# Patient Record
Sex: FEMALE | Race: WHITE | Employment: OTHER | URBAN - METROPOLITAN AREA
[De-identification: names, ages, dates, MRNs, and addresses within clinical notes are randomized per-mention and may not be internally consistent; named-entity substitution may affect disease eponyms.]

---

## 2017-01-11 RX ORDER — HYDROMORPHONE HYDROCHLORIDE 2 MG/1
2 TABLET ORAL
Qty: 100 TABLET | Refills: 0 | Status: SHIPPED | OUTPATIENT
Start: 2017-01-11 | End: 2017-02-20 | Stop reason: SDUPTHER

## 2017-01-11 RX ORDER — AMOXICILLIN AND CLAVULANATE POTASSIUM 875; 125 MG/1; MG/1
1 TABLET, FILM COATED ORAL 2 TIMES DAILY
Qty: 20 TABLET | Refills: 0 | Status: SHIPPED | OUTPATIENT
Start: 2017-01-11 | End: 2017-01-21

## 2017-01-23 RX ORDER — DICLOFENAC SODIUM AND MISOPROSTOL 75; 200 MG/1; UG/1
TABLET, DELAYED RELEASE ORAL
Qty: 60 TABLET | Refills: 0 | Status: SHIPPED | OUTPATIENT
Start: 2017-01-23 | End: 2017-01-26 | Stop reason: SDUPTHER

## 2017-01-26 RX ORDER — DICLOFENAC SODIUM AND MISOPROSTOL 75; 200 MG/1; UG/1
TABLET, DELAYED RELEASE ORAL
Qty: 60 TABLET | Refills: 3 | Status: SHIPPED | OUTPATIENT
Start: 2017-01-26 | End: 2017-04-07 | Stop reason: SDUPTHER

## 2017-02-17 RX ORDER — ATENOLOL 100 MG/1
TABLET ORAL
Qty: 90 TABLET | Refills: 0 | Status: SHIPPED | OUTPATIENT
Start: 2017-02-17 | End: 2017-05-25 | Stop reason: ALTCHOICE

## 2017-02-21 RX ORDER — HYDROMORPHONE HYDROCHLORIDE 2 MG/1
2 TABLET ORAL
Qty: 100 TABLET | Refills: 0 | Status: SHIPPED | OUTPATIENT
Start: 2017-02-21 | End: 2017-06-08 | Stop reason: SDUPTHER

## 2017-02-21 RX ORDER — CEPHALEXIN 500 MG/1
500 CAPSULE ORAL 3 TIMES DAILY
Qty: 30 CAPSULE | Refills: 0 | Status: SHIPPED | OUTPATIENT
Start: 2017-02-21 | End: 2017-03-30 | Stop reason: SDUPTHER

## 2017-02-21 RX ORDER — OMEPRAZOLE 40 MG/1
CAPSULE, DELAYED RELEASE ORAL
Qty: 90 CAPSULE | Refills: 0 | Status: SHIPPED | OUTPATIENT
Start: 2017-02-21 | End: 2017-04-07 | Stop reason: SDUPTHER

## 2017-02-27 RX ORDER — SITAGLIPTIN 100 MG/1
TABLET, FILM COATED ORAL
Qty: 90 TABLET | Refills: 0 | Status: SHIPPED | OUTPATIENT
Start: 2017-02-27 | End: 2017-04-07 | Stop reason: SDUPTHER

## 2017-03-06 RX ORDER — GABAPENTIN 300 MG/1
CAPSULE ORAL
Qty: 60 CAPSULE | Refills: 0 | Status: SHIPPED | OUTPATIENT
Start: 2017-03-06 | End: 2017-04-07 | Stop reason: SDUPTHER

## 2017-03-10 RX ORDER — GLYBURIDE 5 MG/1
TABLET ORAL
Qty: 60 TABLET | Refills: 3 | Status: SHIPPED | OUTPATIENT
Start: 2017-03-10 | End: 2017-04-07 | Stop reason: SDUPTHER

## 2017-03-30 ENCOUNTER — TELEPHONE (OUTPATIENT)
Dept: FAMILY MEDICINE CLINIC | Age: 62
End: 2017-03-30

## 2017-03-30 RX ORDER — CEPHALEXIN 500 MG/1
500 CAPSULE ORAL 3 TIMES DAILY
Qty: 30 CAPSULE | Refills: 0 | Status: SHIPPED | OUTPATIENT
Start: 2017-03-30 | End: 2017-04-09

## 2017-03-30 RX ORDER — FLUCONAZOLE 100 MG/1
100 TABLET ORAL DAILY
Qty: 2 TABLET | Refills: 0 | Status: ON HOLD | OUTPATIENT
Start: 2017-03-30 | End: 2017-07-03 | Stop reason: ALTCHOICE

## 2017-04-10 RX ORDER — DICLOFENAC SODIUM AND MISOPROSTOL 75; 200 MG/1; UG/1
TABLET, DELAYED RELEASE ORAL
Qty: 180 TABLET | Refills: 0 | Status: SHIPPED | OUTPATIENT
Start: 2017-04-10 | End: 2017-04-24 | Stop reason: SDUPTHER

## 2017-04-10 RX ORDER — ATENOLOL 100 MG/1
TABLET ORAL
Qty: 90 TABLET | Refills: 0 | Status: SHIPPED | OUTPATIENT
Start: 2017-04-10 | End: 2017-06-29 | Stop reason: SDUPTHER

## 2017-04-10 RX ORDER — OMEPRAZOLE 40 MG/1
CAPSULE, DELAYED RELEASE ORAL
Qty: 90 CAPSULE | Refills: 0 | Status: SHIPPED | OUTPATIENT
Start: 2017-04-10 | End: 2017-06-29 | Stop reason: SDUPTHER

## 2017-04-10 RX ORDER — AMLODIPINE BESYLATE 5 MG/1
TABLET ORAL
Qty: 90 TABLET | Refills: 0 | Status: SHIPPED | OUTPATIENT
Start: 2017-04-10 | End: 2017-05-25 | Stop reason: ALTCHOICE

## 2017-04-10 RX ORDER — GLYBURIDE 5 MG/1
TABLET ORAL
Qty: 180 TABLET | Refills: 0 | Status: SHIPPED | OUTPATIENT
Start: 2017-04-10 | End: 2017-04-24 | Stop reason: SDUPTHER

## 2017-04-10 RX ORDER — GABAPENTIN 300 MG/1
CAPSULE ORAL
Qty: 180 CAPSULE | Refills: 0 | Status: SHIPPED | OUTPATIENT
Start: 2017-04-10 | End: 2017-05-25 | Stop reason: ALTCHOICE

## 2017-04-11 RX ORDER — AMLODIPINE BESYLATE 5 MG/1
TABLET ORAL
Qty: 30 TABLET | Refills: 3 | Status: ON HOLD | OUTPATIENT
Start: 2017-04-11 | End: 2017-07-02 | Stop reason: CLARIF

## 2017-04-11 RX ORDER — GABAPENTIN 300 MG/1
CAPSULE ORAL
Qty: 60 CAPSULE | Refills: 3 | Status: SHIPPED | OUTPATIENT
Start: 2017-04-11 | End: 2017-10-16 | Stop reason: SDUPTHER

## 2017-04-24 RX ORDER — DICLOFENAC SODIUM AND MISOPROSTOL 75; 200 MG/1; UG/1
TABLET, DELAYED RELEASE ORAL
Qty: 180 TABLET | Refills: 0 | Status: ON HOLD | OUTPATIENT
Start: 2017-04-24 | End: 2017-07-03 | Stop reason: ALTCHOICE

## 2017-04-24 RX ORDER — GLYBURIDE 5 MG/1
TABLET ORAL
Qty: 180 TABLET | Refills: 0 | Status: SHIPPED | OUTPATIENT
Start: 2017-04-24 | End: 2017-08-03 | Stop reason: SDUPTHER

## 2017-05-11 RX ORDER — CEPHALEXIN 500 MG/1
500 CAPSULE ORAL 3 TIMES DAILY
Qty: 30 CAPSULE | Refills: 0 | Status: SHIPPED | OUTPATIENT
Start: 2017-05-11 | End: 2017-05-25 | Stop reason: ALTCHOICE

## 2017-05-25 ENCOUNTER — OFFICE VISIT (OUTPATIENT)
Dept: FAMILY MEDICINE CLINIC | Age: 62
End: 2017-05-25
Payer: COMMERCIAL

## 2017-05-25 VITALS
RESPIRATION RATE: 16 BRPM | WEIGHT: 293 LBS | HEART RATE: 72 BPM | SYSTOLIC BLOOD PRESSURE: 140 MMHG | BODY MASS INDEX: 58.32 KG/M2 | DIASTOLIC BLOOD PRESSURE: 80 MMHG | OXYGEN SATURATION: 98 %

## 2017-05-25 DIAGNOSIS — L03.116 CELLULITIS OF LEFT LOWER EXTREMITY: Primary | ICD-10-CM

## 2017-05-25 DIAGNOSIS — I10 ESSENTIAL HYPERTENSION: ICD-10-CM

## 2017-05-25 DIAGNOSIS — R60.0 LOCALIZED EDEMA: ICD-10-CM

## 2017-05-25 PROCEDURE — 99213 OFFICE O/P EST LOW 20 MIN: CPT | Performed by: FAMILY MEDICINE

## 2017-05-25 RX ORDER — LISINOPRIL AND HYDROCHLOROTHIAZIDE 25; 20 MG/1; MG/1
1 TABLET ORAL DAILY
Qty: 30 TABLET | Refills: 3 | Status: ON HOLD | OUTPATIENT
Start: 2017-05-25 | End: 2017-07-02

## 2017-05-25 RX ORDER — DOXYCYCLINE HYCLATE 100 MG
100 TABLET ORAL 2 TIMES DAILY
Qty: 30 TABLET | Refills: 0 | Status: SHIPPED | OUTPATIENT
Start: 2017-05-25 | End: 2017-06-06 | Stop reason: SDUPTHER

## 2017-06-05 ENCOUNTER — TELEPHONE (OUTPATIENT)
Dept: FAMILY MEDICINE CLINIC | Age: 62
End: 2017-06-05

## 2017-06-06 RX ORDER — DOXYCYCLINE HYCLATE 100 MG
100 TABLET ORAL 2 TIMES DAILY
Qty: 30 TABLET | Refills: 0 | Status: SHIPPED | OUTPATIENT
Start: 2017-06-06 | End: 2017-06-23 | Stop reason: SDUPTHER

## 2017-06-08 RX ORDER — METHOCARBAMOL 750 MG/1
TABLET, FILM COATED ORAL
Qty: 60 TABLET | Refills: 3 | Status: SHIPPED | OUTPATIENT
Start: 2017-06-08 | End: 2017-12-06 | Stop reason: SDUPTHER

## 2017-06-08 RX ORDER — HYDROMORPHONE HYDROCHLORIDE 2 MG/1
2 TABLET ORAL
Qty: 100 TABLET | Refills: 0 | Status: SHIPPED | OUTPATIENT
Start: 2017-06-08 | End: 2017-08-15 | Stop reason: SDUPTHER

## 2017-06-19 RX ORDER — SITAGLIPTIN 100 MG/1
TABLET, FILM COATED ORAL
Qty: 90 TABLET | Refills: 0 | Status: SHIPPED | OUTPATIENT
Start: 2017-06-19 | End: 2017-08-03 | Stop reason: SDUPTHER

## 2017-06-23 RX ORDER — DOXYCYCLINE HYCLATE 100 MG
100 TABLET ORAL 2 TIMES DAILY
Qty: 30 TABLET | Refills: 0 | Status: SHIPPED | OUTPATIENT
Start: 2017-06-23 | End: 2017-07-08

## 2017-06-26 ENCOUNTER — TELEPHONE (OUTPATIENT)
Dept: FAMILY MEDICINE CLINIC | Age: 62
End: 2017-06-26

## 2017-06-26 RX ORDER — HYDROCHLOROTHIAZIDE 25 MG/1
25 TABLET ORAL DAILY
Qty: 30 TABLET | Refills: 3 | Status: SHIPPED | OUTPATIENT
Start: 2017-06-26 | End: 2017-08-11 | Stop reason: SDUPTHER

## 2017-06-29 RX ORDER — GABAPENTIN 300 MG/1
CAPSULE ORAL
Qty: 180 CAPSULE | Refills: 0 | Status: SHIPPED | OUTPATIENT
Start: 2017-06-29 | End: 2017-10-16 | Stop reason: SDUPTHER

## 2017-06-29 RX ORDER — AMLODIPINE BESYLATE 5 MG/1
TABLET ORAL
Qty: 90 TABLET | Refills: 0 | Status: ON HOLD | OUTPATIENT
Start: 2017-06-29 | End: 2017-07-02 | Stop reason: CLARIF

## 2017-06-29 RX ORDER — OMEPRAZOLE 40 MG/1
CAPSULE, DELAYED RELEASE ORAL
Qty: 90 CAPSULE | Refills: 0 | Status: SHIPPED | OUTPATIENT
Start: 2017-06-29 | End: 2017-08-03 | Stop reason: SDUPTHER

## 2017-06-29 RX ORDER — ATENOLOL 100 MG/1
TABLET ORAL
Qty: 90 TABLET | Refills: 0 | Status: ON HOLD | OUTPATIENT
Start: 2017-06-29 | End: 2017-07-05

## 2017-07-02 ENCOUNTER — APPOINTMENT (OUTPATIENT)
Dept: GENERAL RADIOLOGY | Age: 62
DRG: 872 | End: 2017-07-02
Payer: COMMERCIAL

## 2017-07-02 ENCOUNTER — APPOINTMENT (OUTPATIENT)
Dept: CT IMAGING | Age: 62
DRG: 872 | End: 2017-07-02
Payer: COMMERCIAL

## 2017-07-02 ENCOUNTER — HOSPITAL ENCOUNTER (INPATIENT)
Age: 62
LOS: 3 days | Discharge: HOME | DRG: 872 | End: 2017-07-05
Attending: EMERGENCY MEDICINE | Admitting: INTERNAL MEDICINE
Payer: COMMERCIAL

## 2017-07-02 DIAGNOSIS — N30.01 ACUTE CYSTITIS WITH HEMATURIA: ICD-10-CM

## 2017-07-02 DIAGNOSIS — D72.828 GRANULOCYTOSIS: ICD-10-CM

## 2017-07-02 DIAGNOSIS — N28.9 RENAL INSUFFICIENCY: ICD-10-CM

## 2017-07-02 DIAGNOSIS — R10.84 GENERALIZED ABDOMINAL PAIN: ICD-10-CM

## 2017-07-02 DIAGNOSIS — R11.11 VOMITING WITHOUT NAUSEA, INTRACTABILITY OF VOMITING NOT SPECIFIED, UNSPECIFIED VOMITING TYPE: Primary | ICD-10-CM

## 2017-07-02 DIAGNOSIS — R65.10 SIRS (SYSTEMIC INFLAMMATORY RESPONSE SYNDROME) (HCC): ICD-10-CM

## 2017-07-02 DIAGNOSIS — N20.0 NEPHROLITHIASIS: ICD-10-CM

## 2017-07-02 PROBLEM — N30.00 ACUTE CYSTITIS: Status: ACTIVE | Noted: 2017-07-02

## 2017-07-02 PROBLEM — R11.2 NAUSEA AND VOMITING: Status: ACTIVE | Noted: 2017-07-02

## 2017-07-02 LAB
% CKMB: 1.7 % (ref 0–3)
-: ABNORMAL
ABSOLUTE EOS #: 0 K/UL (ref 0–0.4)
ABSOLUTE EOS #: 0.21 K/UL (ref 0–0.4)
ABSOLUTE LYMPH #: 0.62 K/UL (ref 1–4.8)
ABSOLUTE LYMPH #: 0.74 K/UL (ref 1–4.8)
ABSOLUTE MONO #: 0.83 K/UL (ref 0.2–0.8)
ABSOLUTE MONO #: 0.98 K/UL (ref 0.2–0.8)
ALBUMIN SERPL-MCNC: 4.1 G/DL (ref 3.5–5.2)
ALBUMIN/GLOBULIN RATIO: NORMAL (ref 1–2.5)
ALP BLD-CCNC: 75 U/L (ref 35–104)
ALT SERPL-CCNC: 21 U/L (ref 5–33)
AMORPHOUS: ABNORMAL
AMYLASE: 36 U/L (ref 28–100)
ANION GAP SERPL CALCULATED.3IONS-SCNC: 12 MMOL/L (ref 9–17)
ANION GAP SERPL CALCULATED.3IONS-SCNC: 16 MMOL/L (ref 9–17)
AST SERPL-CCNC: 20 U/L
BACTERIA: ABNORMAL
BASOPHILS # BLD: 0 %
BASOPHILS # BLD: 2 %
BASOPHILS ABSOLUTE: 0 K/UL (ref 0–0.2)
BASOPHILS ABSOLUTE: 0.49 K/UL (ref 0–0.2)
BILIRUB SERPL-MCNC: 0.47 MG/DL (ref 0.3–1.2)
BILIRUBIN DIRECT: 0.13 MG/DL
BILIRUBIN URINE: NEGATIVE
BILIRUBIN, INDIRECT: 0.34 MG/DL (ref 0–1)
BUN BLDV-MCNC: 26 MG/DL (ref 8–23)
BUN BLDV-MCNC: 28 MG/DL (ref 8–23)
BUN/CREAT BLD: 19 (ref 9–20)
BUN/CREAT BLD: 19 (ref 9–20)
CALCIUM SERPL-MCNC: 8.5 MG/DL (ref 8.6–10.4)
CALCIUM SERPL-MCNC: 9.5 MG/DL (ref 8.6–10.4)
CASTS UA: ABNORMAL /LPF
CHLORIDE BLD-SCNC: 102 MMOL/L (ref 98–107)
CHLORIDE BLD-SCNC: 104 MMOL/L (ref 98–107)
CK MB: <1 NG/ML
CKMB INTERPRETATION: NORMAL
CO2: 21 MMOL/L (ref 20–31)
CO2: 23 MMOL/L (ref 20–31)
COLOR: YELLOW
COMMENT UA: ABNORMAL
CREAT SERPL-MCNC: 1.39 MG/DL (ref 0.5–0.9)
CREAT SERPL-MCNC: 1.46 MG/DL (ref 0.5–0.9)
CRYSTALS, UA: ABNORMAL /HPF
DIFFERENTIAL TYPE: ABNORMAL
DIFFERENTIAL TYPE: ABNORMAL
EOSINOPHILS RELATIVE PERCENT: 0 %
EOSINOPHILS RELATIVE PERCENT: 1 %
EPITHELIAL CELLS UA: ABNORMAL /HPF
GFR AFRICAN AMERICAN: 44 ML/MIN
GFR AFRICAN AMERICAN: 46 ML/MIN
GFR NON-AFRICAN AMERICAN: 36 ML/MIN
GFR NON-AFRICAN AMERICAN: 38 ML/MIN
GFR SERPL CREATININE-BSD FRML MDRD: ABNORMAL ML/MIN/{1.73_M2}
GLOBULIN: NORMAL G/DL (ref 1.5–3.8)
GLUCOSE BLD-MCNC: 146 MG/DL (ref 65–105)
GLUCOSE BLD-MCNC: 156 MG/DL (ref 65–105)
GLUCOSE BLD-MCNC: 163 MG/DL (ref 65–105)
GLUCOSE BLD-MCNC: 192 MG/DL (ref 65–105)
GLUCOSE BLD-MCNC: 197 MG/DL (ref 70–99)
GLUCOSE BLD-MCNC: 220 MG/DL (ref 65–105)
GLUCOSE BLD-MCNC: 234 MG/DL (ref 70–99)
GLUCOSE URINE: NEGATIVE
HCT VFR BLD CALC: 35.2 % (ref 36–46)
HCT VFR BLD CALC: 41.7 % (ref 36–46)
HEMOGLOBIN: 12.2 G/DL (ref 12–16)
HEMOGLOBIN: 13.7 G/DL (ref 12–16)
KETONES, URINE: NEGATIVE
LACTIC ACID, WHOLE BLOOD: NORMAL MMOL/L (ref 0.7–2.1)
LACTIC ACID: 1.3 MMOL/L (ref 0.5–2.2)
LACTIC ACID: 2 MMOL/L (ref 0.5–2.2)
LEUKOCYTE ESTERASE, URINE: ABNORMAL
LIPASE: 21 U/L (ref 13–60)
LYMPHOCYTES # BLD: 3 %
LYMPHOCYTES # BLD: 3 %
MCH RBC QN AUTO: 28.8 PG (ref 26–34)
MCH RBC QN AUTO: 29.8 PG (ref 26–34)
MCHC RBC AUTO-ENTMCNC: 32.8 G/DL (ref 31–37)
MCHC RBC AUTO-ENTMCNC: 34.6 G/DL (ref 31–37)
MCV RBC AUTO: 86.1 FL (ref 80–100)
MCV RBC AUTO: 87.8 FL (ref 80–100)
MONOCYTES # BLD: 4 %
MONOCYTES # BLD: 4 %
MORPHOLOGY: ABNORMAL
MUCUS: ABNORMAL
MYOGLOBIN: 87 NG/ML (ref 25–58)
NITRITE, URINE: NEGATIVE
OTHER OBSERVATIONS UA: ABNORMAL
PDW BLD-RTO: 13.5 % (ref 11.5–14.5)
PDW BLD-RTO: 14.5 % (ref 11.5–14.5)
PH UA: 5.5 (ref 5–8)
PLATELET # BLD: 201 K/UL (ref 130–400)
PLATELET # BLD: 212 K/UL (ref 130–400)
PLATELET ESTIMATE: ABNORMAL
PLATELET ESTIMATE: ABNORMAL
PMV BLD AUTO: 8 FL (ref 6–12)
PMV BLD AUTO: ABNORMAL FL (ref 6–12)
POTASSIUM SERPL-SCNC: 4.2 MMOL/L (ref 3.7–5.3)
POTASSIUM SERPL-SCNC: 4.2 MMOL/L (ref 3.7–5.3)
PROTEIN UA: NEGATIVE
RBC # BLD: 4.09 M/UL (ref 4–5.2)
RBC # BLD: 4.75 M/UL (ref 4–5.2)
RBC # BLD: ABNORMAL 10*6/UL
RBC # BLD: ABNORMAL 10*6/UL
RBC UA: ABNORMAL /HPF (ref 0–2)
RENAL EPITHELIAL, UA: ABNORMAL /HPF
SEG NEUTROPHILS: 91 %
SEG NEUTROPHILS: 92 %
SEGMENTED NEUTROPHILS ABSOLUTE COUNT: 19.14 K/UL (ref 1.8–7.7)
SEGMENTED NEUTROPHILS ABSOLUTE COUNT: 22.39 K/UL (ref 1.8–7.7)
SODIUM BLD-SCNC: 139 MMOL/L (ref 135–144)
SODIUM BLD-SCNC: 139 MMOL/L (ref 135–144)
SPECIFIC GRAVITY UA: 1.02 (ref 1–1.03)
TOTAL CK: 59 U/L (ref 26–192)
TOTAL PROTEIN: 7.7 G/DL (ref 6.4–8.3)
TRICHOMONAS: ABNORMAL
TROPONIN INTERP: ABNORMAL
TROPONIN T: <0.03 NG/ML
TURBIDITY: ABNORMAL
URINE HGB: ABNORMAL
UROBILINOGEN, URINE: NORMAL
WBC # BLD: 20.8 K/UL (ref 3.5–11)
WBC # BLD: 24.6 K/UL (ref 3.5–11)
WBC # BLD: ABNORMAL 10*3/UL
WBC # BLD: ABNORMAL 10*3/UL
WBC UA: ABNORMAL /HPF (ref 0–5)
YEAST: ABNORMAL

## 2017-07-02 PROCEDURE — 96375 TX/PRO/DX INJ NEW DRUG ADDON: CPT

## 2017-07-02 PROCEDURE — 87184 SC STD DISK METHOD PER PLATE: CPT

## 2017-07-02 PROCEDURE — 85025 COMPLETE CBC W/AUTO DIFF WBC: CPT

## 2017-07-02 PROCEDURE — 82150 ASSAY OF AMYLASE: CPT

## 2017-07-02 PROCEDURE — 83690 ASSAY OF LIPASE: CPT

## 2017-07-02 PROCEDURE — 6360000002 HC RX W HCPCS: Performed by: EMERGENCY MEDICINE

## 2017-07-02 PROCEDURE — 99285 EMERGENCY DEPT VISIT HI MDM: CPT

## 2017-07-02 PROCEDURE — 87077 CULTURE AEROBIC IDENTIFY: CPT

## 2017-07-02 PROCEDURE — 82553 CREATINE MB FRACTION: CPT

## 2017-07-02 PROCEDURE — 83605 ASSAY OF LACTIC ACID: CPT

## 2017-07-02 PROCEDURE — 81001 URINALYSIS AUTO W/SCOPE: CPT

## 2017-07-02 PROCEDURE — 82947 ASSAY GLUCOSE BLOOD QUANT: CPT

## 2017-07-02 PROCEDURE — 2500000003 HC RX 250 WO HCPCS: Performed by: INTERNAL MEDICINE

## 2017-07-02 PROCEDURE — 87086 URINE CULTURE/COLONY COUNT: CPT

## 2017-07-02 PROCEDURE — 2580000003 HC RX 258: Performed by: INTERNAL MEDICINE

## 2017-07-02 PROCEDURE — 93005 ELECTROCARDIOGRAM TRACING: CPT

## 2017-07-02 PROCEDURE — 84484 ASSAY OF TROPONIN QUANT: CPT

## 2017-07-02 PROCEDURE — 83874 ASSAY OF MYOGLOBIN: CPT

## 2017-07-02 PROCEDURE — 6360000002 HC RX W HCPCS: Performed by: INTERNAL MEDICINE

## 2017-07-02 PROCEDURE — 51701 INSERT BLADDER CATHETER: CPT

## 2017-07-02 PROCEDURE — 74176 CT ABD & PELVIS W/O CONTRAST: CPT

## 2017-07-02 PROCEDURE — 71010 XR CHEST PORTABLE: CPT

## 2017-07-02 PROCEDURE — 96361 HYDRATE IV INFUSION ADD-ON: CPT

## 2017-07-02 PROCEDURE — 6370000000 HC RX 637 (ALT 250 FOR IP): Performed by: INTERNAL MEDICINE

## 2017-07-02 PROCEDURE — 80048 BASIC METABOLIC PNL TOTAL CA: CPT

## 2017-07-02 PROCEDURE — 80076 HEPATIC FUNCTION PANEL: CPT

## 2017-07-02 PROCEDURE — 99223 1ST HOSP IP/OBS HIGH 75: CPT | Performed by: HOSPITALIST

## 2017-07-02 PROCEDURE — C9113 INJ PANTOPRAZOLE SODIUM, VIA: HCPCS | Performed by: EMERGENCY MEDICINE

## 2017-07-02 PROCEDURE — 2580000003 HC RX 258: Performed by: EMERGENCY MEDICINE

## 2017-07-02 PROCEDURE — 82550 ASSAY OF CK (CPK): CPT

## 2017-07-02 PROCEDURE — 36415 COLL VENOUS BLD VENIPUNCTURE: CPT

## 2017-07-02 PROCEDURE — 6370000000 HC RX 637 (ALT 250 FOR IP): Performed by: HOSPITALIST

## 2017-07-02 PROCEDURE — 2060000000 HC ICU INTERMEDIATE R&B

## 2017-07-02 PROCEDURE — 96365 THER/PROPH/DIAG IV INF INIT: CPT

## 2017-07-02 PROCEDURE — 6370000000 HC RX 637 (ALT 250 FOR IP): Performed by: EMERGENCY MEDICINE

## 2017-07-02 PROCEDURE — S0028 INJECTION, FAMOTIDINE, 20 MG: HCPCS | Performed by: INTERNAL MEDICINE

## 2017-07-02 RX ORDER — DEXTROSE MONOHYDRATE 25 G/50ML
12.5 INJECTION, SOLUTION INTRAVENOUS PRN
Status: DISCONTINUED | OUTPATIENT
Start: 2017-07-02 | End: 2017-07-05 | Stop reason: HOSPADM

## 2017-07-02 RX ORDER — GABAPENTIN 300 MG/1
300 CAPSULE ORAL 3 TIMES DAILY
Status: DISCONTINUED | OUTPATIENT
Start: 2017-07-02 | End: 2017-07-05 | Stop reason: HOSPADM

## 2017-07-02 RX ORDER — 0.9 % SODIUM CHLORIDE 0.9 %
1000 INTRAVENOUS SOLUTION INTRAVENOUS ONCE
Status: COMPLETED | OUTPATIENT
Start: 2017-07-02 | End: 2017-07-02

## 2017-07-02 RX ORDER — SODIUM CHLORIDE 0.9 % (FLUSH) 0.9 %
10 SYRINGE (ML) INJECTION EVERY 12 HOURS SCHEDULED
Status: DISCONTINUED | OUTPATIENT
Start: 2017-07-02 | End: 2017-07-05 | Stop reason: HOSPADM

## 2017-07-02 RX ORDER — HYDROMORPHONE HYDROCHLORIDE 2 MG/1
2 TABLET ORAL
Status: DISCONTINUED | OUTPATIENT
Start: 2017-07-02 | End: 2017-07-05 | Stop reason: HOSPADM

## 2017-07-02 RX ORDER — GABAPENTIN 300 MG/1
300 CAPSULE ORAL 2 TIMES DAILY
Status: DISCONTINUED | OUTPATIENT
Start: 2017-07-02 | End: 2017-07-02

## 2017-07-02 RX ORDER — DEXTROSE MONOHYDRATE 50 MG/ML
100 INJECTION, SOLUTION INTRAVENOUS PRN
Status: DISCONTINUED | OUTPATIENT
Start: 2017-07-02 | End: 2017-07-05 | Stop reason: HOSPADM

## 2017-07-02 RX ORDER — HEPARIN SODIUM 5000 [USP'U]/ML
5000 INJECTION, SOLUTION INTRAVENOUS; SUBCUTANEOUS EVERY 8 HOURS SCHEDULED
Status: DISCONTINUED | OUTPATIENT
Start: 2017-07-02 | End: 2017-07-03

## 2017-07-02 RX ORDER — METHOCARBAMOL 750 MG/1
750 TABLET, FILM COATED ORAL 3 TIMES DAILY PRN
Status: DISCONTINUED | OUTPATIENT
Start: 2017-07-02 | End: 2017-07-05 | Stop reason: HOSPADM

## 2017-07-02 RX ORDER — GLYBURIDE 5 MG/1
5 TABLET ORAL
Status: DISCONTINUED | OUTPATIENT
Start: 2017-07-03 | End: 2017-07-05 | Stop reason: HOSPADM

## 2017-07-02 RX ORDER — NICOTINE POLACRILEX 4 MG
15 LOZENGE BUCCAL PRN
Status: DISCONTINUED | OUTPATIENT
Start: 2017-07-02 | End: 2017-07-05 | Stop reason: HOSPADM

## 2017-07-02 RX ORDER — SODIUM CHLORIDE 9 MG/ML
INJECTION, SOLUTION INTRAVENOUS CONTINUOUS
Status: DISCONTINUED | OUTPATIENT
Start: 2017-07-02 | End: 2017-07-03

## 2017-07-02 RX ORDER — HYDROCHLOROTHIAZIDE 25 MG/1
25 TABLET ORAL DAILY
Status: DISCONTINUED | OUTPATIENT
Start: 2017-07-02 | End: 2017-07-02

## 2017-07-02 RX ORDER — SODIUM CHLORIDE 0.9 % (FLUSH) 0.9 %
10 SYRINGE (ML) INJECTION PRN
Status: DISCONTINUED | OUTPATIENT
Start: 2017-07-02 | End: 2017-07-05 | Stop reason: HOSPADM

## 2017-07-02 RX ORDER — 0.9 % SODIUM CHLORIDE 0.9 %
10 VIAL (ML) INJECTION ONCE
Status: COMPLETED | OUTPATIENT
Start: 2017-07-02 | End: 2017-07-02

## 2017-07-02 RX ORDER — ATENOLOL 50 MG/1
100 TABLET ORAL DAILY
Status: DISCONTINUED | OUTPATIENT
Start: 2017-07-02 | End: 2017-07-03

## 2017-07-02 RX ORDER — OXYCODONE HYDROCHLORIDE 15 MG/1
15 TABLET, FILM COATED, EXTENDED RELEASE ORAL 2 TIMES DAILY
Status: DISCONTINUED | OUTPATIENT
Start: 2017-07-02 | End: 2017-07-03

## 2017-07-02 RX ORDER — HYDROMORPHONE HCL 110MG/55ML
2 PATIENT CONTROLLED ANALGESIA SYRINGE INTRAVENOUS ONCE
Status: COMPLETED | OUTPATIENT
Start: 2017-07-02 | End: 2017-07-02

## 2017-07-02 RX ORDER — PANTOPRAZOLE SODIUM 40 MG/1
40 TABLET, DELAYED RELEASE ORAL
Status: DISCONTINUED | OUTPATIENT
Start: 2017-07-03 | End: 2017-07-04

## 2017-07-02 RX ORDER — HYDROMORPHONE HCL 110MG/55ML
2 PATIENT CONTROLLED ANALGESIA SYRINGE INTRAVENOUS
Status: DISCONTINUED | OUTPATIENT
Start: 2017-07-02 | End: 2017-07-05 | Stop reason: HOSPADM

## 2017-07-02 RX ORDER — A/C/E/ZINC/SOD SELENATE/COPPER 5000-60-30
1 TABLET ORAL DAILY
Status: DISCONTINUED | OUTPATIENT
Start: 2017-07-02 | End: 2017-07-05 | Stop reason: HOSPADM

## 2017-07-02 RX ORDER — PANTOPRAZOLE SODIUM 40 MG/10ML
40 INJECTION, POWDER, LYOPHILIZED, FOR SOLUTION INTRAVENOUS ONCE
Status: COMPLETED | OUTPATIENT
Start: 2017-07-02 | End: 2017-07-02

## 2017-07-02 RX ORDER — ACETAMINOPHEN 500 MG
500 TABLET ORAL EVERY 6 HOURS PRN
Status: DISCONTINUED | OUTPATIENT
Start: 2017-07-02 | End: 2017-07-02 | Stop reason: SDUPTHER

## 2017-07-02 RX ORDER — ACETAMINOPHEN 325 MG/1
650 TABLET ORAL EVERY 4 HOURS PRN
Status: DISCONTINUED | OUTPATIENT
Start: 2017-07-02 | End: 2017-07-05 | Stop reason: HOSPADM

## 2017-07-02 RX ORDER — ONDANSETRON 2 MG/ML
4 INJECTION INTRAMUSCULAR; INTRAVENOUS EVERY 6 HOURS PRN
Status: DISCONTINUED | OUTPATIENT
Start: 2017-07-02 | End: 2017-07-05 | Stop reason: HOSPADM

## 2017-07-02 RX ORDER — LISINOPRIL AND HYDROCHLOROTHIAZIDE 25; 20 MG/1; MG/1
1 TABLET ORAL DAILY
Status: DISCONTINUED | OUTPATIENT
Start: 2017-07-02 | End: 2017-07-02

## 2017-07-02 RX ORDER — ONDANSETRON 2 MG/ML
8 INJECTION INTRAMUSCULAR; INTRAVENOUS ONCE
Status: COMPLETED | OUTPATIENT
Start: 2017-07-02 | End: 2017-07-02

## 2017-07-02 RX ORDER — AMLODIPINE BESYLATE 5 MG/1
5 TABLET ORAL DAILY
Status: DISCONTINUED | OUTPATIENT
Start: 2017-07-02 | End: 2017-07-02

## 2017-07-02 RX ADMIN — ATENOLOL 100 MG: 50 TABLET ORAL at 08:40

## 2017-07-02 RX ADMIN — GABAPENTIN 300 MG: 300 CAPSULE ORAL at 13:30

## 2017-07-02 RX ADMIN — INSULIN LISPRO 2 UNITS: 100 INJECTION, SOLUTION INTRAVENOUS; SUBCUTANEOUS at 13:01

## 2017-07-02 RX ADMIN — LINAGLIPTIN 5 MG: 5 TABLET, FILM COATED ORAL at 16:32

## 2017-07-02 RX ADMIN — Medication 10 ML: at 00:57

## 2017-07-02 RX ADMIN — HEPARIN SODIUM 5000 UNITS: 5000 INJECTION, SOLUTION INTRAVENOUS; SUBCUTANEOUS at 06:30

## 2017-07-02 RX ADMIN — HEPARIN SODIUM 5000 UNITS: 5000 INJECTION, SOLUTION INTRAVENOUS; SUBCUTANEOUS at 21:06

## 2017-07-02 RX ADMIN — SODIUM CHLORIDE: 9 INJECTION, SOLUTION INTRAVENOUS at 14:59

## 2017-07-02 RX ADMIN — INSULIN LISPRO 2 UNITS: 100 INJECTION, SOLUTION INTRAVENOUS; SUBCUTANEOUS at 09:30

## 2017-07-02 RX ADMIN — SODIUM CHLORIDE 1000 ML: 9 INJECTION, SOLUTION INTRAVENOUS at 00:58

## 2017-07-02 RX ADMIN — ACETAMINOPHEN 650 MG: 325 TABLET ORAL at 14:49

## 2017-07-02 RX ADMIN — Medication 1 TABLET: at 16:32

## 2017-07-02 RX ADMIN — INSULIN LISPRO 1 UNITS: 100 INJECTION, SOLUTION INTRAVENOUS; SUBCUTANEOUS at 21:06

## 2017-07-02 RX ADMIN — GABAPENTIN 300 MG: 300 CAPSULE ORAL at 08:40

## 2017-07-02 RX ADMIN — FAMOTIDINE 20 MG: 10 INJECTION INTRAVENOUS at 08:40

## 2017-07-02 RX ADMIN — FAMOTIDINE 20 MG: 10 INJECTION INTRAVENOUS at 21:06

## 2017-07-02 RX ADMIN — HYDROCHLOROTHIAZIDE 25 MG: 25 TABLET ORAL at 13:30

## 2017-07-02 RX ADMIN — CEFEPIME HYDROCHLORIDE 2 G: 2 INJECTION, POWDER, FOR SOLUTION INTRAVENOUS at 13:08

## 2017-07-02 RX ADMIN — SODIUM CHLORIDE: 9 INJECTION, SOLUTION INTRAVENOUS at 04:12

## 2017-07-02 RX ADMIN — GABAPENTIN 300 MG: 300 CAPSULE ORAL at 21:06

## 2017-07-02 RX ADMIN — CEFEPIME HYDROCHLORIDE 2 G: 2 INJECTION, POWDER, FOR SOLUTION INTRAVENOUS at 02:07

## 2017-07-02 RX ADMIN — INSULIN LISPRO 2 UNITS: 100 INJECTION, SOLUTION INTRAVENOUS; SUBCUTANEOUS at 17:38

## 2017-07-02 RX ADMIN — IBUPROFEN 600 MG: 100 SUSPENSION ORAL at 01:33

## 2017-07-02 RX ADMIN — HEPARIN SODIUM 5000 UNITS: 5000 INJECTION, SOLUTION INTRAVENOUS; SUBCUTANEOUS at 13:30

## 2017-07-02 RX ADMIN — HYDROMORPHONE HYDROCHLORIDE 2 MG: 2 INJECTION, SOLUTION INTRAMUSCULAR; INTRAVENOUS; SUBCUTANEOUS at 01:33

## 2017-07-02 RX ADMIN — PANTOPRAZOLE SODIUM 40 MG: 40 INJECTION, POWDER, FOR SOLUTION INTRAVENOUS at 00:57

## 2017-07-02 RX ADMIN — ONDANSETRON 8 MG: 2 INJECTION INTRAMUSCULAR; INTRAVENOUS at 00:57

## 2017-07-02 RX ADMIN — ACETAMINOPHEN 650 MG: 325 TABLET ORAL at 03:23

## 2017-07-02 ASSESSMENT — ENCOUNTER SYMPTOMS
CONSTIPATION: 0
BLOOD IN STOOL: 0
VOMITING: 1
DIARRHEA: 0
NAUSEA: 1
RESPIRATORY NEGATIVE: 1
ABDOMINAL PAIN: 1
ANAL BLEEDING: 0

## 2017-07-02 ASSESSMENT — PAIN SCALES - GENERAL
PAINLEVEL_OUTOF10: 10
PAINLEVEL_OUTOF10: 5

## 2017-07-03 LAB
ABSOLUTE EOS #: 0.1 K/UL (ref 0–0.4)
ABSOLUTE LYMPH #: 1 K/UL (ref 1–4.8)
ABSOLUTE MONO #: 0.9 K/UL (ref 0.2–0.8)
ANION GAP SERPL CALCULATED.3IONS-SCNC: 10 MMOL/L (ref 9–17)
BASOPHILS # BLD: 0 %
BASOPHILS ABSOLUTE: 0 K/UL (ref 0–0.2)
BUN BLDV-MCNC: 21 MG/DL (ref 8–23)
BUN/CREAT BLD: 19 (ref 9–20)
CALCIUM SERPL-MCNC: 8.5 MG/DL (ref 8.6–10.4)
CHLORIDE BLD-SCNC: 105 MMOL/L (ref 98–107)
CO2: 24 MMOL/L (ref 20–31)
CREAT SERPL-MCNC: 1.11 MG/DL (ref 0.5–0.9)
DIFFERENTIAL TYPE: ABNORMAL
EKG ATRIAL RATE: 97 BPM
EKG P AXIS: 2 DEGREES
EKG P-R INTERVAL: 174 MS
EKG Q-T INTERVAL: 370 MS
EKG QRS DURATION: 100 MS
EKG QTC CALCULATION (BAZETT): 469 MS
EKG R AXIS: -52 DEGREES
EKG T AXIS: 58 DEGREES
EKG VENTRICULAR RATE: 97 BPM
EOSINOPHILS RELATIVE PERCENT: 1 %
GFR AFRICAN AMERICAN: 60 ML/MIN
GFR NON-AFRICAN AMERICAN: 49 ML/MIN
GFR SERPL CREATININE-BSD FRML MDRD: ABNORMAL ML/MIN/{1.73_M2}
GFR SERPL CREATININE-BSD FRML MDRD: ABNORMAL ML/MIN/{1.73_M2}
GLUCOSE BLD-MCNC: 132 MG/DL (ref 65–105)
GLUCOSE BLD-MCNC: 137 MG/DL (ref 65–105)
GLUCOSE BLD-MCNC: 146 MG/DL (ref 70–99)
GLUCOSE BLD-MCNC: 165 MG/DL (ref 65–105)
GLUCOSE BLD-MCNC: 198 MG/DL (ref 65–105)
HCT VFR BLD CALC: 32.8 % (ref 36–46)
HCT VFR BLD CALC: 35.1 % (ref 36–46)
HEMOGLOBIN: 11 G/DL (ref 12–16)
HEMOGLOBIN: 11.8 G/DL (ref 12–16)
LACTIC ACID, WHOLE BLOOD: NORMAL MMOL/L (ref 0.7–2.1)
LACTIC ACID: 0.7 MMOL/L (ref 0.5–2.2)
LV EF: 65 %
LVEF MODALITY: NORMAL
LYMPHOCYTES # BLD: 10 %
MCH RBC QN AUTO: 29.4 PG (ref 26–34)
MCH RBC QN AUTO: 29.5 PG (ref 26–34)
MCHC RBC AUTO-ENTMCNC: 33.5 G/DL (ref 31–37)
MCHC RBC AUTO-ENTMCNC: 33.5 G/DL (ref 31–37)
MCV RBC AUTO: 87.8 FL (ref 80–100)
MCV RBC AUTO: 87.9 FL (ref 80–100)
MONOCYTES # BLD: 9 %
PARTIAL THROMBOPLASTIN TIME: 40.2 SEC (ref 23–31)
PDW BLD-RTO: 14.6 % (ref 11.5–14.5)
PDW BLD-RTO: 15 % (ref 11.5–14.5)
PLATELET # BLD: 153 K/UL (ref 130–400)
PLATELET # BLD: 154 K/UL (ref 130–400)
PLATELET ESTIMATE: ABNORMAL
PMV BLD AUTO: 7.8 FL (ref 6–12)
PMV BLD AUTO: 8.1 FL (ref 6–12)
POTASSIUM SERPL-SCNC: 3.9 MMOL/L (ref 3.7–5.3)
RBC # BLD: 3.73 M/UL (ref 4–5.2)
RBC # BLD: 4 M/UL (ref 4–5.2)
RBC # BLD: ABNORMAL 10*6/UL
SEG NEUTROPHILS: 80 %
SEGMENTED NEUTROPHILS ABSOLUTE COUNT: 7.9 K/UL (ref 1.8–7.7)
SODIUM BLD-SCNC: 139 MMOL/L (ref 135–144)
TSH SERPL DL<=0.05 MIU/L-ACNC: 1.24 MIU/L (ref 0.3–5)
WBC # BLD: 9 K/UL (ref 3.5–11)
WBC # BLD: 9.8 K/UL (ref 3.5–11)
WBC # BLD: ABNORMAL 10*3/UL

## 2017-07-03 PROCEDURE — 99232 SBSQ HOSP IP/OBS MODERATE 35: CPT | Performed by: HOSPITALIST

## 2017-07-03 PROCEDURE — 6360000002 HC RX W HCPCS: Performed by: INTERNAL MEDICINE

## 2017-07-03 PROCEDURE — 2580000003 HC RX 258: Performed by: INTERNAL MEDICINE

## 2017-07-03 PROCEDURE — 93005 ELECTROCARDIOGRAM TRACING: CPT

## 2017-07-03 PROCEDURE — 6370000000 HC RX 637 (ALT 250 FOR IP): Performed by: INTERNAL MEDICINE

## 2017-07-03 PROCEDURE — 85027 COMPLETE CBC AUTOMATED: CPT

## 2017-07-03 PROCEDURE — 2500000003 HC RX 250 WO HCPCS: Performed by: INTERNAL MEDICINE

## 2017-07-03 PROCEDURE — 6370000000 HC RX 637 (ALT 250 FOR IP): Performed by: HOSPITALIST

## 2017-07-03 PROCEDURE — 83605 ASSAY OF LACTIC ACID: CPT

## 2017-07-03 PROCEDURE — 94762 N-INVAS EAR/PLS OXIMTRY CONT: CPT

## 2017-07-03 PROCEDURE — 85730 THROMBOPLASTIN TIME PARTIAL: CPT

## 2017-07-03 PROCEDURE — 2500000003 HC RX 250 WO HCPCS: Performed by: HOSPITALIST

## 2017-07-03 PROCEDURE — 36415 COLL VENOUS BLD VENIPUNCTURE: CPT

## 2017-07-03 PROCEDURE — 82947 ASSAY GLUCOSE BLOOD QUANT: CPT

## 2017-07-03 PROCEDURE — 80048 BASIC METABOLIC PNL TOTAL CA: CPT

## 2017-07-03 PROCEDURE — 93306 TTE W/DOPPLER COMPLETE: CPT

## 2017-07-03 PROCEDURE — S0028 INJECTION, FAMOTIDINE, 20 MG: HCPCS | Performed by: INTERNAL MEDICINE

## 2017-07-03 PROCEDURE — 84443 ASSAY THYROID STIM HORMONE: CPT

## 2017-07-03 PROCEDURE — 2060000000 HC ICU INTERMEDIATE R&B

## 2017-07-03 PROCEDURE — 85025 COMPLETE CBC W/AUTO DIFF WBC: CPT

## 2017-07-03 RX ORDER — ATENOLOL 50 MG/1
50 TABLET ORAL DAILY
Status: DISCONTINUED | OUTPATIENT
Start: 2017-07-04 | End: 2017-07-05 | Stop reason: HOSPADM

## 2017-07-03 RX ORDER — HEPARIN SODIUM 10000 [USP'U]/100ML
1000 INJECTION, SOLUTION INTRAVENOUS CONTINUOUS
Status: DISCONTINUED | OUTPATIENT
Start: 2017-07-03 | End: 2017-07-03

## 2017-07-03 RX ORDER — HEPARIN SODIUM 1000 [USP'U]/ML
2000 INJECTION, SOLUTION INTRAVENOUS; SUBCUTANEOUS PRN
Status: DISCONTINUED | OUTPATIENT
Start: 2017-07-03 | End: 2017-07-03 | Stop reason: ALTCHOICE

## 2017-07-03 RX ORDER — ZOLPIDEM TARTRATE 5 MG/1
5 TABLET ORAL NIGHTLY PRN
Status: DISCONTINUED | OUTPATIENT
Start: 2017-07-03 | End: 2017-07-05 | Stop reason: HOSPADM

## 2017-07-03 RX ORDER — HEPARIN SODIUM 1000 [USP'U]/ML
4000 INJECTION, SOLUTION INTRAVENOUS; SUBCUTANEOUS PRN
Status: DISCONTINUED | OUTPATIENT
Start: 2017-07-03 | End: 2017-07-03 | Stop reason: ALTCHOICE

## 2017-07-03 RX ORDER — FAMOTIDINE 20 MG/1
20 TABLET, FILM COATED ORAL 2 TIMES DAILY
Status: DISCONTINUED | OUTPATIENT
Start: 2017-07-03 | End: 2017-07-05 | Stop reason: HOSPADM

## 2017-07-03 RX ORDER — HEPARIN SODIUM 1000 [USP'U]/ML
4000 INJECTION, SOLUTION INTRAVENOUS; SUBCUTANEOUS ONCE
Status: COMPLETED | OUTPATIENT
Start: 2017-07-03 | End: 2017-07-03

## 2017-07-03 RX ORDER — AMIODARONE HYDROCHLORIDE 200 MG/1
200 TABLET ORAL DAILY
Status: DISCONTINUED | OUTPATIENT
Start: 2017-07-03 | End: 2017-07-05 | Stop reason: HOSPADM

## 2017-07-03 RX ADMIN — ACETAMINOPHEN 650 MG: 325 TABLET ORAL at 00:35

## 2017-07-03 RX ADMIN — Medication 10 ML: at 20:53

## 2017-07-03 RX ADMIN — HEPARIN SODIUM 4000 UNITS: 1000 INJECTION, SOLUTION INTRAVENOUS; SUBCUTANEOUS at 17:31

## 2017-07-03 RX ADMIN — HEPARIN SODIUM 5000 UNITS: 5000 INJECTION, SOLUTION INTRAVENOUS; SUBCUTANEOUS at 13:18

## 2017-07-03 RX ADMIN — GABAPENTIN 300 MG: 300 CAPSULE ORAL at 13:17

## 2017-07-03 RX ADMIN — MICONAZORB AF ANTIFUNGAL POWDER: 1.42 POWDER TOPICAL at 11:56

## 2017-07-03 RX ADMIN — PANTOPRAZOLE SODIUM 40 MG: 40 TABLET, DELAYED RELEASE ORAL at 06:18

## 2017-07-03 RX ADMIN — ZOLPIDEM TARTRATE 5 MG: 5 TABLET ORAL at 23:05

## 2017-07-03 RX ADMIN — INSULIN LISPRO 2 UNITS: 100 INJECTION, SOLUTION INTRAVENOUS; SUBCUTANEOUS at 07:44

## 2017-07-03 RX ADMIN — GLYBURIDE 5 MG: 5 TABLET ORAL at 07:40

## 2017-07-03 RX ADMIN — CEFEPIME HYDROCHLORIDE 2 G: 2 INJECTION, POWDER, FOR SOLUTION INTRAVENOUS at 01:41

## 2017-07-03 RX ADMIN — Medication 1 TABLET: at 07:39

## 2017-07-03 RX ADMIN — INSULIN LISPRO 1 UNITS: 100 INJECTION, SOLUTION INTRAVENOUS; SUBCUTANEOUS at 20:45

## 2017-07-03 RX ADMIN — SODIUM CHLORIDE: 9 INJECTION, SOLUTION INTRAVENOUS at 01:43

## 2017-07-03 RX ADMIN — LINAGLIPTIN 5 MG: 5 TABLET, FILM COATED ORAL at 07:39

## 2017-07-03 RX ADMIN — FAMOTIDINE 20 MG: 20 TABLET ORAL at 23:30

## 2017-07-03 RX ADMIN — AMIODARONE HYDROCHLORIDE 200 MG: 200 TABLET ORAL at 20:45

## 2017-07-03 RX ADMIN — MICONAZORB AF ANTIFUNGAL POWDER: 1.42 POWDER TOPICAL at 20:52

## 2017-07-03 RX ADMIN — VANCOMYCIN HYDROCHLORIDE 1500 MG: 1 INJECTION, POWDER, LYOPHILIZED, FOR SOLUTION INTRAVENOUS at 10:33

## 2017-07-03 RX ADMIN — VANCOMYCIN HYDROCHLORIDE 1500 MG: 1 INJECTION, POWDER, LYOPHILIZED, FOR SOLUTION INTRAVENOUS at 23:30

## 2017-07-03 RX ADMIN — INSULIN LISPRO 2 UNITS: 100 INJECTION, SOLUTION INTRAVENOUS; SUBCUTANEOUS at 17:30

## 2017-07-03 RX ADMIN — GABAPENTIN 300 MG: 300 CAPSULE ORAL at 20:52

## 2017-07-03 RX ADMIN — APIXABAN 5 MG: 5 TABLET, FILM COATED ORAL at 20:45

## 2017-07-03 RX ADMIN — ACETAMINOPHEN 650 MG: 325 TABLET ORAL at 07:44

## 2017-07-03 RX ADMIN — GABAPENTIN 300 MG: 300 CAPSULE ORAL at 07:39

## 2017-07-03 RX ADMIN — HEPARIN SODIUM AND DEXTROSE 1000 UNITS/HR: 10000; 5 INJECTION INTRAVENOUS at 17:31

## 2017-07-03 RX ADMIN — ATENOLOL 100 MG: 50 TABLET ORAL at 07:40

## 2017-07-03 RX ADMIN — HEPARIN SODIUM 5000 UNITS: 5000 INJECTION, SOLUTION INTRAVENOUS; SUBCUTANEOUS at 06:17

## 2017-07-03 RX ADMIN — FAMOTIDINE 20 MG: 10 INJECTION INTRAVENOUS at 07:40

## 2017-07-03 ASSESSMENT — PAIN SCALES - GENERAL
PAINLEVEL_OUTOF10: 8
PAINLEVEL_OUTOF10: 4
PAINLEVEL_OUTOF10: 0
PAINLEVEL_OUTOF10: 2

## 2017-07-03 ASSESSMENT — PAIN DESCRIPTION - LOCATION: LOCATION: HEAD

## 2017-07-03 ASSESSMENT — PAIN DESCRIPTION - PROGRESSION: CLINICAL_PROGRESSION: GRADUALLY IMPROVING

## 2017-07-03 ASSESSMENT — PAIN DESCRIPTION - PAIN TYPE: TYPE: ACUTE PAIN

## 2017-07-03 ASSESSMENT — PAIN DESCRIPTION - FREQUENCY: FREQUENCY: INTERMITTENT

## 2017-07-03 ASSESSMENT — PAIN DESCRIPTION - ONSET: ONSET: ON-GOING

## 2017-07-03 ASSESSMENT — PAIN DESCRIPTION - DESCRIPTORS: DESCRIPTORS: ACHING

## 2017-07-04 PROBLEM — L03.116 CELLULITIS OF LEFT LOWER EXTREMITY: Status: ACTIVE | Noted: 2017-07-04

## 2017-07-04 PROBLEM — I89.0 LYMPHEDEMA OF BOTH LOWER EXTREMITIES: Chronic | Status: ACTIVE | Noted: 2017-07-04

## 2017-07-04 LAB
ABSOLUTE EOS #: 0.2 K/UL (ref 0–0.4)
ABSOLUTE LYMPH #: 1 K/UL (ref 1–4.8)
ABSOLUTE MONO #: 0.7 K/UL (ref 0.2–0.8)
ANION GAP SERPL CALCULATED.3IONS-SCNC: 10 MMOL/L (ref 9–17)
BASOPHILS # BLD: 0 %
BASOPHILS ABSOLUTE: 0 K/UL (ref 0–0.2)
BUN BLDV-MCNC: 16 MG/DL (ref 8–23)
BUN/CREAT BLD: 16 (ref 9–20)
CALCIUM SERPL-MCNC: 8.5 MG/DL (ref 8.6–10.4)
CHLORIDE BLD-SCNC: 105 MMOL/L (ref 98–107)
CO2: 25 MMOL/L (ref 20–31)
CREAT SERPL-MCNC: 0.97 MG/DL (ref 0.5–0.9)
DIFFERENTIAL TYPE: ABNORMAL
EOSINOPHILS RELATIVE PERCENT: 4 %
GFR AFRICAN AMERICAN: >60 ML/MIN
GFR NON-AFRICAN AMERICAN: 58 ML/MIN
GFR SERPL CREATININE-BSD FRML MDRD: ABNORMAL ML/MIN/{1.73_M2}
GFR SERPL CREATININE-BSD FRML MDRD: ABNORMAL ML/MIN/{1.73_M2}
GLUCOSE BLD-MCNC: 108 MG/DL (ref 65–105)
GLUCOSE BLD-MCNC: 131 MG/DL (ref 65–105)
GLUCOSE BLD-MCNC: 144 MG/DL (ref 70–99)
GLUCOSE BLD-MCNC: 187 MG/DL (ref 65–105)
GLUCOSE BLD-MCNC: 193 MG/DL (ref 65–105)
HCT VFR BLD CALC: 32.1 % (ref 36–46)
HEMOGLOBIN: 10.9 G/DL (ref 12–16)
LACTIC ACID, WHOLE BLOOD: NORMAL MMOL/L (ref 0.7–2.1)
LACTIC ACID: 0.6 MMOL/L (ref 0.5–2.2)
LYMPHOCYTES # BLD: 15 %
MCH RBC QN AUTO: 29.5 PG (ref 26–34)
MCHC RBC AUTO-ENTMCNC: 33.8 G/DL (ref 31–37)
MCV RBC AUTO: 87.3 FL (ref 80–100)
MONOCYTES # BLD: 10 %
PARTIAL THROMBOPLASTIN TIME: 29.2 SEC (ref 23–31)
PDW BLD-RTO: 14.6 % (ref 11.5–14.5)
PLATELET # BLD: 155 K/UL (ref 130–400)
PLATELET ESTIMATE: ABNORMAL
PMV BLD AUTO: 7.7 FL (ref 6–12)
POTASSIUM SERPL-SCNC: 3.6 MMOL/L (ref 3.7–5.3)
RBC # BLD: 3.67 M/UL (ref 4–5.2)
RBC # BLD: ABNORMAL 10*6/UL
SEG NEUTROPHILS: 71 %
SEGMENTED NEUTROPHILS ABSOLUTE COUNT: 4.7 K/UL (ref 1.8–7.7)
SODIUM BLD-SCNC: 140 MMOL/L (ref 135–144)
VANCOMYCIN TROUGH DATE LAST DOSE: NORMAL
VANCOMYCIN TROUGH DOSE AMOUNT: NORMAL
VANCOMYCIN TROUGH TIME LAST DOSE: NORMAL
VANCOMYCIN TROUGH: 13.9 UG/ML (ref 10–20)
WBC # BLD: 6.6 K/UL (ref 3.5–11)
WBC # BLD: ABNORMAL 10*3/UL

## 2017-07-04 PROCEDURE — 85025 COMPLETE CBC W/AUTO DIFF WBC: CPT

## 2017-07-04 PROCEDURE — 2700000000 HC OXYGEN THERAPY PER DAY

## 2017-07-04 PROCEDURE — 99232 SBSQ HOSP IP/OBS MODERATE 35: CPT | Performed by: INTERNAL MEDICINE

## 2017-07-04 PROCEDURE — 36415 COLL VENOUS BLD VENIPUNCTURE: CPT

## 2017-07-04 PROCEDURE — 85730 THROMBOPLASTIN TIME PARTIAL: CPT

## 2017-07-04 PROCEDURE — 6360000002 HC RX W HCPCS: Performed by: INTERNAL MEDICINE

## 2017-07-04 PROCEDURE — 6370000000 HC RX 637 (ALT 250 FOR IP): Performed by: INTERNAL MEDICINE

## 2017-07-04 PROCEDURE — 83605 ASSAY OF LACTIC ACID: CPT

## 2017-07-04 PROCEDURE — 80202 ASSAY OF VANCOMYCIN: CPT

## 2017-07-04 PROCEDURE — 80048 BASIC METABOLIC PNL TOTAL CA: CPT

## 2017-07-04 PROCEDURE — 82947 ASSAY GLUCOSE BLOOD QUANT: CPT

## 2017-07-04 PROCEDURE — 2580000003 HC RX 258: Performed by: INTERNAL MEDICINE

## 2017-07-04 PROCEDURE — 2060000000 HC ICU INTERMEDIATE R&B

## 2017-07-04 PROCEDURE — 6370000000 HC RX 637 (ALT 250 FOR IP): Performed by: HOSPITALIST

## 2017-07-04 RX ORDER — POTASSIUM CHLORIDE 20 MEQ/1
20 TABLET, EXTENDED RELEASE ORAL ONCE
Status: COMPLETED | OUTPATIENT
Start: 2017-07-04 | End: 2017-07-04

## 2017-07-04 RX ADMIN — PANTOPRAZOLE SODIUM 40 MG: 40 TABLET, DELAYED RELEASE ORAL at 10:13

## 2017-07-04 RX ADMIN — MICONAZORB AF ANTIFUNGAL POWDER: 1.42 POWDER TOPICAL at 10:19

## 2017-07-04 RX ADMIN — POTASSIUM CHLORIDE 20 MEQ: 20 TABLET, EXTENDED RELEASE ORAL at 21:26

## 2017-07-04 RX ADMIN — GABAPENTIN 300 MG: 300 CAPSULE ORAL at 13:46

## 2017-07-04 RX ADMIN — MICONAZORB AF ANTIFUNGAL POWDER: 1.42 POWDER TOPICAL at 21:12

## 2017-07-04 RX ADMIN — LINAGLIPTIN 5 MG: 5 TABLET, FILM COATED ORAL at 10:13

## 2017-07-04 RX ADMIN — INSULIN LISPRO 2 UNITS: 100 INJECTION, SOLUTION INTRAVENOUS; SUBCUTANEOUS at 12:25

## 2017-07-04 RX ADMIN — GLYBURIDE 5 MG: 5 TABLET ORAL at 10:13

## 2017-07-04 RX ADMIN — Medication 10 ML: at 10:19

## 2017-07-04 RX ADMIN — GABAPENTIN 300 MG: 300 CAPSULE ORAL at 10:13

## 2017-07-04 RX ADMIN — GABAPENTIN 300 MG: 300 CAPSULE ORAL at 21:00

## 2017-07-04 RX ADMIN — FAMOTIDINE 20 MG: 20 TABLET ORAL at 10:13

## 2017-07-04 RX ADMIN — Medication 10 ML: at 21:13

## 2017-07-04 RX ADMIN — ATENOLOL 50 MG: 50 TABLET ORAL at 10:13

## 2017-07-04 RX ADMIN — APIXABAN 5 MG: 5 TABLET, FILM COATED ORAL at 21:00

## 2017-07-04 RX ADMIN — FAMOTIDINE 20 MG: 20 TABLET ORAL at 21:00

## 2017-07-04 RX ADMIN — ZOLPIDEM TARTRATE 5 MG: 5 TABLET ORAL at 22:02

## 2017-07-04 RX ADMIN — AMIODARONE HYDROCHLORIDE 200 MG: 200 TABLET ORAL at 10:13

## 2017-07-04 RX ADMIN — APIXABAN 5 MG: 5 TABLET, FILM COATED ORAL at 10:13

## 2017-07-04 RX ADMIN — VANCOMYCIN HYDROCHLORIDE 1500 MG: 1 INJECTION, POWDER, LYOPHILIZED, FOR SOLUTION INTRAVENOUS at 10:18

## 2017-07-04 RX ADMIN — Medication 1 TABLET: at 10:13

## 2017-07-04 RX ADMIN — INSULIN LISPRO 1 UNITS: 100 INJECTION, SOLUTION INTRAVENOUS; SUBCUTANEOUS at 21:00

## 2017-07-04 RX ADMIN — ACETAMINOPHEN 650 MG: 325 TABLET ORAL at 03:06

## 2017-07-04 RX ADMIN — VANCOMYCIN HYDROCHLORIDE 1750 MG: 1 INJECTION, POWDER, LYOPHILIZED, FOR SOLUTION INTRAVENOUS at 21:47

## 2017-07-04 ASSESSMENT — PAIN SCALES - GENERAL: PAINLEVEL_OUTOF10: 6

## 2017-07-05 VITALS
BODY MASS INDEX: 39.68 KG/M2 | RESPIRATION RATE: 16 BRPM | WEIGHT: 293 LBS | TEMPERATURE: 97.5 F | HEART RATE: 82 BPM | SYSTOLIC BLOOD PRESSURE: 124 MMHG | HEIGHT: 72 IN | DIASTOLIC BLOOD PRESSURE: 79 MMHG | OXYGEN SATURATION: 94 %

## 2017-07-05 LAB
ABSOLUTE EOS #: 0.5 K/UL (ref 0–0.4)
ABSOLUTE LYMPH #: 0.8 K/UL (ref 1–4.8)
ABSOLUTE MONO #: 0.7 K/UL (ref 0.2–0.8)
ANION GAP SERPL CALCULATED.3IONS-SCNC: 12 MMOL/L (ref 9–17)
BASOPHILS # BLD: 0 %
BASOPHILS ABSOLUTE: 0 K/UL (ref 0–0.2)
BUN BLDV-MCNC: 12 MG/DL (ref 8–23)
BUN/CREAT BLD: 14 (ref 9–20)
CALCIUM SERPL-MCNC: 8.6 MG/DL (ref 8.6–10.4)
CHLORIDE BLD-SCNC: 106 MMOL/L (ref 98–107)
CO2: 25 MMOL/L (ref 20–31)
CREAT SERPL-MCNC: 0.87 MG/DL (ref 0.5–0.9)
DIFFERENTIAL TYPE: ABNORMAL
EKG ATRIAL RATE: 78 BPM
EKG Q-T INTERVAL: 406 MS
EKG QRS DURATION: 96 MS
EKG QTC CALCULATION (BAZETT): 415 MS
EKG R AXIS: 6 DEGREES
EKG T AXIS: 22 DEGREES
EKG VENTRICULAR RATE: 63 BPM
EOSINOPHILS RELATIVE PERCENT: 6 %
GFR AFRICAN AMERICAN: >60 ML/MIN
GFR NON-AFRICAN AMERICAN: >60 ML/MIN
GFR SERPL CREATININE-BSD FRML MDRD: ABNORMAL ML/MIN/{1.73_M2}
GFR SERPL CREATININE-BSD FRML MDRD: ABNORMAL ML/MIN/{1.73_M2}
GLUCOSE BLD-MCNC: 138 MG/DL (ref 65–105)
GLUCOSE BLD-MCNC: 143 MG/DL (ref 70–99)
GLUCOSE BLD-MCNC: 196 MG/DL (ref 65–105)
HCT VFR BLD CALC: 33.8 % (ref 36–46)
HEMOGLOBIN: 11.2 G/DL (ref 12–16)
LACTIC ACID, WHOLE BLOOD: NORMAL MMOL/L (ref 0.7–2.1)
LACTIC ACID: 0.8 MMOL/L (ref 0.5–2.2)
LYMPHOCYTES # BLD: 10 %
MCH RBC QN AUTO: 29.3 PG (ref 26–34)
MCHC RBC AUTO-ENTMCNC: 33.2 G/DL (ref 31–37)
MCV RBC AUTO: 88.1 FL (ref 80–100)
MONOCYTES # BLD: 8 %
PDW BLD-RTO: 14.6 % (ref 11.5–14.5)
PLATELET # BLD: 174 K/UL (ref 130–400)
PLATELET ESTIMATE: ABNORMAL
PMV BLD AUTO: 7.7 FL (ref 6–12)
POTASSIUM SERPL-SCNC: 4.4 MMOL/L (ref 3.7–5.3)
RBC # BLD: 3.83 M/UL (ref 4–5.2)
RBC # BLD: ABNORMAL 10*6/UL
SEG NEUTROPHILS: 76 %
SEGMENTED NEUTROPHILS ABSOLUTE COUNT: 6.2 K/UL (ref 1.8–7.7)
SODIUM BLD-SCNC: 143 MMOL/L (ref 135–144)
WBC # BLD: 8.1 K/UL (ref 3.5–11)
WBC # BLD: ABNORMAL 10*3/UL

## 2017-07-05 PROCEDURE — 6370000000 HC RX 637 (ALT 250 FOR IP): Performed by: INTERNAL MEDICINE

## 2017-07-05 PROCEDURE — 6370000000 HC RX 637 (ALT 250 FOR IP): Performed by: HOSPITALIST

## 2017-07-05 PROCEDURE — 83605 ASSAY OF LACTIC ACID: CPT

## 2017-07-05 PROCEDURE — 85025 COMPLETE CBC W/AUTO DIFF WBC: CPT

## 2017-07-05 PROCEDURE — 2580000003 HC RX 258: Performed by: INTERNAL MEDICINE

## 2017-07-05 PROCEDURE — 36415 COLL VENOUS BLD VENIPUNCTURE: CPT

## 2017-07-05 PROCEDURE — 6360000002 HC RX W HCPCS: Performed by: INTERNAL MEDICINE

## 2017-07-05 PROCEDURE — 80048 BASIC METABOLIC PNL TOTAL CA: CPT

## 2017-07-05 PROCEDURE — 82947 ASSAY GLUCOSE BLOOD QUANT: CPT

## 2017-07-05 PROCEDURE — 99232 SBSQ HOSP IP/OBS MODERATE 35: CPT | Performed by: INTERNAL MEDICINE

## 2017-07-05 RX ORDER — CEPHALEXIN 500 MG/1
500 CAPSULE ORAL 4 TIMES DAILY
Qty: 40 CAPSULE | Refills: 0 | Status: CANCELLED | OUTPATIENT
Start: 2017-07-05 | End: 2017-07-15

## 2017-07-05 RX ORDER — CEPHALEXIN 500 MG/1
500 CAPSULE ORAL EVERY 6 HOURS SCHEDULED
Status: DISCONTINUED | OUTPATIENT
Start: 2017-07-05 | End: 2017-07-05 | Stop reason: HOSPADM

## 2017-07-05 RX ORDER — AMIODARONE HYDROCHLORIDE 200 MG/1
200 TABLET ORAL DAILY
Qty: 30 TABLET | Refills: 3 | Status: SHIPPED | OUTPATIENT
Start: 2017-07-05 | End: 2017-08-11 | Stop reason: SDUPTHER

## 2017-07-05 RX ORDER — ATENOLOL 100 MG/1
50 TABLET ORAL DAILY
Qty: 90 TABLET | Refills: 0 | Status: SHIPPED | OUTPATIENT
Start: 2017-07-05 | End: 2017-08-03 | Stop reason: SDUPTHER

## 2017-07-05 RX ORDER — CEPHALEXIN 500 MG/1
500 CAPSULE ORAL 4 TIMES DAILY
Qty: 40 CAPSULE | Refills: 0 | Status: SHIPPED | OUTPATIENT
Start: 2017-07-05 | End: 2017-07-15

## 2017-07-05 RX ADMIN — INSULIN LISPRO 2 UNITS: 100 INJECTION, SOLUTION INTRAVENOUS; SUBCUTANEOUS at 12:51

## 2017-07-05 RX ADMIN — FAMOTIDINE 20 MG: 20 TABLET ORAL at 08:31

## 2017-07-05 RX ADMIN — GABAPENTIN 300 MG: 300 CAPSULE ORAL at 08:31

## 2017-07-05 RX ADMIN — MICONAZORB AF ANTIFUNGAL POWDER: 1.42 POWDER TOPICAL at 08:31

## 2017-07-05 RX ADMIN — GLYBURIDE 5 MG: 5 TABLET ORAL at 08:31

## 2017-07-05 RX ADMIN — APIXABAN 5 MG: 5 TABLET, FILM COATED ORAL at 08:31

## 2017-07-05 RX ADMIN — ATENOLOL 50 MG: 50 TABLET ORAL at 08:31

## 2017-07-05 RX ADMIN — ACETAMINOPHEN 650 MG: 325 TABLET ORAL at 06:47

## 2017-07-05 RX ADMIN — AMIODARONE HYDROCHLORIDE 200 MG: 200 TABLET ORAL at 08:31

## 2017-07-05 RX ADMIN — VANCOMYCIN HYDROCHLORIDE 1750 MG: 1 INJECTION, POWDER, LYOPHILIZED, FOR SOLUTION INTRAVENOUS at 08:31

## 2017-07-05 RX ADMIN — LINAGLIPTIN 5 MG: 5 TABLET, FILM COATED ORAL at 08:31

## 2017-07-05 RX ADMIN — Medication 1 TABLET: at 08:31

## 2017-07-05 RX ADMIN — CEPHALEXIN 500 MG: 500 CAPSULE ORAL at 11:36

## 2017-07-05 ASSESSMENT — PAIN SCALES - GENERAL: PAINLEVEL_OUTOF10: 9

## 2017-07-06 LAB
CULTURE: ABNORMAL
CULTURE: ABNORMAL
Lab: ABNORMAL
ORGANISM: ABNORMAL
SPECIMEN DESCRIPTION: ABNORMAL
SPECIMEN DESCRIPTION: ABNORMAL
STATUS: ABNORMAL

## 2017-07-25 ENCOUNTER — TELEPHONE (OUTPATIENT)
Dept: FAMILY MEDICINE CLINIC | Age: 62
End: 2017-07-25

## 2017-07-25 RX ORDER — CEPHALEXIN 500 MG/1
500 CAPSULE ORAL 2 TIMES DAILY
Qty: 20 CAPSULE | Refills: 0 | Status: SHIPPED | OUTPATIENT
Start: 2017-07-25 | End: 2017-08-04

## 2017-07-31 ENCOUNTER — TELEPHONE (OUTPATIENT)
Dept: FAMILY MEDICINE CLINIC | Age: 62
End: 2017-07-31

## 2017-08-03 RX ORDER — ATENOLOL 100 MG/1
TABLET ORAL
Qty: 90 TABLET | Refills: 0 | Status: SHIPPED | OUTPATIENT
Start: 2017-08-03 | End: 2018-05-15 | Stop reason: CLARIF

## 2017-08-03 RX ORDER — GLYBURIDE 5 MG/1
TABLET ORAL
Qty: 180 TABLET | Refills: 0 | Status: SHIPPED | OUTPATIENT
Start: 2017-08-03 | End: 2018-01-08 | Stop reason: SDUPTHER

## 2017-08-03 RX ORDER — SITAGLIPTIN 100 MG/1
TABLET, FILM COATED ORAL
Qty: 90 TABLET | Refills: 0 | Status: SHIPPED | OUTPATIENT
Start: 2017-08-03 | End: 2018-01-08 | Stop reason: SDUPTHER

## 2017-08-03 RX ORDER — GABAPENTIN 300 MG/1
CAPSULE ORAL
Qty: 180 CAPSULE | Refills: 0 | Status: SHIPPED | OUTPATIENT
Start: 2017-08-03 | End: 2017-09-15 | Stop reason: SDUPTHER

## 2017-08-03 RX ORDER — OMEPRAZOLE 40 MG/1
CAPSULE, DELAYED RELEASE ORAL
Qty: 90 CAPSULE | Refills: 0 | Status: SHIPPED | OUTPATIENT
Start: 2017-08-03 | End: 2018-01-08 | Stop reason: SDUPTHER

## 2017-08-07 ENCOUNTER — TELEPHONE (OUTPATIENT)
Dept: FAMILY MEDICINE CLINIC | Age: 62
End: 2017-08-07

## 2017-08-11 RX ORDER — AMIODARONE HYDROCHLORIDE 200 MG/1
200 TABLET ORAL DAILY
Qty: 30 TABLET | Refills: 3 | Status: SHIPPED | OUTPATIENT
Start: 2017-08-11 | End: 2017-09-29 | Stop reason: SDUPTHER

## 2017-08-11 RX ORDER — HYDROCHLOROTHIAZIDE 25 MG/1
25 TABLET ORAL DAILY
Qty: 30 TABLET | Refills: 3 | Status: SHIPPED | OUTPATIENT
Start: 2017-08-11 | End: 2017-09-29 | Stop reason: SDUPTHER

## 2017-08-14 ENCOUNTER — TELEPHONE (OUTPATIENT)
Dept: FAMILY MEDICINE CLINIC | Age: 62
End: 2017-08-14

## 2017-08-15 ENCOUNTER — TELEPHONE (OUTPATIENT)
Dept: FAMILY MEDICINE CLINIC | Age: 62
End: 2017-08-15

## 2017-08-15 DIAGNOSIS — I89.0 LYMPHEDEMA OF BOTH LOWER EXTREMITIES: Primary | Chronic | ICD-10-CM

## 2017-08-15 RX ORDER — HYDROMORPHONE HYDROCHLORIDE 2 MG/1
2 TABLET ORAL
Qty: 100 TABLET | Refills: 0 | Status: SHIPPED | OUTPATIENT
Start: 2017-08-15 | End: 2017-10-16 | Stop reason: SDUPTHER

## 2017-08-15 RX ORDER — FLUCONAZOLE 100 MG/1
100 TABLET ORAL DAILY
Qty: 2 TABLET | Refills: 0 | Status: SHIPPED | OUTPATIENT
Start: 2017-08-15 | End: 2017-08-22

## 2017-08-23 ENCOUNTER — TELEPHONE (OUTPATIENT)
Dept: FAMILY MEDICINE CLINIC | Age: 62
End: 2017-08-23

## 2017-08-23 DIAGNOSIS — M79.89 SWOLLEN LEG: Primary | ICD-10-CM

## 2017-09-15 ENCOUNTER — TELEPHONE (OUTPATIENT)
Dept: FAMILY MEDICINE CLINIC | Age: 62
End: 2017-09-15

## 2017-09-15 RX ORDER — BLOOD-GLUCOSE METER
KIT MISCELLANEOUS
Qty: 1 KIT | Refills: 0 | Status: SHIPPED | OUTPATIENT
Start: 2017-09-15 | End: 2022-10-18

## 2017-09-15 RX ORDER — GABAPENTIN 300 MG/1
CAPSULE ORAL
Qty: 360 CAPSULE | Refills: 0 | Status: SHIPPED | OUTPATIENT
Start: 2017-09-15 | End: 2017-11-03 | Stop reason: SDUPTHER

## 2017-09-29 LAB
AVERAGE GLUCOSE: NORMAL
HBA1C MFR BLD: 8.6 %

## 2017-09-29 RX ORDER — AMIODARONE HYDROCHLORIDE 200 MG/1
TABLET ORAL
Qty: 90 TABLET | Refills: 0 | Status: SHIPPED | OUTPATIENT
Start: 2017-09-29 | End: 2017-11-22 | Stop reason: ALTCHOICE

## 2017-09-29 RX ORDER — APIXABAN 5 MG/1
TABLET, FILM COATED ORAL
Qty: 180 TABLET | Refills: 0 | Status: SHIPPED | OUTPATIENT
Start: 2017-09-29 | End: 2018-01-09 | Stop reason: SDUPTHER

## 2017-09-29 RX ORDER — HYDROCHLOROTHIAZIDE 25 MG/1
TABLET ORAL
Qty: 90 TABLET | Refills: 0 | Status: SHIPPED | OUTPATIENT
Start: 2017-09-29 | End: 2018-01-09 | Stop reason: SDUPTHER

## 2017-10-04 ENCOUNTER — TELEPHONE (OUTPATIENT)
Dept: FAMILY MEDICINE CLINIC | Age: 62
End: 2017-10-04

## 2017-10-04 NOTE — TELEPHONE ENCOUNTER
The home care nurse was calling in to see if you will follow for home care the patient will be discharging from [address] [place] [address] on 10/05/17 for Cellulitis sepsis. She was notified that you will follow. No further questions at this time.

## 2017-10-06 ENCOUNTER — TELEPHONE (OUTPATIENT)
Dept: FAMILY MEDICINE CLINIC | Age: 62
End: 2017-10-06

## 2017-10-16 ENCOUNTER — OFFICE VISIT (OUTPATIENT)
Dept: FAMILY MEDICINE CLINIC | Age: 62
End: 2017-10-16
Payer: COMMERCIAL

## 2017-10-16 VITALS
DIASTOLIC BLOOD PRESSURE: 72 MMHG | BODY MASS INDEX: 64.56 KG/M2 | OXYGEN SATURATION: 98 % | RESPIRATION RATE: 16 BRPM | WEIGHT: 293 LBS | SYSTOLIC BLOOD PRESSURE: 138 MMHG | HEART RATE: 76 BPM

## 2017-10-16 DIAGNOSIS — L03.116 CELLULITIS OF LEFT LOWER EXTREMITY: Primary | ICD-10-CM

## 2017-10-16 DIAGNOSIS — E11.9 TYPE 2 DIABETES MELLITUS WITHOUT COMPLICATION, WITHOUT LONG-TERM CURRENT USE OF INSULIN (HCC): ICD-10-CM

## 2017-10-16 DIAGNOSIS — I10 ESSENTIAL HYPERTENSION: ICD-10-CM

## 2017-10-16 PROCEDURE — G8427 DOCREV CUR MEDS BY ELIG CLIN: HCPCS | Performed by: FAMILY MEDICINE

## 2017-10-16 PROCEDURE — 99213 OFFICE O/P EST LOW 20 MIN: CPT | Performed by: FAMILY MEDICINE

## 2017-10-16 PROCEDURE — 1036F TOBACCO NON-USER: CPT | Performed by: FAMILY MEDICINE

## 2017-10-16 PROCEDURE — 3046F HEMOGLOBIN A1C LEVEL >9.0%: CPT | Performed by: FAMILY MEDICINE

## 2017-10-16 PROCEDURE — 3014F SCREEN MAMMO DOC REV: CPT | Performed by: FAMILY MEDICINE

## 2017-10-16 PROCEDURE — 3017F COLORECTAL CA SCREEN DOC REV: CPT | Performed by: FAMILY MEDICINE

## 2017-10-16 PROCEDURE — G8417 CALC BMI ABV UP PARAM F/U: HCPCS | Performed by: FAMILY MEDICINE

## 2017-10-16 PROCEDURE — G8484 FLU IMMUNIZE NO ADMIN: HCPCS | Performed by: FAMILY MEDICINE

## 2017-10-16 RX ORDER — HYDROMORPHONE HYDROCHLORIDE 2 MG/1
2 TABLET ORAL
Qty: 100 TABLET | Refills: 0 | Status: SHIPPED | OUTPATIENT
Start: 2017-10-16 | End: 2017-11-20 | Stop reason: SDUPTHER

## 2017-10-16 RX ORDER — FLUCONAZOLE 100 MG/1
TABLET ORAL
Qty: 3 TABLET | Refills: 0 | Status: SHIPPED | OUTPATIENT
Start: 2017-10-16 | End: 2017-11-01 | Stop reason: SDUPTHER

## 2017-10-16 ASSESSMENT — PATIENT HEALTH QUESTIONNAIRE - PHQ9
2. FEELING DOWN, DEPRESSED OR HOPELESS: 0
SUM OF ALL RESPONSES TO PHQ9 QUESTIONS 1 & 2: 0
1. LITTLE INTEREST OR PLEASURE IN DOING THINGS: 0
SUM OF ALL RESPONSES TO PHQ QUESTIONS 1-9: 0

## 2017-10-16 NOTE — PROGRESS NOTES
Subjective:      Patient ID: Lorrie Villela is a 57 y.o. female. \[facility]\""  Hospital follow-up for cellulitis doing well now only complaints of swelling no fever no erythema she has some fatigue, she remains on Elloquis,  is off antibiotics now  Review of Systems   All 10 systems reviewed and normal with the exception of those listed above    Objective:   Physical Exam   Constitutional: She is oriented to person, place, and time. She appears well-developed and well-nourished. No distress. Neck: Neck supple. Cardiovascular: Normal rate and normal heart sounds. Exam reveals no gallop. No murmur heard. Pulmonary/Chest: Effort normal and breath sounds normal. No respiratory distress. She has no wheezes. She has no rales. Musculoskeletal: She exhibits edema. Chronic lymphedema there is no calf tenderness no erythema, no drainage   Lymphadenopathy:     She has no cervical adenopathy. Neurological: She is alert and oriented to person, place, and time. Skin: Skin is warm and dry. No rash noted. She is not diaphoretic. No erythema. Psychiatric: She has a normal mood and affect.  Her behavior is normal. Judgment and thought content normal.       Assessment:      Dm  cellulitis      Plan:      Recheck labs  Lymphedema pump  Follow-up as needed and in 3 months

## 2017-10-23 ENCOUNTER — TELEPHONE (OUTPATIENT)
Dept: FAMILY MEDICINE CLINIC | Age: 62
End: 2017-10-23

## 2017-10-23 RX ORDER — FLUCONAZOLE 100 MG/1
100 TABLET ORAL DAILY
Qty: 5 TABLET | Refills: 3 | Status: SHIPPED | OUTPATIENT
Start: 2017-10-23 | End: 2017-10-30

## 2017-10-24 ENCOUNTER — TELEPHONE (OUTPATIENT)
Dept: FAMILY MEDICINE CLINIC | Age: 62
End: 2017-10-24

## 2017-10-24 NOTE — TELEPHONE ENCOUNTER
She doesn't have anyone to take her to get her bw do you think she would be able to get a home draw at all

## 2017-10-30 RX ORDER — HYDROCODONE BITARTRATE AND ACETAMINOPHEN 5; 325 MG/1; MG/1
1 TABLET ORAL EVERY 8 HOURS PRN
Qty: 10 TABLET | Refills: 0 | Status: SHIPPED | OUTPATIENT
Start: 2017-10-30 | End: 2018-05-15 | Stop reason: CLARIF

## 2017-10-31 ENCOUNTER — TELEPHONE (OUTPATIENT)
Dept: FAMILY MEDICINE CLINIC | Age: 62
End: 2017-10-31

## 2017-11-01 ENCOUNTER — TELEPHONE (OUTPATIENT)
Dept: FAMILY MEDICINE CLINIC | Age: 62
End: 2017-11-01

## 2017-11-01 RX ORDER — METOPROLOL SUCCINATE 50 MG/1
50 TABLET, EXTENDED RELEASE ORAL DAILY
Qty: 90 TABLET | Refills: 1 | Status: SHIPPED | OUTPATIENT
Start: 2017-11-01 | End: 2018-03-16 | Stop reason: SDUPTHER

## 2017-11-01 RX ORDER — FLUCONAZOLE 100 MG/1
TABLET ORAL
Qty: 1 TABLET | Refills: 0 | Status: SHIPPED | OUTPATIENT
Start: 2017-11-01 | End: 2017-11-22 | Stop reason: SDUPTHER

## 2017-11-02 ENCOUNTER — TELEPHONE (OUTPATIENT)
Dept: FAMILY MEDICINE CLINIC | Age: 62
End: 2017-11-02

## 2017-11-02 RX ORDER — CEPHALEXIN 500 MG/1
500 CAPSULE ORAL 3 TIMES DAILY
Qty: 30 CAPSULE | Refills: 0 | Status: SHIPPED | OUTPATIENT
Start: 2017-11-02 | End: 2017-11-22 | Stop reason: SDUPTHER

## 2017-11-03 RX ORDER — GABAPENTIN 300 MG/1
CAPSULE ORAL
Qty: 360 CAPSULE | Refills: 0 | Status: SHIPPED | OUTPATIENT
Start: 2017-11-03 | End: 2018-01-09 | Stop reason: SDUPTHER

## 2017-11-20 RX ORDER — HYDROMORPHONE HYDROCHLORIDE 2 MG/1
2 TABLET ORAL
Qty: 100 TABLET | Refills: 0 | Status: SHIPPED | OUTPATIENT
Start: 2017-11-20 | End: 2018-01-09 | Stop reason: SDUPTHER

## 2017-11-22 RX ORDER — CEPHALEXIN 500 MG/1
500 CAPSULE ORAL 3 TIMES DAILY
Qty: 30 CAPSULE | Refills: 0 | Status: SHIPPED | OUTPATIENT
Start: 2017-11-22 | End: 2017-12-20 | Stop reason: SDUPTHER

## 2017-11-22 RX ORDER — FLUCONAZOLE 100 MG/1
TABLET ORAL
Qty: 1 TABLET | Refills: 0 | Status: SHIPPED | OUTPATIENT
Start: 2017-11-22 | End: 2017-12-20 | Stop reason: SDUPTHER

## 2017-11-22 NOTE — TELEPHONE ENCOUNTER
Next Visit Date:  No future appointments.     Health Maintenance   Topic Date Due    Hepatitis C screen  1955    HIV screen  04/15/1968    DTaP/Tdap/Td vaccine (1 - Tdap) 04/15/1972    Pneumococcal med risk (1 of 1 - PPSV23) 04/15/1972    Diabetic retinal exam  05/05/2008    Diabetic microalbuminuria test  12/08/2014    Lipid screen  02/08/2017    Diabetic foot exam  05/24/2017    Diabetic hemoglobin A1C test  07/28/2017    Flu vaccine (1) 09/01/2017    Breast cancer screen  02/22/2018    Cervical cancer screen  02/22/2019    Colon cancer screen colonoscopy  02/22/2026    Zostavax vaccine  Addressed       Hemoglobin A1C (%)   Date Value   07/28/2016 6.7 (H)   05/19/2016 6.4   02/08/2016 6.3             ( goal A1C is < 7)   No results found for: LABMICR  LDL Calculated (mg/dL)   Date Value   02/08/2016 85       (goal LDL is <100)   AST (U/L)   Date Value   07/02/2017 20     ALT (U/L)   Date Value   07/02/2017 21     BUN (mg/dL)   Date Value   07/05/2017 12     BP Readings from Last 3 Encounters:   10/16/17 138/72   07/05/17 124/79   05/25/17 (!) 140/80          (goal 120/80)    All Future Testing planned in [facility]  Lab Frequency Next Occurrence   Hemoglobin A1C Once 12/03/2017   CBC Auto Differential Once 85/67/3023   Basic Metabolic Panel Once 03/03/9124               Patient Active Problem List:     Diabetes mellitus (HCC)     GERD (gastroesophageal reflux disease)     Back pain     MVP (mitral valve prolapse)     DVT (deep venous thrombosis) (HCC)     Essential hypertension     HIGH CHOLESTEROL     Cellulitis of left lower extremity     Acute renal failure (ARF) (HCC)     Radiculopathy of lumbosacral region     Staghorn renal calculus     Foraminal stenosis of lumbar region     Acute cystitis     SIRS (systemic inflammatory response syndrome) (HCC)     Nausea and vomiting     Lymphedema of both lower extremities

## 2017-12-06 ENCOUNTER — TELEPHONE (OUTPATIENT)
Dept: FAMILY MEDICINE CLINIC | Age: 62
End: 2017-12-06

## 2017-12-06 RX ORDER — METHOCARBAMOL 750 MG/1
TABLET, FILM COATED ORAL
Qty: 60 TABLET | Refills: 3 | Status: SHIPPED | OUTPATIENT
Start: 2017-12-06 | End: 2018-06-29 | Stop reason: SDUPTHER

## 2017-12-06 RX ORDER — HYDROCODONE BITARTRATE AND ACETAMINOPHEN 5; 325 MG/1; MG/1
1 TABLET ORAL EVERY 8 HOURS PRN
Qty: 10 TABLET | Refills: 0 | OUTPATIENT
Start: 2017-12-06

## 2017-12-06 NOTE — TELEPHONE ENCOUNTER
lv 10/16/17  Oars 11/20/17    Next Visit Date:  No future appointments.     Health Maintenance   Topic Date Due    Hepatitis C screen  1955    HIV screen  04/15/1968    DTaP/Tdap/Td vaccine (1 - Tdap) 04/15/1972    Pneumococcal med risk (1 of 1 - PPSV23) 04/15/1972    Diabetic retinal exam  05/05/2008    Diabetic microalbuminuria test  12/08/2014    Lipid screen  02/08/2017    Diabetic foot exam  05/24/2017    Diabetic hemoglobin A1C test  07/28/2017    Flu vaccine (1) 09/01/2017    Breast cancer screen  02/22/2018    Cervical cancer screen  02/22/2019    Colon cancer screen colonoscopy  02/22/2026    Zostavax vaccine  Addressed       Hemoglobin A1C (%)   Date Value   07/28/2016 6.7 (H)   05/19/2016 6.4   02/08/2016 6.3             ( goal A1C is < 7)   No results found for: LABMICR  LDL Calculated (mg/dL)   Date Value   02/08/2016 85       (goal LDL is <100)   AST (U/L)   Date Value   07/02/2017 20     ALT (U/L)   Date Value   07/02/2017 21     BUN (mg/dL)   Date Value   07/05/2017 12     BP Readings from Last 3 Encounters:   10/16/17 138/72   07/05/17 124/79   05/25/17 (!) 140/80          (goal 120/80)    All Future Testing planned in [facility]  Lab Frequency Next Occurrence   Hemoglobin A1C Once 12/03/2017   CBC Auto Differential Once 13/51/6460   Basic Metabolic Panel Once 69/45/3164               Patient Active Problem List:     Diabetes mellitus (HCC)     GERD (gastroesophageal reflux disease)     Back pain     MVP (mitral valve prolapse)     DVT (deep venous thrombosis) (HCC)     Essential hypertension     HIGH CHOLESTEROL     Cellulitis of left lower extremity     Acute renal failure (ARF) (HCC)     Radiculopathy of lumbosacral region     Staghorn renal calculus     Foraminal stenosis of lumbar region     Acute cystitis     SIRS (systemic inflammatory response syndrome) (HCC)     Nausea and vomiting     Lymphedema of both lower extremities

## 2017-12-20 RX ORDER — FLUCONAZOLE 100 MG/1
TABLET ORAL
Qty: 1 TABLET | Refills: 0 | Status: SHIPPED | OUTPATIENT
Start: 2017-12-20 | End: 2018-02-12 | Stop reason: SDUPTHER

## 2017-12-20 RX ORDER — CEPHALEXIN 500 MG/1
500 CAPSULE ORAL 3 TIMES DAILY
Qty: 30 CAPSULE | Refills: 0 | Status: SHIPPED | OUTPATIENT
Start: 2017-12-20 | End: 2018-02-12 | Stop reason: SDUPTHER

## 2017-12-20 NOTE — TELEPHONE ENCOUNTER
Next Visit Date:  No future appointments.     Health Maintenance   Topic Date Due    Hepatitis C screen  1955    HIV screen  04/15/1968    DTaP/Tdap/Td vaccine (1 - Tdap) 04/15/1972    Pneumococcal med risk (1 of 1 - PPSV23) 04/15/1972    Diabetic retinal exam  05/05/2008    Diabetic microalbuminuria test  12/08/2014    Lipid screen  02/08/2017    Diabetic foot exam  05/24/2017    Diabetic hemoglobin A1C test  07/28/2017    Flu vaccine (1) 09/01/2017    Breast cancer screen  02/22/2018    Cervical cancer screen  02/22/2019    Colon cancer screen colonoscopy  02/22/2026    Zostavax vaccine  Addressed       Hemoglobin A1C (%)   Date Value   07/28/2016 6.7 (H)   05/19/2016 6.4   02/08/2016 6.3             ( goal A1C is < 7)   No results found for: LABMICR  LDL Calculated (mg/dL)   Date Value   02/08/2016 85       (goal LDL is <100)   AST (U/L)   Date Value   07/02/2017 20     ALT (U/L)   Date Value   07/02/2017 21     BUN (mg/dL)   Date Value   07/05/2017 12     BP Readings from Last 3 Encounters:   10/16/17 138/72   07/05/17 124/79   05/25/17 (!) 140/80          (goal 120/80)    All Future Testing planned in [facility]  Lab Frequency Next Occurrence   Hemoglobin A1C Once 12/03/2017   CBC Auto Differential Once 69/44/2040   Basic Metabolic Panel Once 66/77/9088               Patient Active Problem List:     Diabetes mellitus (HCC)     GERD (gastroesophageal reflux disease)     Back pain     MVP (mitral valve prolapse)     DVT (deep venous thrombosis) (HCC)     Essential hypertension     HIGH CHOLESTEROL     Cellulitis of left lower extremity     Acute renal failure (ARF) (HCC)     Radiculopathy of lumbosacral region     Staghorn renal calculus     Foraminal stenosis of lumbar region     Acute cystitis     SIRS (systemic inflammatory response syndrome) (HCC)     Nausea and vomiting     Lymphedema of both lower extremities

## 2018-01-08 RX ORDER — OMEPRAZOLE 40 MG/1
CAPSULE, DELAYED RELEASE ORAL
Qty: 90 CAPSULE | Refills: 3 | Status: SHIPPED | OUTPATIENT
Start: 2018-01-08 | End: 2018-11-10 | Stop reason: SDUPTHER

## 2018-01-08 RX ORDER — GLYBURIDE 5 MG/1
TABLET ORAL
Qty: 180 TABLET | Refills: 3 | Status: SHIPPED | OUTPATIENT
Start: 2018-01-08 | End: 2019-01-05 | Stop reason: SDUPTHER

## 2018-01-09 DIAGNOSIS — M54.5 CHRONIC BILATERAL LOW BACK PAIN, WITH SCIATICA PRESENCE UNSPECIFIED: Primary | ICD-10-CM

## 2018-01-09 DIAGNOSIS — G89.29 CHRONIC BILATERAL LOW BACK PAIN, WITH SCIATICA PRESENCE UNSPECIFIED: Primary | ICD-10-CM

## 2018-01-09 RX ORDER — HYDROCHLOROTHIAZIDE 25 MG/1
TABLET ORAL
Qty: 90 TABLET | Refills: 1 | Status: SHIPPED | OUTPATIENT
Start: 2018-01-09 | End: 2018-05-21 | Stop reason: SDUPTHER

## 2018-01-09 RX ORDER — APIXABAN 5 MG/1
TABLET, FILM COATED ORAL
Qty: 180 TABLET | Refills: 1 | Status: SHIPPED | OUTPATIENT
Start: 2018-01-09 | End: 2018-05-21 | Stop reason: SDUPTHER

## 2018-01-09 RX ORDER — HYDROMORPHONE HYDROCHLORIDE 2 MG/1
2 TABLET ORAL
Qty: 100 TABLET | Refills: 0 | Status: SHIPPED | OUTPATIENT
Start: 2018-01-09 | End: 2018-03-19 | Stop reason: SDUPTHER

## 2018-01-09 RX ORDER — GABAPENTIN 300 MG/1
CAPSULE ORAL
Qty: 120 CAPSULE | Refills: 2 | Status: SHIPPED | OUTPATIENT
Start: 2018-01-09 | End: 2018-05-21 | Stop reason: SDUPTHER

## 2018-01-09 NOTE — TELEPHONE ENCOUNTER
Next Visit Date:  No future appointments.     Health Maintenance   Topic Date Due    Hepatitis C screen  1955    HIV screen  04/15/1968    DTaP/Tdap/Td vaccine (1 - Tdap) 04/15/1972    Pneumococcal med risk (1 of 1 - PPSV23) 04/15/1972    Diabetic retinal exam  05/05/2008    Diabetic microalbuminuria test  12/08/2014    Lipid screen  02/08/2017    Diabetic foot exam  05/24/2017    A1C test (Diabetic or Prediabetic)  07/28/2017    Flu vaccine (1) 09/01/2017    Breast cancer screen  02/22/2018    Potassium monitoring  07/05/2018    Creatinine monitoring  07/05/2018    Cervical cancer screen  02/22/2019    Colon cancer screen colonoscopy  02/22/2026    Zostavax vaccine  Addressed       Hemoglobin A1C (%)   Date Value   07/28/2016 6.7 (H)   05/19/2016 6.4   02/08/2016 6.3             ( goal A1C is < 7)   No results found for: LABMICR  LDL Calculated (mg/dL)   Date Value   02/08/2016 85       (goal LDL is <100)   AST (U/L)   Date Value   07/02/2017 20     ALT (U/L)   Date Value   07/02/2017 21     BUN (mg/dL)   Date Value   07/05/2017 12     BP Readings from Last 3 Encounters:   10/16/17 138/72   07/05/17 124/79   05/25/17 (!) 140/80          (goal 120/80)    All Future Testing planned in [facility]  Lab Frequency Next Occurrence   Hemoglobin A1C Once 12/03/2017   CBC Auto Differential Once 16/66/5538   Basic Metabolic Panel Once 80/84/3222               Patient Active Problem List:     Diabetes mellitus (HCC)     GERD (gastroesophageal reflux disease)     Back pain     MVP (mitral valve prolapse)     DVT (deep venous thrombosis) (HCC)     Essential hypertension     HIGH CHOLESTEROL     Cellulitis of left lower extremity     Acute renal failure (ARF) (HCC)     Radiculopathy of lumbosacral region     Staghorn renal calculus     Foraminal stenosis of lumbar region     Acute cystitis     SIRS (systemic inflammatory response syndrome) (HCC)     Nausea and vomiting     Lymphedema of both lower extremities

## 2018-01-09 NOTE — TELEPHONE ENCOUNTER
lv  10/16/17  Oars 11/20/17    Next Visit Date:  No future appointments.     Health Maintenance   Topic Date Due    Hepatitis C screen  1955    HIV screen  04/15/1968    DTaP/Tdap/Td vaccine (1 - Tdap) 04/15/1972    Pneumococcal med risk (1 of 1 - PPSV23) 04/15/1972    Diabetic retinal exam  05/05/2008    Diabetic microalbuminuria test  12/08/2014    Lipid screen  02/08/2017    Diabetic foot exam  05/24/2017    A1C test (Diabetic or Prediabetic)  07/28/2017    Flu vaccine (1) 09/01/2017    Breast cancer screen  02/22/2018    Potassium monitoring  07/05/2018    Creatinine monitoring  07/05/2018    Cervical cancer screen  02/22/2019    Colon cancer screen colonoscopy  02/22/2026    Zostavax vaccine  Addressed       Hemoglobin A1C (%)   Date Value   07/28/2016 6.7 (H)   05/19/2016 6.4   02/08/2016 6.3             ( goal A1C is < 7)   No results found for: LABMICR  LDL Calculated (mg/dL)   Date Value   02/08/2016 85       (goal LDL is <100)   AST (U/L)   Date Value   07/02/2017 20     ALT (U/L)   Date Value   07/02/2017 21     BUN (mg/dL)   Date Value   07/05/2017 12     BP Readings from Last 3 Encounters:   10/16/17 138/72   07/05/17 124/79   05/25/17 (!) 140/80          (goal 120/80)    All Future Testing planned in [facility]  Lab Frequency Next Occurrence   Hemoglobin A1C Once 12/03/2017   CBC Auto Differential Once 41/33/6846   Basic Metabolic Panel Once 30/32/8259               Patient Active Problem List:     Diabetes mellitus (HCC)     GERD (gastroesophageal reflux disease)     Back pain     MVP (mitral valve prolapse)     DVT (deep venous thrombosis) (HCC)     Essential hypertension     HIGH CHOLESTEROL     Cellulitis of left lower extremity     Acute renal failure (ARF) (HCC)     Radiculopathy of lumbosacral region     Staghorn renal calculus     Foraminal stenosis of lumbar region     Acute cystitis     SIRS (systemic inflammatory response syndrome) (HCC)     Nausea and vomiting Lymphedema of both lower extremities

## 2018-01-25 ENCOUNTER — TELEPHONE (OUTPATIENT)
Dept: FAMILY MEDICINE CLINIC | Age: 63
End: 2018-01-25

## 2018-01-25 RX ORDER — OXYBUTYNIN CHLORIDE 10 MG/1
10 TABLET, EXTENDED RELEASE ORAL DAILY
Qty: 30 TABLET | Refills: 3 | Status: SHIPPED | OUTPATIENT
Start: 2018-01-25 | End: 2018-05-15 | Stop reason: ALTCHOICE

## 2018-02-12 ENCOUNTER — TELEPHONE (OUTPATIENT)
Dept: FAMILY MEDICINE CLINIC | Age: 63
End: 2018-02-12

## 2018-02-12 RX ORDER — FLUCONAZOLE 100 MG/1
TABLET ORAL
Qty: 1 TABLET | Refills: 0 | Status: SHIPPED | OUTPATIENT
Start: 2018-02-12 | End: 2018-05-15 | Stop reason: ALTCHOICE

## 2018-02-12 RX ORDER — CEPHALEXIN 500 MG/1
500 CAPSULE ORAL 3 TIMES DAILY
Qty: 30 CAPSULE | Refills: 0 | Status: SHIPPED | OUTPATIENT
Start: 2018-02-12 | End: 2018-05-15 | Stop reason: ALTCHOICE

## 2018-02-12 NOTE — TELEPHONE ENCOUNTER
Patient is calling in with a sinus infection onset one week she has a productive cough brownish colored a lot of drainage she is wanting to know if you can call in keflex and a diflucan please advise

## 2018-02-28 ENCOUNTER — TELEPHONE (OUTPATIENT)
Dept: FAMILY MEDICINE CLINIC | Age: 63
End: 2018-02-28

## 2018-02-28 RX ORDER — LEVOFLOXACIN 500 MG/1
500 TABLET, FILM COATED ORAL DAILY
Qty: 10 TABLET | Refills: 0 | Status: SHIPPED | OUTPATIENT
Start: 2018-02-28 | End: 2018-03-19 | Stop reason: SDUPTHER

## 2018-03-18 RX ORDER — METOPROLOL SUCCINATE 50 MG/1
50 TABLET, EXTENDED RELEASE ORAL DAILY
Qty: 90 TABLET | Refills: 1 | Status: SHIPPED | OUTPATIENT
Start: 2018-03-18 | End: 2018-07-24 | Stop reason: SDUPTHER

## 2018-03-19 DIAGNOSIS — G89.29 CHRONIC BILATERAL LOW BACK PAIN, WITH SCIATICA PRESENCE UNSPECIFIED: ICD-10-CM

## 2018-03-19 DIAGNOSIS — M54.5 CHRONIC BILATERAL LOW BACK PAIN, WITH SCIATICA PRESENCE UNSPECIFIED: ICD-10-CM

## 2018-03-19 RX ORDER — LEVOFLOXACIN 500 MG/1
500 TABLET, FILM COATED ORAL DAILY
Qty: 10 TABLET | Refills: 0 | Status: SHIPPED | OUTPATIENT
Start: 2018-03-19 | End: 2018-04-13 | Stop reason: SDUPTHER

## 2018-03-19 RX ORDER — HYDROMORPHONE HYDROCHLORIDE 2 MG/1
2 TABLET ORAL
Qty: 100 TABLET | Refills: 0 | Status: SHIPPED | OUTPATIENT
Start: 2018-03-19 | End: 2018-04-18

## 2018-03-21 ENCOUNTER — HOSPITAL ENCOUNTER (OUTPATIENT)
Dept: OCCUPATIONAL THERAPY | Age: 63
Setting detail: THERAPIES SERIES
Discharge: HOME | End: 2018-03-21
Payer: COMMERCIAL

## 2018-03-21 PROCEDURE — 97530 THERAPEUTIC ACTIVITIES: CPT

## 2018-03-21 PROCEDURE — G8988 SELF CARE GOAL STATUS: HCPCS

## 2018-03-21 PROCEDURE — 97165 OT EVAL LOW COMPLEX 30 MIN: CPT

## 2018-03-21 PROCEDURE — 97140 MANUAL THERAPY 1/> REGIONS: CPT

## 2018-03-21 PROCEDURE — G8987 SELF CARE CURRENT STATUS: HCPCS

## 2018-03-28 ENCOUNTER — HOSPITAL ENCOUNTER (OUTPATIENT)
Dept: OCCUPATIONAL THERAPY | Age: 63
Setting detail: THERAPIES SERIES
End: 2018-03-28
Payer: COMMERCIAL

## 2018-04-04 ENCOUNTER — HOSPITAL ENCOUNTER (OUTPATIENT)
Dept: OCCUPATIONAL THERAPY | Age: 63
Setting detail: THERAPIES SERIES
Discharge: HOME | End: 2018-04-04
Payer: COMMERCIAL

## 2018-04-13 RX ORDER — LEVOFLOXACIN 500 MG/1
500 TABLET, FILM COATED ORAL DAILY
Qty: 10 TABLET | Refills: 0 | Status: SHIPPED | OUTPATIENT
Start: 2018-04-13 | End: 2018-04-23

## 2018-05-09 DIAGNOSIS — G89.29 CHRONIC BILATERAL LOW BACK PAIN, WITH SCIATICA PRESENCE UNSPECIFIED: Primary | ICD-10-CM

## 2018-05-09 DIAGNOSIS — M54.5 CHRONIC BILATERAL LOW BACK PAIN, WITH SCIATICA PRESENCE UNSPECIFIED: Primary | ICD-10-CM

## 2018-05-09 RX ORDER — HYDROMORPHONE HYDROCHLORIDE 2 MG/1
2 TABLET ORAL
Qty: 100 TABLET | Refills: 0 | Status: SHIPPED | OUTPATIENT
Start: 2018-05-09 | End: 2018-06-08

## 2018-05-15 ENCOUNTER — OFFICE VISIT (OUTPATIENT)
Dept: FAMILY MEDICINE CLINIC | Age: 63
End: 2018-05-15
Payer: MEDICARE

## 2018-05-15 VITALS
BODY MASS INDEX: 64.15 KG/M2 | HEART RATE: 111 BPM | DIASTOLIC BLOOD PRESSURE: 72 MMHG | WEIGHT: 293 LBS | SYSTOLIC BLOOD PRESSURE: 130 MMHG

## 2018-05-15 DIAGNOSIS — M54.5 CHRONIC BILATERAL LOW BACK PAIN, WITH SCIATICA PRESENCE UNSPECIFIED: ICD-10-CM

## 2018-05-15 DIAGNOSIS — E11.59 TYPE 2 DIABETES MELLITUS WITH OTHER CIRCULATORY COMPLICATION, UNSPECIFIED LONG TERM INSULIN USE STATUS: Chronic | ICD-10-CM

## 2018-05-15 DIAGNOSIS — G89.29 CHRONIC BILATERAL LOW BACK PAIN, WITH SCIATICA PRESENCE UNSPECIFIED: ICD-10-CM

## 2018-05-15 DIAGNOSIS — L03.119 RECURRENT CELLULITIS OF LOWER LEG: ICD-10-CM

## 2018-05-15 DIAGNOSIS — I89.0 LYMPHEDEMA OF BOTH LOWER EXTREMITIES: Chronic | ICD-10-CM

## 2018-05-15 DIAGNOSIS — I10 ESSENTIAL HYPERTENSION: Primary | Chronic | ICD-10-CM

## 2018-05-15 PROCEDURE — 2022F DILAT RTA XM EVC RTNOPTHY: CPT | Performed by: FAMILY MEDICINE

## 2018-05-15 PROCEDURE — 3017F COLORECTAL CA SCREEN DOC REV: CPT | Performed by: FAMILY MEDICINE

## 2018-05-15 PROCEDURE — G8400 PT W/DXA NO RESULTS DOC: HCPCS | Performed by: FAMILY MEDICINE

## 2018-05-15 PROCEDURE — 1123F ACP DISCUSS/DSCN MKR DOCD: CPT | Performed by: FAMILY MEDICINE

## 2018-05-15 PROCEDURE — 4040F PNEUMOC VAC/ADMIN/RCVD: CPT | Performed by: FAMILY MEDICINE

## 2018-05-15 PROCEDURE — 99213 OFFICE O/P EST LOW 20 MIN: CPT | Performed by: FAMILY MEDICINE

## 2018-05-15 PROCEDURE — 1090F PRES/ABSN URINE INCON ASSESS: CPT | Performed by: FAMILY MEDICINE

## 2018-05-15 PROCEDURE — 1036F TOBACCO NON-USER: CPT | Performed by: FAMILY MEDICINE

## 2018-05-15 PROCEDURE — 3046F HEMOGLOBIN A1C LEVEL >9.0%: CPT | Performed by: FAMILY MEDICINE

## 2018-05-15 PROCEDURE — G8427 DOCREV CUR MEDS BY ELIG CLIN: HCPCS | Performed by: FAMILY MEDICINE

## 2018-05-15 PROCEDURE — G8417 CALC BMI ABV UP PARAM F/U: HCPCS | Performed by: FAMILY MEDICINE

## 2018-05-15 RX ORDER — LEVOFLOXACIN 500 MG/1
500 TABLET, FILM COATED ORAL DAILY
Qty: 10 TABLET | Refills: 0 | Status: SHIPPED | OUTPATIENT
Start: 2018-05-15 | End: 2018-05-31 | Stop reason: SDUPTHER

## 2018-05-15 ASSESSMENT — ENCOUNTER SYMPTOMS
SHORTNESS OF BREATH: 1
DIARRHEA: 0
NAUSEA: 0
BACK PAIN: 1
SINUS PRESSURE: 0
COUGH: 0
SORE THROAT: 0
VOMITING: 0

## 2018-05-21 ENCOUNTER — TELEPHONE (OUTPATIENT)
Dept: FAMILY MEDICINE CLINIC | Age: 63
End: 2018-05-21

## 2018-05-21 RX ORDER — HYDROCHLOROTHIAZIDE 25 MG/1
TABLET ORAL
Qty: 90 TABLET | Refills: 3 | Status: SHIPPED | OUTPATIENT
Start: 2018-05-21 | End: 2019-07-09 | Stop reason: SDUPTHER

## 2018-05-21 RX ORDER — APIXABAN 5 MG/1
TABLET, FILM COATED ORAL
Qty: 180 TABLET | Refills: 3 | Status: SHIPPED | OUTPATIENT
Start: 2018-05-21 | End: 2019-06-12 | Stop reason: SDUPTHER

## 2018-05-21 RX ORDER — GABAPENTIN 300 MG/1
CAPSULE ORAL
Qty: 180 CAPSULE | Refills: 2 | Status: SHIPPED | OUTPATIENT
Start: 2018-05-21 | End: 2018-08-06 | Stop reason: SDUPTHER

## 2018-05-31 DIAGNOSIS — L03.119 RECURRENT CELLULITIS OF LOWER LEG: ICD-10-CM

## 2018-05-31 RX ORDER — FLUCONAZOLE 100 MG/1
TABLET ORAL
Qty: 1 TABLET | Refills: 0 | Status: SHIPPED | OUTPATIENT
Start: 2018-05-31 | End: 2018-06-21 | Stop reason: SDUPTHER

## 2018-05-31 RX ORDER — LEVOFLOXACIN 500 MG/1
500 TABLET, FILM COATED ORAL DAILY
Qty: 10 TABLET | Refills: 0 | Status: SHIPPED | OUTPATIENT
Start: 2018-05-31 | End: 2018-06-21 | Stop reason: SDUPTHER

## 2018-06-20 ENCOUNTER — APPOINTMENT (OUTPATIENT)
Dept: OCCUPATIONAL THERAPY | Age: 63
End: 2018-06-20
Payer: COMMERCIAL

## 2018-06-21 DIAGNOSIS — G89.29 CHRONIC BILATERAL LOW BACK PAIN, WITH SCIATICA PRESENCE UNSPECIFIED: ICD-10-CM

## 2018-06-21 DIAGNOSIS — M54.5 CHRONIC BILATERAL LOW BACK PAIN, WITH SCIATICA PRESENCE UNSPECIFIED: ICD-10-CM

## 2018-06-21 DIAGNOSIS — L03.119 RECURRENT CELLULITIS OF LOWER LEG: ICD-10-CM

## 2018-06-21 RX ORDER — FLUCONAZOLE 100 MG/1
TABLET ORAL
Qty: 1 TABLET | Refills: 0 | Status: SHIPPED | OUTPATIENT
Start: 2018-06-21 | End: 2018-09-06 | Stop reason: SDUPTHER

## 2018-06-21 RX ORDER — LEVOFLOXACIN 500 MG/1
500 TABLET, FILM COATED ORAL DAILY
Qty: 10 TABLET | Refills: 0 | Status: SHIPPED | OUTPATIENT
Start: 2018-06-21 | End: 2018-08-28 | Stop reason: SDUPTHER

## 2018-06-29 ENCOUNTER — TELEPHONE (OUTPATIENT)
Dept: FAMILY MEDICINE CLINIC | Age: 63
End: 2018-06-29

## 2018-06-29 DIAGNOSIS — M54.5 CHRONIC BILATERAL LOW BACK PAIN, WITH SCIATICA PRESENCE UNSPECIFIED: Primary | ICD-10-CM

## 2018-06-29 DIAGNOSIS — G89.29 CHRONIC BILATERAL LOW BACK PAIN, WITH SCIATICA PRESENCE UNSPECIFIED: Primary | ICD-10-CM

## 2018-06-29 RX ORDER — METHOCARBAMOL 750 MG/1
TABLET, FILM COATED ORAL
Qty: 60 TABLET | Refills: 3 | Status: SHIPPED | OUTPATIENT
Start: 2018-06-29 | End: 2018-09-06 | Stop reason: SDUPTHER

## 2018-06-29 RX ORDER — DULOXETIN HYDROCHLORIDE 30 MG/1
30 CAPSULE, DELAYED RELEASE ORAL DAILY
Qty: 30 CAPSULE | Refills: 3 | Status: SHIPPED | OUTPATIENT
Start: 2018-06-29 | End: 2018-08-06 | Stop reason: SDUPTHER

## 2018-06-29 RX ORDER — HYDROMORPHONE HYDROCHLORIDE 2 MG/1
2 TABLET ORAL
Qty: 100 TABLET | Refills: 0 | Status: SHIPPED | OUTPATIENT
Start: 2018-06-29 | End: 2018-07-29

## 2018-07-11 ENCOUNTER — HOSPITAL ENCOUNTER (OUTPATIENT)
Dept: OCCUPATIONAL THERAPY | Age: 63
Setting detail: THERAPIES SERIES
Discharge: HOME | End: 2018-07-11
Payer: COMMERCIAL

## 2018-07-18 ENCOUNTER — HOSPITAL ENCOUNTER (OUTPATIENT)
Dept: OCCUPATIONAL THERAPY | Age: 63
Setting detail: THERAPIES SERIES
Discharge: HOME | End: 2018-07-18
Payer: COMMERCIAL

## 2018-07-24 RX ORDER — METOPROLOL SUCCINATE 50 MG/1
50 TABLET, EXTENDED RELEASE ORAL DAILY
Qty: 90 TABLET | Refills: 3 | Status: SHIPPED | OUTPATIENT
Start: 2018-07-24 | End: 2019-09-11 | Stop reason: SDUPTHER

## 2018-07-24 NOTE — TELEPHONE ENCOUNTER
region     Staghorn renal calculus     Foraminal stenosis of lumbar region     Acute cystitis     SIRS (systemic inflammatory response syndrome) (HCC)     Nausea and vomiting     Lymphedema of both lower extremities

## 2018-08-06 DIAGNOSIS — G89.29 CHRONIC BILATERAL LOW BACK PAIN, WITH SCIATICA PRESENCE UNSPECIFIED: ICD-10-CM

## 2018-08-06 DIAGNOSIS — M54.5 CHRONIC BILATERAL LOW BACK PAIN, WITH SCIATICA PRESENCE UNSPECIFIED: ICD-10-CM

## 2018-08-06 RX ORDER — GABAPENTIN 300 MG/1
CAPSULE ORAL
Qty: 180 CAPSULE | Refills: 3 | Status: SHIPPED | OUTPATIENT
Start: 2018-08-06 | End: 2019-05-06 | Stop reason: SDUPTHER

## 2018-08-06 RX ORDER — DULOXETIN HYDROCHLORIDE 30 MG/1
30 CAPSULE, DELAYED RELEASE ORAL DAILY
Qty: 90 CAPSULE | Refills: 3 | Status: SHIPPED | OUTPATIENT
Start: 2018-08-06 | End: 2019-07-09 | Stop reason: SDUPTHER

## 2018-08-15 LAB
ALBUMIN SERPL-MCNC: 3.7 G/DL
ALP BLD-CCNC: 70 U/L
ALT SERPL-CCNC: 7 U/L
ANION GAP SERPL CALCULATED.3IONS-SCNC: 10 MMOL/L
AST SERPL-CCNC: 13 U/L
AVERAGE GLUCOSE: 180
BASOPHILS ABSOLUTE: 0 /ΜL
BASOPHILS RELATIVE PERCENT: 0.3 %
BILIRUB SERPL-MCNC: 0.5 MG/DL (ref 0.1–1.4)
BUN BLDV-MCNC: 19 MG/DL
CALCIUM SERPL-MCNC: 9.2 MG/DL
CHLORIDE BLD-SCNC: 104 MMOL/L
CHOLESTEROL, TOTAL: 136 MG/DL
CHOLESTEROL/HDL RATIO: 4.1
CO2: 25 MMOL/L
CREAT SERPL-MCNC: 1.06 MG/DL
EOSINOPHILS ABSOLUTE: 0.2 /ΜL
EOSINOPHILS RELATIVE PERCENT: 2.9 %
GFR CALCULATED: 52
GLUCOSE BLD-MCNC: 197 MG/DL
HBA1C MFR BLD: 7.9 %
HCT VFR BLD CALC: 37.1 % (ref 36–46)
HDLC SERPL-MCNC: 33 MG/DL (ref 35–70)
HEMOGLOBIN: 12.2 G/DL (ref 12–16)
LDL CHOLESTEROL CALCULATED: 67 MG/DL (ref 0–160)
LYMPHOCYTES ABSOLUTE: 1.3 /ΜL
LYMPHOCYTES RELATIVE PERCENT: 17.1 %
MCH RBC QN AUTO: 28.3 PG
MCHC RBC AUTO-ENTMCNC: 32.9 G/DL
MCV RBC AUTO: 86 FL
MONOCYTES ABSOLUTE: 0.7 /ΜL
MONOCYTES RELATIVE PERCENT: 9.5 %
NEUTROPHILS ABSOLUTE: 5.3 /ΜL
NEUTROPHILS RELATIVE PERCENT: 70.2 %
PDW BLD-RTO: 15.2 %
PLATELET # BLD: 219 K/ΜL
PMV BLD AUTO: 8.3 FL
POTASSIUM SERPL-SCNC: 4.2 MMOL/L
RBC # BLD: 4.31 10^6/ΜL
SODIUM BLD-SCNC: 139 MMOL/L
TOTAL PROTEIN: 7.6
TRIGL SERPL-MCNC: 182 MG/DL
VLDLC SERPL CALC-MCNC: 36 MG/DL
WBC # BLD: 7.5 10^3/ML

## 2018-08-21 DIAGNOSIS — I89.0 LYMPHEDEMA OF BOTH LOWER EXTREMITIES: Chronic | ICD-10-CM

## 2018-08-21 DIAGNOSIS — E11.59 TYPE 2 DIABETES MELLITUS WITH OTHER CIRCULATORY COMPLICATIONS (HCC): Chronic | ICD-10-CM

## 2018-08-21 DIAGNOSIS — M54.5 CHRONIC BILATERAL LOW BACK PAIN, WITH SCIATICA PRESENCE UNSPECIFIED: ICD-10-CM

## 2018-08-21 DIAGNOSIS — G89.29 CHRONIC BILATERAL LOW BACK PAIN, WITH SCIATICA PRESENCE UNSPECIFIED: ICD-10-CM

## 2018-08-21 DIAGNOSIS — I10 ESSENTIAL HYPERTENSION: Chronic | ICD-10-CM

## 2018-08-28 DIAGNOSIS — L03.119 RECURRENT CELLULITIS OF LOWER LEG: ICD-10-CM

## 2018-08-28 RX ORDER — LEVOFLOXACIN 500 MG/1
500 TABLET, FILM COATED ORAL DAILY
Qty: 10 TABLET | Refills: 0 | Status: SHIPPED | OUTPATIENT
Start: 2018-08-28 | End: 2018-09-24 | Stop reason: SDUPTHER

## 2018-08-28 NOTE — TELEPHONE ENCOUNTER
Next Visit Date:  Future Appointments  Date Time Provider Larry Tsaii   9/6/2018 2:15 PM Eric Oshea  [address] [facility] Maintenance   Topic Date Due    Hepatitis C screen  1955    HIV screen  04/15/1968    DTaP/Tdap/Td vaccine (1 - Tdap) 04/15/1972    Shingles Vaccine (1 of 2 - 2 Dose Series) 04/15/2003    Diabetic retinal exam  05/05/2008    Diabetic microalbuminuria test  12/08/2014    Diabetic foot exam  05/24/2017    Breast cancer screen  02/22/2018    DEXA (modify frequency per FRAX score)  04/15/2018    Pneumococcal low/med risk (1 of 2 - PCV13) 04/15/2018    Flu vaccine (1) 09/01/2018    Cervical cancer screen  02/22/2019    A1C test (Diabetic or Prediabetic)  08/15/2019    Lipid screen  08/15/2019    Potassium monitoring  08/15/2019    Creatinine monitoring  08/15/2019    Colon cancer screen colonoscopy  02/22/2026       Hemoglobin A1C (%)   Date Value   08/15/2018 7.9   09/29/2017 8.6   07/28/2016 6.7 (H)             ( goal A1C is < 7)   No results found for: LABMICR  LDL Calculated (mg/dL)   Date Value   08/15/2018 67   02/08/2016 85       (goal LDL is <100)   AST (U/L)   Date Value   08/15/2018 13     ALT (U/L)   Date Value   08/15/2018 7     BUN (mg/dL)   Date Value   08/15/2018 19     BP Readings from Last 3 Encounters:   05/15/18 130/72   10/16/17 138/72   07/05/17 124/79          (goal 120/80)    All Future Testing planned in [facility]  Lab Frequency Next Occurrence               Patient Active Problem List:     Diabetes mellitus (HCC)     GERD (gastroesophageal reflux disease)     Back pain     MVP (mitral valve prolapse)     DVT (deep venous thrombosis) (HCC)     Essential hypertension     HIGH CHOLESTEROL     Cellulitis of left lower extremity     Acute renal failure (ARF) (HCC)     Radiculopathy of lumbosacral region     Staghorn renal calculus     Foraminal stenosis of lumbar region     Acute cystitis     SIRS (systemic inflammatory response

## 2018-09-06 ENCOUNTER — OFFICE VISIT (OUTPATIENT)
Dept: FAMILY MEDICINE CLINIC | Age: 63
End: 2018-09-06
Payer: COMMERCIAL

## 2018-09-06 VITALS
SYSTOLIC BLOOD PRESSURE: 132 MMHG | BODY MASS INDEX: 39.68 KG/M2 | HEIGHT: 72 IN | HEART RATE: 98 BPM | DIASTOLIC BLOOD PRESSURE: 84 MMHG | WEIGHT: 293 LBS

## 2018-09-06 DIAGNOSIS — B37.9 YEAST INFECTION: ICD-10-CM

## 2018-09-06 DIAGNOSIS — Z12.39 BREAST CANCER SCREENING: Primary | ICD-10-CM

## 2018-09-06 DIAGNOSIS — E11.59 TYPE 2 DIABETES MELLITUS WITH OTHER CIRCULATORY COMPLICATION, UNSPECIFIED WHETHER LONG TERM INSULIN USE (HCC): Chronic | ICD-10-CM

## 2018-09-06 DIAGNOSIS — G89.29 CHRONIC BILATERAL LOW BACK PAIN, WITH SCIATICA PRESENCE UNSPECIFIED: ICD-10-CM

## 2018-09-06 DIAGNOSIS — M54.5 CHRONIC BILATERAL LOW BACK PAIN, WITH SCIATICA PRESENCE UNSPECIFIED: ICD-10-CM

## 2018-09-06 PROCEDURE — 3017F COLORECTAL CA SCREEN DOC REV: CPT | Performed by: FAMILY MEDICINE

## 2018-09-06 PROCEDURE — 99213 OFFICE O/P EST LOW 20 MIN: CPT | Performed by: FAMILY MEDICINE

## 2018-09-06 PROCEDURE — 4040F PNEUMOC VAC/ADMIN/RCVD: CPT | Performed by: FAMILY MEDICINE

## 2018-09-06 PROCEDURE — G8427 DOCREV CUR MEDS BY ELIG CLIN: HCPCS | Performed by: FAMILY MEDICINE

## 2018-09-06 PROCEDURE — 1090F PRES/ABSN URINE INCON ASSESS: CPT | Performed by: FAMILY MEDICINE

## 2018-09-06 PROCEDURE — 1101F PT FALLS ASSESS-DOCD LE1/YR: CPT | Performed by: FAMILY MEDICINE

## 2018-09-06 PROCEDURE — 3045F PR MOST RECENT HEMOGLOBIN A1C LEVEL 7.0-9.0%: CPT | Performed by: FAMILY MEDICINE

## 2018-09-06 PROCEDURE — 2022F DILAT RTA XM EVC RTNOPTHY: CPT | Performed by: FAMILY MEDICINE

## 2018-09-06 PROCEDURE — G8400 PT W/DXA NO RESULTS DOC: HCPCS | Performed by: FAMILY MEDICINE

## 2018-09-06 PROCEDURE — 1123F ACP DISCUSS/DSCN MKR DOCD: CPT | Performed by: FAMILY MEDICINE

## 2018-09-06 PROCEDURE — G8417 CALC BMI ABV UP PARAM F/U: HCPCS | Performed by: FAMILY MEDICINE

## 2018-09-06 PROCEDURE — 1036F TOBACCO NON-USER: CPT | Performed by: FAMILY MEDICINE

## 2018-09-06 RX ORDER — HYDROMORPHONE HYDROCHLORIDE 2 MG/1
2 TABLET ORAL
Qty: 100 TABLET | Refills: 0 | Status: SHIPPED | OUTPATIENT
Start: 2018-09-06 | End: 2018-09-24 | Stop reason: SDUPTHER

## 2018-09-06 RX ORDER — METHOCARBAMOL 750 MG/1
TABLET, FILM COATED ORAL
Qty: 60 TABLET | Refills: 3 | Status: SHIPPED | OUTPATIENT
Start: 2018-09-06 | End: 2019-01-16 | Stop reason: SDUPTHER

## 2018-09-06 RX ORDER — FLUCONAZOLE 100 MG/1
TABLET ORAL
Qty: 1 TABLET | Refills: 0 | Status: SHIPPED | OUTPATIENT
Start: 2018-09-06 | End: 2018-10-23 | Stop reason: SDUPTHER

## 2018-09-06 RX ORDER — HYDROMORPHONE HYDROCHLORIDE 2 MG/1
2 TABLET ORAL
COMMUNITY
End: 2018-09-06 | Stop reason: SDUPTHER

## 2018-09-06 ASSESSMENT — ENCOUNTER SYMPTOMS
SHORTNESS OF BREATH: 0
VOMITING: 0
SORE THROAT: 0
BACK PAIN: 0
NAUSEA: 0
SINUS PRESSURE: 0
DIARRHEA: 0
COUGH: 0

## 2018-09-06 NOTE — PROGRESS NOTES
Janel Sharp is a 70 y.o. female who presents today for her medical conditions/complaints as noted below. Janel Sharp is c/o of Hypertension; Back Pain; Diabetes; Health Maintenance (micro, pneumonia); and Discuss Labs  Routine follow up on PL she feels she is doing well. HPI:     HYPERTENSION visit     BP Readings from Last 3 Encounters:   09/06/18 132/84   05/15/18 130/72   10/16/17 138/72       LDL Calculated (mg/dL)   Date Value   08/15/2018 67     HDL (mg/dL)   Date Value   08/15/2018 33 (A)     BUN (mg/dL)   Date Value   08/15/2018 19     CREATININE (no units)   Date Value   08/15/2018 1.06     Glucose (mg/dL)   Date Value   08/15/2018 197              Have you changed or started any medications since your last visit including any over-the-counter medicines, vitamins, or herbal medicines? no   Have you stopped taking any of your medications? Is so, why? -  no  Are you having any side effects from any of your medications? - no  How often do you miss doses of your medication? rare      Have you seen any other physician or provider since your last visit?  no   Have you had any other diagnostic tests since your last visit?  no   Have you been seen in the emergency room and/or had an admission in a hospital since we last saw you?  no   Have you had your routine dental cleaning in the past 6 months?  no     Do you have an active MyChart account? If no, what is the barrier?   No:     Patient Care Team:  Fallon Mann MD as PCP - General (Family Medicine)    Medical History Review  Past Medical, Family, and Social History reviewed and  contribute to the patient presenting condition    Health Maintenance   Topic Date Due    Hepatitis C screen  1955    HIV screen  04/15/1968    DTaP/Tdap/Td vaccine (1 - Tdap) 04/15/1972    Shingles Vaccine (1 of 2 - 2 Dose Series) 04/15/2003    Diabetic retinal exam  05/05/2008    Diabetic microalbuminuria test  12/08/2014    Diabetic foot exam Hemoglobin A1C     Standing Status:   Future     Standing Expiration Date:   2019    Hepatitis C Antibody     Standing Status:   Future     Standing Expiration Date:   2019     DIABETES FOOT EXAM     Orders Placed This Encounter   Medications    methocarbamol (ROBAXIN) 750 MG tablet     Sig: TAKE 1 TABLET BY MOUTH THREE TIMES DAILY AS NEEDED FOR PAIN     Dispense:  60 tablet     Refill:  3    fluconazole (DIFLUCAN) 100 MG tablet     Si qd     Dispense:  1 tablet     Refill:  0    HYDROmorphone (DILAUDID) 2 MG tablet     Sig: Take 1 tablet by mouth every 3 hours as needed for Pain for up to 30 days. .     Dispense:  100 tablet     Refill:  0     She refuses the flu vaccine.

## 2018-09-24 DIAGNOSIS — L03.119 RECURRENT CELLULITIS OF LOWER LEG: ICD-10-CM

## 2018-09-24 DIAGNOSIS — M54.5 CHRONIC BILATERAL LOW BACK PAIN, WITH SCIATICA PRESENCE UNSPECIFIED: ICD-10-CM

## 2018-09-24 DIAGNOSIS — G89.29 CHRONIC BILATERAL LOW BACK PAIN, WITH SCIATICA PRESENCE UNSPECIFIED: ICD-10-CM

## 2018-09-24 RX ORDER — LEVOFLOXACIN 500 MG/1
500 TABLET, FILM COATED ORAL DAILY
Qty: 10 TABLET | Refills: 0 | Status: SHIPPED | OUTPATIENT
Start: 2018-09-24 | End: 2018-11-14 | Stop reason: SDUPTHER

## 2018-09-24 RX ORDER — HYDROMORPHONE HYDROCHLORIDE 2 MG/1
2 TABLET ORAL
Qty: 100 TABLET | Refills: 0 | Status: SHIPPED | OUTPATIENT
Start: 2018-09-24 | End: 2019-01-16 | Stop reason: SDUPTHER

## 2018-09-24 NOTE — TELEPHONE ENCOUNTER
lv 9/6/18  Oars please reiew today        Next Visit Date:  Future Appointments  Date Time Provider Larry Klein   3/6/2019 1:00 PM Richard Hennessy  [address] [facility]   Topic Date Due    Hepatitis C screen  1955    HIV screen  04/15/1968    DTaP/Tdap/Td vaccine (1 - Tdap) 04/15/1972    Shingles Vaccine (1 of 2 - 2 Dose Series) 04/15/2003    Diabetic retinal exam  05/05/2008    Diabetic microalbuminuria test  12/08/2014    Breast cancer screen  02/22/2018    DEXA (modify frequency per FRAX score)  04/15/2018    Pneumococcal low/med risk (1 of 2 - PCV13) 04/15/2018    Flu vaccine (1) 12/31/2020 (Originally 9/1/2018)    Cervical cancer screen  02/22/2019    A1C test (Diabetic or Prediabetic)  08/15/2019    Lipid screen  08/15/2019    Potassium monitoring  08/15/2019    Creatinine monitoring  08/15/2019    Diabetic foot exam  09/06/2019    Colon cancer screen colonoscopy  02/22/2026       Hemoglobin A1C (%)   Date Value   08/15/2018 7.9   09/29/2017 8.6   07/28/2016 6.7 (H)             ( goal A1C is < 7)   No results found for: LABMICR  LDL Calculated (mg/dL)   Date Value   08/15/2018 67   02/08/2016 85       (goal LDL is <100)   AST (U/L)   Date Value   08/15/2018 13     ALT (U/L)   Date Value   08/15/2018 7     BUN (mg/dL)   Date Value   08/15/2018 19     BP Readings from Last 3 Encounters:   09/06/18 132/84   05/15/18 130/72   10/16/17 138/72          (goal 120/80)    All Future Testing planned in [facility]  Lab Frequency Next Occurrence   CESAR DIGITAL SCREEN SELF REFERRAL W OR WO CAD BILATERAL Once 09/06/2018   CBC Auto Differential Once 03/06/2019   Comprehensive Metabolic Panel Once 79/23/1487   Lipid Panel Once 03/06/2019   Hemoglobin A1C Once 03/06/2019   Hepatitis C Antibody Once 03/06/2019               Patient Active Problem List:     Diabetes mellitus ([address]     GERD (gastroesophageal reflux disease)     Back pain     MVP (mitral valve prolapse)     DVT (deep venous thrombosis) (HCC)     Essential hypertension     HIGH CHOLESTEROL     Cellulitis of left lower extremity     Acute renal failure (ARF) (HCC)     Radiculopathy of lumbosacral region     Staghorn renal calculus     Foraminal stenosis of lumbar region     Acute cystitis     SIRS (systemic inflammatory response syndrome) (HCC)     Nausea and vomiting     Lymphedema of both lower extremities

## 2018-10-23 DIAGNOSIS — L03.119 RECURRENT CELLULITIS OF LOWER LEG: ICD-10-CM

## 2018-10-23 DIAGNOSIS — B37.9 YEAST INFECTION: ICD-10-CM

## 2018-10-23 RX ORDER — FLUCONAZOLE 100 MG/1
TABLET ORAL
Qty: 1 TABLET | Refills: 0 | Status: SHIPPED | OUTPATIENT
Start: 2018-10-23 | End: 2018-11-14 | Stop reason: SDUPTHER

## 2018-10-23 RX ORDER — LEVOFLOXACIN 500 MG/1
500 TABLET, FILM COATED ORAL DAILY
Qty: 10 TABLET | Refills: 0 | Status: SHIPPED | OUTPATIENT
Start: 2018-10-23 | End: 2018-11-02

## 2018-10-23 NOTE — TELEPHONE ENCOUNTER
Next Visit Date:  Future Appointments  Date Time Provider Larry Tsaii   3/6/2019 1:00 PM Antwan Mello  [address] [facility] [place]   Topic Date Due    Hepatitis C screen  1955    HIV screen  04/15/1968    DTaP/Tdap/Td vaccine (1 - Tdap) 04/15/1972    Shingles Vaccine (1 of 2 - 2 Dose Series) 04/15/2003    Diabetic retinal exam  05/05/2008    Diabetic microalbuminuria test  12/08/2014    Breast cancer screen  02/22/2018    DEXA (modify frequency per FRAX score)  04/15/2018    Pneumococcal low/med risk (1 of 2 - PCV13) 04/15/2018    Flu vaccine (1) 12/31/2020 (Originally 9/1/2018)    Cervical cancer screen  02/22/2019    A1C test (Diabetic or Prediabetic)  08/15/2019    Lipid screen  08/15/2019    Potassium monitoring  08/15/2019    Creatinine monitoring  08/15/2019    Diabetic foot exam  09/06/2019    Colon cancer screen colonoscopy  02/22/2026       Hemoglobin A1C (%)   Date Value   08/15/2018 7.9   09/29/2017 8.6   07/28/2016 6.7 (H)             ( goal A1C is < 7)   No results found for: LABMICR  LDL Calculated (mg/dL)   Date Value   08/15/2018 67   02/08/2016 85       (goal LDL is <100)   AST (U/L)   Date Value   08/15/2018 13     ALT (U/L)   Date Value   08/15/2018 7     BUN (mg/dL)   Date Value   08/15/2018 19     BP Readings from Last 3 Encounters:   09/06/18 132/84   05/15/18 130/72   10/16/17 138/72          (goal 120/80)    All Future Testing planned in [facility]  Lab Frequency Next Occurrence   CESAR DIGITAL SCREEN SELF REFERRAL W OR WO CAD BILATERAL Once 09/06/2018   CBC Auto Differential Once 03/06/2019   Comprehensive Metabolic Panel Once 58/01/1752   Lipid Panel Once 03/06/2019   Hemoglobin A1C Once 03/06/2019   Hepatitis C Antibody Once 03/06/2019               Patient Active Problem List:     Diabetes mellitus ([address]     GERD (gastroesophageal reflux disease)     Back pain     MVP (mitral valve prolapse)     DVT (deep venous thrombosis) (HCC) Essential hypertension     HIGH CHOLESTEROL     Cellulitis of left lower extremity     Acute renal failure (ARF) (HCC)     Radiculopathy of lumbosacral region     Staghorn renal calculus     Foraminal stenosis of lumbar region     Acute cystitis     SIRS (systemic inflammatory response syndrome) ([facility])     Nausea and vomiting     Lymphedema of both lower extremities

## 2018-11-12 RX ORDER — OMEPRAZOLE 40 MG/1
CAPSULE, DELAYED RELEASE ORAL
Qty: 90 CAPSULE | Refills: 3 | Status: SHIPPED | OUTPATIENT
Start: 2018-11-12 | End: 2019-10-07 | Stop reason: SDUPTHER

## 2018-11-12 NOTE — TELEPHONE ENCOUNTER
Next Visit Date:  Future Appointments  Date Time Provider Larry Tsaii   3/6/2019 1:00 PM Ligia Pitt  [address] [facility] Maintenance   Topic Date Due    Hepatitis C screen  1955    HIV screen  04/15/1968    DTaP/Tdap/Td vaccine (1 - Tdap) 04/15/1972    Shingles Vaccine (1 of 2 - 2 Dose Series) 04/15/2003    Diabetic retinal exam  05/05/2008    Diabetic microalbuminuria test  12/08/2014    Breast cancer screen  02/22/2018    DEXA (modify frequency per FRAX score)  04/15/2018    Pneumococcal low/med risk (1 of 2 - PCV13) 04/15/2018    Flu vaccine (1) 12/31/2020 (Originally 9/1/2018)    Cervical cancer screen  02/22/2019    A1C test (Diabetic or Prediabetic)  08/15/2019    Lipid screen  08/15/2019    Potassium monitoring  08/15/2019    Creatinine monitoring  08/15/2019    Diabetic foot exam  09/06/2019    Colon cancer screen colonoscopy  02/22/2026       Hemoglobin A1C (%)   Date Value   08/15/2018 7.9   09/29/2017 8.6   07/28/2016 6.7 (H)             ( goal A1C is < 7)   No results found for: LABMICR  LDL Calculated (mg/dL)   Date Value   08/15/2018 67   02/08/2016 85       (goal LDL is <100)   AST (U/L)   Date Value   08/15/2018 13     ALT (U/L)   Date Value   08/15/2018 7     BUN (mg/dL)   Date Value   08/15/2018 19     BP Readings from Last 3 Encounters:   09/06/18 132/84   05/15/18 130/72   10/16/17 138/72          (goal 120/80)    All Future Testing planned in [facility]  Lab Frequency Next Occurrence   CESAR DIGITAL SCREEN SELF REFERRAL W OR WO CAD BILATERAL Once 09/06/2018   CBC Auto Differential Once 03/06/2019   Comprehensive Metabolic Panel Once 40/68/9148   Lipid Panel Once 03/06/2019   Hemoglobin A1C Once 03/06/2019   Hepatitis C Antibody Once 03/06/2019               Patient Active Problem List:     Diabetes mellitus ([address]     GERD (gastroesophageal reflux disease)     Back pain     MVP (mitral valve prolapse)     DVT (deep venous thrombosis) (HCC)

## 2018-11-14 DIAGNOSIS — L03.119 RECURRENT CELLULITIS OF LOWER LEG: ICD-10-CM

## 2018-11-14 DIAGNOSIS — B37.9 YEAST INFECTION: ICD-10-CM

## 2018-11-14 RX ORDER — LEVOFLOXACIN 500 MG/1
500 TABLET, FILM COATED ORAL DAILY
Qty: 10 TABLET | Refills: 0 | Status: SHIPPED | OUTPATIENT
Start: 2018-11-14 | End: 2018-11-24

## 2018-11-14 RX ORDER — FLUCONAZOLE 100 MG/1
TABLET ORAL
Qty: 3 TABLET | Refills: 0 | Status: ON HOLD | OUTPATIENT
Start: 2018-11-14 | End: 2019-01-03

## 2018-11-30 ENCOUNTER — TELEPHONE (OUTPATIENT)
Dept: FAMILY MEDICINE CLINIC | Age: 63
End: 2018-11-30

## 2018-11-30 RX ORDER — CEFUROXIME AXETIL 250 MG/1
250 TABLET ORAL 2 TIMES DAILY
Qty: 20 TABLET | Refills: 0 | Status: SHIPPED | OUTPATIENT
Start: 2018-11-30 | End: 2018-12-10

## 2018-11-30 NOTE — TELEPHONE ENCOUNTER
Patient has a cough with yellow phlegm, feeling fatige, onset Monday, just doesn't feel good at all sneezing a lot would like an antibiotic called in please advise

## 2018-12-31 ENCOUNTER — APPOINTMENT (OUTPATIENT)
Dept: GENERAL RADIOLOGY | Age: 63
DRG: 603 | End: 2018-12-31
Payer: COMMERCIAL

## 2018-12-31 ENCOUNTER — HOSPITAL ENCOUNTER (INPATIENT)
Age: 63
LOS: 3 days | Discharge: HOME | DRG: 603 | End: 2019-01-03
Attending: EMERGENCY MEDICINE | Admitting: INTERNAL MEDICINE
Payer: COMMERCIAL

## 2018-12-31 DIAGNOSIS — L03.90 CELLULITIS, UNSPECIFIED CELLULITIS SITE: Primary | ICD-10-CM

## 2018-12-31 DIAGNOSIS — B37.9 YEAST INFECTION: ICD-10-CM

## 2018-12-31 LAB
ABSOLUTE EOS #: 0 K/UL (ref 0–0.4)
ABSOLUTE IMMATURE GRANULOCYTE: ABNORMAL K/UL (ref 0–0.3)
ABSOLUTE LYMPH #: 0.5 K/UL (ref 1–4.8)
ABSOLUTE MONO #: 0.4 K/UL (ref 0.2–0.8)
ANION GAP SERPL CALCULATED.3IONS-SCNC: 15 MMOL/L (ref 9–17)
BASOPHILS # BLD: 0 % (ref 0–2)
BASOPHILS ABSOLUTE: 0 K/UL (ref 0–0.2)
BUN BLDV-MCNC: 16 MG/DL (ref 8–23)
BUN/CREAT BLD: 14 (ref 9–20)
CALCIUM SERPL-MCNC: 8.9 MG/DL (ref 8.6–10.4)
CHLORIDE BLD-SCNC: 94 MMOL/L (ref 98–107)
CO2: 23 MMOL/L (ref 20–31)
CREAT SERPL-MCNC: 1.17 MG/DL (ref 0.5–0.9)
DIFFERENTIAL TYPE: ABNORMAL
DIRECT EXAM: NORMAL
EKG ATRIAL RATE: 117 BPM
EKG P AXIS: 44 DEGREES
EKG P-R INTERVAL: 224 MS
EKG Q-T INTERVAL: 354 MS
EKG QRS DURATION: 102 MS
EKG QTC CALCULATION (BAZETT): 461 MS
EKG R AXIS: -48 DEGREES
EKG T AXIS: 64 DEGREES
EKG VENTRICULAR RATE: 102 BPM
EOSINOPHILS RELATIVE PERCENT: 0 % (ref 1–4)
GFR AFRICAN AMERICAN: 56 ML/MIN
GFR NON-AFRICAN AMERICAN: 46 ML/MIN
GFR SERPL CREATININE-BSD FRML MDRD: ABNORMAL ML/MIN/{1.73_M2}
GFR SERPL CREATININE-BSD FRML MDRD: ABNORMAL ML/MIN/{1.73_M2}
GLUCOSE BLD-MCNC: 259 MG/DL (ref 65–105)
GLUCOSE BLD-MCNC: 296 MG/DL (ref 70–99)
HCT VFR BLD CALC: 36.1 % (ref 36–46)
HEMOGLOBIN: 12.1 G/DL (ref 12–16)
IMMATURE GRANULOCYTES: ABNORMAL %
LACTIC ACID: 1.8 MMOL/L (ref 0.5–2.2)
LACTIC ACID: 2.8 MMOL/L (ref 0.5–2.2)
LYMPHOCYTES # BLD: 3 % (ref 24–44)
Lab: NORMAL
MCH RBC QN AUTO: 29.4 PG (ref 26–34)
MCHC RBC AUTO-ENTMCNC: 33.5 G/DL (ref 31–37)
MCV RBC AUTO: 87.7 FL (ref 80–100)
MONOCYTES # BLD: 2 % (ref 1–7)
NRBC AUTOMATED: ABNORMAL PER 100 WBC
PDW BLD-RTO: 15.2 % (ref 11.5–14.5)
PLATELET # BLD: 198 K/UL (ref 130–400)
PLATELET ESTIMATE: ABNORMAL
PMV BLD AUTO: 7.4 FL (ref 6–12)
POTASSIUM SERPL-SCNC: 4.1 MMOL/L (ref 3.7–5.3)
RBC # BLD: 4.12 M/UL (ref 4–5.2)
RBC # BLD: ABNORMAL 10*6/UL
SEG NEUTROPHILS: 95 % (ref 36–66)
SEGMENTED NEUTROPHILS ABSOLUTE COUNT: 16.1 K/UL (ref 1.8–7.7)
SODIUM BLD-SCNC: 132 MMOL/L (ref 135–144)
SPECIMEN DESCRIPTION: NORMAL
STATUS: NORMAL
WBC # BLD: 17 K/UL (ref 3.5–11)
WBC # BLD: ABNORMAL 10*3/UL

## 2018-12-31 PROCEDURE — 2580000003 HC RX 258: Performed by: CLINICAL NURSE SPECIALIST

## 2018-12-31 PROCEDURE — 2580000003 HC RX 258: Performed by: NURSE PRACTITIONER

## 2018-12-31 PROCEDURE — 87581 M.PNEUMON DNA AMP PROBE: CPT

## 2018-12-31 PROCEDURE — 96375 TX/PRO/DX INJ NEW DRUG ADDON: CPT

## 2018-12-31 PROCEDURE — 6370000000 HC RX 637 (ALT 250 FOR IP): Performed by: CLINICAL NURSE SPECIALIST

## 2018-12-31 PROCEDURE — 80048 BASIC METABOLIC PNL TOTAL CA: CPT

## 2018-12-31 PROCEDURE — S0028 INJECTION, FAMOTIDINE, 20 MG: HCPCS | Performed by: NURSE PRACTITIONER

## 2018-12-31 PROCEDURE — 85025 COMPLETE CBC W/AUTO DIFF WBC: CPT

## 2018-12-31 PROCEDURE — 6360000002 HC RX W HCPCS: Performed by: INTERNAL MEDICINE

## 2018-12-31 PROCEDURE — 6360000002 HC RX W HCPCS: Performed by: NURSE PRACTITIONER

## 2018-12-31 PROCEDURE — 87486 CHLMYD PNEUM DNA AMP PROBE: CPT

## 2018-12-31 PROCEDURE — 87040 BLOOD CULTURE FOR BACTERIA: CPT

## 2018-12-31 PROCEDURE — 51701 INSERT BLADDER CATHETER: CPT

## 2018-12-31 PROCEDURE — 81003 URINALYSIS AUTO W/O SCOPE: CPT

## 2018-12-31 PROCEDURE — 87633 RESP VIRUS 12-25 TARGETS: CPT

## 2018-12-31 PROCEDURE — 6370000000 HC RX 637 (ALT 250 FOR IP): Performed by: NURSE PRACTITIONER

## 2018-12-31 PROCEDURE — 87804 INFLUENZA ASSAY W/OPTIC: CPT

## 2018-12-31 PROCEDURE — 87798 DETECT AGENT NOS DNA AMP: CPT

## 2018-12-31 PROCEDURE — 99222 1ST HOSP IP/OBS MODERATE 55: CPT | Performed by: NURSE PRACTITIONER

## 2018-12-31 PROCEDURE — 93005 ELECTROCARDIOGRAM TRACING: CPT

## 2018-12-31 PROCEDURE — 82947 ASSAY GLUCOSE BLOOD QUANT: CPT

## 2018-12-31 PROCEDURE — 36415 COLL VENOUS BLD VENIPUNCTURE: CPT

## 2018-12-31 PROCEDURE — 2500000003 HC RX 250 WO HCPCS: Performed by: NURSE PRACTITIONER

## 2018-12-31 PROCEDURE — 96365 THER/PROPH/DIAG IV INF INIT: CPT

## 2018-12-31 PROCEDURE — 6360000002 HC RX W HCPCS: Performed by: CLINICAL NURSE SPECIALIST

## 2018-12-31 PROCEDURE — 83605 ASSAY OF LACTIC ACID: CPT

## 2018-12-31 PROCEDURE — 99284 EMERGENCY DEPT VISIT MOD MDM: CPT

## 2018-12-31 PROCEDURE — 71045 X-RAY EXAM CHEST 1 VIEW: CPT

## 2018-12-31 PROCEDURE — 1200000000 HC SEMI PRIVATE

## 2018-12-31 RX ORDER — HYDROCODONE BITARTRATE AND ACETAMINOPHEN 5; 325 MG/1; MG/1
1 TABLET ORAL EVERY 4 HOURS PRN
Status: DISCONTINUED | OUTPATIENT
Start: 2018-12-31 | End: 2019-01-03 | Stop reason: HOSPADM

## 2018-12-31 RX ORDER — ONDANSETRON 4 MG/1
4 TABLET, ORALLY DISINTEGRATING ORAL EVERY 6 HOURS PRN
Status: DISCONTINUED | OUTPATIENT
Start: 2018-12-31 | End: 2019-01-03 | Stop reason: HOSPADM

## 2018-12-31 RX ORDER — MAGNESIUM SULFATE 1 G/100ML
1 INJECTION INTRAVENOUS PRN
Status: DISCONTINUED | OUTPATIENT
Start: 2018-12-31 | End: 2019-01-03 | Stop reason: HOSPADM

## 2018-12-31 RX ORDER — GLYBURIDE 5 MG/1
5 TABLET ORAL 2 TIMES DAILY WITH MEALS
Status: DISCONTINUED | OUTPATIENT
Start: 2018-12-31 | End: 2019-01-03 | Stop reason: HOSPADM

## 2018-12-31 RX ORDER — ONDANSETRON 2 MG/ML
4 INJECTION INTRAMUSCULAR; INTRAVENOUS EVERY 6 HOURS PRN
Status: DISCONTINUED | OUTPATIENT
Start: 2018-12-31 | End: 2018-12-31 | Stop reason: SDUPTHER

## 2018-12-31 RX ORDER — HYDROCHLOROTHIAZIDE 25 MG/1
25 TABLET ORAL DAILY
Status: DISCONTINUED | OUTPATIENT
Start: 2019-01-01 | End: 2019-01-03 | Stop reason: HOSPADM

## 2018-12-31 RX ORDER — SODIUM CHLORIDE 0.9 % (FLUSH) 0.9 %
10 SYRINGE (ML) INJECTION EVERY 12 HOURS SCHEDULED
Status: DISCONTINUED | OUTPATIENT
Start: 2018-12-31 | End: 2019-01-03 | Stop reason: HOSPADM

## 2018-12-31 RX ORDER — PANTOPRAZOLE SODIUM 40 MG/1
40 TABLET, DELAYED RELEASE ORAL
Status: DISCONTINUED | OUTPATIENT
Start: 2019-01-01 | End: 2019-01-03 | Stop reason: HOSPADM

## 2018-12-31 RX ORDER — METHOCARBAMOL 750 MG/1
750 TABLET, FILM COATED ORAL 3 TIMES DAILY PRN
Status: DISCONTINUED | OUTPATIENT
Start: 2018-12-31 | End: 2019-01-03 | Stop reason: HOSPADM

## 2018-12-31 RX ORDER — ONDANSETRON 2 MG/ML
4 INJECTION INTRAMUSCULAR; INTRAVENOUS EVERY 6 HOURS PRN
Status: DISCONTINUED | OUTPATIENT
Start: 2018-12-31 | End: 2019-01-03 | Stop reason: HOSPADM

## 2018-12-31 RX ORDER — GABAPENTIN 300 MG/1
600 CAPSULE ORAL 2 TIMES DAILY
Status: DISCONTINUED | OUTPATIENT
Start: 2018-12-31 | End: 2019-01-03 | Stop reason: HOSPADM

## 2018-12-31 RX ORDER — NICOTINE POLACRILEX 4 MG
15 LOZENGE BUCCAL PRN
Status: DISCONTINUED | OUTPATIENT
Start: 2018-12-31 | End: 2019-01-03 | Stop reason: HOSPADM

## 2018-12-31 RX ORDER — SODIUM CHLORIDE 9 MG/ML
INJECTION, SOLUTION INTRAVENOUS CONTINUOUS
Status: DISCONTINUED | OUTPATIENT
Start: 2018-12-31 | End: 2019-01-01

## 2018-12-31 RX ORDER — ACETAMINOPHEN 500 MG
1000 TABLET ORAL EVERY 6 HOURS PRN
Status: DISCONTINUED | OUTPATIENT
Start: 2018-12-31 | End: 2019-01-03 | Stop reason: HOSPADM

## 2018-12-31 RX ORDER — ONDANSETRON 2 MG/ML
4 INJECTION INTRAMUSCULAR; INTRAVENOUS ONCE
Status: COMPLETED | OUTPATIENT
Start: 2018-12-31 | End: 2018-12-31

## 2018-12-31 RX ORDER — DEXTROSE MONOHYDRATE 50 MG/ML
100 INJECTION, SOLUTION INTRAVENOUS PRN
Status: DISCONTINUED | OUTPATIENT
Start: 2018-12-31 | End: 2019-01-03 | Stop reason: HOSPADM

## 2018-12-31 RX ORDER — POTASSIUM CHLORIDE 7.45 MG/ML
10 INJECTION INTRAVENOUS PRN
Status: DISCONTINUED | OUTPATIENT
Start: 2018-12-31 | End: 2019-01-03 | Stop reason: HOSPADM

## 2018-12-31 RX ORDER — 0.9 % SODIUM CHLORIDE 0.9 %
1000 INTRAVENOUS SOLUTION INTRAVENOUS ONCE
Status: COMPLETED | OUTPATIENT
Start: 2018-12-31 | End: 2018-12-31

## 2018-12-31 RX ORDER — SODIUM CHLORIDE 0.9 % (FLUSH) 0.9 %
10 SYRINGE (ML) INJECTION PRN
Status: DISCONTINUED | OUTPATIENT
Start: 2018-12-31 | End: 2019-01-03 | Stop reason: HOSPADM

## 2018-12-31 RX ORDER — 0.9 % SODIUM CHLORIDE 0.9 %
1500 INTRAVENOUS SOLUTION INTRAVENOUS ONCE
Status: COMPLETED | OUTPATIENT
Start: 2018-12-31 | End: 2018-12-31

## 2018-12-31 RX ORDER — POTASSIUM CHLORIDE 20 MEQ/1
40 TABLET, EXTENDED RELEASE ORAL PRN
Status: DISCONTINUED | OUTPATIENT
Start: 2018-12-31 | End: 2019-01-03 | Stop reason: HOSPADM

## 2018-12-31 RX ORDER — BISACODYL 10 MG
10 SUPPOSITORY, RECTAL RECTAL DAILY PRN
Status: DISCONTINUED | OUTPATIENT
Start: 2018-12-31 | End: 2019-01-03 | Stop reason: HOSPADM

## 2018-12-31 RX ORDER — ACETAMINOPHEN 500 MG
500 TABLET ORAL EVERY 6 HOURS PRN
Status: DISCONTINUED | OUTPATIENT
Start: 2018-12-31 | End: 2018-12-31

## 2018-12-31 RX ORDER — HYDROCODONE BITARTRATE AND ACETAMINOPHEN 5; 325 MG/1; MG/1
2 TABLET ORAL EVERY 4 HOURS PRN
Status: DISCONTINUED | OUTPATIENT
Start: 2018-12-31 | End: 2019-01-03 | Stop reason: HOSPADM

## 2018-12-31 RX ORDER — POTASSIUM CHLORIDE 20MEQ/15ML
40 LIQUID (ML) ORAL PRN
Status: DISCONTINUED | OUTPATIENT
Start: 2018-12-31 | End: 2019-01-03 | Stop reason: HOSPADM

## 2018-12-31 RX ORDER — DEXTROSE MONOHYDRATE 25 G/50ML
12.5 INJECTION, SOLUTION INTRAVENOUS PRN
Status: DISCONTINUED | OUTPATIENT
Start: 2018-12-31 | End: 2019-01-03 | Stop reason: HOSPADM

## 2018-12-31 RX ORDER — DULOXETIN HYDROCHLORIDE 30 MG/1
30 CAPSULE, DELAYED RELEASE ORAL DAILY
Status: DISCONTINUED | OUTPATIENT
Start: 2019-01-01 | End: 2019-01-03 | Stop reason: HOSPADM

## 2018-12-31 RX ORDER — METOPROLOL SUCCINATE 50 MG/1
50 TABLET, EXTENDED RELEASE ORAL DAILY
Status: DISCONTINUED | OUTPATIENT
Start: 2019-01-01 | End: 2019-01-03 | Stop reason: HOSPADM

## 2018-12-31 RX ADMIN — GABAPENTIN 600 MG: 300 CAPSULE ORAL at 21:11

## 2018-12-31 RX ADMIN — APIXABAN 5 MG: 5 TABLET, FILM COATED ORAL at 21:11

## 2018-12-31 RX ADMIN — ONDANSETRON 4 MG: 2 INJECTION INTRAMUSCULAR; INTRAVENOUS at 21:27

## 2018-12-31 RX ADMIN — SODIUM CHLORIDE 1000 ML: 9 INJECTION, SOLUTION INTRAVENOUS at 17:22

## 2018-12-31 RX ADMIN — VANCOMYCIN HYDROCHLORIDE 2000 MG: 1 INJECTION, POWDER, LYOPHILIZED, FOR SOLUTION INTRAVENOUS at 18:44

## 2018-12-31 RX ADMIN — CEFTRIAXONE SODIUM 1 G: 1 INJECTION, POWDER, FOR SOLUTION INTRAMUSCULAR; INTRAVENOUS at 21:18

## 2018-12-31 RX ADMIN — GLYBURIDE 5 MG: 5 TABLET ORAL at 22:34

## 2018-12-31 RX ADMIN — ONDANSETRON HYDROCHLORIDE 4 MG: 2 INJECTION, SOLUTION INTRAVENOUS at 17:22

## 2018-12-31 RX ADMIN — ACETAMINOPHEN 1000 MG: 500 TABLET ORAL at 21:11

## 2018-12-31 RX ADMIN — SODIUM CHLORIDE 1500 ML: 9 INJECTION, SOLUTION INTRAVENOUS at 18:44

## 2018-12-31 RX ADMIN — Medication 10 ML: at 21:22

## 2018-12-31 RX ADMIN — FAMOTIDINE 20 MG: 10 INJECTION, SOLUTION INTRAVENOUS at 19:26

## 2018-12-31 ASSESSMENT — PAIN SCALES - GENERAL: PAINLEVEL_OUTOF10: 0

## 2019-01-01 PROBLEM — D72.829 LEUKOCYTOSIS: Status: ACTIVE | Noted: 2019-01-01

## 2019-01-01 PROBLEM — E66.01 MORBID OBESITY WITH BMI OF 60.0-69.9, ADULT (HCC): Status: ACTIVE | Noted: 2019-01-01

## 2019-01-01 PROBLEM — L03.116 CELLULITIS OF LEFT LOWER EXTREMITY: Status: ACTIVE | Noted: 2018-12-31

## 2019-01-01 LAB
ADENOVIRUS PCR: NOT DETECTED
ANION GAP SERPL CALCULATED.3IONS-SCNC: 12 MMOL/L (ref 9–17)
BORDETELLA PERTUSSIS PCR: NOT DETECTED
BUN BLDV-MCNC: 20 MG/DL (ref 8–23)
BUN/CREAT BLD: 15 (ref 9–20)
CALCIUM SERPL-MCNC: 8 MG/DL (ref 8.6–10.4)
CHLAMYDIA PNEUMONIAE BY PCR: NOT DETECTED
CHLORIDE BLD-SCNC: 97 MMOL/L (ref 98–107)
CO2: 23 MMOL/L (ref 20–31)
CORONAVIRUS 229E PCR: NOT DETECTED
CORONAVIRUS HKU1 PCR: NOT DETECTED
CORONAVIRUS NL63 PCR: NOT DETECTED
CORONAVIRUS OC43 PCR: NOT DETECTED
CREAT SERPL-MCNC: 1.37 MG/DL (ref 0.5–0.9)
GFR AFRICAN AMERICAN: 47 ML/MIN
GFR NON-AFRICAN AMERICAN: 39 ML/MIN
GFR SERPL CREATININE-BSD FRML MDRD: ABNORMAL ML/MIN/{1.73_M2}
GFR SERPL CREATININE-BSD FRML MDRD: ABNORMAL ML/MIN/{1.73_M2}
GLUCOSE BLD-MCNC: 161 MG/DL (ref 65–105)
GLUCOSE BLD-MCNC: 184 MG/DL (ref 65–105)
GLUCOSE BLD-MCNC: 189 MG/DL (ref 65–105)
GLUCOSE BLD-MCNC: 201 MG/DL (ref 70–99)
HCT VFR BLD CALC: 34.9 % (ref 36–46)
HEMOGLOBIN: 11.6 G/DL (ref 12–16)
HUMAN METAPNEUMOVIRUS PCR: NOT DETECTED
INFLUENZA A BY PCR: NOT DETECTED
INFLUENZA A H1 (2009) PCR: NORMAL
INFLUENZA A H1 PCR: NORMAL
INFLUENZA A H3 PCR: NORMAL
INFLUENZA B BY PCR: NOT DETECTED
LACTIC ACID, WHOLE BLOOD: NORMAL MMOL/L (ref 0.7–2.1)
LACTIC ACID: 1.4 MMOL/L (ref 0.5–2.2)
MCH RBC QN AUTO: 29 PG (ref 26–34)
MCHC RBC AUTO-ENTMCNC: 33.2 G/DL (ref 31–37)
MCV RBC AUTO: 87.3 FL (ref 80–100)
MYCOPLASMA PNEUMONIAE PCR: NOT DETECTED
NRBC AUTOMATED: ABNORMAL PER 100 WBC
PARAINFLUENZA 1 PCR: NOT DETECTED
PARAINFLUENZA 2 PCR: NOT DETECTED
PARAINFLUENZA 3 PCR: NOT DETECTED
PARAINFLUENZA 4 PCR: NOT DETECTED
PDW BLD-RTO: 15.6 % (ref 11.5–14.5)
PLATELET # BLD: 164 K/UL (ref 130–400)
PMV BLD AUTO: 7.6 FL (ref 6–12)
POTASSIUM SERPL-SCNC: 4.3 MMOL/L (ref 3.7–5.3)
RBC # BLD: 4 M/UL (ref 4–5.2)
RESP SYNCYTIAL VIRUS PCR: NOT DETECTED
RHINO/ENTEROVIRUS PCR: NOT DETECTED
SODIUM BLD-SCNC: 132 MMOL/L (ref 135–144)
SOURCE: NORMAL
WBC # BLD: 11.9 K/UL (ref 3.5–11)

## 2019-01-01 PROCEDURE — 6370000000 HC RX 637 (ALT 250 FOR IP): Performed by: CLINICAL NURSE SPECIALIST

## 2019-01-01 PROCEDURE — 2580000003 HC RX 258: Performed by: CLINICAL NURSE SPECIALIST

## 2019-01-01 PROCEDURE — 2580000003 HC RX 258: Performed by: NURSE PRACTITIONER

## 2019-01-01 PROCEDURE — 80048 BASIC METABOLIC PNL TOTAL CA: CPT

## 2019-01-01 PROCEDURE — 99232 SBSQ HOSP IP/OBS MODERATE 35: CPT | Performed by: INTERNAL MEDICINE

## 2019-01-01 PROCEDURE — 6370000000 HC RX 637 (ALT 250 FOR IP): Performed by: NURSE PRACTITIONER

## 2019-01-01 PROCEDURE — 36415 COLL VENOUS BLD VENIPUNCTURE: CPT

## 2019-01-01 PROCEDURE — 85027 COMPLETE CBC AUTOMATED: CPT

## 2019-01-01 PROCEDURE — 83605 ASSAY OF LACTIC ACID: CPT

## 2019-01-01 PROCEDURE — 1200000000 HC SEMI PRIVATE

## 2019-01-01 PROCEDURE — 6360000002 HC RX W HCPCS: Performed by: NURSE PRACTITIONER

## 2019-01-01 PROCEDURE — 83036 HEMOGLOBIN GLYCOSYLATED A1C: CPT

## 2019-01-01 PROCEDURE — 82947 ASSAY GLUCOSE BLOOD QUANT: CPT

## 2019-01-01 RX ORDER — DIPHENHYDRAMINE HYDROCHLORIDE 50 MG/ML
25 INJECTION INTRAMUSCULAR; INTRAVENOUS EVERY 6 HOURS
Status: COMPLETED | OUTPATIENT
Start: 2019-01-01 | End: 2019-01-01

## 2019-01-01 RX ADMIN — APIXABAN 5 MG: 5 TABLET, FILM COATED ORAL at 08:01

## 2019-01-01 RX ADMIN — INSULIN LISPRO 2 UNITS: 100 INJECTION, SOLUTION INTRAVENOUS; SUBCUTANEOUS at 00:06

## 2019-01-01 RX ADMIN — INSULIN LISPRO 1 UNITS: 100 INJECTION, SOLUTION INTRAVENOUS; SUBCUTANEOUS at 16:35

## 2019-01-01 RX ADMIN — INSULIN LISPRO 1 UNITS: 100 INJECTION, SOLUTION INTRAVENOUS; SUBCUTANEOUS at 12:44

## 2019-01-01 RX ADMIN — METOPROLOL SUCCINATE 50 MG: 50 TABLET, FILM COATED, EXTENDED RELEASE ORAL at 07:57

## 2019-01-01 RX ADMIN — SODIUM CHLORIDE: 9 INJECTION, SOLUTION INTRAVENOUS at 03:59

## 2019-01-01 RX ADMIN — PANTOPRAZOLE SODIUM 40 MG: 40 TABLET, DELAYED RELEASE ORAL at 06:25

## 2019-01-01 RX ADMIN — INSULIN LISPRO 2 UNITS: 100 INJECTION, SOLUTION INTRAVENOUS; SUBCUTANEOUS at 07:58

## 2019-01-01 RX ADMIN — VANCOMYCIN HYDROCHLORIDE 1500 MG: 1 INJECTION, POWDER, LYOPHILIZED, FOR SOLUTION INTRAVENOUS at 09:13

## 2019-01-01 RX ADMIN — GLYBURIDE 5 MG: 5 TABLET ORAL at 16:35

## 2019-01-01 RX ADMIN — LINAGLIPTIN 5 MG: 5 TABLET, FILM COATED ORAL at 07:58

## 2019-01-01 RX ADMIN — VANCOMYCIN HYDROCHLORIDE 1500 MG: 1 INJECTION, POWDER, LYOPHILIZED, FOR SOLUTION INTRAVENOUS at 20:21

## 2019-01-01 RX ADMIN — ACETAMINOPHEN 1000 MG: 500 TABLET ORAL at 07:57

## 2019-01-01 RX ADMIN — APIXABAN 5 MG: 5 TABLET, FILM COATED ORAL at 21:16

## 2019-01-01 RX ADMIN — HYDROCODONE BITARTRATE AND ACETAMINOPHEN 2 TABLET: 5; 325 TABLET ORAL at 16:35

## 2019-01-01 RX ADMIN — DIPHENHYDRAMINE HYDROCHLORIDE 25 MG: 50 INJECTION, SOLUTION INTRAMUSCULAR; INTRAVENOUS at 09:13

## 2019-01-01 RX ADMIN — DIPHENHYDRAMINE HYDROCHLORIDE 25 MG: 50 INJECTION, SOLUTION INTRAMUSCULAR; INTRAVENOUS at 03:58

## 2019-01-01 RX ADMIN — HYDROCHLOROTHIAZIDE 25 MG: 25 TABLET ORAL at 07:57

## 2019-01-01 RX ADMIN — Medication 10 ML: at 21:16

## 2019-01-01 RX ADMIN — HYDROCODONE BITARTRATE AND ACETAMINOPHEN 2 TABLET: 5; 325 TABLET ORAL at 09:14

## 2019-01-01 RX ADMIN — GABAPENTIN 600 MG: 300 CAPSULE ORAL at 21:15

## 2019-01-01 RX ADMIN — GABAPENTIN 600 MG: 300 CAPSULE ORAL at 07:57

## 2019-01-01 RX ADMIN — DIPHENHYDRAMINE HYDROCHLORIDE 25 MG: 50 INJECTION, SOLUTION INTRAMUSCULAR; INTRAVENOUS at 16:35

## 2019-01-01 RX ADMIN — INSULIN LISPRO 1 UNITS: 100 INJECTION, SOLUTION INTRAVENOUS; SUBCUTANEOUS at 22:25

## 2019-01-01 RX ADMIN — GLYBURIDE 5 MG: 5 TABLET ORAL at 07:57

## 2019-01-01 RX ADMIN — DULOXETINE HYDROCHLORIDE 30 MG: 30 CAPSULE, DELAYED RELEASE ORAL at 07:57

## 2019-01-01 RX ADMIN — DIPHENHYDRAMINE HYDROCHLORIDE 25 MG: 50 INJECTION, SOLUTION INTRAMUSCULAR; INTRAVENOUS at 22:25

## 2019-01-01 RX ADMIN — ACETAMINOPHEN 1000 MG: 500 TABLET ORAL at 23:33

## 2019-01-01 ASSESSMENT — PAIN DESCRIPTION - PAIN TYPE
TYPE: ACUTE PAIN

## 2019-01-01 ASSESSMENT — PAIN SCALES - GENERAL
PAINLEVEL_OUTOF10: 8
PAINLEVEL_OUTOF10: 7
PAINLEVEL_OUTOF10: 6
PAINLEVEL_OUTOF10: 7

## 2019-01-01 ASSESSMENT — PAIN DESCRIPTION - ORIENTATION: ORIENTATION: RIGHT;LEFT

## 2019-01-01 ASSESSMENT — PAIN DESCRIPTION - FREQUENCY: FREQUENCY: CONTINUOUS

## 2019-01-01 ASSESSMENT — PAIN DESCRIPTION - LOCATION
LOCATION: LEG
LOCATION: HEAD
LOCATION: HEAD

## 2019-01-01 ASSESSMENT — PAIN DESCRIPTION - ONSET: ONSET: GRADUAL

## 2019-01-01 ASSESSMENT — PAIN DESCRIPTION - DESCRIPTORS
DESCRIPTORS: ACHING
DESCRIPTORS: ACHING

## 2019-01-02 PROBLEM — I27.20 PULMONARY HYPERTENSION (HCC): Status: ACTIVE | Noted: 2019-01-02

## 2019-01-02 PROBLEM — J96.11 CHRONIC HYPOXEMIC RESPIRATORY FAILURE (HCC): Status: ACTIVE | Noted: 2019-01-02

## 2019-01-02 LAB
-: ABNORMAL
AMORPHOUS: ABNORMAL
ANION GAP SERPL CALCULATED.3IONS-SCNC: 10 MMOL/L (ref 9–17)
BACTERIA: ABNORMAL
BILIRUBIN URINE: NEGATIVE
BUN BLDV-MCNC: 20 MG/DL (ref 8–23)
BUN/CREAT BLD: 19 (ref 9–20)
CALCIUM SERPL-MCNC: 8.2 MG/DL (ref 8.6–10.4)
CASTS UA: ABNORMAL /LPF
CHLORIDE BLD-SCNC: 98 MMOL/L (ref 98–107)
CO2: 27 MMOL/L (ref 20–31)
COLOR: YELLOW
COMMENT UA: ABNORMAL
CREAT SERPL-MCNC: 1.03 MG/DL (ref 0.5–0.9)
CRYSTALS, UA: ABNORMAL /HPF
EPITHELIAL CELLS UA: ABNORMAL /HPF (ref 0–5)
ESTIMATED AVERAGE GLUCOSE: 235 MG/DL
GFR AFRICAN AMERICAN: >60 ML/MIN
GFR NON-AFRICAN AMERICAN: 54 ML/MIN
GFR SERPL CREATININE-BSD FRML MDRD: ABNORMAL ML/MIN/{1.73_M2}
GFR SERPL CREATININE-BSD FRML MDRD: ABNORMAL ML/MIN/{1.73_M2}
GLUCOSE BLD-MCNC: 196 MG/DL (ref 65–105)
GLUCOSE BLD-MCNC: 203 MG/DL (ref 65–105)
GLUCOSE BLD-MCNC: 204 MG/DL (ref 65–105)
GLUCOSE BLD-MCNC: 215 MG/DL (ref 70–99)
GLUCOSE BLD-MCNC: 216 MG/DL (ref 65–105)
GLUCOSE BLD-MCNC: 226 MG/DL (ref 65–105)
GLUCOSE URINE: NEGATIVE
HBA1C MFR BLD: 9.8 % (ref 4–6)
HCT VFR BLD CALC: 33.1 % (ref 36–46)
HEMOGLOBIN: 10.9 G/DL (ref 12–16)
KETONES, URINE: NEGATIVE
LEUKOCYTE ESTERASE, URINE: ABNORMAL
MCH RBC QN AUTO: 29.1 PG (ref 26–34)
MCHC RBC AUTO-ENTMCNC: 33 G/DL (ref 31–37)
MCV RBC AUTO: 88.1 FL (ref 80–100)
MUCUS: ABNORMAL
NITRITE, URINE: NEGATIVE
NRBC AUTOMATED: ABNORMAL PER 100 WBC
OTHER OBSERVATIONS UA: ABNORMAL
PDW BLD-RTO: 14.4 % (ref 11.5–14.5)
PH UA: 6 (ref 5–8)
PLATELET # BLD: 172 K/UL (ref 130–400)
PMV BLD AUTO: ABNORMAL FL (ref 6–12)
POTASSIUM SERPL-SCNC: 4.1 MMOL/L (ref 3.7–5.3)
PROTEIN UA: ABNORMAL
RBC # BLD: 3.75 M/UL (ref 4–5.2)
RBC UA: ABNORMAL /HPF (ref 0–2)
RENAL EPITHELIAL, UA: ABNORMAL /HPF
SODIUM BLD-SCNC: 135 MMOL/L (ref 135–144)
SPECIFIC GRAVITY UA: 1.02 (ref 1–1.03)
TRICHOMONAS: ABNORMAL
TURBIDITY: ABNORMAL
URINE HGB: ABNORMAL
UROBILINOGEN, URINE: NORMAL
WBC # BLD: 8 K/UL (ref 3.5–11)
WBC UA: ABNORMAL /HPF (ref 0–5)
YEAST: ABNORMAL

## 2019-01-02 PROCEDURE — 87077 CULTURE AEROBIC IDENTIFY: CPT

## 2019-01-02 PROCEDURE — 85027 COMPLETE CBC AUTOMATED: CPT

## 2019-01-02 PROCEDURE — 87184 SC STD DISK METHOD PER PLATE: CPT

## 2019-01-02 PROCEDURE — 87086 URINE CULTURE/COLONY COUNT: CPT

## 2019-01-02 PROCEDURE — 6360000002 HC RX W HCPCS: Performed by: INTERNAL MEDICINE

## 2019-01-02 PROCEDURE — 2580000003 HC RX 258: Performed by: CLINICAL NURSE SPECIALIST

## 2019-01-02 PROCEDURE — 1200000000 HC SEMI PRIVATE

## 2019-01-02 PROCEDURE — 2580000003 HC RX 258: Performed by: NURSE PRACTITIONER

## 2019-01-02 PROCEDURE — 36415 COLL VENOUS BLD VENIPUNCTURE: CPT

## 2019-01-02 PROCEDURE — 93005 ELECTROCARDIOGRAM TRACING: CPT

## 2019-01-02 PROCEDURE — 81001 URINALYSIS AUTO W/SCOPE: CPT

## 2019-01-02 PROCEDURE — 6360000002 HC RX W HCPCS: Performed by: NURSE PRACTITIONER

## 2019-01-02 PROCEDURE — 6370000000 HC RX 637 (ALT 250 FOR IP): Performed by: INTERNAL MEDICINE

## 2019-01-02 PROCEDURE — 6370000000 HC RX 637 (ALT 250 FOR IP): Performed by: NURSE PRACTITIONER

## 2019-01-02 PROCEDURE — 80048 BASIC METABOLIC PNL TOTAL CA: CPT

## 2019-01-02 PROCEDURE — 6370000000 HC RX 637 (ALT 250 FOR IP): Performed by: CLINICAL NURSE SPECIALIST

## 2019-01-02 PROCEDURE — 99232 SBSQ HOSP IP/OBS MODERATE 35: CPT | Performed by: INTERNAL MEDICINE

## 2019-01-02 RX ORDER — CEFAZOLIN SODIUM 2 G/50ML
2 SOLUTION INTRAVENOUS EVERY 8 HOURS
Status: DISCONTINUED | OUTPATIENT
Start: 2019-01-02 | End: 2019-01-03 | Stop reason: HOSPADM

## 2019-01-02 RX ADMIN — INSULIN LISPRO 3 UNITS: 100 INJECTION, SOLUTION INTRAVENOUS; SUBCUTANEOUS at 22:38

## 2019-01-02 RX ADMIN — CEFAZOLIN SODIUM 2 G: 2 SOLUTION INTRAVENOUS at 22:38

## 2019-01-02 RX ADMIN — METOPROLOL SUCCINATE 50 MG: 50 TABLET, FILM COATED, EXTENDED RELEASE ORAL at 08:07

## 2019-01-02 RX ADMIN — VANCOMYCIN HYDROCHLORIDE 1500 MG: 1 INJECTION, POWDER, LYOPHILIZED, FOR SOLUTION INTRAVENOUS at 11:43

## 2019-01-02 RX ADMIN — GLYBURIDE 5 MG: 5 TABLET ORAL at 17:46

## 2019-01-02 RX ADMIN — APIXABAN 5 MG: 5 TABLET, FILM COATED ORAL at 20:20

## 2019-01-02 RX ADMIN — INSULIN LISPRO 6 UNITS: 100 INJECTION, SOLUTION INTRAVENOUS; SUBCUTANEOUS at 17:46

## 2019-01-02 RX ADMIN — INSULIN LISPRO 2 UNITS: 100 INJECTION, SOLUTION INTRAVENOUS; SUBCUTANEOUS at 11:46

## 2019-01-02 RX ADMIN — Medication 10 ML: at 20:21

## 2019-01-02 RX ADMIN — GABAPENTIN 600 MG: 300 CAPSULE ORAL at 20:20

## 2019-01-02 RX ADMIN — LINAGLIPTIN 5 MG: 5 TABLET, FILM COATED ORAL at 08:09

## 2019-01-02 RX ADMIN — GABAPENTIN 600 MG: 300 CAPSULE ORAL at 08:07

## 2019-01-02 RX ADMIN — ONDANSETRON 4 MG: 2 INJECTION INTRAMUSCULAR; INTRAVENOUS at 06:11

## 2019-01-02 RX ADMIN — GLYBURIDE 5 MG: 5 TABLET ORAL at 08:06

## 2019-01-02 RX ADMIN — HYDROCHLOROTHIAZIDE 25 MG: 25 TABLET ORAL at 08:06

## 2019-01-02 RX ADMIN — CEFAZOLIN SODIUM 2 G: 2 SOLUTION INTRAVENOUS at 15:22

## 2019-01-02 RX ADMIN — INSULIN LISPRO 1 UNITS: 100 INJECTION, SOLUTION INTRAVENOUS; SUBCUTANEOUS at 08:07

## 2019-01-02 RX ADMIN — APIXABAN 5 MG: 5 TABLET, FILM COATED ORAL at 08:06

## 2019-01-02 RX ADMIN — DULOXETINE HYDROCHLORIDE 30 MG: 30 CAPSULE, DELAYED RELEASE ORAL at 08:07

## 2019-01-02 RX ADMIN — PANTOPRAZOLE SODIUM 40 MG: 40 TABLET, DELAYED RELEASE ORAL at 08:06

## 2019-01-02 ASSESSMENT — PAIN SCALES - GENERAL
PAINLEVEL_OUTOF10: 5
PAINLEVEL_OUTOF10: 0

## 2019-01-03 VITALS
BODY MASS INDEX: 39.68 KG/M2 | TEMPERATURE: 99 F | HEIGHT: 72 IN | SYSTOLIC BLOOD PRESSURE: 165 MMHG | DIASTOLIC BLOOD PRESSURE: 84 MMHG | RESPIRATION RATE: 18 BRPM | OXYGEN SATURATION: 94 % | HEART RATE: 79 BPM | WEIGHT: 293 LBS

## 2019-01-03 LAB
ANION GAP SERPL CALCULATED.3IONS-SCNC: 9 MMOL/L (ref 9–17)
BUN BLDV-MCNC: 14 MG/DL (ref 8–23)
BUN/CREAT BLD: 15 (ref 9–20)
CALCIUM SERPL-MCNC: 8.3 MG/DL (ref 8.6–10.4)
CHLORIDE BLD-SCNC: 99 MMOL/L (ref 98–107)
CO2: 28 MMOL/L (ref 20–31)
CREAT SERPL-MCNC: 0.91 MG/DL (ref 0.5–0.9)
GFR AFRICAN AMERICAN: >60 ML/MIN
GFR NON-AFRICAN AMERICAN: >60 ML/MIN
GFR SERPL CREATININE-BSD FRML MDRD: ABNORMAL ML/MIN/{1.73_M2}
GFR SERPL CREATININE-BSD FRML MDRD: ABNORMAL ML/MIN/{1.73_M2}
GLUCOSE BLD-MCNC: 166 MG/DL (ref 65–105)
GLUCOSE BLD-MCNC: 181 MG/DL (ref 70–99)
GLUCOSE BLD-MCNC: 187 MG/DL (ref 65–105)
HCT VFR BLD CALC: 32.1 % (ref 36–46)
HEMOGLOBIN: 11 G/DL (ref 12–16)
MCH RBC QN AUTO: 30 PG (ref 26–34)
MCHC RBC AUTO-ENTMCNC: 34.2 G/DL (ref 31–37)
MCV RBC AUTO: 87.7 FL (ref 80–100)
NRBC AUTOMATED: ABNORMAL PER 100 WBC
PDW BLD-RTO: 14.3 % (ref 11.5–14.5)
PLATELET # BLD: 160 K/UL (ref 130–400)
PMV BLD AUTO: ABNORMAL FL (ref 6–12)
POTASSIUM SERPL-SCNC: 4.1 MMOL/L (ref 3.7–5.3)
RBC # BLD: 3.67 M/UL (ref 4–5.2)
SODIUM BLD-SCNC: 136 MMOL/L (ref 135–144)
WBC # BLD: 5.9 K/UL (ref 3.5–11)

## 2019-01-03 PROCEDURE — 85027 COMPLETE CBC AUTOMATED: CPT

## 2019-01-03 PROCEDURE — 94618 PULMONARY STRESS TESTING: CPT

## 2019-01-03 PROCEDURE — 36415 COLL VENOUS BLD VENIPUNCTURE: CPT

## 2019-01-03 PROCEDURE — 6360000002 HC RX W HCPCS: Performed by: INTERNAL MEDICINE

## 2019-01-03 PROCEDURE — 82947 ASSAY GLUCOSE BLOOD QUANT: CPT

## 2019-01-03 PROCEDURE — 6370000000 HC RX 637 (ALT 250 FOR IP): Performed by: CLINICAL NURSE SPECIALIST

## 2019-01-03 PROCEDURE — 80048 BASIC METABOLIC PNL TOTAL CA: CPT

## 2019-01-03 PROCEDURE — 6370000000 HC RX 637 (ALT 250 FOR IP): Performed by: INTERNAL MEDICINE

## 2019-01-03 PROCEDURE — 99239 HOSP IP/OBS DSCHRG MGMT >30: CPT | Performed by: INTERNAL MEDICINE

## 2019-01-03 RX ORDER — LANOLIN ALCOHOL/MO/W.PET/CERES
CREAM (GRAM) TOPICAL 2 TIMES DAILY
Status: DISCONTINUED | OUTPATIENT
Start: 2019-01-03 | End: 2019-01-03 | Stop reason: HOSPADM

## 2019-01-03 RX ORDER — GREEN TEA/HOODIA GORDONII 315-12.5MG
1 CAPSULE ORAL 2 TIMES DAILY
Qty: 20 TABLET | Refills: 0 | COMMUNITY
Start: 2019-01-03 | End: 2019-01-13

## 2019-01-03 RX ORDER — FLUCONAZOLE 100 MG/1
100 TABLET ORAL DAILY
Qty: 3 TABLET | Refills: 0 | Status: SHIPPED | OUTPATIENT
Start: 2019-01-03 | End: 2019-02-07 | Stop reason: SDUPTHER

## 2019-01-03 RX ORDER — CEPHALEXIN 500 MG/1
500 CAPSULE ORAL 4 TIMES DAILY
Qty: 40 CAPSULE | Refills: 0 | Status: SHIPPED | OUTPATIENT
Start: 2019-01-03 | End: 2019-02-07 | Stop reason: SDUPTHER

## 2019-01-03 RX ADMIN — CEFAZOLIN SODIUM 2 G: 2 SOLUTION INTRAVENOUS at 06:09

## 2019-01-03 RX ADMIN — PANTOPRAZOLE SODIUM 40 MG: 40 TABLET, DELAYED RELEASE ORAL at 06:09

## 2019-01-03 RX ADMIN — HYDROCHLOROTHIAZIDE 25 MG: 25 TABLET ORAL at 08:28

## 2019-01-03 RX ADMIN — INSULIN LISPRO 3 UNITS: 100 INJECTION, SOLUTION INTRAVENOUS; SUBCUTANEOUS at 08:29

## 2019-01-03 RX ADMIN — Medication: at 10:19

## 2019-01-03 RX ADMIN — INSULIN LISPRO 3 UNITS: 100 INJECTION, SOLUTION INTRAVENOUS; SUBCUTANEOUS at 13:16

## 2019-01-03 RX ADMIN — GLYBURIDE 5 MG: 5 TABLET ORAL at 08:29

## 2019-01-03 RX ADMIN — DULOXETINE HYDROCHLORIDE 30 MG: 30 CAPSULE, DELAYED RELEASE ORAL at 08:29

## 2019-01-03 RX ADMIN — APIXABAN 5 MG: 5 TABLET, FILM COATED ORAL at 08:28

## 2019-01-03 RX ADMIN — METOPROLOL SUCCINATE 50 MG: 50 TABLET, FILM COATED, EXTENDED RELEASE ORAL at 08:28

## 2019-01-03 RX ADMIN — GABAPENTIN 600 MG: 300 CAPSULE ORAL at 08:28

## 2019-01-03 RX ADMIN — LINAGLIPTIN 5 MG: 5 TABLET, FILM COATED ORAL at 08:28

## 2019-01-03 ASSESSMENT — PAIN SCALES - GENERAL
PAINLEVEL_OUTOF10: 0

## 2019-01-04 ENCOUNTER — TELEPHONE (OUTPATIENT)
Dept: FAMILY MEDICINE CLINIC | Age: 64
End: 2019-01-04

## 2019-01-06 LAB
CULTURE: ABNORMAL
CULTURE: NORMAL
CULTURE: NORMAL
Lab: ABNORMAL
Lab: NORMAL
Lab: NORMAL
ORGANISM: ABNORMAL
SPECIMEN DESCRIPTION: ABNORMAL
SPECIMEN DESCRIPTION: NORMAL
SPECIMEN DESCRIPTION: NORMAL
STATUS: ABNORMAL
STATUS: NORMAL
STATUS: NORMAL

## 2019-01-07 RX ORDER — SITAGLIPTIN 100 MG/1
TABLET, FILM COATED ORAL
Qty: 90 TABLET | Refills: 3 | Status: SHIPPED | OUTPATIENT
Start: 2019-01-07 | End: 2021-05-20

## 2019-01-07 RX ORDER — GLYBURIDE 5 MG/1
TABLET ORAL
Qty: 180 TABLET | Refills: 3 | Status: SHIPPED | OUTPATIENT
Start: 2019-01-07 | End: 2020-01-20

## 2019-01-09 ENCOUNTER — TELEPHONE (OUTPATIENT)
Dept: FAMILY MEDICINE CLINIC | Age: 64
End: 2019-01-09

## 2019-01-09 ENCOUNTER — HOSPITAL ENCOUNTER (OUTPATIENT)
Dept: OCCUPATIONAL THERAPY | Age: 64
Setting detail: THERAPIES SERIES
Discharge: HOME | End: 2019-01-09
Payer: COMMERCIAL

## 2019-01-09 PROCEDURE — 97140 MANUAL THERAPY 1/> REGIONS: CPT

## 2019-01-09 PROCEDURE — 97165 OT EVAL LOW COMPLEX 30 MIN: CPT

## 2019-01-10 DIAGNOSIS — I89.0 LYMPHEDEMA OF BOTH LOWER EXTREMITIES: Primary | ICD-10-CM

## 2019-01-16 ENCOUNTER — HOSPITAL ENCOUNTER (OUTPATIENT)
Dept: OCCUPATIONAL THERAPY | Age: 64
Setting detail: THERAPIES SERIES
End: 2019-01-16
Payer: COMMERCIAL

## 2019-01-16 DIAGNOSIS — M54.5 CHRONIC BILATERAL LOW BACK PAIN, WITH SCIATICA PRESENCE UNSPECIFIED: ICD-10-CM

## 2019-01-16 DIAGNOSIS — G89.29 CHRONIC BILATERAL LOW BACK PAIN, WITH SCIATICA PRESENCE UNSPECIFIED: ICD-10-CM

## 2019-01-16 RX ORDER — METHOCARBAMOL 750 MG/1
TABLET, FILM COATED ORAL
Qty: 60 TABLET | Refills: 3 | Status: SHIPPED | OUTPATIENT
Start: 2019-01-16 | End: 2020-01-09

## 2019-01-16 RX ORDER — HYDROMORPHONE HYDROCHLORIDE 2 MG/1
2 TABLET ORAL
Qty: 100 TABLET | Refills: 0 | Status: SHIPPED | OUTPATIENT
Start: 2019-01-16 | End: 2019-03-05 | Stop reason: SDUPTHER

## 2019-01-17 ENCOUNTER — TELEPHONE (OUTPATIENT)
Dept: FAMILY MEDICINE CLINIC | Age: 64
End: 2019-01-17

## 2019-02-07 DIAGNOSIS — B37.9 YEAST INFECTION: ICD-10-CM

## 2019-02-07 RX ORDER — FLUCONAZOLE 100 MG/1
100 TABLET ORAL DAILY
Qty: 3 TABLET | Refills: 0 | Status: ON HOLD | OUTPATIENT
Start: 2019-02-07 | End: 2019-05-13

## 2019-02-07 RX ORDER — CEPHALEXIN 500 MG/1
500 CAPSULE ORAL 4 TIMES DAILY
Qty: 40 CAPSULE | Refills: 0 | Status: SHIPPED | OUTPATIENT
Start: 2019-02-07 | End: 2019-03-22 | Stop reason: SDUPTHER

## 2019-02-15 RX ORDER — FLUCONAZOLE 100 MG/1
100 TABLET ORAL DAILY
Qty: 2 TABLET | Refills: 0 | Status: SHIPPED | OUTPATIENT
Start: 2019-02-15 | End: 2019-02-22

## 2019-02-15 RX ORDER — LEVOFLOXACIN 500 MG/1
500 TABLET, FILM COATED ORAL DAILY
Qty: 10 TABLET | Refills: 0 | Status: SHIPPED | OUTPATIENT
Start: 2019-02-15 | End: 2019-02-25

## 2019-03-05 DIAGNOSIS — M54.5 CHRONIC BILATERAL LOW BACK PAIN, WITH SCIATICA PRESENCE UNSPECIFIED: ICD-10-CM

## 2019-03-05 DIAGNOSIS — G89.29 CHRONIC BILATERAL LOW BACK PAIN, WITH SCIATICA PRESENCE UNSPECIFIED: ICD-10-CM

## 2019-03-05 RX ORDER — HYDROMORPHONE HYDROCHLORIDE 2 MG/1
2 TABLET ORAL
Qty: 240 TABLET | Refills: 0 | Status: SHIPPED | OUTPATIENT
Start: 2019-03-05 | End: 2019-04-04

## 2019-03-20 ENCOUNTER — HOSPITAL ENCOUNTER (OUTPATIENT)
Dept: OCCUPATIONAL THERAPY | Age: 64
Setting detail: THERAPIES SERIES
Discharge: HOME | End: 2019-03-20
Payer: COMMERCIAL

## 2019-03-22 DIAGNOSIS — B37.9 YEAST INFECTION: ICD-10-CM

## 2019-03-22 RX ORDER — CEPHALEXIN 500 MG/1
500 CAPSULE ORAL 4 TIMES DAILY
Qty: 40 CAPSULE | Refills: 0 | Status: SHIPPED | OUTPATIENT
Start: 2019-03-22 | End: 2019-04-01

## 2019-03-22 RX ORDER — FLUCONAZOLE 100 MG/1
100 TABLET ORAL DAILY
Qty: 3 TABLET | Refills: 0 | Status: ON HOLD | OUTPATIENT
Start: 2019-03-22 | End: 2019-05-13

## 2019-03-28 ENCOUNTER — HOSPITAL ENCOUNTER (OUTPATIENT)
Dept: OCCUPATIONAL THERAPY | Age: 64
Setting detail: THERAPIES SERIES
End: 2019-03-28
Payer: COMMERCIAL

## 2019-05-06 NOTE — TELEPHONE ENCOUNTER
Next Visit Date:  No future appointments.     Health Maintenance   Topic Date Due    Hepatitis C screen  1955    DTaP/Tdap/Td vaccine (1 - Tdap) 04/15/1972    Shingles Vaccine (1 of 2) 04/15/2003    Diabetic retinal exam  05/05/2008    Diabetic microalbuminuria test  12/08/2014    Breast cancer screen  02/22/2018    DEXA (modify frequency per FRAX score)  04/15/2018    Pneumococcal 65+ years Vaccine (1 of 2 - PCV13) 04/15/2018    A1C test (Diabetic or Prediabetic)  04/01/2019    Flu vaccine (Season Ended) 12/31/2020 (Originally 9/1/2019)    Lipid screen  08/15/2019    Diabetic foot exam  09/06/2019    Potassium monitoring  01/03/2020    Creatinine monitoring  01/03/2020    Colon cancer screen colonoscopy  02/22/2026       Hemoglobin A1C (%)   Date Value   01/01/2019 9.8 (H)   08/15/2018 7.9   09/29/2017 8.6             ( goal A1C is < 7)   No results found for: LABMICR  LDL Calculated (mg/dL)   Date Value   08/15/2018 67   02/08/2016 85       (goal LDL is <100)   AST (U/L)   Date Value   08/15/2018 13     ALT (U/L)   Date Value   08/15/2018 7     BUN (mg/dL)   Date Value   01/03/2019 14     BP Readings from Last 3 Encounters:   01/03/19 (!) 165/84   09/06/18 132/84   05/15/18 130/72          (goal 120/80)    All Future Testing planned in [facility]  Lab Frequency Next Occurrence   CESAR DIGITAL SCREEN SELF REFERRAL W OR WO CAD BILATERAL Once 09/06/2018   CBC Auto Differential Once 03/06/2019   Comprehensive Metabolic Panel Once 83/97/4205   Lipid Panel Once 03/06/2019   Hemoglobin A1C Once 03/06/2019   Hepatitis C Antibody Once 03/06/2019               Patient Active Problem List:     Diabetes mellitus (HCC)     GERD (gastroesophageal reflux disease)     Back pain     MVP (mitral valve prolapse)     DVT (deep venous thrombosis) (HCC)     Essential hypertension     HIGH CHOLESTEROL     Cellulitis of left lower extremity     Acute renal failure (ARF) (HCC)     Radiculopathy of lumbosacral region Staghorn renal calculus     Foraminal stenosis of lumbar region     Acute cystitis     SIRS (systemic inflammatory response syndrome) (HCC)     Nausea and vomiting     Lymphedema of both lower extremities     Cellulitis of left lower extremity     Leukocytosis     Morbid obesity with BMI of 60.0-69.9, adult ([address]     Chronic hypoxemic respiratory failure ([facility] [address]     Pulmonary hypertension ([facility] [address]

## 2019-05-07 RX ORDER — GABAPENTIN 300 MG/1
CAPSULE ORAL
Qty: 180 CAPSULE | Refills: 3 | Status: ON HOLD | OUTPATIENT
Start: 2019-05-07 | End: 2019-05-13

## 2019-05-09 ENCOUNTER — TELEPHONE (OUTPATIENT)
Dept: FAMILY MEDICINE CLINIC | Age: 64
End: 2019-05-09

## 2019-05-09 NOTE — TELEPHONE ENCOUNTER
Patient is calling is calling she Is  urinating a lot and want to know of there is a medication that can help slow it down please advise no painful urination

## 2019-05-12 ENCOUNTER — APPOINTMENT (OUTPATIENT)
Dept: GENERAL RADIOLOGY | Age: 64
DRG: 603 | End: 2019-05-12
Payer: COMMERCIAL

## 2019-05-12 ENCOUNTER — HOSPITAL ENCOUNTER (INPATIENT)
Age: 64
LOS: 3 days | Discharge: HOME | DRG: 603 | End: 2019-05-15
Attending: EMERGENCY MEDICINE | Admitting: INTERNAL MEDICINE
Payer: COMMERCIAL

## 2019-05-12 DIAGNOSIS — L03.116 LEFT LEG CELLULITIS: Primary | ICD-10-CM

## 2019-05-12 PROBLEM — L03.90 CELLULITIS: Status: RESOLVED | Noted: 2019-05-12 | Resolved: 2019-05-12

## 2019-05-12 PROBLEM — L03.90 CELLULITIS: Status: ACTIVE | Noted: 2019-05-12

## 2019-05-12 LAB
ABSOLUTE EOS #: 0 K/UL (ref 0–0.4)
ABSOLUTE IMMATURE GRANULOCYTE: 0 K/UL (ref 0–0.3)
ABSOLUTE LYMPH #: 0.59 K/UL (ref 1–4.8)
ABSOLUTE MONO #: 0.98 K/UL (ref 0.2–0.8)
ANION GAP SERPL CALCULATED.3IONS-SCNC: 13 MMOL/L (ref 9–17)
BASOPHILS # BLD: 0 %
BASOPHILS ABSOLUTE: 0 K/UL (ref 0–0.2)
BILIRUBIN, POC: NEGATIVE
BLOOD URINE, POC: NORMAL
BUN BLDV-MCNC: 15 MG/DL (ref 8–23)
BUN/CREAT BLD: 16 (ref 9–20)
CALCIUM SERPL-MCNC: 8.9 MG/DL (ref 8.6–10.4)
CHLORIDE BLD-SCNC: 99 MMOL/L (ref 98–107)
CHP ED QC CHECK: NORMAL
CLARITY, POC: NORMAL
CO2: 23 MMOL/L (ref 20–31)
COLOR, POC: YELLOW
CREAT SERPL-MCNC: 0.95 MG/DL (ref 0.5–0.9)
DIFFERENTIAL TYPE: ABNORMAL
EOSINOPHILS RELATIVE PERCENT: 0 % (ref 1–4)
GFR AFRICAN AMERICAN: >60 ML/MIN
GFR NON-AFRICAN AMERICAN: 59 ML/MIN
GFR SERPL CREATININE-BSD FRML MDRD: ABNORMAL ML/MIN/{1.73_M2}
GFR SERPL CREATININE-BSD FRML MDRD: ABNORMAL ML/MIN/{1.73_M2}
GLUCOSE BLD-MCNC: 372 MG/DL (ref 65–105)
GLUCOSE BLD-MCNC: 380 MG/DL (ref 70–99)
GLUCOSE URINE, POC: 500
HCT VFR BLD CALC: 40.4 % (ref 36.3–47.1)
HEMOGLOBIN: 12.8 G/DL (ref 11.9–15.1)
IMMATURE GRANULOCYTES: 0 %
KETONES, POC: NEGATIVE
LEUKOCYTE EST, POC: NORMAL
LYMPHOCYTES # BLD: 3 % (ref 24–44)
MCH RBC QN AUTO: 28.6 PG (ref 25.2–33.5)
MCHC RBC AUTO-ENTMCNC: 31.7 G/DL (ref 28.4–34.8)
MCV RBC AUTO: 90.4 FL (ref 82.6–102.9)
MONOCYTES # BLD: 5 % (ref 1–7)
MORPHOLOGY: ABNORMAL
NITRITE, POC: POSITIVE
NRBC AUTOMATED: 0 PER 100 WBC
PDW BLD-RTO: 14.2 % (ref 11.8–14.4)
PH, POC: 5.5
PLATELET # BLD: 226 K/UL (ref 138–453)
PLATELET ESTIMATE: ABNORMAL
PMV BLD AUTO: 10.1 FL (ref 8.1–13.5)
POTASSIUM SERPL-SCNC: 4.9 MMOL/L (ref 3.7–5.3)
PROTEIN, POC: 100
RBC # BLD: 4.47 M/UL (ref 3.95–5.11)
RBC # BLD: ABNORMAL 10*6/UL
SEG NEUTROPHILS: 92 % (ref 36–66)
SEGMENTED NEUTROPHILS ABSOLUTE COUNT: 17.93 K/UL (ref 1.8–7.7)
SODIUM BLD-SCNC: 135 MMOL/L (ref 135–144)
SPECIFIC GRAVITY, POC: 1.02
UROBILINOGEN, POC: 0.2
WBC # BLD: 19.5 K/UL (ref 3.5–11.3)
WBC # BLD: ABNORMAL 10*3/UL

## 2019-05-12 PROCEDURE — 71045 X-RAY EXAM CHEST 1 VIEW: CPT

## 2019-05-12 PROCEDURE — 87205 SMEAR GRAM STAIN: CPT

## 2019-05-12 PROCEDURE — 99284 EMERGENCY DEPT VISIT MOD MDM: CPT

## 2019-05-12 PROCEDURE — 36415 COLL VENOUS BLD VENIPUNCTURE: CPT

## 2019-05-12 PROCEDURE — 80048 BASIC METABOLIC PNL TOTAL CA: CPT

## 2019-05-12 PROCEDURE — 99223 1ST HOSP IP/OBS HIGH 75: CPT | Performed by: NURSE PRACTITIONER

## 2019-05-12 PROCEDURE — 96374 THER/PROPH/DIAG INJ IV PUSH: CPT

## 2019-05-12 PROCEDURE — 87077 CULTURE AEROBIC IDENTIFY: CPT

## 2019-05-12 PROCEDURE — 83036 HEMOGLOBIN GLYCOSYLATED A1C: CPT

## 2019-05-12 PROCEDURE — 6370000000 HC RX 637 (ALT 250 FOR IP): Performed by: NURSE PRACTITIONER

## 2019-05-12 PROCEDURE — 6360000002 HC RX W HCPCS: Performed by: EMERGENCY MEDICINE

## 2019-05-12 PROCEDURE — 82947 ASSAY GLUCOSE BLOOD QUANT: CPT

## 2019-05-12 PROCEDURE — 51701 INSERT BLADDER CATHETER: CPT

## 2019-05-12 PROCEDURE — 81003 URINALYSIS AUTO W/O SCOPE: CPT

## 2019-05-12 PROCEDURE — 2580000003 HC RX 258: Performed by: NURSE PRACTITIONER

## 2019-05-12 PROCEDURE — 85025 COMPLETE CBC W/AUTO DIFF WBC: CPT

## 2019-05-12 PROCEDURE — 1200000000 HC SEMI PRIVATE

## 2019-05-12 PROCEDURE — 2580000003 HC RX 258: Performed by: EMERGENCY MEDICINE

## 2019-05-12 PROCEDURE — 87149 DNA/RNA DIRECT PROBE: CPT

## 2019-05-12 PROCEDURE — 87086 URINE CULTURE/COLONY COUNT: CPT

## 2019-05-12 PROCEDURE — 87186 SC STD MICRODIL/AGAR DIL: CPT

## 2019-05-12 PROCEDURE — 87040 BLOOD CULTURE FOR BACTERIA: CPT

## 2019-05-12 RX ORDER — METOPROLOL SUCCINATE 50 MG/1
50 TABLET, EXTENDED RELEASE ORAL DAILY
Status: DISCONTINUED | OUTPATIENT
Start: 2019-05-13 | End: 2019-05-15 | Stop reason: HOSPADM

## 2019-05-12 RX ORDER — DEXTROSE MONOHYDRATE 25 G/50ML
12.5 INJECTION, SOLUTION INTRAVENOUS PRN
Status: DISCONTINUED | OUTPATIENT
Start: 2019-05-12 | End: 2019-05-15 | Stop reason: HOSPADM

## 2019-05-12 RX ORDER — PANTOPRAZOLE SODIUM 40 MG/1
40 TABLET, DELAYED RELEASE ORAL
Status: DISCONTINUED | OUTPATIENT
Start: 2019-05-13 | End: 2019-05-15 | Stop reason: HOSPADM

## 2019-05-12 RX ORDER — POTASSIUM CHLORIDE 7.45 MG/ML
10 INJECTION INTRAVENOUS PRN
Status: DISCONTINUED | OUTPATIENT
Start: 2019-05-12 | End: 2019-05-15 | Stop reason: HOSPADM

## 2019-05-12 RX ORDER — NICOTINE POLACRILEX 4 MG
15 LOZENGE BUCCAL PRN
Status: DISCONTINUED | OUTPATIENT
Start: 2019-05-12 | End: 2019-05-15 | Stop reason: HOSPADM

## 2019-05-12 RX ORDER — ONDANSETRON 4 MG/1
4 TABLET, ORALLY DISINTEGRATING ORAL EVERY 6 HOURS PRN
Status: DISCONTINUED | OUTPATIENT
Start: 2019-05-12 | End: 2019-05-15 | Stop reason: HOSPADM

## 2019-05-12 RX ORDER — NICOTINE 21 MG/24HR
1 PATCH, TRANSDERMAL 24 HOURS TRANSDERMAL DAILY PRN
Status: DISCONTINUED | OUTPATIENT
Start: 2019-05-12 | End: 2019-05-15 | Stop reason: HOSPADM

## 2019-05-12 RX ORDER — ONDANSETRON 2 MG/ML
4 INJECTION INTRAMUSCULAR; INTRAVENOUS ONCE
Status: COMPLETED | OUTPATIENT
Start: 2019-05-12 | End: 2019-05-12

## 2019-05-12 RX ORDER — KETOROLAC TROMETHAMINE 30 MG/ML
30 INJECTION, SOLUTION INTRAMUSCULAR; INTRAVENOUS ONCE
Status: COMPLETED | OUTPATIENT
Start: 2019-05-12 | End: 2019-05-12

## 2019-05-12 RX ORDER — ACETAMINOPHEN 325 MG/1
650 TABLET ORAL EVERY 4 HOURS PRN
Status: DISCONTINUED | OUTPATIENT
Start: 2019-05-12 | End: 2019-05-15 | Stop reason: HOSPADM

## 2019-05-12 RX ORDER — ONDANSETRON 2 MG/ML
4 INJECTION INTRAMUSCULAR; INTRAVENOUS EVERY 6 HOURS PRN
Status: DISCONTINUED | OUTPATIENT
Start: 2019-05-12 | End: 2019-05-15 | Stop reason: HOSPADM

## 2019-05-12 RX ORDER — HYDROCHLOROTHIAZIDE 25 MG/1
25 TABLET ORAL DAILY
Status: DISCONTINUED | OUTPATIENT
Start: 2019-05-13 | End: 2019-05-15 | Stop reason: HOSPADM

## 2019-05-12 RX ORDER — SODIUM CHLORIDE 0.9 % (FLUSH) 0.9 %
10 SYRINGE (ML) INJECTION PRN
Status: DISCONTINUED | OUTPATIENT
Start: 2019-05-12 | End: 2019-05-15 | Stop reason: HOSPADM

## 2019-05-12 RX ORDER — DEXTROSE MONOHYDRATE 50 MG/ML
100 INJECTION, SOLUTION INTRAVENOUS PRN
Status: DISCONTINUED | OUTPATIENT
Start: 2019-05-12 | End: 2019-05-15 | Stop reason: HOSPADM

## 2019-05-12 RX ORDER — CEFAZOLIN SODIUM 1 G/50ML
1 INJECTION, SOLUTION INTRAVENOUS ONCE
Status: COMPLETED | OUTPATIENT
Start: 2019-05-12 | End: 2019-05-12

## 2019-05-12 RX ORDER — MAGNESIUM SULFATE 1 G/100ML
1 INJECTION INTRAVENOUS PRN
Status: DISCONTINUED | OUTPATIENT
Start: 2019-05-12 | End: 2019-05-15 | Stop reason: HOSPADM

## 2019-05-12 RX ORDER — ONDANSETRON 2 MG/ML
4 INJECTION INTRAMUSCULAR; INTRAVENOUS EVERY 6 HOURS PRN
Status: DISCONTINUED | OUTPATIENT
Start: 2019-05-12 | End: 2019-05-12 | Stop reason: SDUPTHER

## 2019-05-12 RX ORDER — CEFAZOLIN SODIUM 1 G/50ML
1 INJECTION, SOLUTION INTRAVENOUS EVERY 8 HOURS
Status: DISCONTINUED | OUTPATIENT
Start: 2019-05-13 | End: 2019-05-14

## 2019-05-12 RX ORDER — POTASSIUM CHLORIDE 20 MEQ/1
40 TABLET, EXTENDED RELEASE ORAL PRN
Status: DISCONTINUED | OUTPATIENT
Start: 2019-05-12 | End: 2019-05-15 | Stop reason: HOSPADM

## 2019-05-12 RX ORDER — SODIUM CHLORIDE 0.9 % (FLUSH) 0.9 %
10 SYRINGE (ML) INJECTION EVERY 12 HOURS SCHEDULED
Status: DISCONTINUED | OUTPATIENT
Start: 2019-05-12 | End: 2019-05-15 | Stop reason: HOSPADM

## 2019-05-12 RX ORDER — DULOXETIN HYDROCHLORIDE 30 MG/1
30 CAPSULE, DELAYED RELEASE ORAL DAILY
Status: DISCONTINUED | OUTPATIENT
Start: 2019-05-13 | End: 2019-05-15 | Stop reason: HOSPADM

## 2019-05-12 RX ORDER — 0.9 % SODIUM CHLORIDE 0.9 %
500 INTRAVENOUS SOLUTION INTRAVENOUS ONCE
Status: COMPLETED | OUTPATIENT
Start: 2019-05-12 | End: 2019-05-12

## 2019-05-12 RX ORDER — GABAPENTIN 300 MG/1
300 CAPSULE ORAL 3 TIMES DAILY
Status: DISCONTINUED | OUTPATIENT
Start: 2019-05-12 | End: 2019-05-15 | Stop reason: HOSPADM

## 2019-05-12 RX ORDER — SODIUM CHLORIDE 9 MG/ML
INJECTION, SOLUTION INTRAVENOUS CONTINUOUS
Status: DISCONTINUED | OUTPATIENT
Start: 2019-05-12 | End: 2019-05-15 | Stop reason: HOSPADM

## 2019-05-12 RX ADMIN — SODIUM CHLORIDE: 9 INJECTION, SOLUTION INTRAVENOUS at 21:16

## 2019-05-12 RX ADMIN — KETOROLAC TROMETHAMINE 30 MG: 30 INJECTION, SOLUTION INTRAMUSCULAR at 19:53

## 2019-05-12 RX ADMIN — GABAPENTIN 300 MG: 300 CAPSULE ORAL at 21:16

## 2019-05-12 RX ADMIN — Medication 10 ML: at 21:16

## 2019-05-12 RX ADMIN — SODIUM CHLORIDE 500 ML: 9 INJECTION, SOLUTION INTRAVENOUS at 17:20

## 2019-05-12 RX ADMIN — CEFAZOLIN SODIUM 1 G: 1 INJECTION, SOLUTION INTRAVENOUS at 19:54

## 2019-05-12 RX ADMIN — APIXABAN 5 MG: 5 TABLET, FILM COATED ORAL at 21:16

## 2019-05-12 RX ADMIN — INSULIN LISPRO 5 UNITS: 100 INJECTION, SOLUTION INTRAVENOUS; SUBCUTANEOUS at 21:23

## 2019-05-12 RX ADMIN — ONDANSETRON 4 MG: 2 INJECTION INTRAMUSCULAR; INTRAVENOUS at 17:20

## 2019-05-12 ASSESSMENT — ENCOUNTER SYMPTOMS
DIARRHEA: 0
COUGH: 0
EYE DISCHARGE: 0
VOMITING: 1
ABDOMINAL PAIN: 0
EYE REDNESS: 0
COLOR CHANGE: 0
CONSTIPATION: 0
FACIAL SWELLING: 0
SHORTNESS OF BREATH: 0
NAUSEA: 1

## 2019-05-12 ASSESSMENT — PAIN SCALES - GENERAL
PAINLEVEL_OUTOF10: 5
PAINLEVEL_OUTOF10: 8
PAINLEVEL_OUTOF10: 0

## 2019-05-12 ASSESSMENT — PAIN DESCRIPTION - DESCRIPTORS: DESCRIPTORS: BURNING

## 2019-05-12 ASSESSMENT — PAIN DESCRIPTION - LOCATION: LOCATION: ABDOMEN

## 2019-05-12 ASSESSMENT — PAIN DESCRIPTION - ORIENTATION: ORIENTATION: LOWER

## 2019-05-12 NOTE — ED PROVIDER NOTES
50 MG EXTENDED RELEASE TABLET    TAKE 1 TABLET BY MOUTH  DAILY    OMEPRAZOLE (PRILOSEC) 40 MG DELAYED RELEASE CAPSULE    TAKE 1 CAPSULE BY MOUTH  EVERY DAY       PAST MEDICAL HISTORY         Diagnosis Date    Back pain 2012    Cellulitis 10/9/2015    Lt. Lower Leg    DM (diabetes mellitus) ([facility] [address] 2012    DVT (deep venous thrombosis) ([facility] 2012    GERD (gastroesophageal reflux disease) 2012    MVP (mitral valve prolapse) 2012       SURGICAL HISTORY           Procedure Laterality Date    HYSTERECTOMY      TONSILLECTOMY           FAMILY HISTORY           Problem Relation Age of Onset    Heart Disease Mother      Family Status   Relation Name Status    Mother      Father          SOCIAL HISTORY      reports that she quit smoking about 35 years ago. She has never used smokeless tobacco. She reports that she does not drink alcohol or use drugs. REVIEW OF SYSTEMS    (2-9 systems for level 4, 10 or more for level 5)     Review of Systems   Constitutional: Negative for chills, fatigue and fever. HENT: Negative for congestion, ear discharge and facial swelling. Eyes: Negative for discharge and redness. Respiratory: Negative for cough and shortness of breath. Cardiovascular: Negative for chest pain. Gastrointestinal: Positive for nausea and vomiting. Negative for abdominal pain, constipation and diarrhea. Genitourinary: Negative for dysuria and hematuria. Musculoskeletal: Negative for arthralgias. Skin: Negative for color change and rash. Neurological: Negative for syncope, numbness and headaches. Hematological: Negative for adenopathy. Psychiatric/Behavioral: Negative for confusion. The patient is not nervous/anxious. Except as noted above the remainder of the review of systems was reviewed and negative.      PHYSICAL EXAM    (up to 7 for level 4, 8 or more for level 5)     Vitals:    19 1715 19 1925   BP: (!) 151/69 (!) 137/49 Pulse: 108 109   Resp: 18 19   Temp: 98.6 °F (37 °C)    SpO2: 99% 91%   Weight: (!) 480 lb (217.7 kg)    Height: 6' (1.829 m)        Physical Exam   Constitutional: She is oriented to person, place, and time. She appears well-developed and well-nourished. No distress. Morbidly obese. No acute distress. HENT:   Head: Normocephalic and atraumatic. Eyes: Right eye exhibits no discharge. Left eye exhibits no discharge. No scleral icterus. Neck: Neck supple. Cardiovascular: Normal rate and regular rhythm. Pulmonary/Chest: Effort normal and breath sounds normal. No stridor. No respiratory distress. She has no wheezes. She has no rales. Abdominal: Soft. She exhibits no distension. There is no tenderness. Musculoskeletal: Normal range of motion. Lymphadenopathy:     She has no cervical adenopathy. Neurological: She is alert and oriented to person, place, and time. Skin: Skin is warm and dry. No rash noted. She is not diaphoretic. No erythema. Both legs have significant lymphedema. The left leg below the knee has well demarcated area of erythema and it is warm to touch compared to the contralateral.  It is consistent with cellulitis. Psychiatric: She has a normal mood and affect. Her behavior is normal.   Vitals reviewed.           DIAGNOSTIC RESULTS     EKG: All EKG's are interpreted by the Emergency Department Physician who either signs or Co-signs this chart in the absence of a cardiologist.    RADIOLOGY:   Non-plain film images such as CT, Ultrasound and MRI are read by the radiologist. [facility] radiographic images are visualized and preliminarily interpreted by the emergency physician with the below findings:    Interpretation per the Radiologist below, if available at the time of this note:    Xr Chest Portable    Result Date: 5/12/2019  EXAMINATION: ONE XRAY VIEW OF THE CHEST 5/12/2019 6:10 pm COMPARISON: 12/31/2018 HISTORY: ORDERING SYSTEM PROVIDED HISTORY: wbc 19K TECHNOLOGIST PROVIDED HISTORY: wbc 19K Ordering Physician Provided Reason for Exam: emesis Acuity: Acute Type of Exam: Initial Relevant Medical/Surgical History: MVP FINDINGS: Shallow inspiratory effort and portable technique limit evaluation. No consolidation or effusion is identified. The heart size is magnified. No conclusive infiltrate is identified. There may be mild pulmonary vascular congestion. Limited hypoventilatory exam. Possible mild pulmonary vascular congestion. LABS:  Labs Reviewed   CBC WITH AUTO DIFFERENTIAL - Abnormal; Notable for the following components:       Result Value    WBC 19.5 (*)     Seg Neutrophils 92 (*)     Lymphocytes 3 (*)     Eosinophils % 0 (*)     Segs Absolute 17.93 (*)     Absolute Lymph # 0.59 (*)     Absolute Mono # 0.98 (*)     All other components within normal limits   BASIC METABOLIC PANEL - Abnormal; Notable for the following components:    Glucose 380 (*)     CREATININE 0.95 (*)     GFR Non- 59 (*)     All other components within normal limits   POCT URINALYSIS DIPSTICK - Normal   CULTURE BLOOD #1   CULTURE BLOOD #1   URINE CULTURE       All other labs were within normal range or not returned as of this dictation. EMERGENCY DEPARTMENT COURSE and DIFFERENTIAL DIAGNOSIS/MDM:   Vitals:    Vitals:    05/12/19 1715 05/12/19 1925   BP: (!) 151/69 (!) 137/49   Pulse: 108 109   Resp: 18 19   Temp: 98.6 °F (37 °C)    SpO2: 99% 91%   Weight: (!) 480 lb (217.7 kg)    Height: 6' (1.829 m)        Orders Placed This Encounter   Medications    0.9 % sodium chloride bolus    ondansetron (ZOFRAN) injection 4 mg    ceFAZolin (ANCEF) 1 g in dextrose 5 % 50 mL IVPB (premix)    ketorolac (TORADOL) injection 30 mg       Medical Decision Making: My clinical impression is that she has left leg cellulitis. She has had this on several occasions in the past.  Blood cultures were obtained and she was given IV Ancef and she is being admitted. I've no clinical suspicion of sepsis. 7:43 PM  Treatment diagnosis and disposition were discussed with the patient. CONSULTS:  None    PROCEDURES:  None    FINAL IMPRESSION      1. Left leg cellulitis          DISPOSITION/PLAN   DISPOSITION Decision To Admit 05/12/2019 07:43:02 PM      PATIENT REFERRED TO:   No follow-up provider specified.     DISCHARGE MEDICATIONS:     New Prescriptions    No medications on file         (Please note that portions of this note were completed with a voice recognition program.  Efforts were made to edit the dictations but occasionally words are mis-transcribed.)    Shoshana Smith MD  Attending Emergency Physician           Shoshana Smith MD  05/12/19 3992

## 2019-05-13 LAB
ANION GAP SERPL CALCULATED.3IONS-SCNC: 9 MMOL/L (ref 9–17)
BUN BLDV-MCNC: 18 MG/DL (ref 8–23)
BUN/CREAT BLD: 16 (ref 9–20)
CALCIUM SERPL-MCNC: 8.2 MG/DL (ref 8.6–10.4)
CHLORIDE BLD-SCNC: 100 MMOL/L (ref 98–107)
CO2: 26 MMOL/L (ref 20–31)
CREAT SERPL-MCNC: 1.16 MG/DL (ref 0.5–0.9)
ESTIMATED AVERAGE GLUCOSE: 249 MG/DL
ESTIMATED AVERAGE GLUCOSE: 255 MG/DL
GFR AFRICAN AMERICAN: 57 ML/MIN
GFR NON-AFRICAN AMERICAN: 47 ML/MIN
GFR SERPL CREATININE-BSD FRML MDRD: ABNORMAL ML/MIN/{1.73_M2}
GFR SERPL CREATININE-BSD FRML MDRD: ABNORMAL ML/MIN/{1.73_M2}
GLUCOSE BLD-MCNC: 261 MG/DL (ref 65–105)
GLUCOSE BLD-MCNC: 289 MG/DL (ref 65–105)
GLUCOSE BLD-MCNC: 289 MG/DL (ref 65–105)
GLUCOSE BLD-MCNC: 313 MG/DL (ref 65–105)
GLUCOSE BLD-MCNC: 329 MG/DL (ref 70–99)
HBA1C MFR BLD: 10.3 % (ref 4–6)
HBA1C MFR BLD: 10.5 % (ref 4–6)
HCT VFR BLD CALC: 35.2 % (ref 36.3–47.1)
HEMOGLOBIN: 10.9 G/DL (ref 11.9–15.1)
LACTIC ACID, SEPSIS WHOLE BLOOD: ABNORMAL MMOL/L (ref 0.5–1.9)
LACTIC ACID, SEPSIS WHOLE BLOOD: NORMAL MMOL/L (ref 0.5–1.9)
LACTIC ACID, SEPSIS: 1.7 MMOL/L (ref 0.5–1.9)
LACTIC ACID, SEPSIS: 2 MMOL/L (ref 0.5–1.9)
MCH RBC QN AUTO: 28.7 PG (ref 25.2–33.5)
MCHC RBC AUTO-ENTMCNC: 31 G/DL (ref 28.4–34.8)
MCV RBC AUTO: 92.6 FL (ref 82.6–102.9)
NRBC AUTOMATED: 0 PER 100 WBC
PDW BLD-RTO: 14.6 % (ref 11.8–14.4)
PLATELET # BLD: 215 K/UL (ref 138–453)
PMV BLD AUTO: 9.9 FL (ref 8.1–13.5)
POTASSIUM SERPL-SCNC: 4.7 MMOL/L (ref 3.7–5.3)
RBC # BLD: 3.8 M/UL (ref 3.95–5.11)
SODIUM BLD-SCNC: 135 MMOL/L (ref 135–144)
WBC # BLD: 20.4 K/UL (ref 3.5–11.3)

## 2019-05-13 PROCEDURE — 97167 OT EVAL HIGH COMPLEX 60 MIN: CPT

## 2019-05-13 PROCEDURE — 97530 THERAPEUTIC ACTIVITIES: CPT

## 2019-05-13 PROCEDURE — 80048 BASIC METABOLIC PNL TOTAL CA: CPT

## 2019-05-13 PROCEDURE — 6360000002 HC RX W HCPCS: Performed by: NURSE PRACTITIONER

## 2019-05-13 PROCEDURE — 36415 COLL VENOUS BLD VENIPUNCTURE: CPT

## 2019-05-13 PROCEDURE — 85027 COMPLETE CBC AUTOMATED: CPT

## 2019-05-13 PROCEDURE — 97163 PT EVAL HIGH COMPLEX 45 MIN: CPT

## 2019-05-13 PROCEDURE — 99232 SBSQ HOSP IP/OBS MODERATE 35: CPT | Performed by: INTERNAL MEDICINE

## 2019-05-13 PROCEDURE — 87040 BLOOD CULTURE FOR BACTERIA: CPT

## 2019-05-13 PROCEDURE — 1200000000 HC SEMI PRIVATE

## 2019-05-13 PROCEDURE — 83605 ASSAY OF LACTIC ACID: CPT

## 2019-05-13 PROCEDURE — 6370000000 HC RX 637 (ALT 250 FOR IP): Performed by: NURSE PRACTITIONER

## 2019-05-13 PROCEDURE — 97535 SELF CARE MNGMENT TRAINING: CPT

## 2019-05-13 PROCEDURE — 83036 HEMOGLOBIN GLYCOSYLATED A1C: CPT

## 2019-05-13 PROCEDURE — 97116 GAIT TRAINING THERAPY: CPT

## 2019-05-13 PROCEDURE — 82947 ASSAY GLUCOSE BLOOD QUANT: CPT

## 2019-05-13 PROCEDURE — 2580000003 HC RX 258: Performed by: NURSE PRACTITIONER

## 2019-05-13 PROCEDURE — 97110 THERAPEUTIC EXERCISES: CPT

## 2019-05-13 RX ORDER — SODIUM CHLORIDE 0.9 % (FLUSH) 0.9 %
10 SYRINGE (ML) INJECTION PRN
Status: DISCONTINUED | OUTPATIENT
Start: 2019-05-13 | End: 2019-05-15 | Stop reason: HOSPADM

## 2019-05-13 RX ORDER — SODIUM CHLORIDE 0.9 % (FLUSH) 0.9 %
10 SYRINGE (ML) INJECTION EVERY 12 HOURS SCHEDULED
Status: DISCONTINUED | OUTPATIENT
Start: 2019-05-13 | End: 2019-05-15 | Stop reason: HOSPADM

## 2019-05-13 RX ORDER — GABAPENTIN 300 MG/1
600 CAPSULE ORAL 2 TIMES DAILY
COMMUNITY
End: 2020-11-23

## 2019-05-13 RX ORDER — FLUCONAZOLE 100 MG/1
100 TABLET ORAL DAILY PRN
COMMUNITY
End: 2019-07-30 | Stop reason: SDUPTHER

## 2019-05-13 RX ORDER — HYDROMORPHONE HYDROCHLORIDE 2 MG/1
2 TABLET ORAL
COMMUNITY
End: 2019-09-12 | Stop reason: SDUPTHER

## 2019-05-13 RX ADMIN — CEFAZOLIN SODIUM 1 G: 1 INJECTION, SOLUTION INTRAVENOUS at 13:56

## 2019-05-13 RX ADMIN — HYDROCHLOROTHIAZIDE 25 MG: 25 TABLET ORAL at 08:03

## 2019-05-13 RX ADMIN — CEFAZOLIN SODIUM 1 G: 1 INJECTION, SOLUTION INTRAVENOUS at 05:32

## 2019-05-13 RX ADMIN — GABAPENTIN 300 MG: 300 CAPSULE ORAL at 08:03

## 2019-05-13 RX ADMIN — ACETAMINOPHEN 650 MG: 325 TABLET ORAL at 05:38

## 2019-05-13 RX ADMIN — ACETAMINOPHEN 650 MG: 325 TABLET ORAL at 16:33

## 2019-05-13 RX ADMIN — APIXABAN 5 MG: 5 TABLET, FILM COATED ORAL at 21:20

## 2019-05-13 RX ADMIN — METOPROLOL SUCCINATE 50 MG: 50 TABLET, FILM COATED, EXTENDED RELEASE ORAL at 08:03

## 2019-05-13 RX ADMIN — INSULIN LISPRO 8 UNITS: 100 INJECTION, SOLUTION INTRAVENOUS; SUBCUTANEOUS at 08:04

## 2019-05-13 RX ADMIN — CEFAZOLIN SODIUM 1 G: 1 INJECTION, SOLUTION INTRAVENOUS at 21:20

## 2019-05-13 RX ADMIN — GABAPENTIN 300 MG: 300 CAPSULE ORAL at 13:56

## 2019-05-13 RX ADMIN — LINAGLIPTIN 5 MG: 5 TABLET, FILM COATED ORAL at 08:04

## 2019-05-13 RX ADMIN — APIXABAN 5 MG: 5 TABLET, FILM COATED ORAL at 08:04

## 2019-05-13 RX ADMIN — INSULIN LISPRO 6 UNITS: 100 INJECTION, SOLUTION INTRAVENOUS; SUBCUTANEOUS at 12:28

## 2019-05-13 RX ADMIN — INSULIN LISPRO 6 UNITS: 100 INJECTION, SOLUTION INTRAVENOUS; SUBCUTANEOUS at 16:33

## 2019-05-13 RX ADMIN — INSULIN LISPRO 3 UNITS: 100 INJECTION, SOLUTION INTRAVENOUS; SUBCUTANEOUS at 21:20

## 2019-05-13 RX ADMIN — GABAPENTIN 300 MG: 300 CAPSULE ORAL at 21:20

## 2019-05-13 RX ADMIN — DULOXETINE HYDROCHLORIDE 30 MG: 30 CAPSULE, DELAYED RELEASE ORAL at 08:03

## 2019-05-13 RX ADMIN — SODIUM CHLORIDE: 9 INJECTION, SOLUTION INTRAVENOUS at 17:03

## 2019-05-13 ASSESSMENT — PAIN DESCRIPTION - ONSET: ONSET: ON-GOING

## 2019-05-13 ASSESSMENT — PAIN DESCRIPTION - LOCATION
LOCATION: BACK
LOCATION: BACK

## 2019-05-13 ASSESSMENT — PAIN SCALES - GENERAL
PAINLEVEL_OUTOF10: 3
PAINLEVEL_OUTOF10: 2
PAINLEVEL_OUTOF10: 4
PAINLEVEL_OUTOF10: 8

## 2019-05-13 ASSESSMENT — PAIN DESCRIPTION - DESCRIPTORS: DESCRIPTORS: ACHING;DISCOMFORT

## 2019-05-13 ASSESSMENT — PAIN DESCRIPTION - PAIN TYPE: TYPE: ACUTE PAIN

## 2019-05-13 ASSESSMENT — PAIN DESCRIPTION - FREQUENCY: FREQUENCY: INTERMITTENT

## 2019-05-13 ASSESSMENT — PAIN DESCRIPTION - ORIENTATION: ORIENTATION: LOWER

## 2019-05-13 ASSESSMENT — PAIN DESCRIPTION - PROGRESSION: CLINICAL_PROGRESSION: NOT CHANGED

## 2019-05-13 NOTE — PROGRESS NOTES
[facility]    Progress Note    5/13/2019    1:55 PM    Name:   Eric Funez  MRN:     [id number]     [place]ide:      [de-identified]   Room:   2021/2021-01   Day:  1  Admit Date:  5/12/2019  5:04 PM    PCP:   Nikhil Gudino MD  Code Status:  Full Code    Subjective:     C/C:   Chief Complaint   Patient presents with    Emesis     Interval History Status:   Still is left leg swelling  Nausea and vomiting has improved denies any other new problem  Brief History:   The patient is a 75 y.o.  female who presents to the hospital with complaint of nausea and vomiting that started this morning. She reports several occurances of emesis. She has associated chills and fatigue. She has chronic bilateral lower extremity lymphedema and reoccurring left lower extremity cellulitis, she noticed left leg was more red than normal today. She denies abdominal pain, chest pain or shortness of breath. She has health history that includes mitral valve prolapse and diabetes type 2. The patient takes eliquis, she has a history of atrial fibrillation and DVT. She does not have a cardiologist.  She will be admitted for further evaluation and treatment.      The patient sees Theoplis Camp with occupational therapy at [facility]. She uses lymphedema boots intermittantly. She states she has a compression garment at OT waiting, but has not picked it up yet.      WBC 19.5. Absolute segs 17.93.   T 99F    /91.   CXR shows pulmonary vascular congestion.      Echocardiogram 7/3/17 indicated mild left ventricular hypertrophy and preserved systolic function and EF 41%          Review of Systems:     Constitutional:  negative for chills, fevers, sweats  Respiratory:  negative for cough, dyspnea on exertion, shortness of breath, wheezing  Cardiovascular:  negative for chest pain, chest pressure/discomfort, lower extremity edema, palpitations  Gastrointestinal:  negative for (37.2 °C)    Recent Labs     05/12/19  2115 05/13/19  0628 05/13/19  1117   POCGLU 372* 313* 261*       I/O (24Hr): Intake/Output Summary (Last 24 hours) at 5/13/2019 1355  Last data filed at 5/13/2019 0400  Gross per 24 hour   Intake --   Output 425 ml   Net -425 ml       Labs:    Hematology:  Recent Labs     05/12/19  1711 05/13/19  0446   WBC 19.5* 20.4*   HGB 12.8 10.9*   HCT 40.4 35.2*    215     Chemistry:  Recent Labs     05/12/19  1711 05/13/19  0446    135   K 4.9 4.7   CL 99 100   CO2 23 26   GLUCOSE 380* 329*   BUN 15 18   CREATININE 0.95* 1.16*   ANIONGAP 13 9   LABGLOM 59* 47*   GFRAA >60 57*   CALCIUM 8.9 8.2*     Recent Labs     05/12/19  1711   LABA1C 10.5*         Lab Results   Component Value Date/Time    SPECIAL RT FOREARM 10ML 05/12/2019 06:51 PM     Lab Results   Component Value Date/Time    CULTURE NO GROWTH 11 HOURS 05/12/2019 06:51 PM       No results found for: POCPH, PHART, PH, POCPCO2, ICX3QIX, PCO2, POCPO2, PO2ART, PO2, POCHCO3, NHJ4BDN, HCO3, NBEA, PBEA, BEART, BE, THGBART, THB, OAO8FUZ, HQLX7DQW, J4XTNZYU, O2SAT, FIO2    Radiology:  EXAMINATION: ONE XRAY VIEW OF THE CHEST   5/12/2019 6:10 pm   COMPARISON: 12/31/2018   HISTORY: ORDERING SYSTEM PROVIDED HISTORY: wbc 19K TECHNOLOGIST PROVIDED HISTORY: wbc 19K Ordering Physician Provided Reason for Exam: emesis Acuity: Acute Type of Exam: Initial Relevant Medical/Surgical History: MVP   FINDINGS: Shallow inspiratory effort and portable technique limit evaluation.  No consolidation or effusion is identified.  The heart size is magnified.   No conclusive infiltrate is identified.   There may be mild pulmonary vascular congestion. Limited hypoventilatory exam.   Possible mild pulmonary vascular congestion.      EXAMINATION: SINGLE XRAY VIEW OF THE CHEST   12/31/2018 5:07 pm   COMPARISON: Chest radiograph 07/02/2017  No focal pneumonia or any other acute cardiopulmonary abnormality.           Physical Examination:

## 2019-05-13 NOTE — PROGRESS NOTES
Physical Therapy    Facility/Department: [facility]  Initial Assessment    NAME: Beck Hernandez  : 1955  MRN: [id number]    Date of Service: 2019    Discharge Recommendations:  Home with assist PRN(home health as needed)      Beck Hernandez is a 75 y.o. female who presents to the emergency department for nausea and vomiting. It began about 8:30 this morning. No diarrhea fever or hematemesis. Symptoms have been intermittent. She was incontinent on the way here in the ambulance. No known ill contacts. Assessment   Body structures, Functions, Activity limitations: Decreased functional mobility ; Decreased strength;Decreased ROM; Decreased balance;Decreased endurance  Assessment: Pt. presents with gross lymphedema bilat. however moves fairly well. Gave pt. RW as first time up but states she usu. amb. indep. At discharge pt. to continue with OP lymphedema treatment. Specific instructions for Next Treatment: progress gait  Prognosis: Good  Decision Making: High Complexity  REQUIRES PT FOLLOW UP: Yes  Activity Tolerance  Activity Tolerance: Patient Tolerated treatment well       Patient Diagnosis(es): The encounter diagnosis was Left leg cellulitis. has a past medical history of Back pain, Cellulitis, DM (diabetes mellitus) ([address] DVT (deep venous thrombosis) ([address] GERD (gastroesophageal reflux disease), and MVP (mitral valve prolapse). has a past surgical history that includes Hysterectomy and Tonsillectomy.     Restrictions  Restrictions/Precautions  Restrictions/Precautions: General Precautions, Fall Risk  Vision/Hearing        Subjective  General  Chart Reviewed: Yes  Patient assessed for rehabilitation services?: Yes  Family / Caregiver Present: No  Follows Commands: Within Functional Limits  Pain Screening  Patient Currently in Pain: Yes          Orientation  Orientation  Overall Orientation Status: Within Normal Limits  Social/Functional History  Social/Functional History  Lives With: Spouse(and son who both work)  Type of Home: House  Home Layout: Two level, Able to Live on Main level with bedroom/bathroom(has full bath on first floor. Pt sleeps in recliner on first floor)  Home Access: Stairs to enter with rails  Entrance Stairs - Number of Steps: 1  Bathroom Shower/Tub: Walk-in shower  Bathroom Toilet: Handicap height  Bathroom Equipment: Built-in shower seat, Grab bars in shower  Home Equipment: Cane, Oxygen(O2 at night, 2L)  ADL Assistance: Independent  Homemaking Assistance: Independent  Ambulation Assistance: Independent(occasionally uses cane in home, does use cane in community)  Transfer Assistance: Independent  Additional Comments: lymphedema treatment at outpatient therapy. Has history of falls, tripped over dog and carpet -2 falls in last year. Cognition        Objective     Observation/Palpation  Posture: Good  Observation: 445lbs/6' tall  Edema: lymphedema bilat. LE with cellulitis LLE    AROM RLE (degrees)  RLE AROM: WFL  AROM LLE (degrees)  LLE AROM : WFL  AROM RUE (degrees)  RUE AROM : WFL  AROM LUE (degrees)  LUE AROM : WFL  Strength RLE  Strength RLE: WFL  Strength LLE  Strength LLE: WFL  Strength RUE  Strength RUE: WFL  Strength LUE  Strength LUE: WFL  Strength Other  Other: despite gross lymphedema, pt. moves well  Tone RLE  RLE Tone: Normotonic  Tone LLE  LLE Tone: Normotonic     Bed mobility  Rolling to Left: Stand by assistance  Rolling to Right: Stand by assistance  Supine to Sit: Minimal assistance(for LLE; uses bedrail)  Transfers  Sit to Stand: Contact guard assistance(bed raised slightly for pt. as she is 6' tall)  Stand to sit: Contact guard assistance  Ambulation  Ambulation?: Yes  Ambulation 1  Surface: level tile  Device: Rolling Walker  Assistance: Contact guard assistance  Quality of Gait: Pt. stated she appreciated having RW to lean on; does not normally use an AD except for cane in community  Distance: 18ft.    Comments: Up to PT's bariatric recliner at bedside      Balance  Posture: Good  Sitting - Static: Good  Sitting - Dynamic: Good  Standing - Static: Good;-(with RW)  Exercises  Quad Sets: 10  Gluteal Sets: 10  Ankle Pumps: 10  Comments: discussed small ROM heel slides while in recliner, along with AP and isometrics and UE AROM to be done while up in chair as able     Plan   Plan  Times per week: 1-2x/day; 5-6days/wk  Specific instructions for Next Treatment: progress gait  Current Treatment Recommendations: Strengthening, Functional Mobility Training, Home Exercise Program, ROM, Transfer Training, Gait Training, Safety Education & Training, Balance Training, Endurance Training, Stair training, Patient/Caregiver Education & Training  Safety Devices  Type of devices: All fall risk precautions in place, Gait belt, Patient at risk for falls, Call light within reach, Left in chair, Nurse notified      AM-PAC Score  AM-PAC Inpatient Mobility Raw Score : 19  AM-PAC Inpatient T-Scale Score : 45.44  Mobility Inpatient CMS 0-100% Score: 41.77  Mobility Inpatient CMS G-Code Modifier : CK          Goals  Short term goals  Time Frame for Short term goals: 12 visits:  Short term goal 1: Pt. to be indep with transfers  Short term goal 2: Pt. to be indep. with gait with approp. assistive device, 100ft. Short term goal 3: Pt. to safely negotiate one step to prepare for entering her home at discharge  Short term goal 4: Pt. to tolerate 25+ min. of PT daily for gait/ balance training and ther ex./functional mobility training  Patient Goals   Patient goals : no cellulitis       Therapy Time   Individual Concurrent Group Co-treatment   Time In 0950         Time Out 1032(also 10 min. chart review)         Minutes 43               Co-treatment with OT warranted secondary to decreased safety and independence requiring 2 skilled therapy professionals to address individual discipline's goals. Pt. Weighs 445lbs and has gross lymphedema bilat. Violeta Ferrara, PT

## 2019-05-13 NOTE — H&P
[facility]    HISTORY AND PHYSICAL EXAMINATION            Date:   5/12/2019  Patient name:  Jeanmarie Sands  Date of admission:  5/12/2019  5:04 PM  MRN:   [id number]  Account:  [de-identified]  YOB: 1955  PCP:    Niurka Mccall MD  Room:   2021/2021-01  Code Status:    Full Code    Chief Complaint:     Chief Complaint   Patient presents with    Emesis     History Obtained From:     Patient and electronic medical record. History of Present Illness: The patient is a 75 y.o.  female who presents to the hospital with complaint of nausea and vomiting that started this morning. She reports several occurances of emesis. She has associated chills and fatigue. She has chronic bilateral lower extremity lymphedema and reoccurring left lower extremity cellulitis, she noticed left leg was more red than normal today. She denies abdominal pain, chest pain or shortness of breath. She has health history that includes mitral valve prolapse and diabetes type 2. The patient takes eliquis, she has a history of atrial fibrillation and DVT. She does not have a cardiologist.  She will be admitted for further evaluation and treatment. The patient sees Bettie Kramer with occupational therapy at [facility]. She uses lymphedema boots intermittantly. She states she has a compression garment at OT waiting, but has not picked it up yet. WBC 19.5. Absolute segs 17.93.   T 99F    /91. CXR shows pulmonary vascular congestion. Echocardiogram 7/3/17 indicated mild left ventricular hypertrophy and preserved systolic function and EF 81%    Past Medical History:     Past Medical History:   Diagnosis Date    Back pain 6/22/2012    Cellulitis 10/9/2015    Lt.  Lower Leg    DM (diabetes mellitus) ([facility] 06/22/2012    DVT (deep venous thrombosis) ([facility] 6/22/2012    GERD (gastroesophageal reflux disease) 6/22/2012    MVP (mitral valve prolapse) 6/22/2012        Past Surgical History:     Past Surgical History:   Procedure Laterality Date    HYSTERECTOMY      TONSILLECTOMY          Medications Prior to Admission:     Prior to Admission medications    Medication Sig Start Date End Date Taking? Authorizing Provider   gabapentin (NEURONTIN) 300 MG capsule TAKE 2 CAPSULES BY MOUTH 3  TIMES A DAY 5/7/19 9/7/19 Yes Vikram Barajas MD   methocarbamol (ROBAXIN) 750 MG tablet TAKE 1 TABLET BY MOUTH THREE TIMES DAILY AS NEEDED FOR PAIN 1/16/19  Yes Vikram Barajas MD   glyBURIDE (DIABETA) 5 MG tablet TAKE 1 TABLET BY MOUTH  TWICE A DAY WITH MEALS 1/7/19  Yes Vikram Barajas MD   JANUVIA 100 MG tablet TAKE 1 TABLET BY MOUTH  EVERY DAY 1/7/19  Yes Shelly Mcarthur MD   omeprazole (PRILOSEC) 40 MG delayed release capsule TAKE 1 CAPSULE BY MOUTH  EVERY DAY 11/12/18  Yes Shelly Mcarthur MD   DULoxetine (CYMBALTA) 30 MG extended release capsule Take 1 capsule by mouth daily 8/6/18  Yes Shelly Mcarthur MD   metoprolol succinate (TOPROL XL) 50 MG extended release tablet TAKE 1 TABLET BY MOUTH  DAILY 7/24/18  Yes Vikram Barajas MD   hydrochlorothiazide (HYDRODIURIL) 25 MG tablet TAKE 1 TABLET BY MOUTH  DAILY 5/21/18  Yes Vikram Barajas MD   ELIQUIS 5 MG TABS tablet TAKE 1 TABLET BY MOUTH TWO  TIMES DAILY 5/21/18  Yes Shelly Mcarthur MD   glucose monitoring kit (FREESTYLE) monitoring kit Please fill with what is covered by ins Patient test once a day DM E11.9 9/15/17  Yes Geetha Dotson MD   acetaminophen (TYLENOL) 500 MG tablet Take 500 mg by mouth every 6 hours as needed for Pain. Pt only takes 1-2 times per week   Yes Historical Provider, MD   fluconazole (DIFLUCAN) 100 MG tablet Take 1 tablet by mouth daily 3/22/19   Shelly Mcarthur MD   fluconazole (DIFLUCAN) 100 MG tablet Take 1 tablet by mouth daily 2/7/19   Vikram Barajas MD   Glucose Blood (BLOOD GLUCOSE TEST STRIPS) STRP by Does not apply route 3 times daily.  Freestyle [place] Test Strips Historical Provider, MD        Allergies:     Erythromycin and Lisinopril    Social History:     Tobacco:    reports that she quit smoking about 35 years ago. She has never used smokeless tobacco.  Alcohol:      reports that she does not drink alcohol. Drug Use:  reports that she does not use drugs. Family History:     Family History   Problem Relation Age of Onset    Heart Disease Mother        Review of Systems:     Positive and Negative as described in HPI. CONSTITUTIONAL:  Positive for fevers, chills, fatigue, negative for sweats and weight loss. HEENT:  Negative for vision, hearing changes, runny nose, throat pain. RESPIRATORY:  Negative for shortness of breath, cough, congestion, wheezing. CARDIOVASCULAR:  Negative for chest pain, palpitations. GASTROINTESTINAL:  Positive for nausea, vomiting, negative for diarrhea, constipation, change in bowel habits, abdominal pain. GENITOURINARY:  Negative for difficulty of urination, burning with urination, frequency. INTEGUMENT:  Negative for rash, skin lesions, easy bruising. HEMATOLOGIC/LYMPHATIC:  Positive for chronic bilateral lower extremity lymphedema. ALLERGIC/IMMUNOLOGIC:  Negative for urticaria, itching. ENDOCRINE:  Negative increase in drinking, increase in urination, hot or cold intolerance. MUSCULOSKELETAL:  Negative joint pains, muscle aches, swelling of joints  NEUROLOGICAL:  Negative for headaches, dizziness, lightheadedness, numbness, pain, tingling extremities. BEHAVIOR/PSYCH:  Negative for depression, anxiety.     Physical Exam:   BP (!) 137/91   Pulse 107   Temp 99 °F (37.2 °C) (Oral)   Resp 22   Ht 6' (1.829 m)   Wt (!) 480 lb (217.7 kg)   SpO2 92%   BMI 65.10 kg/m²   Temp (24hrs), Av.8 °F (37.1 °C), Min:98.6 °F (37 °C), Max:99 °F (37.2 °C)    Recent Labs     19   POCGLU 372*     No intake or output data in the 24 hours ending 19    General Appearance:  Alert, chronically ill appearing, and in no acute distress. Mental status: Oriented to person, place, and time with normal affect. Head:  Normocephalic, atraumatic. Eye: No icterus, redness, pupils equal and reactive, extraocular eye movements intact, conjunctiva clear. Ear: Normal external ear, no discharge, hearing intact. Nose:  No drainage noted. Mouth: Mucous membranes moist.  Neck: Supple, no carotid bruits, thyroid not palpable. Lungs: Bilateral equal air entry, diminished at bases bilaterally to auscultation, no wheezing, rales or rhonchi, normal effort. Cardiovascular: Irregular rate, irregular rhythm, no murmur, gallop, rub. Abdomen: Soft, obese, nontender, nondistended, normal bowel sounds, no hepatomegaly or splenomegaly. Neurologic: There are no new focal motor or sensory deficits, normal muscle tone and bulk, no abnormal sensation, normal speech, cranial nerves II through XII grossly intact. Skin: No gross lesions, rashes, bruising or bleeding on exposed skin area  Extremities:  Peripheral pulses palpable, diffuse lymphedema to bilateral lower extremities, erythema to left lower extremity from knee to ankle. Psych: Normal affect.     Investigations:      Laboratory Testing:  Recent Results (from the past 24 hour(s))   CBC Auto Differential    Collection Time: 05/12/19  5:11 PM   Result Value Ref Range    WBC 19.5 (H) 3.5 - 11.3 k/uL    RBC 4.47 3.95 - 5.11 m/uL    Hemoglobin 12.8 11.9 - 15.1 g/dL    Hematocrit 40.4 36.3 - 47.1 %    MCV 90.4 82.6 - 102.9 fL    MCH 28.6 25.2 - 33.5 pg    MCHC 31.7 28.4 - 34.8 g/dL    RDW 14.2 11.8 - 14.4 %    Platelets 556 066 - 306 k/uL    MPV 10.1 8.1 - 13.5 fL    NRBC Automated 0.0 0.0 per 100 WBC    Differential Type NOT REPORTED     WBC Morphology NOT REPORTED     RBC Morphology NOT REPORTED     Platelet Estimate NOT REPORTED     Seg Neutrophils 92 (H) 36 - 66 %    Lymphocytes 3 (L) 24 - 44 %    Monocytes 5 1 - 7 %    Eosinophils % 0 (L) 1 - 4 %    Basophils 0 %    Immature Granulocytes 0 0 % Possible mild pulmonary vascular congestion. EXAMINATION: SINGLE XRAY VIEW OF THE CHEST   12/31/2018 5:07 pm   COMPARISON: Chest radiograph 07/02/2017  No focal pneumonia or any other acute cardiopulmonary abnormality. Assessment :      Primary Problem  Cellulitis of left lower extremity    Active Hospital Problems    Diagnosis Date Noted    Morbid obesity with BMI of 60.0-69.9, adult ([facility] [E66.01, Z68.44]     Left leg cellulitis [S89.712]     Essential hypertension [I10]     Type 2 diabetes mellitus with skin complication, without long-term current use of insulin ([facility] [E11.628]        Plan:     Patient status Admit as inpatient to the medical surgical unit. 1. IV Ancef. 2. Gentle IV hydration. 3. Supplemental oxygen as needed. 4. Monitor and control blood glucose. 5. Monitor vitals, control BP. 6. Monitor labs. 7. Resume home medications. 8. GI prophylaxis. 9. Electrolyte replacement per sliding scale. 10. Diabetic diet. 11. Activity as tolerated with assist.   12. PT/OT (patient sees Dayna Barcenas). Plan of care discussed with patient and primary RN. Consultations:   IP CONSULT TO HOSPITALIST    Patient is admitted as inpatient status because of co-morbidities listed above, severity of signs and symptoms as outlined, requirement for current medical therapies and most importantly because of direct risk to patient if care not provided in a hospital setting.     ARTURO Fong - CNP  5/12/2019  9:27 PM    Copy sent to Dr. Shelly Mcarthur MD

## 2019-05-13 NOTE — PROGRESS NOTES
Cognitive Status: WFL  Perception  Overall Perceptual Status: WFL     Sensation  Overall Sensation Status: WFL        LUE AROM (degrees)  LUE AROM : WFL  RUE AROM (degrees)  RUE AROM : WFL  LUE Strength  Gross LUE Strength: WFL  LUE Strength Comment: DMITRY USILVERIO's 5/5  RUE Strength  Gross RUE Strength: WFL                   Plan   Plan  Times per week: 4-5x/week, 1-2x/day  Current Treatment Recommendations: Strengthening, Balance Training, Functional Mobility Training, Endurance Training, Patient/Caregiver Education & Training, Self-Care / ADL, Safety Education & Training, Equipment Evaluation, Education, & procurement    G-Code     OutComes Score                                                  AM-PAC Score             Goals  Short term goals  Time Frame for Short term goals: by discharge, pt will  Short term goal 1: demo S/MI with functional mob for ADL completion with good safety/pacing  Short term goal 2: demo S/MI with toileting routine with good safety and approp DME  Short term goal 3: demo S/MI with ADL transfers with good safety  Short term goal 4: demo I with UB ADLs and SBA with LB ADLs with DME/AE as needed  Short term goal 5: demo and verb good understanding of fall prevention techs, EC/WS techs, and possible equip needs for home  Patient Goals   Patient goals : to go home       Therapy Time   Individual Concurrent Group Co-treatment   Time In 0940(+10 min chart review)         Time Out 1019         Minutes 39         Timed Code Treatment Minutes: [address]ont[address] Hinojosa [address] [place] [address]

## 2019-05-13 NOTE — PROGRESS NOTES
Transitions of Care Pharmacy Service   Medication Review    The patient's list of current home medications has been reviewed and updated. Source(s) of information: Patient/SureScripts    Please feel free to call with any questions about this encounter. Thank you. Sharita Jaramillo, [address] [facility]  Transitions of Care Pharmacy Service  Phone:  [phone]  Fax: [phone]      Prior to Admission medications    Medication Sig Start Date End Date Taking? Authorizing Provider   gabapentin (NEURONTIN) 300 MG capsule Take 600 mg by mouth 2 times daily. Yes Historical Provider, MD   fluconazole (DIFLUCAN) 100 MG tablet Take 100 mg by mouth daily as needed Indications: Patient self diagnoses and takes fluconaole for yeast infections. Yes Historical Provider, MD   HYDROmorphone (DILAUDID) 2 MG tablet Take 2 mg by mouth every 3 hours as needed for Pain.    Yes Historical Provider, MD   methocarbamol (ROBAXIN) 750 MG tablet TAKE 1 TABLET BY MOUTH THREE TIMES DAILY AS NEEDED FOR PAIN 1/16/19  Yes Vikram Barajas MD   glyBURIDE (DIABETA) 5 MG tablet TAKE 1 TABLET BY MOUTH  TWICE A DAY WITH MEALS 1/7/19  Yes Vikram Barajas MD   JANUVIA 100 MG tablet TAKE 1 TABLET BY MOUTH  EVERY DAY 1/7/19  Yes Hattie Gallardo MD   omeprazole (PRILOSEC) 40 MG delayed release capsule TAKE 1 CAPSULE BY MOUTH  EVERY DAY 11/12/18  Yes Hattie Gallardo MD   DULoxetine (CYMBALTA) 30 MG extended release capsule Take 1 capsule by mouth daily 8/6/18  Yes Vikram Barajas MD   metoprolol succinate (TOPROL XL) 50 MG extended release tablet TAKE 1 TABLET BY MOUTH  DAILY 7/24/18  Yes Vikram Barajas MD   hydrochlorothiazide (HYDRODIURIL) 25 MG tablet TAKE 1 TABLET BY MOUTH  DAILY 5/21/18  Yes Vikram Barajas MD   ELIQUIS 5 MG TABS tablet TAKE 1 TABLET BY MOUTH TWO  TIMES DAILY 5/21/18  Yes Hattie Gallardo MD   glucose monitoring kit (FREESTYLE) monitoring kit Please fill with what is covered by ins Patient test once a day DM E11.9 9/15/17  Yes Lukas Tubbs

## 2019-05-13 NOTE — PLAN OF CARE
Problem: Falls - Risk of:  Goal: Will remain free from falls  Description  Will remain free from falls  Outcome: Ongoing   Patient remains free from falls, bed locked and low with side rails up x 2, call light and belongings in reach, will call for assistance when necessary, hourly rounding.

## 2019-05-13 NOTE — PROGRESS NOTES
Patient arrived from ED on cart, stood and pivoted to bed with assistance only to lift legs, patient has no c/o pain, no c/o nausea at this time, oriented to room and call light, discussed her plan of care, hourly rounding.

## 2019-05-13 NOTE — FLOWSHEET NOTE
Patient sleeping; receives [facility] of the [address] [facility]    [facility] will follow as needed. (writer charting for Nambii Wishery .)     48/67/21 3838   Encounter Summary   Services provided to: Patient   Referral/Consult From: Rounding   Continue Visiting   (5/13/19 sleeping/anointed)   Complexity of Encounter Low   Length of Encounter 15 minutes   Routine   Type Initial   Assessment Sleeping   Sacraments   Sacrament of Sick-Anointing Anointed  (5/13/19 Fr. Mary Tsai)

## 2019-05-13 NOTE — CARE COORDINATION
Case Management Initial Discharge Plan  Zhang Course,         Readmission Risk              Risk of Unplanned Readmission:        19             Met with:patient to discuss discharge plans. Information verified: address, contacts, phone number, , insurance Yes  PCP: Mejia Sue MD  Date of last visit: Oct 2018    Insurance Provider: [employer] [place]    Discharge Planning  Current Residence:  Private home  Living Arrangements:  Spouse/Significant Other, Children   Home has 2 stories/bedroom and bathroom on main floor   Support Systems:  Spouse/Significant Other, Children  Current Services PTA:  none Agency: none  Patient able to perform ADL's:Assisted does not drive  DME in home:  O2 concentrator for 2L at night from [address]  DME used to aid ambulation prior to admission:   none  DME used during admission:  none    Potential Assistance Needed:  Outpatient PT/OT    Pharmacy: [facility] in [address] [place] [address] Medications:  No  Does patient want to participate in local refill/ meds to beds program?  No    Patient agreeable to home care: No  [place] of choice provided:  n/a      Type of Home Care Services:  None  Patient expects to be discharged to:  home    Prior SNF/Rehab Placement and Facility: none  Agreeable to SNF/Rehab: No  [place] of choice provided: n/a   Evaluation: n/a    Expected Discharge date:  19  Follow Up Appointment: Best Day/ Time: Monday AM    Transportation provider: family  Transportation arrangements needed for discharge: No    Discharge Plan:   Met with patient to review plan of care. Pt states she goes to [facility] and has to  compression garment on her next visit. Message left at clinic regarding Dr's request if lymphedema pump would be covered under her insurance    Pt states she is independent at home except she does not drive.   Her  and son are supportive and she denies any

## 2019-05-13 NOTE — PLAN OF CARE
Problem: Falls - Risk of:  Goal: Will remain free from falls  Description  Will remain free from falls  5/13/2019 1001 by Esthela Mathews RN  Outcome: Ongoing  Note:   Siderails up x 2  Hourly rounding  Call light in reach  Instructed to call for assist before attempting out of bed.   Remains free from falls and accidental injury at this time   Floor free from obstacles  Bed is locked and in lowest position  Adequate lighting provided  Bed alarm on, [place] star and Stay with Me signs posted

## 2019-05-13 NOTE — PLAN OF CARE
Problem: Falls - Risk of:  Goal: Will remain free from falls  Description  Will remain free from falls  5/13/2019 1001 by Ava Perera RN  Outcome: Ongoing  Note:   Siderails up x 2  Hourly rounding  Call light in reach  Instructed to call for assist before attempting out of bed.   Remains free from falls and accidental injury at this time   Floor free from obstacles  Bed is locked and in lowest position  Adequate lighting provided  Bed alarm on, [place] Falling star and Stay with Me signs posted         Problem: Pain:  Goal: Pain level will decrease  Description  Pain level will decrease  Outcome: Ongoing  Patient rating pain 0/10 at this time

## 2019-05-13 NOTE — PLAN OF CARE
[facility]    Second Visit Note  For more detailed information please refer to the progress note of the day      5/13/2019    4:12 PM    Name:   Faiza Palencia  MRN:     [id number]     [place]:      [de-identified]   Room:   2021/2021-01  IP Day:  1  Admit Date:  5/12/2019  5:04 PM    PCP:   Paras Jc MD  Code Status:  Full Code        Pt vitals were reviewed   New labs were reviewed   Patient was seen    Updated plan :     1. No new complaint  2. Denies nausea vomiting  3.  Foleys catheter will be replaced by external Foleys Soon        Brook Arechiga MD  5/13/2019  4:12 PM

## 2019-05-14 LAB
ANION GAP SERPL CALCULATED.3IONS-SCNC: 9 MMOL/L (ref 9–17)
BUN BLDV-MCNC: 16 MG/DL (ref 8–23)
BUN/CREAT BLD: 19 (ref 9–20)
CALCIUM SERPL-MCNC: 8.3 MG/DL (ref 8.6–10.4)
CHLORIDE BLD-SCNC: 99 MMOL/L (ref 98–107)
CO2: 24 MMOL/L (ref 20–31)
CREAT SERPL-MCNC: 0.86 MG/DL (ref 0.5–0.9)
CULTURE: ABNORMAL
GFR AFRICAN AMERICAN: >60 ML/MIN
GFR NON-AFRICAN AMERICAN: >60 ML/MIN
GFR SERPL CREATININE-BSD FRML MDRD: ABNORMAL ML/MIN/{1.73_M2}
GFR SERPL CREATININE-BSD FRML MDRD: ABNORMAL ML/MIN/{1.73_M2}
GLUCOSE BLD-MCNC: 237 MG/DL (ref 65–105)
GLUCOSE BLD-MCNC: 249 MG/DL (ref 65–105)
GLUCOSE BLD-MCNC: 258 MG/DL (ref 70–99)
GLUCOSE BLD-MCNC: 295 MG/DL (ref 65–105)
GLUCOSE BLD-MCNC: 339 MG/DL (ref 65–105)
Lab: ABNORMAL
POTASSIUM SERPL-SCNC: 4.2 MMOL/L (ref 3.7–5.3)
SODIUM BLD-SCNC: 132 MMOL/L (ref 135–144)
SPECIMEN DESCRIPTION: ABNORMAL

## 2019-05-14 PROCEDURE — 97530 THERAPEUTIC ACTIVITIES: CPT

## 2019-05-14 PROCEDURE — 1200000000 HC SEMI PRIVATE

## 2019-05-14 PROCEDURE — 80048 BASIC METABOLIC PNL TOTAL CA: CPT

## 2019-05-14 PROCEDURE — 99232 SBSQ HOSP IP/OBS MODERATE 35: CPT | Performed by: INTERNAL MEDICINE

## 2019-05-14 PROCEDURE — 2580000003 HC RX 258: Performed by: INTERNAL MEDICINE

## 2019-05-14 PROCEDURE — 2500000003 HC RX 250 WO HCPCS: Performed by: NURSE PRACTITIONER

## 2019-05-14 PROCEDURE — 6360000002 HC RX W HCPCS: Performed by: NURSE PRACTITIONER

## 2019-05-14 PROCEDURE — 2580000003 HC RX 258: Performed by: NURSE PRACTITIONER

## 2019-05-14 PROCEDURE — 36415 COLL VENOUS BLD VENIPUNCTURE: CPT

## 2019-05-14 PROCEDURE — 6360000002 HC RX W HCPCS: Performed by: INTERNAL MEDICINE

## 2019-05-14 PROCEDURE — 82947 ASSAY GLUCOSE BLOOD QUANT: CPT

## 2019-05-14 PROCEDURE — 6370000000 HC RX 637 (ALT 250 FOR IP): Performed by: INTERNAL MEDICINE

## 2019-05-14 PROCEDURE — 6370000000 HC RX 637 (ALT 250 FOR IP): Performed by: NURSE PRACTITIONER

## 2019-05-14 RX ORDER — LOPERAMIDE HYDROCHLORIDE 2 MG/1
2 CAPSULE ORAL ONCE
Status: COMPLETED | OUTPATIENT
Start: 2019-05-14 | End: 2019-05-14

## 2019-05-14 RX ORDER — GLYBURIDE 5 MG/1
5 TABLET ORAL 2 TIMES DAILY WITH MEALS
Status: DISCONTINUED | OUTPATIENT
Start: 2019-05-14 | End: 2019-05-15 | Stop reason: HOSPADM

## 2019-05-14 RX ORDER — METOPROLOL TARTRATE 5 MG/5ML
5 INJECTION INTRAVENOUS EVERY 4 HOURS PRN
Status: DISCONTINUED | OUTPATIENT
Start: 2019-05-14 | End: 2019-05-15 | Stop reason: HOSPADM

## 2019-05-14 RX ORDER — CIPROFLOXACIN 2 MG/ML
400 INJECTION, SOLUTION INTRAVENOUS EVERY 12 HOURS
Status: DISCONTINUED | OUTPATIENT
Start: 2019-05-14 | End: 2019-05-15

## 2019-05-14 RX ADMIN — HYDROCHLOROTHIAZIDE 25 MG: 25 TABLET ORAL at 08:19

## 2019-05-14 RX ADMIN — LINAGLIPTIN 5 MG: 5 TABLET, FILM COATED ORAL at 08:19

## 2019-05-14 RX ADMIN — METOPROLOL SUCCINATE 50 MG: 50 TABLET, FILM COATED, EXTENDED RELEASE ORAL at 08:18

## 2019-05-14 RX ADMIN — METOPROLOL TARTRATE 5 MG: 5 INJECTION INTRAVENOUS at 01:16

## 2019-05-14 RX ADMIN — INSULIN LISPRO 2 UNITS: 100 INJECTION, SOLUTION INTRAVENOUS; SUBCUTANEOUS at 20:41

## 2019-05-14 RX ADMIN — INSULIN LISPRO 4 UNITS: 100 INJECTION, SOLUTION INTRAVENOUS; SUBCUTANEOUS at 08:20

## 2019-05-14 RX ADMIN — CIPROFLOXACIN 400 MG: 2 INJECTION, SOLUTION INTRAVENOUS at 23:47

## 2019-05-14 RX ADMIN — APIXABAN 5 MG: 5 TABLET, FILM COATED ORAL at 08:19

## 2019-05-14 RX ADMIN — GABAPENTIN 300 MG: 300 CAPSULE ORAL at 08:19

## 2019-05-14 RX ADMIN — GABAPENTIN 300 MG: 300 CAPSULE ORAL at 20:41

## 2019-05-14 RX ADMIN — INSULIN LISPRO 6 UNITS: 100 INJECTION, SOLUTION INTRAVENOUS; SUBCUTANEOUS at 17:50

## 2019-05-14 RX ADMIN — CEFAZOLIN SODIUM 1 G: 1 INJECTION, SOLUTION INTRAVENOUS at 05:59

## 2019-05-14 RX ADMIN — CIPROFLOXACIN 400 MG: 2 INJECTION, SOLUTION INTRAVENOUS at 12:05

## 2019-05-14 RX ADMIN — SODIUM CHLORIDE: 9 INJECTION, SOLUTION INTRAVENOUS at 15:47

## 2019-05-14 RX ADMIN — GLYBURIDE 5 MG: 5 TABLET ORAL at 12:05

## 2019-05-14 RX ADMIN — GLYBURIDE 5 MG: 5 TABLET ORAL at 17:50

## 2019-05-14 RX ADMIN — GABAPENTIN 300 MG: 300 CAPSULE ORAL at 15:45

## 2019-05-14 RX ADMIN — ACETAMINOPHEN 650 MG: 325 TABLET ORAL at 04:11

## 2019-05-14 RX ADMIN — DULOXETINE HYDROCHLORIDE 30 MG: 30 CAPSULE, DELAYED RELEASE ORAL at 08:19

## 2019-05-14 RX ADMIN — Medication 10 ML: at 20:41

## 2019-05-14 RX ADMIN — PANTOPRAZOLE SODIUM 40 MG: 40 TABLET, DELAYED RELEASE ORAL at 05:59

## 2019-05-14 RX ADMIN — INSULIN LISPRO 8 UNITS: 100 INJECTION, SOLUTION INTRAVENOUS; SUBCUTANEOUS at 12:05

## 2019-05-14 RX ADMIN — LOPERAMIDE HYDROCHLORIDE 2 MG: 2 CAPSULE ORAL at 20:00

## 2019-05-14 RX ADMIN — APIXABAN 5 MG: 5 TABLET, FILM COATED ORAL at 20:41

## 2019-05-14 ASSESSMENT — PAIN DESCRIPTION - DESCRIPTORS
DESCRIPTORS: HEADACHE
DESCRIPTORS: HEADACHE

## 2019-05-14 ASSESSMENT — PAIN SCALES - GENERAL
PAINLEVEL_OUTOF10: 0
PAINLEVEL_OUTOF10: 0
PAINLEVEL_OUTOF10: 4
PAINLEVEL_OUTOF10: 4
PAINLEVEL_OUTOF10: 2
PAINLEVEL_OUTOF10: 0
PAINLEVEL_OUTOF10: 0

## 2019-05-14 ASSESSMENT — PAIN DESCRIPTION - FREQUENCY
FREQUENCY: INTERMITTENT
FREQUENCY: INTERMITTENT

## 2019-05-14 ASSESSMENT — PAIN DESCRIPTION - ONSET: ONSET: ON-GOING

## 2019-05-14 ASSESSMENT — PAIN DESCRIPTION - PAIN TYPE: TYPE: ACUTE PAIN

## 2019-05-14 ASSESSMENT — PAIN DESCRIPTION - PROGRESSION: CLINICAL_PROGRESSION: NOT CHANGED

## 2019-05-14 ASSESSMENT — PAIN DESCRIPTION - LOCATION: LOCATION: BACK

## 2019-05-14 NOTE — PLAN OF CARE
Patient is a fall risk during this admission. Fall risk assessment was performed. Patient is absent of falls. Bed is in the lowest position. Wheels on the bed are locked. Call light and bed side table are within reach. Clutter is removed. Patient was educated to call out when needing assistance or wanting to get out of bed. Patient offered toileting assistance during rounding. Hourly rounds have been performed. Skin assessment performed. Patient turns self PRN. Pressure ulcer prevention being practiced.

## 2019-05-14 NOTE — PROGRESS NOTES
[address]    Progress Note    5/14/2019    8:07 AM    Name:   Vernon Bejarano  MRN:     [id number]     [place]:      [de-identified]   Room:   2021/2021-01  IP Day:  2  Admit Date:  5/12/2019  5:04 PM    PCP:   Sharyle Rail, MD  Code Status:  Full Code    Subjective:     C/C:   Chief Complaint   Patient presents with    Emesis     Interval History Status:   Urine culture growing gram-negative rods  One out of 2 blood cultures is showing gram-positive cocci is in clusters but is PNAFISH negative. Culture results to follow. left leg swelling is a little better than before  Nausea and vomiting has improved denies any other new problem  Brief History:   The patient is a 75 y.o.  female who presents to the hospital with complaint of nausea and vomiting that started this morning. She reports several occurances of emesis. She has associated chills and fatigue. She has chronic bilateral lower extremity lymphedema and reoccurring left lower extremity cellulitis, she noticed left leg was more red than normal today. She denies abdominal pain, chest pain or shortness of breath. She has health history that includes mitral valve prolapse and diabetes type 2. The patient takes eliquis, she has a history of atrial fibrillation and DVT. She does not have a cardiologist.  She will be admitted for further evaluation and treatment.      The patient sees Safia Mcgill with occupational therapy at [facility]. She uses lymphedema boots intermittantly. She states she has a compression garment at OT waiting, but has not picked it up yet.      WBC 19.5. Absolute segs 17.93.   T 99F    /91.   CXR shows pulmonary vascular congestion.      Echocardiogram 7/3/17 indicated mild left ventricular hypertrophy and preserved systolic function and EF 67%          Review of Systems:     Constitutional:  negative for chills, fevers, sweats  Respiratory:  negative for cough, dyspnea on exertion, shortness of breath, wheezing  Cardiovascular:  negative for chest pain, chest pressure/discomfort, lower extremity edema, palpitations  Gastrointestinal:  negative for abdominal pain, constipation, diarrhea, nausea, vomiting  Neurological:  negative for dizziness, headache  + Chronic both lower extremities lymphedema with redness of left lower extremity  Medications: Allergies: Allergies   Allergen Reactions    Erythromycin Hives    Lisinopril Itching       Current Meds:   Scheduled Meds:    sodium chloride flush  10 mL Intravenous 2 times per day    ceFAZolin  1 g Intravenous Q8H    DULoxetine  30 mg Oral Daily    apixaban  5 mg Oral BID    gabapentin  300 mg Oral TID    hydrochlorothiazide  25 mg Oral Daily    linagliptin  5 mg Oral Daily    metoprolol succinate  50 mg Oral Daily    pantoprazole  40 mg Oral QAM AC    sodium chloride flush  10 mL Intravenous 2 times per day    insulin lispro  0-12 Units Subcutaneous TID WC    insulin lispro  0-6 Units Subcutaneous Nightly     Continuous Infusions:    sodium chloride 50 mL/hr at 05/13/19 1703    dextrose       PRN Meds: metoprolol, sodium chloride flush, sodium chloride flush, potassium chloride **OR** potassium alternative oral replacement **OR** potassium chloride, magnesium sulfate, magnesium hydroxide, nicotine, acetaminophen, glucose, dextrose, glucagon (rDNA), dextrose, ondansetron **OR** ondansetron    Data:     Past Medical History:   has a past medical history of Back pain, Cellulitis, DM (diabetes mellitus) ([facility] [address] DVT (deep venous thrombosis) ([facility] [address] GERD (gastroesophageal reflux disease), and MVP (mitral valve prolapse). Social History:   reports that she quit smoking about 35 years ago. She has never used smokeless tobacco. She reports that she does not drink alcohol or use drugs.      Family History:   Family History   Problem Relation Age of Onset    Heart Disease Mother        Vitals:  BP (!) 151/68 Pulse 71   Temp 97.9 °F (36.6 °C) (Oral)   Resp 18   Ht 6' (1.829 m)   Wt (!) 450 lb 12.8 oz (204.5 kg)   SpO2 92%   BMI 61.14 kg/m²   Temp (24hrs), Av.2 °F (36.8 °C), Min:97.9 °F (36.6 °C), Max:98.8 °F (37.1 °C)    Recent Labs     19  1117 19  1625 19  2036 19  0615   POCGLU 261* 289* 289* 237*       I/O (24Hr):     Intake/Output Summary (Last 24 hours) at 2019 0807  Last data filed at 2019 1704  Gross per 24 hour   Intake 1030 ml   Output 400 ml   Net 630 ml       Labs:    Hematology:  Recent Labs     19  17119  0446   WBC 19.5* 20.4*   HGB 12.8 10.9*   HCT 40.4 35.2*    215     Chemistry:  Recent Labs     19  1711 19  0446 19  0502    135 132*   K 4.9 4.7 4.2   CL 99 100 99   CO2 23 26 24   GLUCOSE 380* 329* 258*   BUN 15 18 16   CREATININE 0.95* 1.16* 0.86   ANIONGAP 13 9 9   LABGLOM 59* 47* >60   GFRAA >60 57* >60   CALCIUM 8.9 8.2* 8.3*     Recent Labs     196   LABA1C 10.3*         Lab Results   Component Value Date/Time    SPECIAL 10ML LAC 2019 02:09 PM    SPECIAL 10ML LH 2019 02:09 PM     Lab Results   Component Value Date/Time    CULTURE NO GROWTH 16 HOURS 2019 02:09 PM    CULTURE NO GROWTH 16 HOURS 2019 02:09 PM       No results found for: POCPH, PHART, PH, POCPCO2, EXG1DUL, PCO2, POCPO2, PO2ART, PO2, POCHCO3, UYH7ZRU, HCO3, NBEA, PBEA, BEART, BE, THGBART, THB, NTS3CWY, TUQC3LIQ, N4JTBKBD, O2SAT, FIO2    Radiology:  EXAMINATION: ONE XRAY VIEW OF THE CHEST   2019 6:10 pm   COMPARISON: 2018   HISTORY: ORDERING SYSTEM PROVIDED HISTORY: wbc 19K TECHNOLOGIST PROVIDED HISTORY: wbc 19K Ordering Physician Provided Reason for Exam: emesis Acuity: Acute Type of Exam: Initial Relevant Medical/Surgical History: MVP   FINDINGS: Shallow inspiratory effort and portable technique limit evaluation.  No consolidation or effusion is identified.  The heart size is magnified.   No conclusive infiltrate is identified.   There may be mild pulmonary vascular congestion. Limited hypoventilatory exam.   Possible mild pulmonary vascular congestion.      EXAMINATION: SINGLE XRAY VIEW OF THE CHEST   12/31/2018 5:07 pm   COMPARISON: Chest radiograph 07/02/2017  No focal pneumonia or any other acute cardiopulmonary abnormality. Physical Examination:        General appearance:  alert, cooperative and no distress, morbidly obese  Mental Status:  oriented to person, place and time and normal affect  Lungs:  clear to auscultation bilaterally, normal effort  Heart:  regular rate and rhythm, no murmur  Abdomen:  soft, nontender, nondistended, normal bowel sounds, no masses, hepatomegaly, splenomegaly  Extremities:  + Lymphedema both lower extremities, + redness left lower extremity, no tenderness in the calves  Skin:  no gross lesions, rashes, induration    Assessment:        Primary Problem  Left leg cellulitis  Mild BRIANNA-resolved  Active Hospital Problems    Diagnosis Date Noted    SIRS (systemic inflammatory response syndrome) ([facility]) [R65.10] 07/02/2017     Priority: High    Type 2 diabetes mellitus with skin complication, without long-term current use of insulin ([facility]) [E11.628]      Priority: High    Morbid obesity with BMI of 60.0-69.9, adult ([facility]) [E66.01, Z68.44]     Lymphedema of both lower extremities [I89.0] 07/04/2017    Left leg cellulitis [Z08.861]     Essential hypertension [I10]        Plan:        1. Antibiotics changed to IV Cipro as per urine culture result  2. Continue other home medicines as before including Eliuquis  3. Diabetes control and resume home dose of glyburide also, A1c 10.3  4. Monitor labs  5.  She will be benefited with Lymphopress if available    Isidro Carbajal MD  5/14/2019  8:07 AM

## 2019-05-14 NOTE — PROGRESS NOTES
Physical Therapy  Facility/Department: [facility]  Daily Treatment Note  NAME: Yaa Perez  : 1955  MRN: [id number]    Date of Service: 2019    Discharge Recommendations:  Home with assist PRN        Patient Diagnosis(es): The encounter diagnosis was Left leg cellulitis. has a past medical history of Back pain, Cellulitis, DM (diabetes mellitus) ([facility] [address] DVT (deep venous thrombosis) ([facility] [address] GERD (gastroesophageal reflux disease), and MVP (mitral valve prolapse). has a past surgical history that includes Hysterectomy and Tonsillectomy. Restrictions  Restrictions/Precautions  Restrictions/Precautions: General Precautions, Fall Risk  Subjective   General  Chart Reviewed: Yes  Family / Caregiver Present: No  Pain Screening  Patient Currently in Pain: Yes  Pain Assessment  Pain Assessment: 0-10  Pain Level: 4  Patient's Stated Pain Goal: No pain  Pain Location: Back  Pain Descriptors: Headache  Pain Frequency: Intermittent  Pain Onset: On-going  Clinical Progression: Not changed  Vital Signs  Patient Currently in Pain: Yes       Orientation     Cognition      Objective         Ambulation  Ambulation?: No  Ambulation 1  Comments: pt refuses PTOT services states she does not need therapy, writer took extended time to educate pt on importance of PT and mobility as well as her PT goals but pt continues to refuse           Balance  Posture: Good  Sitting - Static: Good                           Assessment   Body structures, Functions, Activity limitations: Decreased functional mobility ; Decreased strength;Decreased ROM; Decreased balance;Decreased endurance  Assessment: pt declined the need for skilled therapy  Activity Tolerance  Activity Tolerance: Patient Tolerated treatment well     G-Code     OutComes Score                                                  AM-PAC Score  AM-PAC Inpatient Mobility Raw Score : 17  AM-PAC Inpatient T-Scale Score : 42.13  Mobility Inpatient CMS 0-100% Score: 50.57  Mobility Inpatient CMS G-Code Modifier : CK          Goals  Short term goals  Time Frame for Short term goals: 12 visits:  Short term goal 1: Pt. to be indep with transfers  Short term goal 2: Pt. to be indep. with gait with approp. assistive device, 100ft. Short term goal 3: Pt. to safely negotiate one step to prepare for entering her home at discharge  Short term goal 4: Pt. to tolerate 25+ min. of PT daily for gait/ balance training and ther ex./functional mobility training  Patient Goals   Patient goals : no cellulitis    Plan    Plan  Times per week: 1-2x/day; 5-6days/wk  Specific instructions for Next Treatment: progress gait  Current Treatment Recommendations: Strengthening, Functional Mobility Training, Home Exercise Program, ROM, Transfer Training, Gait Training, Safety Education & Training, Balance Training, Endurance Training, Stair training, Patient/Caregiver Education & Training  Safety Devices  Type of devices:  All fall risk precautions in place, Gait belt, Patient at risk for falls, Call light within reach, Left in chair, Nurse notified     Therapy Time   Individual Concurrent Group Co-treatment   Time In  1005         Time Out  1015         Minutes  10          billed for education       DIGNA SUAREZ, PTA

## 2019-05-14 NOTE — PROGRESS NOTES
Occupational Therapy    DATE: 2019    NAME: Madelyn Nixon  MRN: [id number]   : 1955      [facility]  Occupational Therapy Not Seen Note    Patient not available for Occupational Therapy due to:    [] Testing:    [] Hemodialysis    [] Cancelled by RN:    [x]Refusal by Patient: Pt adamantly refused in A.M.  And was sleeping at 1:45 P.M.    [] Surgery:     [] Intubation:     [] Pain Medication:    [] Sedation:     [] Spine Precautions :    [] Medical Instability:    [] Other:    ALINE Ball

## 2019-05-14 NOTE — PLAN OF CARE
[address] [place] [address]    Second Visit Note  For more detailed information please refer to the progress note of the day      5/14/2019    6:19 PM    Name:   Clay Siddiqui  MRN:     [id number]     [place]:      [de-identified]   Room:   2021/2021-01  IP Day:  2  Admit Date:  5/12/2019  5:04 PM    PCP:   Jeannette Starr MD  Code Status:  Full Code        Pt vitals were reviewed   New labs were reviewed   Patient was seen    Updated plan :     1. Complains of 2-3 loose stools without any cramping. 2.  Had taken only one capsule of Keflex prior to admission  3.  Will give one-time dose of Imodium, if diarrhea persists then we will check for C. diff        Jennifer Galvez MD  5/14/2019  6:19 PM

## 2019-05-14 NOTE — PLAN OF CARE
Problem: Falls - Risk of:  Goal: Will remain free from falls  Outcome: Ongoing     Problem: Mobility - Impaired:  Goal: Mobility will improve to maximum level  Outcome: Ongoing     Problem: Pain:  Goal: Control of acute pain  Outcome: Ongoing     Problem: Skin Integrity - Impaired:  Goal: Will show no infection signs and symptoms  Outcome: Ongoing     Problem: Skin Integrity - Impaired:  Goal: Absence of new skin breakdown  Outcome: Ongoing

## 2019-05-14 NOTE — PROGRESS NOTES
I meant to comment in my earlier note. Per [facility]: the \"positive\" blood culture is likely micrococcus, and likely is a contaminant.

## 2019-05-15 ENCOUNTER — TELEPHONE (OUTPATIENT)
Dept: FAMILY MEDICINE CLINIC | Age: 64
End: 2019-05-15

## 2019-05-15 VITALS
RESPIRATION RATE: 20 BRPM | HEIGHT: 72 IN | DIASTOLIC BLOOD PRESSURE: 77 MMHG | WEIGHT: 293 LBS | OXYGEN SATURATION: 95 % | SYSTOLIC BLOOD PRESSURE: 153 MMHG | BODY MASS INDEX: 39.68 KG/M2 | TEMPERATURE: 97.7 F | HEART RATE: 78 BPM

## 2019-05-15 LAB
ANION GAP SERPL CALCULATED.3IONS-SCNC: 9 MMOL/L (ref 9–17)
BUN BLDV-MCNC: 15 MG/DL (ref 8–23)
BUN/CREAT BLD: 18 (ref 9–20)
CALCIUM SERPL-MCNC: 8.5 MG/DL (ref 8.6–10.4)
CHLORIDE BLD-SCNC: 101 MMOL/L (ref 98–107)
CO2: 25 MMOL/L (ref 20–31)
CREAT SERPL-MCNC: 0.85 MG/DL (ref 0.5–0.9)
CULTURE: ABNORMAL
GFR AFRICAN AMERICAN: >60 ML/MIN
GFR NON-AFRICAN AMERICAN: >60 ML/MIN
GFR SERPL CREATININE-BSD FRML MDRD: ABNORMAL ML/MIN/{1.73_M2}
GFR SERPL CREATININE-BSD FRML MDRD: ABNORMAL ML/MIN/{1.73_M2}
GLUCOSE BLD-MCNC: 214 MG/DL (ref 65–105)
GLUCOSE BLD-MCNC: 233 MG/DL (ref 70–99)
GLUCOSE BLD-MCNC: 309 MG/DL (ref 65–105)
Lab: ABNORMAL
POTASSIUM SERPL-SCNC: 4 MMOL/L (ref 3.7–5.3)
SODIUM BLD-SCNC: 135 MMOL/L (ref 135–144)
SPECIMEN DESCRIPTION: ABNORMAL

## 2019-05-15 PROCEDURE — 36415 COLL VENOUS BLD VENIPUNCTURE: CPT

## 2019-05-15 PROCEDURE — 80048 BASIC METABOLIC PNL TOTAL CA: CPT

## 2019-05-15 PROCEDURE — 99232 SBSQ HOSP IP/OBS MODERATE 35: CPT | Performed by: INTERNAL MEDICINE

## 2019-05-15 PROCEDURE — 6370000000 HC RX 637 (ALT 250 FOR IP): Performed by: INTERNAL MEDICINE

## 2019-05-15 PROCEDURE — 82947 ASSAY GLUCOSE BLOOD QUANT: CPT

## 2019-05-15 PROCEDURE — 6370000000 HC RX 637 (ALT 250 FOR IP): Performed by: NURSE PRACTITIONER

## 2019-05-15 RX ORDER — CIPROFLOXACIN 500 MG/1
500 TABLET, FILM COATED ORAL EVERY 12 HOURS SCHEDULED
Qty: 10 TABLET | Refills: 0 | Status: SHIPPED | OUTPATIENT
Start: 2019-05-15 | End: 2019-05-20

## 2019-05-15 RX ORDER — CIPROFLOXACIN 500 MG/1
500 TABLET, FILM COATED ORAL EVERY 12 HOURS SCHEDULED
Status: DISCONTINUED | OUTPATIENT
Start: 2019-05-15 | End: 2019-05-15 | Stop reason: HOSPADM

## 2019-05-15 RX ADMIN — HYDROCHLOROTHIAZIDE 25 MG: 25 TABLET ORAL at 08:09

## 2019-05-15 RX ADMIN — GLYBURIDE 5 MG: 5 TABLET ORAL at 08:10

## 2019-05-15 RX ADMIN — APIXABAN 5 MG: 5 TABLET, FILM COATED ORAL at 08:10

## 2019-05-15 RX ADMIN — METOPROLOL SUCCINATE 50 MG: 50 TABLET, FILM COATED, EXTENDED RELEASE ORAL at 08:09

## 2019-05-15 RX ADMIN — INSULIN LISPRO 4 UNITS: 100 INJECTION, SOLUTION INTRAVENOUS; SUBCUTANEOUS at 08:10

## 2019-05-15 RX ADMIN — PANTOPRAZOLE SODIUM 40 MG: 40 TABLET, DELAYED RELEASE ORAL at 06:30

## 2019-05-15 RX ADMIN — CIPROFLOXACIN 500 MG: 500 TABLET ORAL at 11:37

## 2019-05-15 RX ADMIN — LINAGLIPTIN 5 MG: 5 TABLET, FILM COATED ORAL at 08:10

## 2019-05-15 RX ADMIN — METFORMIN HYDROCHLORIDE 1000 MG: 500 TABLET ORAL at 11:37

## 2019-05-15 RX ADMIN — INSULIN LISPRO 8 UNITS: 100 INJECTION, SOLUTION INTRAVENOUS; SUBCUTANEOUS at 11:43

## 2019-05-15 RX ADMIN — DULOXETINE HYDROCHLORIDE 30 MG: 30 CAPSULE, DELAYED RELEASE ORAL at 08:09

## 2019-05-15 RX ADMIN — GABAPENTIN 300 MG: 300 CAPSULE ORAL at 08:09

## 2019-05-15 ASSESSMENT — PAIN SCALES - GENERAL: PAINLEVEL_OUTOF10: 0

## 2019-05-15 NOTE — PROGRESS NOTES
[address]     Progress Note    5/15/2019    3:43 PM    Name:   Yaa Perez  MRN:     [id number]     [place]:      [de-identified]   Room:   2021/2021-01  IP Day:  3  Admit Date:  5/12/2019  5:04 PM    PCP:   Ke Suggs MD  Code Status:  Prior    Subjective:     C/C:   Chief Complaint   Patient presents with    Emesis     Interval History Status:   Urine culture growing gram-negative rods  One out of 2 blood cultures is showing gram-positive cocci is in clusters but is PNAFISH negative. Likely a skin contaminant  left leg swelling is a little better than before  Nausea and vomiting has improved denies any other new problem  Brief History:   The patient is a 75 y.o.  female who presents to the hospital with complaint of nausea and vomiting that started this morning. She reports several occurances of emesis. She has associated chills and fatigue. She has chronic bilateral lower extremity lymphedema and reoccurring left lower extremity cellulitis, she noticed left leg was more red than normal today. She denies abdominal pain, chest pain or shortness of breath. She has health history that includes mitral valve prolapse and diabetes type 2. The patient takes eliquis, she has a history of atrial fibrillation and DVT. She does not have a cardiologist.  She will be admitted for further evaluation and treatment.      The patient sees Thelma Hodgkin with occupational therapy at [facility]. She uses lymphedema boots intermittantly. She states she has a compression garment at OT waiting, but has not picked it up yet.      WBC 19.5. Absolute segs 17.93.   T 99F    /91.   CXR shows pulmonary vascular congestion.      Echocardiogram 7/3/17 indicated mild left ventricular hypertrophy and preserved systolic function and EF 36%          Review of Systems:     Constitutional:  negative for chills, fevers, sweats  Respiratory:  negative for cough, dyspnea on exertion, shortness of breath, wheezing  Cardiovascular:  negative for chest pain, chest pressure/discomfort, lower extremity edema, palpitations  Gastrointestinal:  negative for abdominal pain, constipation, diarrhea, nausea, vomiting  Neurological:  negative for dizziness, headache  + Chronic both lower extremities lymphedema with redness of left lower extremity  Medications: Allergies: Allergies   Allergen Reactions    Erythromycin Hives    Lisinopril Itching       Current Meds:   Scheduled Meds:     Continuous Infusions:     PRN Meds:     Data:     Past Medical History:   has a past medical history of Back pain, Cellulitis, DM (diabetes mellitus) ([facility] [address] DVT (deep venous thrombosis) ([facility] [address] GERD (gastroesophageal reflux disease), and MVP (mitral valve prolapse). Social History:   reports that she quit smoking about 35 years ago. She has never used smokeless tobacco. She reports that she does not drink alcohol or use drugs. Family History:   Family History   Problem Relation Age of Onset    Heart Disease Mother        Vitals:  BP (!) 153/77   Pulse 78   Temp 97.7 °F (36.5 °C) (Oral)   Resp 20   Ht 6' (1.829 m)   Wt (!) 446 lb 1.6 oz (202.3 kg)   SpO2 95%   BMI 60.50 kg/m²   Temp (24hrs), Av °F (36.7 °C), Min:97.7 °F (36.5 °C), Max:98.2 °F (36.8 °C)    Recent Labs     19  1641 19  2035 05/15/19  0608 05/15/19  1139   POCGLU 295* 249* 214* 309*       I/O (24Hr):     Intake/Output Summary (Last 24 hours) at 5/15/2019 1543  Last data filed at 5/15/2019 1139  Gross per 24 hour   Intake 1754 ml   Output --   Net 1754 ml       Labs:    Hematology:  Recent Labs     19  1711 19  0446   WBC 19.5* 20.4*   HGB 12.8 10.9*   HCT 40.4 35.2*    215     Chemistry:  Recent Labs     19  0446 19  0502 05/15/19  0541    132* 135   K 4.7 4.2 4.0    99 101   CO2 26 24 25   GLUCOSE 329* 258* 233*   BUN 18 16 15   CREATININE 1.16* 0.86 0.85 ANIONGAP 9 9 9   LABGLOM 47* >60 >60   GFRAA 57* >60 >60   CALCIUM 8.2* 8.3* 8.5*     Recent Labs     05/13/19  0446   LABA1C 10.3*         Lab Results   Component Value Date/Time    SPECIAL 10ML LAC 05/13/2019 02:09 PM    SPECIAL 10ML LH 05/13/2019 02:09 PM     Lab Results   Component Value Date/Time    CULTURE NO GROWTH 2 DAYS 05/13/2019 02:09 PM    CULTURE NO GROWTH 2 DAYS 05/13/2019 02:09 PM       No results found for: POCPH, PHART, PH, POCPCO2, ZKC1VRI, PCO2, POCPO2, PO2ART, PO2, POCHCO3, WNP5CAT, HCO3, NBEA, PBEA, BEART, BE, THGBART, THB, HEE1BAT, JFVF3AXI, P2RJRDEX, O2SAT, FIO2    Radiology:  EXAMINATION: ONE XRAY VIEW OF THE CHEST   5/12/2019 6:10 pm   COMPARISON: 12/31/2018   HISTORY: ORDERING SYSTEM PROVIDED HISTORY: wbc 19K TECHNOLOGIST PROVIDED HISTORY: wbc 19K Ordering Physician Provided Reason for Exam: emesis Acuity: Acute Type of Exam: Initial Relevant Medical/Surgical History: MVP   FINDINGS: Shallow inspiratory effort and portable technique limit evaluation.  No consolidation or effusion is identified.  The heart size is magnified.   No conclusive infiltrate is identified.   There may be mild pulmonary vascular congestion. Limited hypoventilatory exam.   Possible mild pulmonary vascular congestion.      EXAMINATION: SINGLE XRAY VIEW OF THE CHEST   12/31/2018 5:07 pm   COMPARISON: Chest radiograph 07/02/2017  No focal pneumonia or any other acute cardiopulmonary abnormality.           Physical Examination:        General appearance:  alert, cooperative and no distress, morbidly obese  Mental Status:  oriented to person, place and time and normal affect  Lungs:  clear to auscultation bilaterally, normal effort  Heart:  regular rate and rhythm, no murmur  Abdomen:  soft, nontender, nondistended, normal bowel sounds, no masses, hepatomegaly, splenomegaly  Extremities:  + Lymphedema both lower extremities, + redness left lower extremity which has improved, no tenderness in the calves  Skin:  no gross lesions, rashes, induration    Assessment:        Primary Problem  Left leg cellulitis-improved with IV antibiotics  E. coli UTI  Mild BRIANNA-resolved  Active Hospital Problems    Diagnosis Date Noted    SIRS (systemic inflammatory response syndrome) ([facility]) [R65.10] 07/02/2017     Priority: High    Type 2 diabetes mellitus with skin complication, without long-term current use of insulin ([facility]) [E11.628]      Priority: High    Morbid obesity with BMI of 60.0-69.9, adult ([facility] [E66.01, Z68.44]     Lymphedema of both lower extremities [I89.0] 07/04/2017    Left leg cellulitis [B43.029]     Essential hypertension [I10]        Plan:        1. Antibiotics changed to by mouth Cipro   2. Continue other home medicines as before including Eliuquis  3. Diabetes control and add metformin thousand milligrams twice a day in addition to home dose of Januvia and glyburide (patient does not want insulin at this stage)  4. Discharge home  5.  She will be benefited with Lymphopress if available, she'll follow with lymphedema clinic    Felix Mcclendon MD  5/15/2019  3:43 PM

## 2019-05-15 NOTE — PROGRESS NOTES
Writer reviewed discharge instructions with patient. Patient verbalized understanding. careplan goal progressed.  Patient discharge with all belongings and scripts for Metformin and Cipro

## 2019-05-15 NOTE — CARE COORDINATION
Discharge Planning    Met with pt to review plan of care prior to discharge. She states she has a lymphedema pump at home already and she wants to make her own appt with the lymphedema clinic to get her compression garment as the representative has to be present then. Home with no needs.

## 2019-05-15 NOTE — PLAN OF CARE
Problem: Falls - Risk of:  Goal: Will remain free from falls  Description  Will remain free from falls  5/14/2019 2146 by Jacqueline Carlton RN  Outcome: Ongoing  Note:   Siderails up x 2  Hourly rounding  Call light in reach  Instructed to call for assist before attempting out of bed. Remains free from falls and accidental injury at this time   Floor free from obstacles  Bed is locked and in lowest position  Adequate lighting provided  Bed alarm on, [place] star and Stay with Me signs posted      5/14/2019 1724 by Imani Castro RN  Outcome: Ongoing  Goal: Absence of physical injury  Description  Absence of physical injury  5/14/2019 2146 by Jacqueline Carlton RN  Outcome: Ongoing  5/14/2019 1724 by Imani Castro RN  Outcome: Ongoing     Problem: Discharge Planning:  Goal: Discharged to appropriate level of care  Description  Discharged to appropriate level of care  5/14/2019 2146 by Jacqueline Carlton RN  Outcome: Ongoing  5/14/2019 1724 by Imani Castro RN  Outcome: Ongoing     Problem: Body Temperature - Imbalanced:  Goal: Ability to maintain a body temperature in the normal range will improve  Description  Ability to maintain a body temperature in the normal range will improve  5/14/2019 2146 by Jacqueline Carlton RN  Outcome: Ongoing  5/14/2019 1724 by Imani Castro RN  Outcome: Ongoing     Problem: Mobility - Impaired:  Goal: Mobility will improve to maximum level  Description  Mobility will improve to maximum level  5/14/2019 2146 by Jacqueline Carlton RN  Outcome: Ongoing  Note:   Patient tolerating ambulation within the room very well. Patient reports feeling much better upon assessment. 5/14/2019 1724 by Imani Castro RN  Outcome: Ongoing     Problem: Pain:  Description  Pain management should include both nonpharmacologic and pharmacologic interventions.   Goal: Pain level will decrease  Description  Pain level will decrease  5/14/2019 2146 by Jacqueline Carlton RN  Outcome: Ongoing  Note:   Patient currently reports no pain upon assessment. 5/14/2019 1724 by Pawel Baez RN  Outcome: Ongoing  Goal: Control of acute pain  Description  Control of acute pain  5/14/2019 2146 by Gris Solomon RN  Outcome: Ongoing  5/14/2019 1724 by Pawel Baez RN  Outcome: Ongoing  Goal: Control of chronic pain  Description  Control of chronic pain  5/14/2019 2146 by Gris Solomon RN  Outcome: Ongoing  5/14/2019 1724 by Pawel Baez RN  Outcome: Ongoing     Problem: Skin Integrity - Impaired:  Goal: Will show no infection signs and symptoms  Description  Will show no infection signs and symptoms  5/14/2019 2146 by Gris Solomon RN  Outcome: Ongoing  5/14/2019 1724 by Pawel Baez RN  Outcome: Ongoing  Goal: Absence of new skin breakdown  Description  Absence of new skin breakdown  5/14/2019 2146 by Gris Solomon RN  Outcome: Ongoing  5/14/2019 1724 by Pawel Baez RN  Outcome: Ongoing     Problem: IP BALANCE  Goal: LTG - Patient will maintain balance to allow for safe/functional mobility  5/14/2019 2146 by Gris Solomon RN  Outcome: Ongoing  5/14/2019 1724 by Pawel Baez RN  Outcome: Ongoing     Problem: Pain:  Description  Pain management should include both nonpharmacologic and pharmacologic interventions. Goal: Pain level will decrease  Description  Pain level will decrease  5/14/2019 2146 by Gris Solomon RN  Outcome: Ongoing  Note:   Patient currently reports no pain upon assessment.   5/14/2019 1724 by Pawel Baez RN  Outcome: Ongoing  Goal: Control of acute pain  Description  Control of acute pain  5/14/2019 2146 by Gris Solomon RN  Outcome: Ongoing  5/14/2019 1724 by Pawel Baez RN  Outcome: Ongoing  Goal: Control of chronic pain  Description  Control of chronic pain  5/14/2019 2146 by Gris Solomon RN  Outcome: Ongoing  5/14/2019 1724 by Pawel Baez RN  Outcome: Ongoing     Problem: Serum Glucose Level - Abnormal:  Goal: Ability to maintain appropriate glucose levels will improve  Description  Ability to maintain appropriate glucose levels will

## 2019-05-15 NOTE — DISCHARGE SUMMARY
[facility]    Discharge Summary     Patient ID: Eric Funez  :  1955   MRN: [id number]     ACCOUNT:  [de-identified]   Patient's PCP: Nikhil Gudino MD  Admit Date: 2019   Discharge Date: 5/15/2019    Length of Stay: 3  Code Status:  Full Code  Admitting Physician: Freda Salazar MD  Discharge Physician: Freda Salazar MD     Active Discharge Diagnoses:     Hospital Problem Lists:  Principal Problem:    Left leg cellulitis  Active Problems:    Type 2 diabetes mellitus with skin complication, without long-term current use of insulin (HCC)    SIRS [facility]systemic inflammatory response syndrome) ([facility]    Essential hypertension    Lymphedema of both lower extremities    Morbid obesity with BMI of 60.0-69.9, adult ([facility]  Resolved Problems:    Cellulitis      Admission Condition:  poor     Discharged Condition: good    Hospital Stay:   Admitting history:  The patient is a 74 y.o.  female who presents to the hospital with complaint of nausea and vomiting that started this morning. She reports several occurances of emesis. She has associated chills and fatigue. She has chronic bilateral lower extremity lymphedema and reoccurring left lower extremity cellulitis, she noticed left leg was more red than normal today. She denies abdominal pain, chest pain or shortness of breath. She has health history that includes mitral valve prolapse and diabetes type 2. The patient takes eliquis, she has a history of atrial fibrillation and DVT. She does not have a cardiologist.  She will be admitted for further evaluation and treatment.      The patient [facility] She uses lymphedema boots intermittantly. She states she has a compression garment at OT waiting, but has not picked it up yet.      WBC 19.5. Absolute segs 17.93.   T 99F    /91. CXR shows pulmonary vascular congestion.    Echocardiogram 7/3/17 indicated mild left ventricular hypertrophy and preserved systolic function and EF 76%      Hospital Course:   Urine culture growing gram-negative rods  One out of 2 blood cultures is showing gram-positive cocci is in clusters but is PNAFISH negative. Likely a skin contaminant  left leg swelling is a little better than before  Nausea and vomiting has improved denies any other new problem  Primary Problem  Left leg cellulitis-improved with IV antibiotics  E. coli UTI  Mild BRIANNA-resolved    SIRS-possibly due to E. coli UTI and cellulitis of left lower extremity        Active Hospital Problems     Diagnosis Date Noted    SIRS (systemic inflammatory response syndrome) ([facility]) [R65.10] 07/02/2017       Priority: High    Type 2 diabetes mellitus with skin complication, without long-term current use of insulin ([facility]) [E11.628]         Priority: High    Morbid obesity with BMI of 60.0-69.9, adult [facility] [E66.01, Z68.44]      Lymphedema of both lower extremities [I89.0] 07/04/2017    Left leg cellulitis [Y64.027]      Essential hypertension [I10]           Plan:         1. Antibiotics changed to by mouth Cipro   2. Continue other home medicines as before including Eliuquis  3. Diabetes control and add metformin thousand milligrams twice a day in addition to home dose of Januvia and glyburide (patient does not want insulin at this stage)  4. Discharge home  5.  She will be benefited with [employer] if available, she'll follow with lymphedema clinic              Significant therapeutic interventions: IV antibiotics    Significant Diagnostic Studies:   Labs / Micro:  CBC:   Lab Results   Component Value Date    WBC 20.4 05/13/2019    RBC 3.80 05/13/2019    HGB 10.9 05/13/2019    HCT 35.2 05/13/2019    MCV 92.6 05/13/2019    MCH 28.7 05/13/2019    MCHC 31.0 05/13/2019    RDW 14.6 05/13/2019     05/13/2019     BMP:    Lab Results   Component Value Date    GLUCOSE 233 05/15/2019     05/15/2019    K 4.0 05/15/2019     05/15/2019    CO2 25 05/15/2019    ANIONGAP 9 05/15/2019    BUN 15 05/15/2019    CREATININE 0.85 05/15/2019    BUNCRER 18 05/15/2019    CALCIUM 8.5 05/15/2019    LABGLOM >60 05/15/2019    GFRAA >60 05/15/2019    GFR      05/15/2019    GFR NOT REPORTED 05/15/2019     HFP:    Lab Results   Component Value Date    PROT 7.7 07/02/2017     CMP:    Lab Results   Component Value Date    GLUCOSE 233 05/15/2019     05/15/2019    K 4.0 05/15/2019     05/15/2019    CO2 25 05/15/2019    BUN 15 05/15/2019    CREATININE 0.85 05/15/2019    ANIONGAP 9 05/15/2019    ALKPHOS 70 08/15/2018    ALT 7 08/15/2018    AST 13 08/15/2018    BILITOT 0.5 08/15/2018    LABALBU 3.7 08/15/2018    ALBUMIN NOT REPORTED 07/02/2017    LABGLOM >60 05/15/2019    GFRAA >60 05/15/2019    GFR      05/15/2019    GFR NOT REPORTED 05/15/2019    PROT 7.7 07/02/2017    CALCIUM 8.5 05/15/2019     PT/INR:  No results found for: PTINR  PTT:   Lab Results   Component Value Date    APTT 29.2 07/04/2017     FLP:    Lab Results   Component Value Date    CHOL 136 08/15/2018    TRIG 182 08/15/2018    HDL 33 08/15/2018     U/A:    Lab Results   Component Value Date    COLORU yellow 05/12/2019    COLORU YELLOW 01/02/2019    TURBIDITY CLOUDY 01/02/2019    SPECGRAV 1.020 05/12/2019    SPECGRAV 1.020 01/02/2019    HGBUR 3+ 01/02/2019    PHUR 5.5 05/12/2019    PHUR 6.0 01/02/2019    PROTEINU 100 05/12/2019    PROTEINU TRACE 01/02/2019    GLUCOSEU 500 05/12/2019    GLUCOSEU NEGATIVE 01/02/2019    KETUA negative 05/12/2019    KETUA NEGATIVE 01/02/2019    BILIRUBINUR negative 05/12/2019    UROBILINOGEN Normal 01/02/2019    NITRU NEGATIVE 01/02/2019    LEUKOCYTESUR trace 05/12/2019    LEUKOCYTESUR LARGE 01/02/2019     TSH:    Lab Results   Component Value Date    TSH 1.24 07/03/2017     Urine culture E. coli  Blood culture-possibly skin contaminant  Radiology:    Xr Chest Portable    Result Date: 5/12/2019  Limited DIABETA  TAKE 1 TABLET BY MOUTH  TWICE A DAY WITH MEALS     hydrochlorothiazide 25 MG tablet  Commonly known as:  HYDRODIURIL  TAKE 1 TABLET BY MOUTH  DAILY     HYDROmorphone 2 MG tablet  Commonly known as:  DILAUDID     JANUVIA 100 MG tablet  Generic drug:  SITagliptin  TAKE 1 TABLET BY MOUTH  EVERY DAY     methocarbamol 750 MG tablet  Commonly known as:  ROBAXIN  TAKE 1 TABLET BY MOUTH THREE TIMES DAILY AS NEEDED FOR PAIN     metoprolol succinate 50 MG extended release tablet  Commonly known as:  TOPROL XL  TAKE 1 TABLET BY MOUTH  DAILY     omeprazole 40 MG delayed release capsule  Commonly known as:  PRILOSEC  TAKE 1 CAPSULE BY MOUTH  EVERY DAY        ASK your doctor about these medications    blood glucose test strips     glucose monitoring kit monitoring kit  Please fill with what is covered by ins Patient test once a day DM E11.9           Where to Get Your Medications      These medications were sent to [address] [place] [address], [address] [facility] [address] [facility] [phone]  [facility], [address] [facility] [address]    Phone:  [phone]   · ciprofloxacin 500 MG tablet  · metFORMIN 1000 MG tablet         Time Spent on discharge is  20 mins in patient examination, evaluation, counseling as well as medication reconciliation, prescriptions for required medications, discharge plan and follow up. Electronically signed by   Mina Kendrick MD  5/15/2019  9:28 AM      Thank you Dr. Bertrand Carl MD for the opportunity to be involved in this patient's care.

## 2019-05-15 NOTE — DISCHARGE INSTR - DIET

## 2019-05-17 DIAGNOSIS — M54.17 RADICULOPATHY OF LUMBOSACRAL REGION: Primary | ICD-10-CM

## 2019-05-17 RX ORDER — GABAPENTIN 300 MG/1
CAPSULE ORAL
Qty: 21 CAPSULE | Refills: 0 | Status: SHIPPED | OUTPATIENT
Start: 2019-05-17 | End: 2019-11-20 | Stop reason: SDUPTHER

## 2019-05-18 LAB
CULTURE: NORMAL
Lab: NORMAL
SPECIMEN DESCRIPTION: NORMAL

## 2019-05-19 LAB
CULTURE: NORMAL
CULTURE: NORMAL
Lab: NORMAL
Lab: NORMAL
SPECIMEN DESCRIPTION: NORMAL
SPECIMEN DESCRIPTION: NORMAL

## 2019-05-28 ENCOUNTER — OFFICE VISIT (OUTPATIENT)
Dept: FAMILY MEDICINE CLINIC | Age: 64
End: 2019-05-28
Payer: COMMERCIAL

## 2019-05-28 VITALS
OXYGEN SATURATION: 94 % | BODY MASS INDEX: 61.19 KG/M2 | SYSTOLIC BLOOD PRESSURE: 142 MMHG | HEART RATE: 75 BPM | DIASTOLIC BLOOD PRESSURE: 80 MMHG | WEIGHT: 293 LBS

## 2019-05-28 DIAGNOSIS — E11.628 TYPE 2 DIABETES MELLITUS WITH OTHER SKIN COMPLICATION, WITHOUT LONG-TERM CURRENT USE OF INSULIN (HCC): Primary | ICD-10-CM

## 2019-05-28 DIAGNOSIS — N17.9 ACUTE RENAL FAILURE, UNSPECIFIED ACUTE RENAL FAILURE TYPE (HCC): ICD-10-CM

## 2019-05-28 DIAGNOSIS — I89.0 LYMPHEDEMA OF BOTH LOWER EXTREMITIES: Chronic | ICD-10-CM

## 2019-05-28 PROCEDURE — 1123F ACP DISCUSS/DSCN MKR DOCD: CPT | Performed by: FAMILY MEDICINE

## 2019-05-28 PROCEDURE — 1111F DSCHRG MED/CURRENT MED MERGE: CPT | Performed by: FAMILY MEDICINE

## 2019-05-28 PROCEDURE — 1090F PRES/ABSN URINE INCON ASSESS: CPT | Performed by: FAMILY MEDICINE

## 2019-05-28 PROCEDURE — 3017F COLORECTAL CA SCREEN DOC REV: CPT | Performed by: FAMILY MEDICINE

## 2019-05-28 PROCEDURE — G8417 CALC BMI ABV UP PARAM F/U: HCPCS | Performed by: FAMILY MEDICINE

## 2019-05-28 PROCEDURE — G8427 DOCREV CUR MEDS BY ELIG CLIN: HCPCS | Performed by: FAMILY MEDICINE

## 2019-05-28 PROCEDURE — 3046F HEMOGLOBIN A1C LEVEL >9.0%: CPT | Performed by: FAMILY MEDICINE

## 2019-05-28 PROCEDURE — 4040F PNEUMOC VAC/ADMIN/RCVD: CPT | Performed by: FAMILY MEDICINE

## 2019-05-28 PROCEDURE — 1036F TOBACCO NON-USER: CPT | Performed by: FAMILY MEDICINE

## 2019-05-28 PROCEDURE — G8400 PT W/DXA NO RESULTS DOC: HCPCS | Performed by: FAMILY MEDICINE

## 2019-05-28 PROCEDURE — 2022F DILAT RTA XM EVC RTNOPTHY: CPT | Performed by: FAMILY MEDICINE

## 2019-05-28 PROCEDURE — 99213 OFFICE O/P EST LOW 20 MIN: CPT | Performed by: FAMILY MEDICINE

## 2019-05-28 ASSESSMENT — ENCOUNTER SYMPTOMS
BACK PAIN: 0
NAUSEA: 0
COUGH: 0
DIARRHEA: 0
VOMITING: 0
SINUS PRESSURE: 0
SORE THROAT: 0
SHORTNESS OF BREATH: 0

## 2019-05-28 ASSESSMENT — PATIENT HEALTH QUESTIONNAIRE - PHQ9
2. FEELING DOWN, DEPRESSED OR HOPELESS: 0
SUM OF ALL RESPONSES TO PHQ QUESTIONS 1-9: 0
SUM OF ALL RESPONSES TO PHQ QUESTIONS 1-9: 0
SUM OF ALL RESPONSES TO PHQ9 QUESTIONS 1 & 2: 0
1. LITTLE INTEREST OR PLEASURE IN DOING THINGS: 0

## 2019-05-28 NOTE — PROGRESS NOTES
Madelyn Nixon is a 75 y.o. female who presents todayfor her medical conditions/complaints as noted below. Madelyn Nixon is here today c/oFollow-Up from Hospital (cellulitis )  Follow up hosp for lymphedema and cellulitis. Was on metformin but she will not take this due to side effects.    :     Visit Information    Have you changed or started any medications since your last visit including any over-the-counter medicines, vitamins, or herbal medicines? no   Are you having any side effects from any of your medications? -  no  Have you stopped taking any of your medications? Is so, why? -  no    Have you seen any other physician or provider since your last visit? Yes - Records Obtained  Have you had any other diagnostic tests since your last visit? No  Have you been seen in the emergency room and/or had an admission to a hospital since we last saw you? Yes - Records Obtained  Have you had your routine dental cleaning in the past 6 months? no    Have you activated your [employer] account? If not, what are your barriers?  No     Patient Care Team:  Mark Mary MD as PCP - General (Family Medicine)    Medical History Review  Past Medical, Family, and Social History reviewed and does not contribute to the patient presenting condition    Health Maintenance   Topic Date Due    Hepatitis C screen  1955    DTaP/Tdap/Td vaccine (1 - Tdap) 04/15/1972    Shingles Vaccine (1 of 2) 04/15/2003    Diabetic retinal exam  05/05/2008    Breast cancer screen  02/22/2018    DEXA (modify frequency per FRAX score)  04/15/2018    Diabetic microalbuminuria test  06/08/2020 (Originally 12/8/2014)    Flu vaccine (Season Ended) 12/31/2020 (Originally 9/1/2019)    Pneumococcal 65+ years Vaccine (1 of 2 - PCV13) 05/28/2021 (Originally 4/15/2018)    A1C test (Diabetic or Prediabetic)  08/13/2019    Lipid screen  08/15/2019    Diabetic foot exam  09/06/2019    Potassium monitoring  05/15/2020    Creatinine monitoring  05/15/2020    Colon cancer screen colonoscopy  2026         HPI        Past Medical History:   Diagnosis Date    Back pain 2012    Cellulitis 10/9/2015    Lt. Lower Leg    DM (diabetes mellitus) ([facility] 2012    DVT (deep venous thrombosis) ([facility] 2012    GERD (gastroesophageal reflux disease) 2012    MVP (mitral valve prolapse) 2012      Past Surgical History:   Procedure Laterality Date    HYSTERECTOMY      TONSILLECTOMY       Family History   Problem Relation Age of Onset    Heart Disease Mother      Social History     Tobacco Use    Smoking status: Former Smoker     Last attempt to quit: 1983     Years since quittin.9    Smokeless tobacco: Never Used   Substance Use Topics    Alcohol use: No      Current Outpatient Medications   Medication Sig Dispense Refill    gabapentin (NEURONTIN) 300 MG capsule TAKE 2 CAPSULES BY MOUTH 3  TIMES A DAY 21 capsule 0    gabapentin (NEURONTIN) 300 MG capsule Take 600 mg by mouth 2 times daily.  HYDROmorphone (DILAUDID) 2 MG tablet Take 2 mg by mouth every 3 hours as needed for Pain.       methocarbamol (ROBAXIN) 750 MG tablet TAKE 1 TABLET BY MOUTH THREE TIMES DAILY AS NEEDED FOR PAIN 60 tablet 3    glyBURIDE (DIABETA) 5 MG tablet TAKE 1 TABLET BY MOUTH  TWICE A DAY WITH MEALS 180 tablet 3    JANUVIA 100 MG tablet TAKE 1 TABLET BY MOUTH  EVERY DAY 90 tablet 3    omeprazole (PRILOSEC) 40 MG delayed release capsule TAKE 1 CAPSULE BY MOUTH  EVERY DAY 90 capsule 3    DULoxetine (CYMBALTA) 30 MG extended release capsule Take 1 capsule by mouth daily 90 capsule 3    metoprolol succinate (TOPROL XL) 50 MG extended release tablet TAKE 1 TABLET BY MOUTH  DAILY 90 tablet 3    hydrochlorothiazide (HYDRODIURIL) 25 MG tablet TAKE 1 TABLET BY MOUTH  DAILY 90 tablet 3    ELIQUIS 5 MG TABS tablet TAKE 1 TABLET BY MOUTH TWO  TIMES DAILY 180 tablet 3    glucose monitoring kit (FREESTYLE) monitoring kit Please fill with what is covered by ins Patient test once a day DM E11.9 1 kit 0    acetaminophen (TYLENOL) 500 MG tablet Take 500 mg by mouth every 6 hours as needed for Pain. Pt only takes 1-2 times per week      Glucose Blood (BLOOD GLUCOSE TEST STRIPS) STRP by Does not apply route 3 times daily. Freestyle [place] Test Strips      fluconazole (DIFLUCAN) 100 MG tablet Take 100 mg by mouth daily as needed Indications: Patient self diagnoses and takes fluconaole for yeast infections. No current facility-administered medications for this visit. Allergies   Allergen Reactions    Erythromycin Hives    Lisinopril Itching         Subjective:   Review of Systems   Constitutional: Negative for chills, diaphoresis and fever. HENT: Negative for congestion, sinus pressure and sore throat. Eyes: Negative for visual disturbance. Respiratory: Negative for cough and shortness of breath. Cardiovascular: Negative for chest pain and leg swelling. Gastrointestinal: Negative for diarrhea, nausea and vomiting. Genitourinary: Negative for dysuria, menstrual problem, urgency and vaginal discharge. Musculoskeletal: Positive for gait problem. Negative for arthralgias, back pain and myalgias. Skin: Negative for rash. Neurological: Negative for weakness, numbness and headaches. Psychiatric/Behavioral: Negative for sleep disturbance.       :   BP (!) 142/80   Pulse 75   Wt (!) 451 lb 3.2 oz (204.7 kg)   SpO2 94%   BMI 61.19 kg/m²     Physical Exam   Constitutional: She is oriented to person, place, and time. She appears well-developed and well-nourished. No distress. HENT:   Head: Normocephalic and atraumatic. Mouth/Throat: No oropharyngeal exudate. Eyes: No scleral icterus. Neck: Neck supple. Carotid bruit is not present. Cardiovascular: Exam reveals no gallop and no friction rub. No murmur heard. Pulmonary/Chest: No respiratory distress. She has no wheezes. She has no rales. She exhibits no tenderness.

## 2019-06-03 ENCOUNTER — TELEPHONE (OUTPATIENT)
Dept: FAMILY MEDICINE CLINIC | Age: 64
End: 2019-06-03

## 2019-06-12 NOTE — TELEPHONE ENCOUNTER
Next Visit Date:  Future Appointments   Date Time Provider Larry Tsaii   8/28/2019  2:15 PM Mejia Sue  [facility]   Topic Date Due    Hepatitis C screen  1955    DTaP/Tdap/Td vaccine (1 - Tdap) 04/15/1972    Shingles Vaccine (1 of 2) 04/15/2003    Diabetic retinal exam  05/05/2008    Breast cancer screen  02/22/2018    DEXA (modify frequency per FRAX score)  04/15/2018    Diabetic microalbuminuria test  06/08/2020 (Originally 12/8/2014)    Flu vaccine (Season Ended) 12/31/2020 (Originally 9/1/2019)    Pneumococcal 65+ years Vaccine (1 of 2 - PCV13) 05/28/2021 (Originally 4/15/2018)    A1C test (Diabetic or Prediabetic)  08/13/2019    Lipid screen  08/15/2019    Diabetic foot exam  09/06/2019    Potassium monitoring  05/15/2020    Creatinine monitoring  05/15/2020    Colon cancer screen colonoscopy  02/22/2026       Hemoglobin A1C (%)   Date Value   05/13/2019 10.3 (H)   05/12/2019 10.5 (H)   01/01/2019 9.8 (H)             ( goal A1C is < 7)   No results found for: LABMICR  LDL Calculated (mg/dL)   Date Value   08/15/2018 67   02/08/2016 85       (goal LDL is <100)   AST (U/L)   Date Value   08/15/2018 13     ALT (U/L)   Date Value   08/15/2018 7     BUN (mg/dL)   Date Value   05/15/2019 15     BP Readings from Last 3 Encounters:   05/28/19 (!) 142/80   05/15/19 (!) 153/77   01/03/19 (!) 165/84          (goal 120/80)    All Future Testing planned in [facility]  Lab Frequency Next Occurrence   CESAR DIGITAL SCREEN SELF REFERRAL W OR WO CAD BILATERAL Once 09/06/2018   CBC Auto Differential Once 03/06/2019   Comprehensive Metabolic Panel Once 94/00/7154   Lipid Panel Once 03/06/2019   Hemoglobin A1C Once 03/06/2019   Hepatitis C Antibody Once 03/06/2019   CBC Auto Differential Once 08/28/2019   Comprehensive Metabolic Panel Once 19/98/3356   Lipid Panel Once 08/28/2019   Hemoglobin A1C Once 08/28/2019               Patient Active Problem List:     Type 2 diabetes mellitus with skin complication, without long-term current use of insulin (HCC)     GERD (gastroesophageal reflux disease)     Back pain     MVP (mitral valve prolapse)     DVT (deep venous thrombosis) ([facility])     Essential hypertension     HIGH CHOLESTEROL     Left leg cellulitis     Acute renal failure (ARF) ([facility])     Radiculopathy of lumbosacral region     Staghorn renal calculus     Foraminal stenosis of lumbar region     Acute cystitis     SIRS (systemic inflammatory response syndrome) ([facility])     Nausea and vomiting     Lymphedema of both lower extremities     Leukocytosis     Morbid obesity with BMI of 60.0-69.9, adult ([address]     Chronic hypoxemic respiratory failure ([address]     Pulmonary hypertension ([address]

## 2019-07-09 DIAGNOSIS — M54.5 CHRONIC BILATERAL LOW BACK PAIN, WITH SCIATICA PRESENCE UNSPECIFIED: ICD-10-CM

## 2019-07-09 DIAGNOSIS — G89.29 CHRONIC BILATERAL LOW BACK PAIN, WITH SCIATICA PRESENCE UNSPECIFIED: ICD-10-CM

## 2019-07-09 RX ORDER — HYDROCHLOROTHIAZIDE 25 MG/1
TABLET ORAL
Qty: 90 TABLET | Refills: 3 | Status: SHIPPED | OUTPATIENT
Start: 2019-07-09 | End: 2020-07-29

## 2019-07-09 RX ORDER — DULOXETIN HYDROCHLORIDE 30 MG/1
30 CAPSULE, DELAYED RELEASE ORAL DAILY
Qty: 90 CAPSULE | Refills: 3 | Status: SHIPPED | OUTPATIENT
Start: 2019-07-09 | End: 2020-09-08

## 2019-07-10 ENCOUNTER — HOSPITAL ENCOUNTER (OUTPATIENT)
Dept: OCCUPATIONAL THERAPY | Age: 64
Setting detail: THERAPIES SERIES
Discharge: HOME | End: 2019-07-10
Payer: COMMERCIAL

## 2019-07-10 NOTE — FLOWSHEET NOTE
? [address] [facility]. Occupational Therapy       [address], [address]       Phone: [phone]       Fax: [phone] [facility] Occupational  Therapy at [email or web address]. [place], [place]       Phone: [phone]       Fax: [phone]          Occupational Therapy Cancel/No Show note    Date: 7/10/2019  Patient: Moraima Pinto  : 1955  MRN: [id number]    Cancels/No Shows to date:     Pt cancelled d/t  having back surgery. Will call back to reschedule when able.      Electronically signed by: Mary Cortes OT

## 2019-07-30 RX ORDER — FLUCONAZOLE 100 MG/1
100 TABLET ORAL DAILY PRN
Qty: 2 TABLET | Refills: 0 | Status: SHIPPED | OUTPATIENT
Start: 2019-07-30 | End: 2019-09-25 | Stop reason: SDUPTHER

## 2019-08-16 LAB
ALBUMIN SERPL-MCNC: 3.5 G/DL
ALP BLD-CCNC: 67 U/L
ALT SERPL-CCNC: 14 U/L
ANION GAP SERPL CALCULATED.3IONS-SCNC: 11 MMOL/L
AST SERPL-CCNC: 24 U/L
AVERAGE GLUCOSE: 260
BASOPHILS ABSOLUTE: 0 /ΜL
BASOPHILS RELATIVE PERCENT: 0.5 %
BILIRUB SERPL-MCNC: 0.5 MG/DL (ref 0.1–1.4)
BUN BLDV-MCNC: 13 MG/DL
CALCIUM SERPL-MCNC: 9.2 MG/DL
CHLORIDE BLD-SCNC: 103 MMOL/L
CHOLESTEROL, TOTAL: 149 MG/DL
CHOLESTEROL/HDL RATIO: 4.3
CO2: 26 MMOL/L
CREAT SERPL-MCNC: 0.9 MG/DL
EOSINOPHILS ABSOLUTE: 0.2 /ΜL
EOSINOPHILS RELATIVE PERCENT: 2.8 %
GFR CALCULATED: >60
GLUCOSE BLD-MCNC: 236 MG/DL
HBA1C MFR BLD: 10.7 %
HCT VFR BLD CALC: 36.8 % (ref 36–46)
HDLC SERPL-MCNC: 35 MG/DL (ref 35–70)
HEMOGLOBIN: 12.2 G/DL (ref 12–16)
LDL CHOLESTEROL CALCULATED: 73 MG/DL (ref 0–160)
LYMPHOCYTES ABSOLUTE: 1.1 /ΜL
LYMPHOCYTES RELATIVE PERCENT: 18.3 %
MCH RBC QN AUTO: 29 PG
MCHC RBC AUTO-ENTMCNC: 33.2 G/DL
MCV RBC AUTO: 87 FL
MONOCYTES ABSOLUTE: 0.5 /ΜL
MONOCYTES RELATIVE PERCENT: 7.8 %
NEUTROPHILS ABSOLUTE: 4.4 /ΜL
NEUTROPHILS RELATIVE PERCENT: 70.6 %
PDW BLD-RTO: 15 %
PLATELET # BLD: 208 K/ΜL
PMV BLD AUTO: 8.3 FL
POTASSIUM SERPL-SCNC: 4.3 MMOL/L
RBC # BLD: 4.21 10^6/ΜL
SODIUM BLD-SCNC: 140 MMOL/L
TOTAL PROTEIN: 8.1
TRIGL SERPL-MCNC: 203 MG/DL
VLDLC SERPL CALC-MCNC: 41 MG/DL
WBC # BLD: 6.2 10^3/ML

## 2019-08-27 DIAGNOSIS — E11.628 TYPE 2 DIABETES MELLITUS WITH OTHER SKIN COMPLICATION, WITHOUT LONG-TERM CURRENT USE OF INSULIN (HCC): ICD-10-CM

## 2019-08-27 DIAGNOSIS — I89.0 LYMPHEDEMA OF BOTH LOWER EXTREMITIES: Chronic | ICD-10-CM

## 2019-08-27 DIAGNOSIS — N17.9 ACUTE RENAL FAILURE, UNSPECIFIED ACUTE RENAL FAILURE TYPE (HCC): ICD-10-CM

## 2019-09-10 ENCOUNTER — OFFICE VISIT (OUTPATIENT)
Dept: FAMILY MEDICINE CLINIC | Age: 64
End: 2019-09-10
Payer: COMMERCIAL

## 2019-09-10 VITALS
HEART RATE: 72 BPM | DIASTOLIC BLOOD PRESSURE: 82 MMHG | OXYGEN SATURATION: 95 % | SYSTOLIC BLOOD PRESSURE: 146 MMHG | WEIGHT: 293 LBS | BODY MASS INDEX: 60.49 KG/M2

## 2019-09-10 DIAGNOSIS — Z12.31 ENCOUNTER FOR SCREENING MAMMOGRAM FOR BREAST CANCER: ICD-10-CM

## 2019-09-10 DIAGNOSIS — E11.628 TYPE 2 DIABETES MELLITUS WITH OTHER SKIN COMPLICATION, WITHOUT LONG-TERM CURRENT USE OF INSULIN (HCC): Primary | ICD-10-CM

## 2019-09-10 DIAGNOSIS — Z78.0 POST-MENOPAUSAL: ICD-10-CM

## 2019-09-10 PROCEDURE — 3017F COLORECTAL CA SCREEN DOC REV: CPT | Performed by: FAMILY MEDICINE

## 2019-09-10 PROCEDURE — 99213 OFFICE O/P EST LOW 20 MIN: CPT | Performed by: FAMILY MEDICINE

## 2019-09-10 PROCEDURE — 2022F DILAT RTA XM EVC RTNOPTHY: CPT | Performed by: FAMILY MEDICINE

## 2019-09-10 PROCEDURE — 1123F ACP DISCUSS/DSCN MKR DOCD: CPT | Performed by: FAMILY MEDICINE

## 2019-09-10 PROCEDURE — 1036F TOBACCO NON-USER: CPT | Performed by: FAMILY MEDICINE

## 2019-09-10 PROCEDURE — G8400 PT W/DXA NO RESULTS DOC: HCPCS | Performed by: FAMILY MEDICINE

## 2019-09-10 PROCEDURE — G8417 CALC BMI ABV UP PARAM F/U: HCPCS | Performed by: FAMILY MEDICINE

## 2019-09-10 PROCEDURE — 3046F HEMOGLOBIN A1C LEVEL >9.0%: CPT | Performed by: FAMILY MEDICINE

## 2019-09-10 PROCEDURE — G8427 DOCREV CUR MEDS BY ELIG CLIN: HCPCS | Performed by: FAMILY MEDICINE

## 2019-09-10 PROCEDURE — 1090F PRES/ABSN URINE INCON ASSESS: CPT | Performed by: FAMILY MEDICINE

## 2019-09-10 PROCEDURE — 4040F PNEUMOC VAC/ADMIN/RCVD: CPT | Performed by: FAMILY MEDICINE

## 2019-09-10 RX ORDER — HYDROMORPHONE HYDROCHLORIDE 2 MG/1
2 TABLET ORAL
Status: CANCELLED | OUTPATIENT
Start: 2019-09-10

## 2019-09-10 ASSESSMENT — ENCOUNTER SYMPTOMS
SHORTNESS OF BREATH: 0
SINUS PRESSURE: 0
BACK PAIN: 0
NAUSEA: 0
VOMITING: 0
SORE THROAT: 0
DIARRHEA: 0
COUGH: 0

## 2019-09-11 ENCOUNTER — TELEPHONE (OUTPATIENT)
Dept: FAMILY MEDICINE CLINIC | Age: 64
End: 2019-09-11

## 2019-09-11 RX ORDER — METOPROLOL SUCCINATE 50 MG/1
50 TABLET, EXTENDED RELEASE ORAL DAILY
Qty: 90 TABLET | Refills: 3 | Status: SHIPPED | OUTPATIENT
Start: 2019-09-11 | End: 2020-09-23

## 2019-09-11 NOTE — TELEPHONE ENCOUNTER
Next Visit Date:  Future Appointments   Date Time Provider Larry Tsaii   12/4/2019  2:15 PM Juanita Irwin  [facility]   Topic Date Due    Hepatitis C screen  1955    DTaP/Tdap/Td vaccine (1 - Tdap) 04/15/1972    Diabetic retinal exam  05/05/2008    Breast cancer screen  02/22/2018    DEXA (modify frequency per FRAX score)  04/15/2018    Diabetic microalbuminuria test  06/08/2020 (Originally 12/8/2014)    Shingles Vaccine (1 of 2) 09/10/2020 (Originally 4/15/2003)    Flu vaccine (1) 12/31/2020 (Originally 9/1/2019)    Pneumococcal 65+ years Vaccine (1 of 2 - PCV13) 05/28/2021 (Originally 4/15/2018)    A1C test (Diabetic or Prediabetic)  11/16/2019    Lipid screen  08/16/2020    Potassium monitoring  08/16/2020    Creatinine monitoring  08/16/2020    Diabetic foot exam  09/10/2020    Colon cancer screen colonoscopy  02/22/2026       Hemoglobin A1C (%)   Date Value   08/16/2019 10.7   05/13/2019 10.3 (H)   05/12/2019 10.5 (H)             ( goal A1C is < 7)   No results found for: LABMICR  LDL Calculated (mg/dL)   Date Value   08/16/2019 73   08/15/2018 67       (goal LDL is <100)   AST (U/L)   Date Value   08/16/2019 24     ALT (U/L)   Date Value   08/16/2019 14     BUN (mg/dL)   Date Value   08/16/2019 13     BP Readings from Last 3 Encounters:   09/10/19 (!) 146/82   05/28/19 (!) 142/80   05/15/19 (!) 153/77          (goal 120/80)    All Future Testing planned in [facility]  Lab Frequency Next Occurrence   CESAR DIGITAL SCREEN SELF REFERRAL W OR WO CAD BILATERAL Once 09/06/2018   CESAR Digital Screen Bilateral [[id number]] Once 10/10/2019   DEXA Bone Density Axial Skeleton Once 10/10/2019               Patient Active Problem List:     Type 2 diabetes mellitus with skin complication, without long-term current use of insulin (HCC)     GERD (gastroesophageal reflux disease)     Back pain     MVP (mitral valve prolapse)     DVT (deep venous thrombosis) ([address]

## 2019-09-12 DIAGNOSIS — M48.061 FORAMINAL STENOSIS OF LUMBAR REGION: Primary | ICD-10-CM

## 2019-09-12 RX ORDER — HYDROMORPHONE HYDROCHLORIDE 2 MG/1
2 TABLET ORAL
Qty: 260 TABLET | Refills: 0 | Status: SHIPPED | OUTPATIENT
Start: 2019-09-12 | End: 2020-01-27 | Stop reason: SDUPTHER

## 2019-09-12 NOTE — TELEPHONE ENCOUNTER
DVT (deep venous thrombosis) (HCC)     Essential hypertension     HIGH CHOLESTEROL     Left leg cellulitis     Acute renal failure (ARF) (HCC)     Radiculopathy of lumbosacral region     Staghorn renal calculus     Foraminal stenosis of lumbar region     Acute cystitis     SIRS (systemic inflammatory response syndrome) ([facility])     Nausea and vomiting     Lymphedema of both lower extremities     Leukocytosis     Morbid obesity with BMI of 60.0-69.9, adult ([address]     Chronic hypoxemic respiratory failure ([address]     Pulmonary hypertension ([address]

## 2019-09-16 ENCOUNTER — TELEPHONE (OUTPATIENT)
Dept: FAMILY MEDICINE CLINIC | Age: 64
End: 2019-09-16

## 2019-09-25 RX ORDER — FLUCONAZOLE 100 MG/1
100 TABLET ORAL DAILY PRN
Qty: 4 TABLET | Refills: 0 | Status: SHIPPED | OUTPATIENT
Start: 2019-09-25 | End: 2020-04-21 | Stop reason: SDUPTHER

## 2019-09-25 NOTE — TELEPHONE ENCOUNTER
Next Visit Date:  Future Appointments   Date Time Provider Larry Tsaii   12/4/2019  2:15 PM Latesha Phoenix  [address] [facility]   Topic Date Due    Hepatitis C screen  1955    DTaP/Tdap/Td vaccine (1 - Tdap) 04/15/1972    Diabetic retinal exam  05/05/2008    Breast cancer screen  02/22/2018    DEXA (modify frequency per FRAX score)  04/15/2018    Diabetic microalbuminuria test  06/08/2020 (Originally 12/8/2014)    Shingles Vaccine (1 of 2) 09/10/2020 (Originally 4/15/2003)    Flu vaccine (1) 12/31/2020 (Originally 9/1/2019)    Pneumococcal 65+ years Vaccine (1 of 2 - PCV13) 05/28/2021 (Originally 4/15/2018)    A1C test (Diabetic or Prediabetic)  11/16/2019    Lipid screen  08/16/2020    Potassium monitoring  08/16/2020    Creatinine monitoring  08/16/2020    Diabetic foot exam  09/10/2020    Colon cancer screen colonoscopy  02/22/2026       Hemoglobin A1C (%)   Date Value   08/16/2019 10.7   05/13/2019 10.3 (H)   05/12/2019 10.5 (H)             ( goal A1C is < 7)   No results found for: LABMICR  LDL Calculated (mg/dL)   Date Value   08/16/2019 73   08/15/2018 67       (goal LDL is <100)   AST (U/L)   Date Value   08/16/2019 24     ALT (U/L)   Date Value   08/16/2019 14     BUN (mg/dL)   Date Value   08/16/2019 13     BP Readings from Last 3 Encounters:   09/10/19 (!) 146/82   05/28/19 (!) 142/80   05/15/19 (!) 153/77          (goal 120/80)    All Future Testing planned in [facility]  Lab Frequency Next Occurrence   CESAR DIGITAL SCREEN SELF REFERRAL W OR WO CAD BILATERAL Once 09/06/2018   CESAR Digital Screen Bilateral [[id number]] Once 10/10/2019   DEXA Bone Density Axial Skeleton Once 10/10/2019               Patient Active Problem List:     Type 2 diabetes mellitus with skin complication, without long-term current use of insulin (HCC)     GERD (gastroesophageal reflux disease)     Back pain     MVP (mitral valve prolapse)     DVT (deep venous thrombosis) ([address] Essential hypertension     HIGH CHOLESTEROL     Left leg cellulitis     Acute renal failure (ARF) (HCC)     Radiculopathy of lumbosacral region     Staghorn renal calculus     Foraminal stenosis of lumbar region     Acute cystitis     SIRS (systemic inflammatory response syndrome) ([facility])     Nausea and vomiting     Lymphedema of both lower extremities     Leukocytosis     Morbid obesity with BMI of 60.0-69.9, adult ([address]     Chronic hypoxemic respiratory failure ([address]     Pulmonary hypertension ([address]

## 2019-10-04 RX ORDER — ACYCLOVIR 50 MG/G
OINTMENT TOPICAL
Qty: 1 TUBE | Refills: 1 | Status: SHIPPED | OUTPATIENT
Start: 2019-10-04 | End: 2019-10-11

## 2019-10-07 RX ORDER — OMEPRAZOLE 40 MG/1
CAPSULE, DELAYED RELEASE ORAL
Qty: 90 CAPSULE | Refills: 3 | Status: SHIPPED | OUTPATIENT
Start: 2019-10-07 | End: 2020-07-29

## 2019-10-11 ENCOUNTER — HOSPITAL ENCOUNTER (OUTPATIENT)
Dept: OCCUPATIONAL THERAPY | Age: 64
Setting detail: THERAPIES SERIES
Discharge: HOME | End: 2019-10-11
Payer: COMMERCIAL

## 2019-11-20 DIAGNOSIS — M54.17 RADICULOPATHY OF LUMBOSACRAL REGION: ICD-10-CM

## 2019-11-20 RX ORDER — GABAPENTIN 300 MG/1
CAPSULE ORAL
Qty: 180 CAPSULE | Refills: 5 | Status: SHIPPED | OUTPATIENT
Start: 2019-11-20 | End: 2020-05-21

## 2020-01-09 RX ORDER — METHOCARBAMOL 750 MG/1
TABLET, FILM COATED ORAL
Qty: 60 TABLET | Refills: 3 | Status: SHIPPED | OUTPATIENT
Start: 2020-01-09 | End: 2021-05-20 | Stop reason: SDUPTHER

## 2020-01-14 ENCOUNTER — TELEPHONE (OUTPATIENT)
Dept: FAMILY MEDICINE CLINIC | Age: 65
End: 2020-01-14

## 2020-01-14 RX ORDER — CEFUROXIME AXETIL 250 MG/1
250 TABLET ORAL 2 TIMES DAILY
Qty: 20 TABLET | Refills: 0 | Status: SHIPPED | OUTPATIENT
Start: 2020-01-14 | End: 2020-01-24

## 2020-01-20 RX ORDER — GLYBURIDE 5 MG/1
TABLET ORAL
Qty: 180 TABLET | Refills: 3 | Status: SHIPPED | OUTPATIENT
Start: 2020-01-20 | End: 2020-12-22

## 2020-01-27 RX ORDER — HYDROMORPHONE HYDROCHLORIDE 2 MG/1
2 TABLET ORAL
Qty: 260 TABLET | Refills: 0 | Status: SHIPPED | OUTPATIENT
Start: 2020-01-27 | End: 2020-01-30 | Stop reason: SDUPTHER

## 2020-01-27 RX ORDER — FLUCONAZOLE 150 MG/1
150 TABLET ORAL ONCE
Qty: 3 TABLET | Refills: 0 | Status: SHIPPED | OUTPATIENT
Start: 2020-01-27 | End: 2020-01-27

## 2020-01-27 NOTE — TELEPHONE ENCOUNTER
lv 9/10/19  Oars  Please review today    Next Visit Date:  No future appointments.     Health Maintenance   Topic Date Due    Hepatitis C screen  1955    DTaP/Tdap/Td vaccine (1 - Tdap) 04/15/1964    Diabetic retinal exam  05/05/2008    Breast cancer screen  02/22/2018    DEXA (modify frequency per FRAX score)  04/15/2018    A1C test (Diabetic or Prediabetic)  11/16/2019    Diabetic microalbuminuria test  06/08/2020 (Originally 12/8/2014)    Shingles Vaccine (1 of 2) 09/10/2020 (Originally 4/15/2003)    Flu vaccine (1) 12/31/2020 (Originally 9/1/2019)    Pneumococcal 65+ years Vaccine (1 of 1 - PPSV23) 05/28/2021 (Originally 4/15/2018)    Lipid screen  08/16/2020    Potassium monitoring  08/16/2020    Creatinine monitoring  08/16/2020    Diabetic foot exam  09/10/2020    Colon cancer screen colonoscopy  02/22/2026       Hemoglobin A1C (%)   Date Value   08/16/2019 10.7   05/13/2019 10.3 (H)   05/12/2019 10.5 (H)             ( goal A1C is < 7)   No results found for: LABMICR  LDL Calculated (mg/dL)   Date Value   08/16/2019 73   08/15/2018 67       (goal LDL is <100)   AST (U/L)   Date Value   08/16/2019 24     ALT (U/L)   Date Value   08/16/2019 14     BUN (mg/dL)   Date Value   08/16/2019 13     BP Readings from Last 3 Encounters:   09/10/19 (!) 146/82   05/28/19 (!) 142/80   05/15/19 (!) 153/77          (goal 120/80)    All Future Testing planned in [facility]  Lab Frequency Next Occurrence   CESAR Digital Screen Bilateral [[id number]] Once 10/10/2019   DEXA Bone Density Axial Skeleton Once 10/10/2019               Patient Active Problem List:     Type 2 diabetes mellitus with skin complication, without long-term current use of insulin (HCC)     GERD (gastroesophageal reflux disease)     Back pain     MVP (mitral valve prolapse)     DVT (deep venous thrombosis) (HCC)     Essential hypertension     HIGH CHOLESTEROL     Left leg cellulitis     Acute renal failure (ARF) (HCC)     Radiculopathy of lumbosacral region     Staghorn renal calculus     Foraminal stenosis of lumbar region     Acute cystitis     SIRS (systemic inflammatory response syndrome) (HCC)     Nausea and vomiting     Lymphedema of both lower extremities     Leukocytosis     Morbid obesity with BMI of 60.0-69.9, adult ([address]     Chronic hypoxemic respiratory failure ([address]     Pulmonary hypertension ([address]

## 2020-01-30 RX ORDER — HYDROMORPHONE HYDROCHLORIDE 2 MG/1
2 TABLET ORAL
Qty: 260 TABLET | Refills: 0 | Status: SHIPPED | OUTPATIENT
Start: 2020-01-30 | End: 2020-04-30 | Stop reason: SDUPTHER

## 2020-02-26 ENCOUNTER — TELEPHONE (OUTPATIENT)
Dept: FAMILY MEDICINE CLINIC | Age: 65
End: 2020-02-26

## 2020-02-26 NOTE — TELEPHONE ENCOUNTER
If she is too sick or develops dehydration may need to consider going to the ER.   Vomiting and diarrhea are likely viral.

## 2020-02-26 NOTE — TELEPHONE ENCOUNTER
Patient calling in she is having cough body aches chills diarrhea unsure of fever she is wanting to know if you can call anything in for her I told her she probably had the flu please advise

## 2020-03-09 ENCOUNTER — TELEPHONE (OUTPATIENT)
Dept: FAMILY MEDICINE CLINIC | Age: 65
End: 2020-03-09

## 2020-03-09 RX ORDER — DOXYCYCLINE HYCLATE 100 MG
100 TABLET ORAL 2 TIMES DAILY
Qty: 20 TABLET | Refills: 0 | Status: SHIPPED | OUTPATIENT
Start: 2020-03-09 | End: 2020-03-19

## 2020-03-09 RX ORDER — FLUCONAZOLE 150 MG/1
150 TABLET ORAL ONCE
Qty: 1 TABLET | Refills: 0 | Status: SHIPPED | OUTPATIENT
Start: 2020-03-09 | End: 2020-05-19 | Stop reason: SDUPTHER

## 2020-03-09 NOTE — TELEPHONE ENCOUNTER
Patient would like to know if she can have some doxycycline called in for her cellulitis and some diflucan please advise send to [facility] in [place]

## 2020-04-21 ENCOUNTER — TELEPHONE (OUTPATIENT)
Dept: FAMILY MEDICINE CLINIC | Age: 65
End: 2020-04-21

## 2020-04-21 ENCOUNTER — HOSPITAL ENCOUNTER (EMERGENCY)
Age: 65
Discharge: HOME | End: 2020-04-21
Attending: EMERGENCY MEDICINE
Payer: COMMERCIAL

## 2020-04-21 VITALS
RESPIRATION RATE: 14 BRPM | OXYGEN SATURATION: 97 % | HEIGHT: 72 IN | WEIGHT: 293 LBS | HEART RATE: 137 BPM | SYSTOLIC BLOOD PRESSURE: 150 MMHG | TEMPERATURE: 99 F | DIASTOLIC BLOOD PRESSURE: 94 MMHG | BODY MASS INDEX: 39.68 KG/M2

## 2020-04-21 PROCEDURE — 99283 EMERGENCY DEPT VISIT LOW MDM: CPT

## 2020-04-21 RX ORDER — FLUCONAZOLE 100 MG/1
100 TABLET ORAL DAILY PRN
Qty: 4 TABLET | Refills: 0 | Status: SHIPPED | OUTPATIENT
Start: 2020-04-21 | End: 2020-04-30 | Stop reason: SDUPTHER

## 2020-04-21 RX ORDER — DOXYCYCLINE HYCLATE 100 MG
100 TABLET ORAL 2 TIMES DAILY
Qty: 20 TABLET | Refills: 0 | Status: SHIPPED | OUTPATIENT
Start: 2020-04-21 | End: 2020-04-30 | Stop reason: SDUPTHER

## 2020-04-21 NOTE — TELEPHONE ENCOUNTER
Next Visit Date:  No future appointments.     Health Maintenance   Topic Date Due    Hepatitis C screen  1955    DTaP/Tdap/Td vaccine (1 - Tdap) 04/15/1972    DEXA (modify frequency per FRAX score)  04/15/2008    Diabetic retinal exam  05/05/2008    Breast cancer screen  02/22/2018    A1C test (Diabetic or Prediabetic)  11/16/2019    Diabetic microalbuminuria test  06/08/2020 (Originally 12/8/2014)    Shingles Vaccine (1 of 2) 09/10/2020 (Originally 4/15/2003)    Flu vaccine (Season Ended) 12/31/2020 (Originally 9/1/2020)    Pneumococcal 65+ years Vaccine (1 of 1 - PPSV23) 05/28/2021 (Originally 4/15/2018)    Lipid screen  08/16/2020    Potassium monitoring  08/16/2020    Creatinine monitoring  08/16/2020    Diabetic foot exam  02/19/2021    Colon cancer screen colonoscopy  02/22/2026    Hepatitis A vaccine  Aged Out    Hib vaccine  Aged Out    Meningococcal (ACWY) vaccine  Aged Out       Hemoglobin A1C (%)   Date Value   08/16/2019 10.7   05/13/2019 10.3 (H)   05/12/2019 10.5 (H)             ( goal A1C is < 7)   No results found for: LABMICR  LDL Calculated (mg/dL)   Date Value   08/16/2019 73   08/15/2018 67       (goal LDL is <100)   AST (U/L)   Date Value   08/16/2019 24     ALT (U/L)   Date Value   08/16/2019 14     BUN (mg/dL)   Date Value   08/16/2019 13     BP Readings from Last 3 Encounters:   04/21/20 (!) 150/94   09/10/19 (!) 146/82   05/28/19 (!) 142/80          (goal 120/80)    All Future Testing planned in [facility]  Lab Frequency Next Occurrence   CESAR Digital Screen Bilateral [[id number]] Once 10/10/2019   DEXA Bone Density Axial Skeleton Once 10/10/2019               Patient Active Problem List:     Type 2 diabetes mellitus with skin complication, without long-term current use of insulin (HCC)     GERD (gastroesophageal reflux disease)     Back pain     MVP (mitral valve prolapse)     DVT (deep venous thrombosis) (HCC)     Essential hypertension     HIGH CHOLESTEROL     Left leg cellulitis     Acute renal failure (ARF) (HCC)     Radiculopathy of lumbosacral region     Staghorn renal calculus     Foraminal stenosis of lumbar region     Acute cystitis     SIRS (systemic inflammatory response syndrome) ([facility])     Nausea and vomiting     Lymphedema of both lower extremities     Leukocytosis     Morbid obesity with BMI of 60.0-69.9, adult ([address]     Chronic hypoxemic respiratory failure ([facility]     Pulmonary hypertension ([facility]

## 2020-04-21 NOTE — ED PROVIDER NOTES
EMERGENCY DEPARTMENT ENCOUNTER    Pt Name: Pedro Ortiz  MRN: [id number]  [place] 1955  Date of evaluation: 4/21/20  CHIEF COMPLAINT       Chief Complaint   Patient presents with    Cellulitis     left lower leg wound     HISTORY OF PRESENT ILLNESS   Patient is a 24-year-old female with a PMH of diabetes, GERD, lymphedema who presents to the ED complaining of redness to right ankle. Symptoms started yesterday. She has mild pain and redness to lateral aspect of right ankle. She does not have fever, cough, shortness of breath, abdominal pain, chest pain, nausea, vomiting, changes in urine or stool. Patient has had cellulitis over same area in the past and doxycycline was prescribed with good results. REVIEW OF SYSTEMS     Review of Systems   All other systems reviewed and are negative. PASTMEDICAL HISTORY     Past Medical History:   Diagnosis Date    Back pain 6/22/2012    Cellulitis 10/9/2015    Lt. Lower Leg    DM (diabetes mellitus) ([facility] 06/22/2012    DVT [facility] thrombosis) ([facility]) 6/22/2012    GERD (gastroesophageal reflux disease) 6/22/2012    MVP (mitral valve prolapse) 6/22/2012     SURGICAL HISTORY       Past Surgical History:   Procedure Laterality Date    HYSTERECTOMY      TONSILLECTOMY       CURRENT MEDICATIONS       Previous Medications    ACETAMINOPHEN (TYLENOL) 500 MG TABLET    Take 500 mg by mouth every 6 hours as needed for Pain. Pt only takes 1-2 times per week    APIXABAN (ELIQUIS) 5 MG TABS TABLET    TAKE 1 TABLET BY MOUTH TWO  TIMES DAILY    DULOXETINE (CYMBALTA) 30 MG EXTENDED RELEASE CAPSULE    TAKE 1 CAPSULE BY MOUTH  DAILY    FLUCONAZOLE (DIFLUCAN) 100 MG TABLET    Take 1 tablet by mouth daily as needed (yeast infection) Indications: Patient self diagnoses and takes fluconaole for yeast infections. GABAPENTIN (NEURONTIN) 300 MG CAPSULE    Take 600 mg by mouth 2 times daily.     GABAPENTIN (NEURONTIN) 300 MG CAPSULE    TAKE 2 CAPSULES BY MOUTH 3  TIMES A DAY General: Skin is dry. Comments: Lymphedema bilaterally. Small ulcer with localized area of redness lateral aspect right ankle. No purulent discharge. Neurological:      Mental Status: She is alert. Mental status is at baseline. Psychiatric:         Mood and Affect: Mood normal.         Behavior: Behavior normal.         MEDICAL DECISION MAKING:   The patient is hemodynamically stable, afebrile, nontoxic-appearing. Physical exam notable for small ulcer, localized redness lateral aspect right ankle. Based on history and exam it appears she is suffering from early stages of cellulitis. ED plan doxycycline, close follow-up. DIAGNOSTIC RESULTS   EKG:All EKG's are interpreted by the Emergency Department Physician who either signs or Co-signs this chart in the absence of a cardiologist.        RADIOLOGY:All plain film, CT, MRI, and formal ultrasound images (except ED bedside ultrasound) are read by the radiologist, see reports below, unless otherwisenoted in MDM or here. No orders to display     LABS: All lab results were reviewed by myself, and all abnormals are listed below. Labs Reviewed - No data to display    EMERGENCY DEPARTMENTCOURSE:         Vitals:    Vitals:    04/21/20 0956 04/21/20 1001   BP: (!) 150/94    Pulse: 137    Resp: 14    Temp: 99 °F (37.2 °C)    TempSrc: Oral    SpO2: 97%    Weight:  (!) 413 lb 11.2 oz (187.7 kg)   Height:  6' (1.829 m)       The patient was given the following medications while in the emergency department:  Orders Placed This Encounter   Medications    doxycycline hyclate (VIBRA-TABS) 100 MG tablet     Sig: Take 1 tablet by mouth 2 times daily for 10 days     Dispense:  20 tablet     Refill:  0     CONSULTS:  None    FINAL IMPRESSION      1.  Cellulitis of left lower extremity          DISPOSITION/PLAN   DISPOSITION Decision To Discharge 04/21/2020 10:54:40 AM      PATIENT REFERRED TO:  [address] Ainsley Ortiz MD  [address] [place] [address]

## 2020-04-21 NOTE — ED NOTES
Non adhering and zora wrap applied.   Ace wrap to hold in place     Mirna Thomson, RN  04/21/20 [id number]

## 2020-04-21 NOTE — TELEPHONE ENCOUNTER
[facility]) ED Follow up Call    Reason for ED visit:  Cellulitis      4/21/2020     Gealcio Jose , this is Daisy from Dr. Adrianna Tobar office, just calling to see how you are doing after your recent ED visit. Did you receive discharge instructions? Yes  Do you understand the discharge instructions? Yes  Did the ED give you any new prescriptions? Yes  Were you able to fill your prescriptions? Yes      Do you have one of our red, yellow and green  Zone sheets that help you to determine when you should go to the ED? Not Applicable      Do you need or want to make a follow up appt with your PCP? Not Applicable    Do you have any further needs in the home, e.g. equipment? Not Applicable        FU appts/Provider:    No future appointments.

## 2020-04-30 ENCOUNTER — TELEPHONE (OUTPATIENT)
Dept: FAMILY MEDICINE CLINIC | Age: 65
End: 2020-04-30

## 2020-04-30 RX ORDER — DOXYCYCLINE HYCLATE 100 MG
100 TABLET ORAL 2 TIMES DAILY
Qty: 20 TABLET | Refills: 0 | Status: SHIPPED | OUTPATIENT
Start: 2020-04-30 | End: 2020-05-19 | Stop reason: SDUPTHER

## 2020-04-30 RX ORDER — FLUCONAZOLE 100 MG/1
100 TABLET ORAL DAILY PRN
Qty: 4 TABLET | Refills: 0 | Status: SHIPPED | OUTPATIENT
Start: 2020-04-30 | End: 2020-10-09 | Stop reason: SDUPTHER

## 2020-04-30 RX ORDER — HYDROMORPHONE HYDROCHLORIDE 2 MG/1
2 TABLET ORAL
Qty: 260 TABLET | Refills: 0 | Status: SHIPPED | OUTPATIENT
Start: 2020-04-30 | End: 2020-05-04 | Stop reason: SDUPTHER

## 2020-04-30 NOTE — TELEPHONE ENCOUNTER
lv 9/10/19  Oars please review today      Next Visit Date:  No future appointments.     Health Maintenance   Topic Date Due    Hepatitis C screen  1955    DTaP/Tdap/Td vaccine (1 - Tdap) 04/15/1972    DEXA (modify frequency per FRAX score)  04/15/2008    Diabetic retinal exam  05/05/2008    Breast cancer screen  02/22/2018    A1C test (Diabetic or Prediabetic)  11/16/2019    Diabetic microalbuminuria test  06/08/2020 (Originally 12/8/2014)    Shingles Vaccine (1 of 2) 09/10/2020 (Originally 4/15/2003)    Flu vaccine (Season Ended) 12/31/2020 (Originally 9/1/2020)    Pneumococcal 65+ years Vaccine (1 of 1 - PPSV23) 05/28/2021 (Originally 4/15/2018)    Lipid screen  08/16/2020    Potassium monitoring  08/16/2020    Creatinine monitoring  08/16/2020    Diabetic foot exam  02/19/2021    Colon cancer screen colonoscopy  02/22/2026    Hepatitis A vaccine  Aged Out    Hib vaccine  Aged Out    Meningococcal (ACWY) vaccine  Aged Out       Hemoglobin A1C (%)   Date Value   08/16/2019 10.7   05/13/2019 10.3 (H)   05/12/2019 10.5 (H)             ( goal A1C is < 7)   No results found for: LABMICR  LDL Calculated (mg/dL)   Date Value   08/16/2019 73   08/15/2018 67       (goal LDL is <100)   AST (U/L)   Date Value   08/16/2019 24     ALT (U/L)   Date Value   08/16/2019 14     BUN (mg/dL)   Date Value   08/16/2019 13     BP Readings from Last 3 Encounters:   04/21/20 (!) 150/94   09/10/19 (!) 146/82   05/28/19 (!) 142/80          (goal 120/80)    All Future Testing planned in [facility]  Lab Frequency Next Occurrence   CESAR Digital Screen Bilateral [[id number]] Once 10/10/2019   DEXA Bone Density Axial Skeleton Once 10/10/2019               Patient Active Problem List:     Type 2 diabetes mellitus with skin complication, without long-term current use of insulin (HCC)     GERD (gastroesophageal reflux disease)     Back pain     MVP (mitral valve prolapse)     DVT (deep venous thrombosis) ([address]     Essential

## 2020-05-04 RX ORDER — HYDROMORPHONE HYDROCHLORIDE 2 MG/1
2 TABLET ORAL
Qty: 86 TABLET | Refills: 0 | Status: SHIPPED | OUTPATIENT
Start: 2020-05-04 | End: 2020-06-16 | Stop reason: SDUPTHER

## 2020-05-07 NOTE — TELEPHONE ENCOUNTER
prolapse)     DVT (deep venous thrombosis) (HCC)     Essential hypertension     HIGH CHOLESTEROL     Left leg cellulitis     Acute renal failure (ARF) (HCC)     Radiculopathy of lumbosacral region     Staghorn renal calculus     Foraminal stenosis of lumbar region     Acute cystitis     SIRS (systemic inflammatory response syndrome) ([facility])     Nausea and vomiting     Lymphedema of both lower extremities     Leukocytosis     Morbid obesity with BMI of 60.0-69.9, adult ([address]     Chronic hypoxemic respiratory failure ([address]     Pulmonary hypertension ([address]

## 2020-05-19 RX ORDER — FLUCONAZOLE 150 MG/1
150 TABLET ORAL ONCE
Qty: 3 TABLET | Refills: 0 | Status: SHIPPED | OUTPATIENT
Start: 2020-05-19 | End: 2020-08-24 | Stop reason: SDUPTHER

## 2020-05-19 RX ORDER — DOXYCYCLINE HYCLATE 100 MG
100 TABLET ORAL 2 TIMES DAILY
Qty: 20 TABLET | Refills: 0 | Status: SHIPPED | OUTPATIENT
Start: 2020-05-19 | End: 2020-06-19 | Stop reason: SDUPTHER

## 2020-05-19 NOTE — TELEPHONE ENCOUNTER
Next Visit Date:  No future appointments.     Health Maintenance   Topic Date Due    Hepatitis C screen  1955    DTaP/Tdap/Td vaccine (1 - Tdap) 04/15/1972    DEXA (modify frequency per FRAX score)  04/15/2008    Diabetic retinal exam  05/05/2008    Breast cancer screen  02/22/2018    A1C test (Diabetic or Prediabetic)  11/16/2019    Diabetic microalbuminuria test  06/08/2020 (Originally 12/8/2014)    Shingles Vaccine (1 of 2) 09/10/2020 (Originally 4/15/2003)    Flu vaccine (Season Ended) 12/31/2020 (Originally 9/1/2020)    Pneumococcal 65+ years Vaccine (1 of 1 - PPSV23) 05/28/2021 (Originally 4/15/2018)    Lipid screen  08/16/2020    Potassium monitoring  08/16/2020    Creatinine monitoring  08/16/2020    Diabetic foot exam  02/19/2021    Colon cancer screen colonoscopy  02/22/2026    Hepatitis A vaccine  Aged Out    Hib vaccine  Aged Out    Meningococcal (ACWY) vaccine  Aged Out       Hemoglobin A1C (%)   Date Value   08/16/2019 10.7   05/13/2019 10.3 (H)   05/12/2019 10.5 (H)             ( goal A1C is < 7)   No results found for: LABMICR  LDL Calculated (mg/dL)   Date Value   08/16/2019 73   08/15/2018 67       (goal LDL is <100)   AST (U/L)   Date Value   08/16/2019 24     ALT (U/L)   Date Value   08/16/2019 14     BUN (mg/dL)   Date Value   08/16/2019 13     BP Readings from Last 3 Encounters:   04/21/20 (!) 150/94   09/10/19 (!) 146/82   05/28/19 (!) 142/80          (goal 120/80)    All Future Testing planned in [facility]  Lab Frequency Next Occurrence   CESAR Digital Screen Bilateral [[id number]] Once 10/10/2019   DEXA Bone Density Axial Skeleton Once 10/10/2019               Patient Active Problem List:     Type 2 diabetes mellitus with skin complication, without long-term current use of insulin (HCC)     GERD (gastroesophageal reflux disease)     Back pain     MVP (mitral valve prolapse)     DVT (deep venous thrombosis) (HCC)     Essential hypertension     HIGH CHOLESTEROL     Left leg

## 2020-05-21 RX ORDER — GABAPENTIN 300 MG/1
CAPSULE ORAL
Qty: 180 CAPSULE | Refills: 3 | Status: SHIPPED | OUTPATIENT
Start: 2020-05-21 | End: 2020-11-20

## 2020-06-02 ENCOUNTER — TELEPHONE (OUTPATIENT)
Dept: FAMILY MEDICINE CLINIC | Age: 65
End: 2020-06-02

## 2020-06-02 RX ORDER — AZITHROMYCIN 250 MG/1
TABLET, FILM COATED ORAL
Qty: 1 PACKET | Refills: 0 | Status: SHIPPED | OUTPATIENT
Start: 2020-06-02 | End: 2020-11-20

## 2020-06-16 RX ORDER — HYDROMORPHONE HYDROCHLORIDE 2 MG/1
2 TABLET ORAL
Qty: 86 TABLET | Refills: 0 | Status: SHIPPED | OUTPATIENT
Start: 2020-06-16 | End: 2020-09-14

## 2020-06-19 RX ORDER — DOXYCYCLINE HYCLATE 100 MG
100 TABLET ORAL 2 TIMES DAILY
Qty: 20 TABLET | Refills: 0 | Status: SHIPPED | OUTPATIENT
Start: 2020-06-19 | End: 2020-08-24 | Stop reason: SDUPTHER

## 2020-06-19 NOTE — TELEPHONE ENCOUNTER
Next Visit Date:  No future appointments.     Health Maintenance   Topic Date Due    Hepatitis C screen  1955    DTaP/Tdap/Td vaccine (1 - Tdap) 04/15/1972    DEXA (modify frequency per FRAX score)  04/15/2008    Diabetic retinal exam  05/05/2008    Diabetic microalbuminuria test  12/08/2014    Breast cancer screen  02/22/2018    A1C test (Diabetic or Prediabetic)  11/16/2019    Shingles Vaccine (1 of 2) 09/10/2020 (Originally 4/15/2003)    Flu vaccine (Season Ended) 12/31/2020 (Originally 9/1/2020)    Pneumococcal 65+ years Vaccine (1 of 1 - PPSV23) 05/28/2021 (Originally 4/15/2018)    Lipid screen  08/16/2020    Potassium monitoring  08/16/2020    Creatinine monitoring  08/16/2020    Diabetic foot exam  02/19/2021    Colon cancer screen colonoscopy  02/22/2026    Hepatitis A vaccine  Aged Out    Hib vaccine  Aged Out    Meningococcal (ACWY) vaccine  Aged Out       Hemoglobin A1C (%)   Date Value   08/16/2019 10.7   05/13/2019 10.3 (H)   05/12/2019 10.5 (H)             ( goal A1C is < 7)   No results found for: LABMICR  LDL Calculated (mg/dL)   Date Value   08/16/2019 73   08/15/2018 67       (goal LDL is <100)   AST (U/L)   Date Value   08/16/2019 24     ALT (U/L)   Date Value   08/16/2019 14     BUN (mg/dL)   Date Value   08/16/2019 13     BP Readings from Last 3 Encounters:   04/21/20 (!) 150/94   09/10/19 (!) 146/82   05/28/19 (!) 142/80          (goal 120/80)    All Future Testing planned in [facility]  Lab Frequency Next Occurrence   CESAR Digital Screen Bilateral [[id number]] Once 10/10/2019   DEXA Bone Density Axial Skeleton Once 10/10/2019               Patient Active Problem List:     Type 2 diabetes mellitus with skin complication, without long-term current use of insulin (HCC)     GERD (gastroesophageal reflux disease)     Back pain     MVP (mitral valve prolapse)     DVT (deep venous thrombosis) (HCC)     Essential hypertension     HIGH CHOLESTEROL     Left leg cellulitis     Acute renal failure (ARF) (HCC)     Radiculopathy of lumbosacral region     Staghorn renal calculus     Foraminal stenosis of lumbar region     Acute cystitis     SIRS (systemic inflammatory response syndrome) (HCC)     Nausea and vomiting     Lymphedema of both lower extremities     Leukocytosis     Morbid obesity with BMI of 60.0-69.9, adult ([address]     Chronic hypoxemic respiratory failure ([address]     Pulmonary hypertension ([address]

## 2020-07-29 RX ORDER — OMEPRAZOLE 40 MG/1
CAPSULE, DELAYED RELEASE ORAL
Qty: 90 CAPSULE | Refills: 3 | Status: SHIPPED | OUTPATIENT
Start: 2020-07-29 | End: 2021-08-05 | Stop reason: SDUPTHER

## 2020-07-29 RX ORDER — HYDROCHLOROTHIAZIDE 25 MG/1
TABLET ORAL
Qty: 90 TABLET | Refills: 3 | Status: SHIPPED | OUTPATIENT
Start: 2020-07-29 | End: 2021-08-05 | Stop reason: SDUPTHER

## 2020-08-24 ENCOUNTER — TELEPHONE (OUTPATIENT)
Dept: FAMILY MEDICINE CLINIC | Age: 65
End: 2020-08-24

## 2020-08-24 RX ORDER — DOXYCYCLINE HYCLATE 100 MG
100 TABLET ORAL 2 TIMES DAILY
Qty: 20 TABLET | Refills: 0 | Status: SHIPPED | OUTPATIENT
Start: 2020-08-24 | End: 2020-10-09 | Stop reason: SDUPTHER

## 2020-08-24 RX ORDER — FLUCONAZOLE 150 MG/1
150 TABLET ORAL ONCE
Qty: 3 TABLET | Refills: 0 | Status: SHIPPED | OUTPATIENT
Start: 2020-08-24 | End: 2020-08-24

## 2020-08-24 NOTE — TELEPHONE ENCOUNTER
Radiculopathy of lumbosacral region     Staghorn renal calculus     Foraminal stenosis of lumbar region     Acute cystitis     SIRS (systemic inflammatory response syndrome) (HCC)     Nausea and vomiting     Lymphedema of both lower extremities     Leukocytosis     Morbid obesity with BMI of 60.0-69.9, adult ([address]     Chronic hypoxemic respiratory failure ([address]     Pulmonary hypertension ([address]

## 2020-08-24 NOTE — TELEPHONE ENCOUNTER
Patient received a letter in the mail in regards to her janumet, its starting sept 1 the drug below will be added to a puja therapy program and hers is a step two drug and she needs to try the stp one drug first which are metformin, metformin er, glybezide met, glyberide met, and diaglitazone-met, so upon her next refill it will need a prior auth and we will need to put on the form that she had tried and failed the metformin [facility]

## 2020-09-08 ENCOUNTER — TELEPHONE (OUTPATIENT)
Dept: FAMILY MEDICINE CLINIC | Age: 65
End: 2020-09-08

## 2020-09-08 RX ORDER — DULOXETIN HYDROCHLORIDE 30 MG/1
30 CAPSULE, DELAYED RELEASE ORAL DAILY
Qty: 90 CAPSULE | Refills: 3 | Status: SHIPPED | OUTPATIENT
Start: 2020-09-08 | End: 2021-08-05 | Stop reason: SDUPTHER

## 2020-09-08 NOTE — TELEPHONE ENCOUNTER
patient is calling in with frequent urination and some burning does she need to schedule an appointment or can she just do a nurse visit please advise

## 2020-09-09 NOTE — TELEPHONE ENCOUNTER
She doesn't have a ride to come in however she does have someone that can bring in a urine sample for her she has container that she can wash out with hot soapy water and then put the sample in there I know its not sterile but maybe her only option at this time please advise

## 2020-09-11 ENCOUNTER — TELEPHONE (OUTPATIENT)
Dept: FAMILY MEDICINE CLINIC | Age: 65
End: 2020-09-11
Payer: COMMERCIAL

## 2020-09-11 LAB
BILIRUBIN, POC: NEGATIVE
BLOOD URINE, POC: NORMAL
CLARITY, POC: CLEAR
COLOR, POC: YELLOW
GLUCOSE URINE, POC: 500
KETONES, POC: NEGATIVE
LEUKOCYTE EST, POC: NEGATIVE
NITRITE, POC: NORMAL
PH, POC: 6
PROTEIN, POC: 30
SPECIFIC GRAVITY, POC: 1.02
UROBILINOGEN, POC: 0.2

## 2020-09-11 PROCEDURE — 81003 URINALYSIS AUTO W/O SCOPE: CPT | Performed by: FAMILY MEDICINE

## 2020-09-11 RX ORDER — CIPROFLOXACIN 250 MG/1
250 TABLET, FILM COATED ORAL 2 TIMES DAILY
Qty: 14 TABLET | Refills: 0 | Status: SHIPPED | OUTPATIENT
Start: 2020-09-11 | End: 2020-09-18

## 2020-09-23 RX ORDER — METOPROLOL SUCCINATE 50 MG/1
50 TABLET, EXTENDED RELEASE ORAL DAILY
Qty: 90 TABLET | Refills: 3 | Status: SHIPPED | OUTPATIENT
Start: 2020-09-23 | End: 2021-08-05 | Stop reason: SDUPTHER

## 2020-09-23 RX ORDER — SITAGLIPTIN AND METFORMIN HYDROCHLORIDE 100; 1000 MG/1; MG/1
TABLET, FILM COATED, EXTENDED RELEASE ORAL
Qty: 90 TABLET | Refills: 3 | Status: SHIPPED | OUTPATIENT
Start: 2020-09-23 | End: 2021-05-20

## 2020-09-23 NOTE — TELEPHONE ENCOUNTER
Williams Medina is calling to request a refill on the following medication(s):    Medication Request:  Requested Prescriptions     Pending Prescriptions Disp Refills    metoprolol succinate (TOPROL XL) 50 MG extended release tablet [Pharmacy Med Name: METOPROLOL SUCC ER 50MG TABLET] 90 tablet 3     Sig: TAKE 1 TABLET BY MOUTH  DAILY    JANUMET -1000 MG TB24 [Pharmacy Med Name: JANUMET  100MG 1000MG  TAB  XR] 90 tablet 3     Sig: TAKE 1 TABLET BY MOUTH  DAILY       Last Visit Date (If Applicable):  5/05/9125    Next Visit Date:    Visit date not found

## 2020-10-09 RX ORDER — DOXYCYCLINE HYCLATE 100 MG
100 TABLET ORAL 2 TIMES DAILY
Qty: 20 TABLET | Refills: 0 | Status: SHIPPED | OUTPATIENT
Start: 2020-10-09 | End: 2020-10-20 | Stop reason: SDUPTHER

## 2020-10-09 RX ORDER — FLUCONAZOLE 100 MG/1
100 TABLET ORAL DAILY PRN
Qty: 4 TABLET | Refills: 0 | Status: SHIPPED | OUTPATIENT
Start: 2020-10-09 | End: 2020-11-11 | Stop reason: SDUPTHER

## 2020-10-09 NOTE — TELEPHONE ENCOUNTER
Patient called in and she is having the cellulitis in the leg again can you send these in for her please

## 2020-10-13 ENCOUNTER — TELEPHONE (OUTPATIENT)
Dept: FAMILY MEDICINE CLINIC | Age: 65
End: 2020-10-13

## 2020-10-13 RX ORDER — AZITHROMYCIN 250 MG/1
250 TABLET, FILM COATED ORAL SEE ADMIN INSTRUCTIONS
Qty: 6 TABLET | Refills: 0 | Status: SHIPPED | OUTPATIENT
Start: 2020-10-13 | End: 2020-10-18

## 2020-10-13 NOTE — TELEPHONE ENCOUNTER
patient is calling in she stating she has a sinus infection and wanted to know if you can call in a zpack med pended for you to sign if you are ok with that please advise

## 2020-10-13 NOTE — TELEPHONE ENCOUNTER
Sent this to her pharmacy. Records indicate she is allergic to erythromycin and this is a macrolide as is azithromycin so I just want to make sure is able to tolerate this.

## 2020-10-20 RX ORDER — DOXYCYCLINE HYCLATE 100 MG
100 TABLET ORAL 2 TIMES DAILY
Qty: 20 TABLET | Refills: 0 | Status: SHIPPED | OUTPATIENT
Start: 2020-10-20 | End: 2020-11-11 | Stop reason: SDUPTHER

## 2020-10-20 NOTE — TELEPHONE ENCOUNTER
Next Visit Date:  No future appointments.     Health Maintenance   Topic Date Due    Hepatitis C screen  1955    DTaP/Tdap/Td vaccine (1 - Tdap) 04/15/1972    Shingles Vaccine (1 of 2) 04/15/2003    DEXA (modify frequency per FRAX score)  04/15/2008    Diabetic retinal exam  05/05/2008    Diabetic microalbuminuria test  12/08/2014    Breast cancer screen  02/22/2018    A1C test (Diabetic or Prediabetic)  08/16/2020    Lipid screen  08/16/2020    Potassium monitoring  08/16/2020    Creatinine monitoring  08/16/2020    Flu vaccine (1) 09/01/2020    Pneumococcal 65+ years Vaccine (1 of 1 - PPSV23) 05/28/2021 (Originally 4/15/2018)    Diabetic foot exam  02/19/2021    Colon cancer screen colonoscopy  02/22/2026    Hepatitis A vaccine  Aged Out    Hib vaccine  Aged Out    Meningococcal (ACWY) vaccine  Aged Out       Hemoglobin A1C (%)   Date Value   08/16/2019 10.7   05/13/2019 10.3 (H)   05/12/2019 10.5 (H)             ( goal A1C is < 7)   No results found for: LABMICR  LDL Calculated (mg/dL)   Date Value   08/16/2019 73   08/15/2018 67       (goal LDL is <100)   AST (U/L)   Date Value   08/16/2019 24     ALT (U/L)   Date Value   08/16/2019 14     BUN (mg/dL)   Date Value   08/16/2019 13     BP Readings from Last 3 Encounters:   04/21/20 (!) 150/94   09/10/19 (!) 146/82   05/28/19 (!) 142/80          (goal 120/80)    All Future Testing planned in [facility]  Lab Frequency Next Occurrence               Patient Active Problem List:     Type 2 diabetes mellitus with skin complication, without long-term current use of insulin (HCC)     GERD (gastroesophageal reflux disease)     Back pain     MVP (mitral valve prolapse)     DVT (deep venous thrombosis) (HCC)     Essential hypertension     HIGH CHOLESTEROL     Left leg cellulitis     Acute renal failure (ARF) (HCC)     Radiculopathy of lumbosacral region     Staghorn renal calculus     Foraminal stenosis of lumbar region     Acute cystitis     SIRS (systemic inflammatory response syndrome) (HCC)     Nausea and vomiting     Lymphedema of both lower extremities     Leukocytosis     Morbid obesity with BMI of 60.0-69.9, adult ([address]     Chronic hypoxemic respiratory failure ([address]     Pulmonary hypertension ([address]

## 2020-11-11 RX ORDER — DOXYCYCLINE HYCLATE 100 MG
100 TABLET ORAL 2 TIMES DAILY
Qty: 20 TABLET | Refills: 0 | Status: SHIPPED | OUTPATIENT
Start: 2020-11-11 | End: 2020-11-19 | Stop reason: SDUPTHER

## 2020-11-11 RX ORDER — FLUCONAZOLE 100 MG/1
100 TABLET ORAL DAILY PRN
Qty: 4 TABLET | Refills: 0 | Status: SHIPPED | OUTPATIENT
Start: 2020-11-11 | End: 2021-08-17 | Stop reason: SDUPTHER

## 2020-11-11 NOTE — TELEPHONE ENCOUNTER
Next Visit Date:  Future Appointments   Date Time Provider Larry Klein   11/20/2020  3:30 PM Jorge Vasquez  [address] [facility]   Topic Date Due    Hepatitis C screen  1955    DTaP/Tdap/Td vaccine (1 - Tdap) 04/15/1972    Shingles Vaccine (1 of 2) 04/15/2003    DEXA (modify frequency per FRAX score)  04/15/2008    Diabetic retinal exam  05/05/2008    Diabetic microalbuminuria test  12/08/2014    Breast cancer screen  02/22/2018    A1C test (Diabetic or Prediabetic)  08/16/2020    Lipid screen  08/16/2020    Potassium monitoring  08/16/2020    Creatinine monitoring  08/16/2020    Flu vaccine (1) 09/01/2020    Pneumococcal 65+ years Vaccine (1 of 1 - PPSV23) 05/28/2021 (Originally 4/15/2018)    Diabetic foot exam  02/19/2021    Colon cancer screen colonoscopy  02/22/2026    Hepatitis A vaccine  Aged Out    Hib vaccine  Aged Out    Meningococcal (ACWY) vaccine  Aged Out       Hemoglobin A1C (%)   Date Value   08/16/2019 10.7   05/13/2019 10.3 (H)   05/12/2019 10.5 (H)             ( goal A1C is < 7)   No results found for: LABMICR  LDL Calculated (mg/dL)   Date Value   08/16/2019 73   08/15/2018 67       (goal LDL is <100)   AST (U/L)   Date Value   08/16/2019 24     ALT (U/L)   Date Value   08/16/2019 14     BUN (mg/dL)   Date Value   08/16/2019 13     BP Readings from Last 3 Encounters:   04/21/20 (!) 150/94   09/10/19 (!) 146/82   05/28/19 (!) 142/80          (goal 120/80)    All Future Testing planned in [facility]  Lab Frequency Next Occurrence               Patient Active Problem List:     Type 2 diabetes mellitus with skin complication, without long-term current use of insulin (HCC)     GERD (gastroesophageal reflux disease)     Back pain     MVP (mitral valve prolapse)     DVT (deep venous thrombosis) (HCC)     Essential hypertension     HIGH CHOLESTEROL     Left leg cellulitis     Acute renal failure (ARF) (HCC)     Radiculopathy of lumbosacral region     Staghorn renal calculus     Foraminal stenosis of lumbar region     Acute cystitis     SIRS (systemic inflammatory response syndrome) (HCC)     Nausea and vomiting     Lymphedema of both lower extremities     Leukocytosis     Morbid obesity with BMI of 60.0-69.9, adult ([address]     Chronic hypoxemic respiratory failure ([address]     Pulmonary hypertension ([address]

## 2020-11-19 RX ORDER — DOXYCYCLINE HYCLATE 100 MG
100 TABLET ORAL 2 TIMES DAILY
Qty: 20 TABLET | Refills: 0 | Status: SHIPPED | OUTPATIENT
Start: 2020-11-19 | End: 2020-11-29

## 2020-11-20 ENCOUNTER — OFFICE VISIT (OUTPATIENT)
Dept: FAMILY MEDICINE CLINIC | Age: 65
End: 2020-11-20
Payer: COMMERCIAL

## 2020-11-20 VITALS
DIASTOLIC BLOOD PRESSURE: 80 MMHG | WEIGHT: 293 LBS | BODY MASS INDEX: 55.61 KG/M2 | OXYGEN SATURATION: 96 % | SYSTOLIC BLOOD PRESSURE: 130 MMHG | TEMPERATURE: 97.9 F | HEART RATE: 86 BPM

## 2020-11-20 PROCEDURE — 1123F ACP DISCUSS/DSCN MKR DOCD: CPT | Performed by: FAMILY MEDICINE

## 2020-11-20 PROCEDURE — 4040F PNEUMOC VAC/ADMIN/RCVD: CPT | Performed by: FAMILY MEDICINE

## 2020-11-20 PROCEDURE — 3046F HEMOGLOBIN A1C LEVEL >9.0%: CPT | Performed by: FAMILY MEDICINE

## 2020-11-20 PROCEDURE — 99213 OFFICE O/P EST LOW 20 MIN: CPT | Performed by: FAMILY MEDICINE

## 2020-11-20 PROCEDURE — 2022F DILAT RTA XM EVC RTNOPTHY: CPT | Performed by: FAMILY MEDICINE

## 2020-11-20 PROCEDURE — 1090F PRES/ABSN URINE INCON ASSESS: CPT | Performed by: FAMILY MEDICINE

## 2020-11-20 PROCEDURE — G8400 PT W/DXA NO RESULTS DOC: HCPCS | Performed by: FAMILY MEDICINE

## 2020-11-20 PROCEDURE — G8417 CALC BMI ABV UP PARAM F/U: HCPCS | Performed by: FAMILY MEDICINE

## 2020-11-20 PROCEDURE — 1036F TOBACCO NON-USER: CPT | Performed by: FAMILY MEDICINE

## 2020-11-20 PROCEDURE — 3017F COLORECTAL CA SCREEN DOC REV: CPT | Performed by: FAMILY MEDICINE

## 2020-11-20 PROCEDURE — G8427 DOCREV CUR MEDS BY ELIG CLIN: HCPCS | Performed by: FAMILY MEDICINE

## 2020-11-20 PROCEDURE — G8484 FLU IMMUNIZE NO ADMIN: HCPCS | Performed by: FAMILY MEDICINE

## 2020-11-20 SDOH — ECONOMIC STABILITY: TRANSPORTATION INSECURITY
IN THE PAST 12 MONTHS, HAS LACK OF TRANSPORTATION KEPT YOU FROM MEETINGS, WORK, OR FROM GETTING THINGS NEEDED FOR DAILY LIVING?: NO

## 2020-11-20 SDOH — ECONOMIC STABILITY: FOOD INSECURITY: WITHIN THE PAST 12 MONTHS, YOU WORRIED THAT YOUR FOOD WOULD RUN OUT BEFORE YOU GOT MONEY TO BUY MORE.: NEVER TRUE

## 2020-11-20 SDOH — ECONOMIC STABILITY: TRANSPORTATION INSECURITY
IN THE PAST 12 MONTHS, HAS THE LACK OF TRANSPORTATION KEPT YOU FROM MEDICAL APPOINTMENTS OR FROM GETTING MEDICATIONS?: NO

## 2020-11-20 SDOH — ECONOMIC STABILITY: INCOME INSECURITY: HOW HARD IS IT FOR YOU TO PAY FOR THE VERY BASICS LIKE FOOD, HOUSING, MEDICAL CARE, AND HEATING?: NOT HARD AT ALL

## 2020-11-20 ASSESSMENT — PATIENT HEALTH QUESTIONNAIRE - PHQ9
2. FEELING DOWN, DEPRESSED OR HOPELESS: 0
SUM OF ALL RESPONSES TO PHQ QUESTIONS 1-9: 0
1. LITTLE INTEREST OR PLEASURE IN DOING THINGS: 0
SUM OF ALL RESPONSES TO PHQ QUESTIONS 1-9: 0
SUM OF ALL RESPONSES TO PHQ9 QUESTIONS 1 & 2: 0
SUM OF ALL RESPONSES TO PHQ QUESTIONS 1-9: 0

## 2020-11-20 ASSESSMENT — ENCOUNTER SYMPTOMS
DIARRHEA: 0
VOMITING: 0
COUGH: 0
SHORTNESS OF BREATH: 0
SORE THROAT: 0
SINUS PRESSURE: 0
BACK PAIN: 1
NAUSEA: 0

## 2020-11-20 NOTE — PROGRESS NOTES
Doris Yoo is a 77 y.o. female who presents todayfor her medical conditions/complaints as noted below. Doris Yoo is here today c/o1 Year Follow Up      :     HPI    Routine follow up on PL she has lost some weight with reduced calorie intake. Past Medical History:   Diagnosis Date    Back pain 2012    Cellulitis 10/9/2015    Lt. Lower Leg    DM [facility]di[facility] mellitus) [facility] 2012    DVT (deep venous thrombosis) ([facility] 2012    GERD (gastroesophageal reflux disease) 2012    MVP (mitral valve prolapse) 2012      Past Surgical History:   Procedure Laterality Date    HYSTERECTOMY      TONSILLECTOMY       Family History   Problem Relation Age of Onset    Heart Disease Mother      Social History     Tobacco Use    Smoking status: Former Smoker     Packs/day: 1.00     Years: 30.00     Pack years: 30.00     Types: Cigarettes     Last attempt to quit: 1983     Years since quittin.4    Smokeless tobacco: Never Used   Substance Use Topics    Alcohol use: No      Current Outpatient Medications   Medication Sig Dispense Refill    apixaban (ELIQUIS) 5 MG TABS tablet TAKE 1 TABLET BY MOUTH TWO  TIMES DAILY 180 tablet 3    acetaminophen (TYLENOL) 500 MG tablet Take 500 mg by mouth every 6 hours as needed for Pain. Pt only takes 1-2 times per week      doxycycline hyclate (VIBRA-TABS) 100 MG tablet Take 1 tablet by mouth 2 times daily for 10 days 20 tablet 0    fluconazole (DIFLUCAN) 100 MG tablet Take 1 tablet by mouth daily as needed (yeast infection) Indications: Patient self diagnoses and takes fluconaole for yeast infections.  4 tablet 0    metoprolol succinate (TOPROL XL) 50 MG extended release tablet TAKE 1 TABLET BY MOUTH  DAILY 90 tablet 3    JANUMET -1000 MG TB24 TAKE 1 TABLET BY MOUTH  DAILY 90 tablet 3    DULoxetine (CYMBALTA) 30 MG extended release capsule TAKE 1 CAPSULE BY MOUTH  DAILY 90 capsule 3    hydroCHLOROthiazide (HYDRODIURIL) 25 MG tablet TAKE 1 TABLET BY MOUTH  DAILY 90 tablet 3    omeprazole (PRILOSEC) 40 MG delayed release capsule TAKE 1 CAPSULE BY MOUTH  EVERY DAY 90 capsule 3    glyBURIDE (DIABETA) 5 MG tablet TAKE 1 TABLET BY MOUTH  TWICE A DAY WITH MEALS 180 tablet 3    methocarbamol (ROBAXIN) 750 MG tablet TAKE 1 TABLET BY MOUTH THREE TIMES DAILY AS NEEDED FOR PAIN 60 tablet 3    gabapentin (NEURONTIN) 300 MG capsule Take 600 mg by mouth 2 times daily.  JANUVIA 100 MG tablet TAKE 1 TABLET BY MOUTH  EVERY DAY 90 tablet 3    glucose monitoring kit (FREESTYLE) monitoring kit Please fill with what is covered by ins Patient test once a day DM E11.9 1 kit 0    Glucose Blood (BLOOD GLUCOSE TEST STRIPS) STRP by Does not apply route 3 times daily. Freestyle [place] Test Strips       No current facility-administered medications for this visit. Allergies   Allergen Reactions    Erythromycin Hives    Lisinopril Itching         Subjective:   Review of Systems   Constitutional: Negative for chills, diaphoresis and fever. HENT: Negative for congestion, sinus pressure and sore throat. Eyes: Negative for visual disturbance. Respiratory: Negative for cough and shortness of breath. Cardiovascular: Positive for leg swelling. Negative for chest pain. Gastrointestinal: Negative for diarrhea, nausea and vomiting. Genitourinary: Negative for dysuria, menstrual problem, urgency and vaginal discharge. Musculoskeletal: Positive for back pain and gait problem. Negative for arthralgias and myalgias. Skin: Negative for rash. Neurological: Negative for weakness, numbness and headaches. Psychiatric/Behavioral: Negative for sleep disturbance.       :   /80   Pulse 86   Temp 97.9 °F (36.6 °C)   Wt (!) 410 lb (186 kg)   SpO2 96%   BMI 55.61 kg/m²     Physical Exam  Constitutional:       General: She is not in acute distress. Appearance: She is well-developed. She is not diaphoretic.    HENT:      Head: Normocephalic and atraumatic. Mouth/Throat:      Pharynx: No oropharyngeal exudate. Eyes:      General: No scleral icterus. Neck:      Musculoskeletal: Neck supple. Vascular: No carotid bruit. Cardiovascular:      Heart sounds: No murmur. No friction rub. No gallop. Pulmonary:      Effort: No respiratory distress. Breath sounds: No wheezing or rales. Chest:      Chest wall: No tenderness. Musculoskeletal:      Right lower leg: Edema present. Left lower leg: Edema present. Lymphadenopathy:      Cervical: No cervical adenopathy. Skin:     Findings: No rash. Neurological:      Mental Status: She is alert and oriented to person, place, and time. Cranial Nerves: No cranial nerve deficit. Gait: Gait abnormal.   Psychiatric:         Behavior: Behavior normal.         Thought Content: Thought content normal.         Judgment: Judgment normal.         Assessment:       Diagnosis Orders   1. Type 2 diabetes mellitus with other skin complication, without long-term current use of insulin (HCC)  CBC Auto Differential    Comprehensive Metabolic Panel    Lipid Panel    Hemoglobin A1C   2. Essential hypertension  CBC Auto Differential    Comprehensive Metabolic Panel    Lipid Panel         Plan:      No follow-ups on file. Orders Placed This Encounter   Procedures    CBC Auto Differential     Standing Status:   Future     Standing Expiration Date:   11/20/2021    Comprehensive Metabolic Panel     Standing Status:   Future     Standing Expiration Date:   11/20/2021    Lipid Panel     Standing Status:   Future     Standing Expiration Date:   11/20/2021     Order Specific Question:   Is Patient Fasting?/# of Hours     Answer:   12 hour fast    Hemoglobin A1C     Standing Status:   Future     Standing Expiration Date:   11/20/2021     No orders of the defined types were placed in this encounter. Update labs  Questions answered about vaccine. She will see new provider going forward.

## 2020-12-28 NOTE — TELEPHONE ENCOUNTER
Next Visit Date:  Future Appointments  Date Time Provider Larry Tsaii   3/21/2018 11:00 AM Daryl Cage, OT [facility] [address] Maintenance   Topic Date Due    Hepatitis C screen  1955    HIV screen  04/15/1968    DTaP/Tdap/Td vaccine (1 - Tdap) 04/15/1972    Pneumococcal med risk (1 of 1 - PPSV23) 04/15/1972    Shingles Vaccine (1 of 2 - 2 Dose Series) 04/15/2003    Diabetic retinal exam  05/05/2008    Diabetic microalbuminuria test  12/08/2014    Lipid screen  02/08/2017    Diabetic foot exam  05/24/2017    Flu vaccine (1) 09/01/2017    Breast cancer screen  02/22/2018    Potassium monitoring  07/05/2018    Creatinine monitoring  07/05/2018    A1C test (Diabetic or Prediabetic)  09/29/2018    Cervical cancer screen  02/22/2019    Colon cancer screen colonoscopy  02/22/2026       Hemoglobin A1C (%)   Date Value   09/29/2017 8.6   07/28/2016 6.7 (H)   05/19/2016 6.4             ( goal A1C is < 7)   No results found for: LABMICR  LDL Calculated (mg/dL)   Date Value   02/08/2016 85       (goal LDL is <100)   AST (U/L)   Date Value   07/02/2017 20     ALT (U/L)   Date Value   07/02/2017 21     BUN (mg/dL)   Date Value   07/05/2017 12     BP Readings from Last 3 Encounters:   10/16/17 138/72   07/05/17 124/79   05/25/17 (!) 140/80          (goal 120/80)    All Future Testing planned in [facility]  Lab Frequency Next Occurrence               Patient Active Problem List:     Diabetes mellitus (HCC)     GERD (gastroesophageal reflux disease)     Back pain     MVP (mitral valve prolapse)     DVT (deep venous thrombosis) (HCC)     Essential hypertension     HIGH CHOLESTEROL     Cellulitis of left lower extremity     Acute renal failure (ARF) (HCC)     Radiculopathy of lumbosacral region     Staghorn renal calculus     Foraminal stenosis of lumbar region     Acute cystitis     SIRS (systemic inflammatory response syndrome) (HCC)     Nausea and vomiting     Lymphedema of both lower
bilateral UE Active ROM was WFL  (within functional limits)

## 2021-02-26 ENCOUNTER — TELEPHONE (OUTPATIENT)
Dept: FAMILY MEDICINE CLINIC | Age: 66
End: 2021-02-26

## 2021-02-26 RX ORDER — DOXYCYCLINE HYCLATE 100 MG
100 TABLET ORAL 2 TIMES DAILY
Qty: 20 TABLET | Refills: 0 | Status: SHIPPED | OUTPATIENT
Start: 2021-02-26 | End: 2021-07-06 | Stop reason: SDUPTHER

## 2021-02-26 NOTE — TELEPHONE ENCOUNTER
Without seeing the leg, it could be cellulitis, venous congeston etc.      I have sent in doxy. She should try to be seen by dr. Nan Landaverde in 2 weeks.     As a side note, this patient has to be seen in the office 3-4 times a year at least based on her diagnosis alone and terrible lab results

## 2021-05-20 ENCOUNTER — OFFICE VISIT (OUTPATIENT)
Dept: FAMILY MEDICINE CLINIC | Age: 66
End: 2021-05-20
Payer: COMMERCIAL

## 2021-05-20 VITALS
HEIGHT: 72 IN | SYSTOLIC BLOOD PRESSURE: 138 MMHG | WEIGHT: 293 LBS | OXYGEN SATURATION: 96 % | BODY MASS INDEX: 39.68 KG/M2 | DIASTOLIC BLOOD PRESSURE: 80 MMHG | HEART RATE: 107 BPM

## 2021-05-20 DIAGNOSIS — Z71.82 EXERCISE COUNSELING: ICD-10-CM

## 2021-05-20 DIAGNOSIS — R22.41 MASS OF LOWER EXTREMITY, RIGHT: ICD-10-CM

## 2021-05-20 DIAGNOSIS — Z00.00 WELL ADULT EXAM: Primary | ICD-10-CM

## 2021-05-20 DIAGNOSIS — Z76.0 MEDICATION REFILL: ICD-10-CM

## 2021-05-20 DIAGNOSIS — E78.5 DYSLIPIDEMIA: ICD-10-CM

## 2021-05-20 DIAGNOSIS — E11.65 TYPE 2 DIABETES MELLITUS WITH HYPERGLYCEMIA, WITHOUT LONG-TERM CURRENT USE OF INSULIN (HCC): ICD-10-CM

## 2021-05-20 DIAGNOSIS — I10 HTN, GOAL BELOW 130/80: ICD-10-CM

## 2021-05-20 DIAGNOSIS — Z71.3 DIETARY COUNSELING: ICD-10-CM

## 2021-05-20 DIAGNOSIS — E66.01 SEVERE OBESITY (BMI >= 40) (HCC): ICD-10-CM

## 2021-05-20 PROCEDURE — 99397 PER PM REEVAL EST PAT 65+ YR: CPT | Performed by: FAMILY MEDICINE

## 2021-05-20 PROCEDURE — G8417 CALC BMI ABV UP PARAM F/U: HCPCS | Performed by: FAMILY MEDICINE

## 2021-05-20 RX ORDER — METHOCARBAMOL 750 MG/1
TABLET, FILM COATED ORAL
Qty: 180 TABLET | Refills: 0 | Status: SHIPPED | OUTPATIENT
Start: 2021-05-20 | End: 2022-10-18

## 2021-05-20 RX ORDER — DULAGLUTIDE 0.75 MG/.5ML
0.75 INJECTION, SOLUTION SUBCUTANEOUS WEEKLY
Qty: 2 PEN | Refills: 0 | COMMUNITY
Start: 2021-05-20 | End: 2021-05-28 | Stop reason: SDUPTHER

## 2021-05-20 RX ORDER — DOXYCYCLINE HYCLATE 100 MG
100 TABLET ORAL 2 TIMES DAILY
Qty: 20 TABLET | Refills: 0 | Status: SHIPPED | OUTPATIENT
Start: 2021-05-20 | End: 2021-05-30

## 2021-05-20 SDOH — ECONOMIC STABILITY: FOOD INSECURITY: WITHIN THE PAST 12 MONTHS, YOU WORRIED THAT YOUR FOOD WOULD RUN OUT BEFORE YOU GOT MONEY TO BUY MORE.: NEVER TRUE

## 2021-05-20 SDOH — ECONOMIC STABILITY: FOOD INSECURITY: WITHIN THE PAST 12 MONTHS, THE FOOD YOU BOUGHT JUST DIDN'T LAST AND YOU DIDN'T HAVE MONEY TO GET MORE.: NEVER TRUE

## 2021-05-20 ASSESSMENT — PATIENT HEALTH QUESTIONNAIRE - PHQ9
SUM OF ALL RESPONSES TO PHQ9 QUESTIONS 1 & 2: 0
SUM OF ALL RESPONSES TO PHQ QUESTIONS 1-9: 0
1. LITTLE INTEREST OR PLEASURE IN DOING THINGS: 0
SUM OF ALL RESPONSES TO PHQ QUESTIONS 1-9: 0
SUM OF ALL RESPONSES TO PHQ QUESTIONS 1-9: 0
2. FEELING DOWN, DEPRESSED OR HOPELESS: 0

## 2021-05-20 NOTE — PROGRESS NOTES
Progress Note    Dalila Patterson is a 74 y.o.  female who presents today alone for evaluation of   Chief Complaint   Patient presents with    Establish Care           HPI:   Patient is here to [facility] care today. Patient PMH DM type 2, obesity, HTN, dyslipidemia, GERD, OA, and DVT. Patient [address] hysterectomy & tonsillectomy. Patient fhx mom HD. Patient is a former smoker. Patient denies regular EtOH consumption. Patient denies illicits. Patient denies SIG E CAPS. Patient denies SI/HI. Patient denies anxiety. Patient denies specific diet. Patient denies regular aerobic activity. Patient is due for DEXA scan and mammogram. Patient is due for colon cancer screening. PHQ-9 Total Score: 0 (5/20/2021  3:50 PM)    Patient last HbA1c 10.7 8/2019. Patient does follow with podiatry. Patient denies n/t in her feet. Patient denies Rolena Field. Patient denies exertional calf cramping. Patient is due for PORSHA. Patient does not check her FBG. Patient reports large RLE mass for about 3 years after fall. Patient states it has been increasing in size. Patient states she does have pain with palpation. Patient needs a refill of her robaxin today. Patient states she frequently gets cellulitis due to her severe lymphedema and is requesting a refill of doxycycline to have at home.     Health Maintenance Due   Topic Date Due    Hepatitis C screen  Never done    COVID-19 Vaccine (1) Never done    DTaP/Tdap/Td vaccine (1 - Tdap) Never done    Shingles Vaccine (1 of 2) Never done    DEXA (modify frequency per FRAX score)  Never done    Diabetic retinal exam  05/05/2008    Diabetic microalbuminuria test  12/08/2014    Breast cancer screen  02/22/2018    A1C test (Diabetic or Prediabetic)  08/16/2020    Lipid screen  08/16/2020    Potassium monitoring  08/16/2020    Creatinine monitoring  08/16/2020    Diabetic foot exam  02/19/2021        Current Medications:     Current Outpatient Medications Medication Sig Dispense Refill    metFORMIN (GLUCOPHAGE) 1000 MG tablet Take 1 tablet by mouth 2 times daily (with meals) 180 tablet 1    methocarbamol (ROBAXIN) 750 MG tablet Twice daily prn for pain 180 tablet 0    Dulaglutide (TRULICITY) 3.46 CA/0.3EF SOPN Inject 0.75 mg into the skin once a week 2 pen 0    doxycycline hyclate (VIBRA-TABS) 100 MG tablet Take 1 tablet by mouth 2 times daily for 10 days 20 tablet 0    glyBURIDE (DIABETA) 5 MG tablet TAKE 1 TABLET BY MOUTH  TWICE A DAY WITH MEALS 180 tablet 1    gabapentin (NEURONTIN) 300 MG capsule TAKE 2 CAPSULES BY MOUTH 3  TIMES A  capsule 2    fluconazole (DIFLUCAN) 100 MG tablet Take 1 tablet by mouth daily as needed (yeast infection) Indications: Patient self diagnoses and takes fluconaole for yeast infections. 4 tablet 0    metoprolol succinate (TOPROL XL) 50 MG extended release tablet TAKE 1 TABLET BY MOUTH  DAILY 90 tablet 3    DULoxetine (CYMBALTA) 30 MG extended release capsule TAKE 1 CAPSULE BY MOUTH  DAILY 90 capsule 3    hydroCHLOROthiazide (HYDRODIURIL) 25 MG tablet TAKE 1 TABLET BY MOUTH  DAILY 90 tablet 3    omeprazole (PRILOSEC) 40 MG delayed release capsule TAKE 1 CAPSULE BY MOUTH  EVERY DAY 90 capsule 3    apixaban (ELIQUIS) 5 MG TABS tablet TAKE 1 TABLET BY MOUTH TWO  TIMES DAILY 180 tablet 3    glucose monitoring kit (FREESTYLE) monitoring kit Please fill with what is covered by ins Patient test once a day DM E11.9 1 kit 0    acetaminophen (TYLENOL) 500 MG tablet Take 500 mg by mouth every 6 hours as needed for Pain. Pt only takes 1-2 times per week      Glucose Blood (BLOOD GLUCOSE TEST STRIPS) STRP by Does not apply route 3 times daily. Freestyle [place] Test Strips       No current facility-administered medications for this visit. Allergies:      Allergies   Allergen Reactions    Erythromycin Hives    Lisinopril Itching        Medical History:     Past Medical History:   Diagnosis Date    Back pain 6/22/2012  Cellulitis 10/9/2015    Lt. Lower Leg    DM (diabetes mellitus) ([facility] 2012    [facility]    GERD (gastroesophageal reflux disease) 2012    MVP (mitral valve prolapse) 2012       Past Surgical History:   Procedure Laterality Date    HYSTERECTOMY      TONSILLECTOMY         Family History   Problem Relation Age of Onset    Heart Disease Mother         Social History:     Social History     Socioeconomic History    Marital status:      Spouse name: Not on file    Number of children: Not on file    Years of education: Not on file    Highest education level: Not on file   Occupational History    Not on file   Tobacco Use    Smoking status: Former Smoker     Packs/day: 1.00     Years: 30.00     Pack years: 30.00     Types: Cigarettes     Quit date: 1983     Years since quittin.9    Smokeless tobacco: Never Used   Vaping Use    Vaping Use: Never used   Substance and Sexual Activity    Alcohol use: No    Drug use: No    Sexual activity: Not on file   Other Topics Concern    Not on file   Social History Narrative    Not on file     Social Determinants of Health     Financial Resource Strain: Low Risk     Difficulty of Paying Living Expenses: Not hard at all   Food Insecurity: No Food Insecurity    Worried About Running Out of Food in the Last Year: Never true    Yanni of Food in the Last Year: Never true   Transportation Needs: No Transportation Needs    Lack of Transportation (Medical): No    Lack of Transportation (Non-Medical):  No   Physical Activity:     Days of Exercise per Week:     Minutes of Exercise per Session:    Stress:     Feeling of Stress :    Social Connections:     Frequency of Communication with Friends and Family:     Frequency of Social Gatherings with Friends and Family:     Attends Sikh Services:     Active Member of Clubs or Organizations:     Attends Club or Organization Meetings:     Marital Status:    Intimate Partner Violence:     Fear of Current or Ex-Partner:     Emotionally Abused:     Physically Abused:     Sexually Abused:         ROS:     Constitutional: No fevers, chills, fatigue. ENT: No nasal congestion or sore throat  Respiratory: No difficulty in breathing or cough. Cardiovascular: No chest pain, palpitations or shortness of breath  Gastrointestinal: No abdominal pain or change in bowel movements. Genitourinary: No change in urinary frequency or dysuria. Skin: No rashes or skin lesions. Neurological: No weakness. No headaches. MSK: +large mass RLE; +swelling b/l LE         Last Filed Vitals:  /80   Pulse 107   Ht 6' (1.829 m)   Wt (!) 396 lb 6 oz (179.8 kg)   SpO2 96%   BMI 53.76 kg/m²      Physical Examination:     GENERAL APPEARANCE: in no acute distress, well developed, well nourished. HEAD: normocephalic, atraumatic. EYES: extraocular movement intact (EOMI), pupils equal, round, reactive to light and accommodation. EARS: normal, tympanic membrane intact, clear, auditory canal clear. NOSE: nares patent, no erythema, sinuses nontender bilaterally, no rhinorrhea. ORAL CAVITY: mucosa moist, no lesions. THROAT: clear, no mass, no exudate. NECK/THYROID: neck supple, full range of motion, no thyromegaly. HEART: no murmurs, regular rate and rhythm, S1, S2 normal.   LUNGS: clear to auscultation bilaterally, no wheezes, rales, rhonchi.    ABDOMEN: normal, bowel sounds present, soft, nontender, nondistended, no rebound guarding or rigidity  MSK: +large mass RLE; +b/l 3-4+ LE pitting edema    Recent Labs/ In Office Testing/ Radiograph review:     Telephone on 09/11/2020   Component Date Value Ref Range Status    Color, UA 09/11/2020 Yellow   Final    Clarity, UA 09/11/2020 Clear   Final    Glucose, UA POC 09/11/2020 500   Final    Bilirubin, UA 09/11/2020 Negative   Final    Ketones, UA 09/11/2020 Negative   Final    Spec Grav, UA 09/11/2020 1.020   Final  Blood, UA POC 09/11/2020 Moderate   Final    pH, UA 09/11/2020 6.0   Final    Protein, UA POC 09/11/2020 30   Final    Urobilinogen, UA 09/11/2020 0.2   Final    Leukocytes, UA 09/11/2020 Negative   Final    Nitrite, UA 09/11/2020 Trace   Final       No results found for this visit on 05/20/21. Assessment/Plan:     Javier Weir was seen today for establish care. Diagnoses and all orders for this visit:    Well adult exam    Severe obesity (BMI >= 40) ([facility]    Exercise counseling    Dietary counseling  -      BMI ABOVE NORMAL F/U    Dyslipidemia  -     ALT; Future  -     AST; Future  -     CBC Auto Differential; Future  -     Lipid Panel; Future  -     TSH with Reflex; Future    HTN, goal below 130/80  -     Magnesium; Future  -     Renal Function Panel; Future    Type 2 diabetes mellitus with hyperglycemia, without long-term current use of insulin (HCC)  -     metFORMIN (GLUCOPHAGE) 1000 MG tablet; Take 1 tablet by mouth 2 times daily (with meals)  -     Hemoglobin A1C; Future  -     Microalbumin, Ur; Future  -     Dulaglutide (TRULICITY) 6.65 GV/7.0ET SOPN; Inject 0.75 mg into the skin once a week    Mass of lower extremity, right  -     MRI TIBIA FIBULA RIGHT W CONTRAST; Future    Medication refill  -     methocarbamol (ROBAXIN) 750 MG tablet; Twice daily prn for pain  -     doxycycline hyclate (VIBRA-TABS) 100 MG tablet; Take 1 tablet by mouth 2 times daily for 10 days    Follow up on labs. Refill of robaxin as above. Stop janumet. Start trulicity for tighter glycemic control. Reviewed HM. Patient declined all today. Reviewed benefit of screening tests. Patient declined. Encouraged well balanced diet. Encouraged as much aerobic activity as patient can tolerate. Encouraged patient to check her FBG. Mass is too large to [address]. All questions answered and addressed to patient satisfaction. Patient understands and agrees to the plan.      The patient was evaluated and treated today based on the osteopathic principle that each person is a unit of body, mind, and spirit, the body is capable of self-regulation, self-healing, and health maintenance and that structure and function are reciprocally interrelated. Follow-up:   Return in about 3 months (around 8/20/2021) for dm; 20 min appt. Carolina Vasquez D.O.    BMI was elevated today, and weight loss plan recommended is : conventional weight loss.

## 2021-05-20 NOTE — PATIENT INSTRUCTIONS

## 2021-05-28 DIAGNOSIS — E11.65 TYPE 2 DIABETES MELLITUS WITH HYPERGLYCEMIA, WITHOUT LONG-TERM CURRENT USE OF INSULIN (HCC): ICD-10-CM

## 2021-05-28 RX ORDER — DULAGLUTIDE 0.75 MG/.5ML
0.75 INJECTION, SOLUTION SUBCUTANEOUS WEEKLY
Qty: 2 PEN | Refills: 0 | COMMUNITY
Start: 2021-05-28 | End: 2021-06-24 | Stop reason: SDUPTHER

## 2021-06-03 DIAGNOSIS — R22.41 MASS OF RIGHT LOWER LEG: Primary | ICD-10-CM

## 2021-06-22 RX ORDER — GABAPENTIN 300 MG/1
CAPSULE ORAL
Qty: 180 CAPSULE | Refills: 0 | Status: SHIPPED | OUTPATIENT
Start: 2021-06-22 | End: 2021-06-30 | Stop reason: SDUPTHER

## 2021-06-22 NOTE — TELEPHONE ENCOUNTER
Christine Graham is calling to request a refill on the following medication(s):    Medication Request:  Requested Prescriptions     Pending Prescriptions Disp Refills    gabapentin (NEURONTIN) 300 MG capsule [Pharmacy Med Name: GABAPENTIN 300MG CAPSULE] 180 capsule 11     Sig: TAKE 2 CAPSULES BY MOUTH 3  TIMES DAILY       Last Visit Date (If Applicable):  9/20/8771    Next Visit Date:    8/19/2021

## 2021-06-24 DIAGNOSIS — E11.65 TYPE 2 DIABETES MELLITUS WITH HYPERGLYCEMIA, WITHOUT LONG-TERM CURRENT USE OF INSULIN (HCC): ICD-10-CM

## 2021-06-24 RX ORDER — DULAGLUTIDE 0.75 MG/.5ML
0.75 INJECTION, SOLUTION SUBCUTANEOUS WEEKLY
Qty: 3 PEN | Refills: 3 | Status: SHIPPED | OUTPATIENT
Start: 2021-06-24 | End: 2021-09-03 | Stop reason: SDUPTHER

## 2021-06-24 NOTE — TELEPHONE ENCOUNTER
Magnesium Once 05/20/2021   Microalbumin, Ur Once 05/20/2021   Renal Function Panel Once 05/20/2021   TSH with Reflex Once 05/20/2021   MRI TIBIA FIBULA RIGHT W WO CONTRAST Once 06/03/2021               Patient Active Problem List:     Type 2 diabetes mellitus with skin complication, without long-term current use of insulin ([facility])     GERD (gastroesophageal reflux disease)     Back pain     MVP (mitral valve prolapse)     DVT (deep venous thrombosis) ([facility])     Essential hypertension     HIGH CHOLESTEROL     Left leg cellulitis     Acute renal failure (ARF) ([facility])     Radiculopathy of lumbosacral region     Staghorn renal calculus     Foraminal stenosis of lumbar region     Acute cystitis     SIRS (systemic inflammatory response syndrome) ([facility])     Nausea and vomiting     Lymphedema of both lower extremities     Leukocytosis     Morbid obesity with BMI of 60.0-69.9, adult ([address]     Chronic hypoxemic respiratory failure ([address]     Pulmonary hypertension ([address]

## 2021-06-30 DIAGNOSIS — E11.42 DIABETIC POLYNEUROPATHY ASSOCIATED WITH TYPE 2 DIABETES MELLITUS (HCC): Primary | ICD-10-CM

## 2021-06-30 RX ORDER — GABAPENTIN 300 MG/1
CAPSULE ORAL
Qty: 180 CAPSULE | Refills: 0 | Status: SHIPPED | OUTPATIENT
Start: 2021-06-30 | End: 2021-08-05 | Stop reason: SDUPTHER

## 2021-06-30 NOTE — TELEPHONE ENCOUNTER
Sae Jonse is calling to request a refill on the following medication(s):    Medication Request:  Requested Prescriptions     Pending Prescriptions Disp Refills    gabapentin (NEURONTIN) 300 MG capsule 180 capsule 0       Last Visit Date (If Applicable):  0/36/2071    Next Visit Date:    8/19/2021

## 2021-07-01 ENCOUNTER — TELEPHONE (OUTPATIENT)
Dept: FAMILY MEDICINE CLINIC | Age: 66
End: 2021-07-01

## 2021-07-01 DIAGNOSIS — R22.41 MASS OF RIGHT LOWER LEG: Primary | ICD-10-CM

## 2021-07-01 NOTE — TELEPHONE ENCOUNTER
Will refer to [address] [place] [address]. Referral placed to Dr. Celena Person. Please have patient hold off on MRI until she sees Dr. Celena Person.

## 2021-07-01 NOTE — TELEPHONE ENCOUNTER
Tib/Fib MRI - Insurance is denying unless you can call for peer to peer. Phone#: [phone]  Case Number: [id number]    Patient scheduled July 8th For MRI    Pre-Cert Tamra Trujillo, [phone] with answer of calling or not calling for this.

## 2021-07-06 ENCOUNTER — TELEPHONE (OUTPATIENT)
Dept: FAMILY MEDICINE CLINIC | Age: 66
End: 2021-07-06

## 2021-07-06 DIAGNOSIS — L08.9 SKIN INFECTION: Primary | ICD-10-CM

## 2021-07-06 RX ORDER — DOXYCYCLINE HYCLATE 100 MG
100 TABLET ORAL 2 TIMES DAILY
Qty: 20 TABLET | Refills: 0 | Status: SHIPPED | OUTPATIENT
Start: 2021-07-06 | End: 2021-08-17 | Stop reason: SDUPTHER

## 2021-07-06 NOTE — TELEPHONE ENCOUNTER
----- Message from Tri Kimball sent at 7/6/2021  1:49 PM EDT -----  Subject: Refill Request    QUESTIONS  Name of Medication? doxycycline hyclate (VIBRA-TABS) 100 MG tablet  Patient-reported dosage and instructions? 100mg   How many days do you have left? 0  [facility]? [id number]  Pharmacy phone number (if available)? [phone]  ---------------------------------------------------------------------------  --------------  Jaime Muse INFO  What is the best way for the office to contact you? OK to leave message on   voicemail  Preferred Call Back Phone Number?  [phone]

## 2021-07-29 RX ORDER — GLYBURIDE 5 MG/1
TABLET ORAL
Qty: 180 TABLET | Refills: 1 | Status: SHIPPED | OUTPATIENT
Start: 2021-07-29 | End: 2022-04-08 | Stop reason: SDUPTHER

## 2021-07-29 NOTE — TELEPHONE ENCOUNTER
Doris Yoo is calling to request a refill on the following medication(s):    Medication Request:  Requested Prescriptions     Pending Prescriptions Disp Refills    glyBURIDE (DIABETA) 5 MG tablet [Pharmacy Med Name: GLYBURIDE  5MG  TAB] 180 tablet 1     Sig: TAKE 1 TABLET BY MOUTH  TWICE DAILY WITH MEALS       Last Visit Date (If Applicable):  8/63/9121    Next Visit Date:    8/19/2021

## 2021-08-05 DIAGNOSIS — Z76.0 MEDICATION REFILL: ICD-10-CM

## 2021-08-05 DIAGNOSIS — G89.29 CHRONIC BILATERAL LOW BACK PAIN: ICD-10-CM

## 2021-08-05 DIAGNOSIS — E11.42 DIABETIC POLYNEUROPATHY ASSOCIATED WITH TYPE 2 DIABETES MELLITUS (HCC): ICD-10-CM

## 2021-08-05 DIAGNOSIS — E11.65 TYPE 2 DIABETES MELLITUS WITH HYPERGLYCEMIA, WITHOUT LONG-TERM CURRENT USE OF INSULIN (HCC): ICD-10-CM

## 2021-08-05 DIAGNOSIS — M54.50 CHRONIC BILATERAL LOW BACK PAIN: ICD-10-CM

## 2021-08-05 RX ORDER — OMEPRAZOLE 40 MG/1
CAPSULE, DELAYED RELEASE ORAL
Qty: 90 CAPSULE | Refills: 1 | Status: SHIPPED | OUTPATIENT
Start: 2021-08-05 | End: 2022-01-10

## 2021-08-05 RX ORDER — METOPROLOL SUCCINATE 50 MG/1
50 TABLET, EXTENDED RELEASE ORAL DAILY
Qty: 90 TABLET | Refills: 1 | Status: SHIPPED | OUTPATIENT
Start: 2021-08-05 | End: 2022-04-08 | Stop reason: SDUPTHER

## 2021-08-05 RX ORDER — HYDROCHLOROTHIAZIDE 25 MG/1
TABLET ORAL
Qty: 90 TABLET | Refills: 1 | Status: SHIPPED | OUTPATIENT
Start: 2021-08-05 | End: 2021-12-13

## 2021-08-05 RX ORDER — DULOXETIN HYDROCHLORIDE 30 MG/1
30 CAPSULE, DELAYED RELEASE ORAL DAILY
Qty: 90 CAPSULE | Refills: 1 | Status: SHIPPED | OUTPATIENT
Start: 2021-08-05 | End: 2021-12-13

## 2021-08-05 RX ORDER — GABAPENTIN 300 MG/1
CAPSULE ORAL
Qty: 180 CAPSULE | Refills: 1 | Status: SHIPPED | OUTPATIENT
Start: 2021-08-05 | End: 2021-08-23 | Stop reason: SDUPTHER

## 2021-08-17 ENCOUNTER — TELEPHONE (OUTPATIENT)
Dept: FAMILY MEDICINE CLINIC | Age: 66
End: 2021-08-17

## 2021-08-17 DIAGNOSIS — L08.9 SKIN INFECTION: ICD-10-CM

## 2021-08-17 RX ORDER — DOXYCYCLINE HYCLATE 100 MG
100 TABLET ORAL 2 TIMES DAILY
Qty: 20 TABLET | Refills: 0 | Status: SHIPPED | OUTPATIENT
Start: 2021-08-17 | End: 2021-08-27

## 2021-08-17 RX ORDER — FLUCONAZOLE 100 MG/1
100 TABLET ORAL DAILY PRN
Qty: 4 TABLET | Refills: 0 | Status: SHIPPED | OUTPATIENT
Start: 2021-08-17 | End: 2022-10-18

## 2021-08-17 NOTE — TELEPHONE ENCOUNTER
What do you think in regards to all of her symptoms she has broken out in a sweat twice since this am also what about getting someone to do a home draw for her labs

## 2021-08-17 NOTE — TELEPHONE ENCOUNTER
Needs to reschedule appt for 3-4 weeks out. She could perform an open MRI however she will have to check with insurance if that is covered and where she could go to do that. Refills sent. Will need to keep appt in 3-4 weeks for any future refills.

## 2021-08-17 NOTE — TELEPHONE ENCOUNTER
Patient wants to know if we can try to set up a home draw for her labs, also she had to stop taking the metformin she took it for two moth and is still having bad diarrhea and also she had another infection so she needs a refill on the two meds I pended, she also cancelled her appt for Thursday because she has been sick since last Thursday with a snotty nose then she gets the chills, vomiting ( just phlegm), a little cough little productive with a greenish tint, body aches, these were all last week and then today she has the sweats, nose is getting better and cough is please advise also she is wanting to know if you know where she can go to get a mri without having to put her whole body in it selacy said it gives her anxiety and she just cant do it

## 2021-08-17 NOTE — TELEPHONE ENCOUNTER
Sweating is non-specific. Could be quite a few things. If she is not improving I would recommend a VV. I would need a medical indication for her to have her labs drawn at home. I can ask emily to see if this is something she would qualify for. Erika Machuca,    Would there be anyway we could get this patients labs drawn at home? Would [facility] do this?     Tx

## 2021-08-22 DIAGNOSIS — E11.42 DIABETIC POLYNEUROPATHY ASSOCIATED WITH TYPE 2 DIABETES MELLITUS (HCC): ICD-10-CM

## 2021-08-22 NOTE — LETTER
David Bradley, DO  [facility]  [address] [facility] [address]  Dept: [phone]    8/23/2021    Gilles Alvarez  [facility] [facility] [address]      Dear Gilles Alvarez    In addition to helping you feel better when you are sick, we are interested in preventing illness and injury in the first place. In the spirit of maintaining your good health, your record indicates that you are due for an appointment.        Please call [phone] to schedule     I look forward to seeing you soon     Sincerely,       [facility]

## 2021-08-23 ENCOUNTER — TELEPHONE (OUTPATIENT)
Dept: FAMILY MEDICINE CLINIC | Age: 66
End: 2021-08-23

## 2021-08-23 ENCOUNTER — CARE COORDINATION (OUTPATIENT)
Dept: CARE COORDINATION | Age: 66
End: 2021-08-23

## 2021-08-23 DIAGNOSIS — S81.801A OPEN WOUND OF RIGHT LOWER EXTREMITY, INITIAL ENCOUNTER: Primary | ICD-10-CM

## 2021-08-23 RX ORDER — GABAPENTIN 300 MG/1
CAPSULE ORAL
Qty: 180 CAPSULE | Refills: 0 | Status: SHIPPED | OUTPATIENT
Start: 2021-08-23 | End: 2021-09-20

## 2021-08-23 RX ORDER — GABAPENTIN 300 MG/1
CAPSULE ORAL
Qty: 180 CAPSULE | Refills: 11 | OUTPATIENT
Start: 2021-08-23

## 2021-08-23 NOTE — TELEPHONE ENCOUNTER
She had that lump on her leg that you wanted her to get a mri of well Friday evening it was super swollen and it had a blue little head on in, and she touched it and it came open and it started oozing and has oozed all weekend it is better but still red on her leg and a little warm to the touch and this is the opposite leg that she gets the cellulitis in she was going to call wound care to get it but she currently doesn't have a ride she is waiting on a vehicle to be fixed, please advise

## 2021-08-23 NOTE — TELEPHONE ENCOUNTER
Next Visit Date:  No future appointments.     Health Maintenance   Topic Date Due    Hepatitis C screen  Never done    COVID-19 Vaccine (1) Never done    DTaP/Tdap/Td vaccine (1 - Tdap) Never done    Shingles Vaccine (1 of 2) Never done    DEXA (modify frequency per FRAX score)  Never done    Diabetic retinal exam  05/05/2008    Diabetic microalbuminuria test  12/08/2014    Breast cancer screen  02/22/2018    Pneumococcal 65+ years Vaccine (1 of 1 - PPSV23) Never done    A1C test (Diabetic or Prediabetic)  08/16/2020    Lipid screen  08/16/2020    Potassium monitoring  08/16/2020    Creatinine monitoring  08/16/2020    Diabetic foot exam  02/19/2021    Flu vaccine (1) 09/01/2021    Colon cancer screen colonoscopy  02/22/2026    Hepatitis A vaccine  Aged Out    Hib vaccine  Aged Out    Meningococcal (ACWY) vaccine  Aged Out       Hemoglobin A1C (%)   Date Value   08/16/2019 10.7   05/13/2019 10.3 (H)   05/12/2019 10.5 (H)             ( goal A1C is < 7)   No results found for: LABMICR  LDL Calculated (mg/dL)   Date Value   08/16/2019 73   08/15/2018 67       (goal LDL is <100)   AST (U/L)   Date Value   08/16/2019 24     ALT (U/L)   Date Value   08/16/2019 14     BUN (mg/dL)   Date Value   08/16/2019 13     BP Readings from Last 3 Encounters:   05/20/21 138/80   11/20/20 130/80   04/21/20 (!) 150/94          (goal 120/80)    All Future Testing planned in [facility]  Lab Frequency Next Occurrence   CBC Auto Differential Once 11/20/2020   Comprehensive Metabolic Panel Once 09/13/2483   Lipid Panel Once 11/20/2020   Hemoglobin A1C Once 11/20/2020   MRI TIBIA FIBULA RIGHT W CONTRAST Once 05/20/2021   ALT Once 05/20/2021   AST Once 05/20/2021   CBC Auto Differential Once 05/20/2021   Hemoglobin A1C Once 05/20/2021   Lipid Panel Once 05/20/2021   Magnesium Once 05/20/2021   Microalbumin, Ur Once 05/20/2021   Renal Function Panel Once 05/20/2021   TSH with Reflex Once 05/20/2021   MRI TIBIA FIBULA RIGHT W WO CONTRAST Once 06/03/2021               Patient Active Problem List:     Type 2 diabetes mellitus with skin complication, without long-term current use of insulin ([facility])     GERD (gastroesophageal reflux disease)     Back pain     MVP (mitral valve prolapse)     DVT (deep venous thrombosis) ([facility])     Essential hypertension     HIGH CHOLESTEROL     Left leg cellulitis     Acute renal failure (ARF) ([facility])     Radiculopathy of lumbosacral region     Staghorn renal calculus     Foraminal stenosis of lumbar region     Acute cystitis     SIRS (systemic inflammatory response syndrome) ([facility])     Nausea and vomiting     Lymphedema of both lower extremities     Leukocytosis     Morbid obesity with BMI of 60.0-69.9, adult ([address]     Chronic hypoxemic respiratory failure ([address]     Pulmonary hypertension ([address]

## 2021-08-31 ENCOUNTER — TELEPHONE (OUTPATIENT)
Dept: FAMILY MEDICINE CLINIC | Age: 66
End: 2021-08-31

## 2021-08-31 DIAGNOSIS — S81.801A OPEN WOUND OF RIGHT LOWER EXTREMITY, INITIAL ENCOUNTER: Primary | ICD-10-CM

## 2021-09-01 ENCOUNTER — TELEPHONE (OUTPATIENT)
Dept: FAMILY MEDICINE CLINIC | Age: 66
End: 2021-09-01

## 2021-09-01 NOTE — TELEPHONE ENCOUNTER
[facility] cannot accept patients case until she has a face to face encounter with you. Phone visits will not be taken. Also they need specific orders for the patient.  After the appt we will need to send a new referral,  Clinical notes, demographics to them

## 2021-09-01 NOTE — TELEPHONE ENCOUNTER
Appt made for 9/13/21. Pt says that a couple of months ago [facility] tried to set up home draw for her labs but she has never heard from them. Can the labs wait until homecare starts coming in to her home, or do we need to try and get the home draw set up? Authored by Resident/PA/NP

## 2021-09-03 ENCOUNTER — HOSPITAL ENCOUNTER (INPATIENT)
Age: 66
LOS: 3 days | Discharge: HOME | DRG: 579 | End: 2021-09-06
Attending: EMERGENCY MEDICINE | Admitting: INTERNAL MEDICINE
Payer: COMMERCIAL

## 2021-09-03 ENCOUNTER — HOSPITAL ENCOUNTER (OUTPATIENT)
Age: 66
Setting detail: SPECIMEN
Discharge: HOME | End: 2021-09-03
Payer: COMMERCIAL

## 2021-09-03 ENCOUNTER — HOSPITAL ENCOUNTER (OUTPATIENT)
Dept: CT IMAGING | Facility: CLINIC | Age: 66
Discharge: HOME | End: 2021-09-05
Payer: COMMERCIAL

## 2021-09-03 ENCOUNTER — HOSPITAL ENCOUNTER (OUTPATIENT)
Dept: ULTRASOUND IMAGING | Facility: CLINIC | Age: 66
Discharge: HOME | End: 2021-09-05
Payer: COMMERCIAL

## 2021-09-03 ENCOUNTER — TELEPHONE (OUTPATIENT)
Dept: FAMILY MEDICINE CLINIC | Age: 66
End: 2021-09-03

## 2021-09-03 ENCOUNTER — OFFICE VISIT (OUTPATIENT)
Dept: FAMILY MEDICINE CLINIC | Age: 66
End: 2021-09-03
Payer: COMMERCIAL

## 2021-09-03 VITALS
TEMPERATURE: 97.2 F | DIASTOLIC BLOOD PRESSURE: 82 MMHG | WEIGHT: 293 LBS | BODY MASS INDEX: 49.1 KG/M2 | OXYGEN SATURATION: 96 % | SYSTOLIC BLOOD PRESSURE: 136 MMHG | HEART RATE: 100 BPM

## 2021-09-03 DIAGNOSIS — L03.115 CELLULITIS OF RIGHT LOWER EXTREMITY: Primary | ICD-10-CM

## 2021-09-03 DIAGNOSIS — R22.41 LEG MASS, RIGHT: ICD-10-CM

## 2021-09-03 DIAGNOSIS — L03.115 CELLULITIS OF RIGHT LOWER EXTREMITY: ICD-10-CM

## 2021-09-03 DIAGNOSIS — R22.41 LOCALIZED SWELLING OF RIGHT LOWER EXTREMITY: ICD-10-CM

## 2021-09-03 DIAGNOSIS — L02.415 ABSCESS OF RIGHT LEG: ICD-10-CM

## 2021-09-03 DIAGNOSIS — L02.419 LEG ABSCESS: Primary | ICD-10-CM

## 2021-09-03 DIAGNOSIS — E11.65 TYPE 2 DIABETES MELLITUS WITH HYPERGLYCEMIA, WITHOUT LONG-TERM CURRENT USE OF INSULIN (HCC): ICD-10-CM

## 2021-09-03 LAB
-: NORMAL
ABSOLUTE EOS #: 0.08 K/UL (ref 0–0.44)
ABSOLUTE EOS #: 0.09 K/UL (ref 0–0.44)
ABSOLUTE IMMATURE GRANULOCYTE: 0.05 K/UL (ref 0–0.3)
ABSOLUTE IMMATURE GRANULOCYTE: 0.06 K/UL (ref 0–0.3)
ABSOLUTE LYMPH #: 1.31 K/UL (ref 1.1–3.7)
ABSOLUTE LYMPH #: 1.43 K/UL (ref 1.1–3.7)
ABSOLUTE MONO #: 0.89 K/UL (ref 0.1–1.2)
ABSOLUTE MONO #: 0.9 K/UL (ref 0.1–1.2)
ALBUMIN SERPL-MCNC: 3.5 G/DL (ref 3.5–5.2)
ALT SERPL-CCNC: 8 U/L (ref 5–33)
ANION GAP SERPL CALCULATED.3IONS-SCNC: 18 MMOL/L (ref 9–17)
ANION GAP SERPL CALCULATED.3IONS-SCNC: 21 MMOL/L (ref 9–17)
AST SERPL-CCNC: 17 U/L
BASOPHILS # BLD: 1 % (ref 0–2)
BASOPHILS # BLD: 1 % (ref 0–2)
BASOPHILS ABSOLUTE: 0.06 K/UL (ref 0–0.2)
BASOPHILS ABSOLUTE: 0.06 K/UL (ref 0–0.2)
BUN BLDV-MCNC: 14 MG/DL (ref 8–23)
BUN BLDV-MCNC: 16 MG/DL (ref 8–23)
BUN/CREAT BLD: 11 (ref 9–20)
BUN/CREAT BLD: ABNORMAL (ref 9–20)
C-REACTIVE PROTEIN: 54.8 MG/L (ref 0–5)
CALCIUM SERPL-MCNC: 9.5 MG/DL (ref 8.6–10.4)
CALCIUM SERPL-MCNC: 9.6 MG/DL (ref 8.6–10.4)
CHLORIDE BLD-SCNC: 94 MMOL/L (ref 98–107)
CHLORIDE BLD-SCNC: 96 MMOL/L (ref 98–107)
CO2: 20 MMOL/L (ref 20–31)
CO2: 22 MMOL/L (ref 20–31)
CREAT SERPL-MCNC: 1.09 MG/DL (ref 0.5–0.9)
CREAT SERPL-MCNC: 1.45 MG/DL (ref 0.5–0.9)
DIFFERENTIAL TYPE: ABNORMAL
DIFFERENTIAL TYPE: ABNORMAL
EOSINOPHILS RELATIVE PERCENT: 1 % (ref 1–4)
EOSINOPHILS RELATIVE PERCENT: 1 % (ref 1–4)
GFR AFRICAN AMERICAN: 44 ML/MIN
GFR AFRICAN AMERICAN: >60 ML/MIN
GFR NON-AFRICAN AMERICAN: 36 ML/MIN
GFR NON-AFRICAN AMERICAN: 50 ML/MIN
GFR SERPL CREATININE-BSD FRML MDRD: ABNORMAL ML/MIN/{1.73_M2}
GLUCOSE BLD-MCNC: 275 MG/DL (ref 70–99)
GLUCOSE BLD-MCNC: 343 MG/DL (ref 65–105)
GLUCOSE BLD-MCNC: 345 MG/DL (ref 70–99)
HCT VFR BLD CALC: 41.6 % (ref 36.3–47.1)
HCT VFR BLD CALC: 42.2 % (ref 36.3–47.1)
HEMOGLOBIN: 13.2 G/DL (ref 11.9–15.1)
HEMOGLOBIN: 13.6 G/DL (ref 11.9–15.1)
IMMATURE GRANULOCYTES: 1 %
IMMATURE GRANULOCYTES: 1 %
LACTIC ACID, SEPSIS WHOLE BLOOD: ABNORMAL MMOL/L (ref 0.5–1.9)
LACTIC ACID, SEPSIS: 2.5 MMOL/L (ref 0.5–1.9)
LACTIC ACID: 2.4 MMOL/L (ref 0.5–2.2)
LACTIC ACID: 5 MMOL/L (ref 0.5–2.2)
LYMPHOCYTES # BLD: 14 % (ref 24–43)
LYMPHOCYTES # BLD: 14 % (ref 24–43)
MAGNESIUM: 1.5 MG/DL (ref 1.6–2.6)
MCH RBC QN AUTO: 28.9 PG (ref 25.2–33.5)
MCH RBC QN AUTO: 29.2 PG (ref 25.2–33.5)
MCHC RBC AUTO-ENTMCNC: 31.7 G/DL (ref 28.4–34.8)
MCHC RBC AUTO-ENTMCNC: 32.2 G/DL (ref 28.4–34.8)
MCV RBC AUTO: 90.6 FL (ref 82.6–102.9)
MCV RBC AUTO: 91.2 FL (ref 82.6–102.9)
MONOCYTES # BLD: 9 % (ref 3–12)
MONOCYTES # BLD: 9 % (ref 3–12)
NRBC AUTOMATED: 0 PER 100 WBC
NRBC AUTOMATED: 0 PER 100 WBC
PDW BLD-RTO: 13.2 % (ref 11.8–14.4)
PDW BLD-RTO: 13.2 % (ref 11.8–14.4)
PHOSPHORUS: 3.4 MG/DL (ref 2.6–4.5)
PLATELET # BLD: 454 K/UL (ref 138–453)
PLATELET # BLD: 472 K/UL (ref 138–453)
PLATELET ESTIMATE: ABNORMAL
PLATELET ESTIMATE: ABNORMAL
PMV BLD AUTO: 9.3 FL (ref 8.1–13.5)
PMV BLD AUTO: 9.8 FL (ref 8.1–13.5)
POC CREATININE: 1.2 MG/DL (ref 0.6–1.4)
POTASSIUM SERPL-SCNC: 4 MMOL/L (ref 3.7–5.3)
POTASSIUM SERPL-SCNC: 4.1 MMOL/L (ref 3.7–5.3)
RBC # BLD: 4.56 M/UL (ref 3.95–5.11)
RBC # BLD: 4.66 M/UL (ref 3.95–5.11)
RBC # BLD: ABNORMAL 10*6/UL
RBC # BLD: ABNORMAL 10*6/UL
REASON FOR REJECTION: NORMAL
SEDIMENTATION RATE, ERYTHROCYTE: 107 MM (ref 0–30)
SEG NEUTROPHILS: 74 % (ref 36–65)
SEG NEUTROPHILS: 74 % (ref 36–65)
SEGMENTED NEUTROPHILS ABSOLUTE COUNT: 7.25 K/UL (ref 1.5–8.1)
SEGMENTED NEUTROPHILS ABSOLUTE COUNT: 7.75 K/UL (ref 1.5–8.1)
SODIUM BLD-SCNC: 134 MMOL/L (ref 135–144)
SODIUM BLD-SCNC: 137 MMOL/L (ref 135–144)
TOTAL CK: 30 U/L (ref 26–192)
WBC # BLD: 10.3 K/UL (ref 3.5–11.3)
WBC # BLD: 9.7 K/UL (ref 3.5–11.3)
WBC # BLD: ABNORMAL 10*3/UL
WBC # BLD: ABNORMAL 10*3/UL
ZZ NTE CLEAN UP: ORDERED TEST: NORMAL
ZZ NTE WITH NAME CLEAN UP: SPECIMEN SOURCE: NORMAL

## 2021-09-03 PROCEDURE — 4040F PNEUMOC VAC/ADMIN/RCVD: CPT | Performed by: FAMILY MEDICINE

## 2021-09-03 PROCEDURE — 82947 ASSAY GLUCOSE BLOOD QUANT: CPT

## 2021-09-03 PROCEDURE — 87040 BLOOD CULTURE FOR BACTERIA: CPT

## 2021-09-03 PROCEDURE — 36415 COLL VENOUS BLD VENIPUNCTURE: CPT

## 2021-09-03 PROCEDURE — G8427 DOCREV CUR MEDS BY ELIG CLIN: HCPCS | Performed by: FAMILY MEDICINE

## 2021-09-03 PROCEDURE — 6370000000 HC RX 637 (ALT 250 FOR IP): Performed by: NURSE PRACTITIONER

## 2021-09-03 PROCEDURE — 96365 THER/PROPH/DIAG IV INF INIT: CPT

## 2021-09-03 PROCEDURE — G8400 PT W/DXA NO RESULTS DOC: HCPCS | Performed by: FAMILY MEDICINE

## 2021-09-03 PROCEDURE — 83036 HEMOGLOBIN GLYCOSYLATED A1C: CPT

## 2021-09-03 PROCEDURE — 2580000003 HC RX 258: Performed by: FAMILY MEDICINE

## 2021-09-03 PROCEDURE — 1036F TOBACCO NON-USER: CPT | Performed by: FAMILY MEDICINE

## 2021-09-03 PROCEDURE — 93971 EXTREMITY STUDY: CPT

## 2021-09-03 PROCEDURE — 1123F ACP DISCUSS/DSCN MKR DOCD: CPT | Performed by: FAMILY MEDICINE

## 2021-09-03 PROCEDURE — 3017F COLORECTAL CA SCREEN DOC REV: CPT | Performed by: FAMILY MEDICINE

## 2021-09-03 PROCEDURE — 83605 ASSAY OF LACTIC ACID: CPT

## 2021-09-03 PROCEDURE — 73701 CT LOWER EXTREMITY W/DYE: CPT

## 2021-09-03 PROCEDURE — 99222 1ST HOSP IP/OBS MODERATE 55: CPT | Performed by: NURSE PRACTITIONER

## 2021-09-03 PROCEDURE — 1090F PRES/ABSN URINE INCON ASSESS: CPT | Performed by: FAMILY MEDICINE

## 2021-09-03 PROCEDURE — 99214 OFFICE O/P EST MOD 30 MIN: CPT | Performed by: FAMILY MEDICINE

## 2021-09-03 PROCEDURE — G8417 CALC BMI ABV UP PARAM F/U: HCPCS | Performed by: FAMILY MEDICINE

## 2021-09-03 PROCEDURE — 6360000002 HC RX W HCPCS: Performed by: EMERGENCY MEDICINE

## 2021-09-03 PROCEDURE — 85025 COMPLETE CBC W/AUTO DIFF WBC: CPT

## 2021-09-03 PROCEDURE — 6360000004 HC RX CONTRAST MEDICATION: Performed by: FAMILY MEDICINE

## 2021-09-03 PROCEDURE — 99282 EMERGENCY DEPT VISIT SF MDM: CPT

## 2021-09-03 PROCEDURE — 80048 BASIC METABOLIC PNL TOTAL CA: CPT

## 2021-09-03 PROCEDURE — 82565 ASSAY OF CREATININE: CPT

## 2021-09-03 PROCEDURE — 96372 THER/PROPH/DIAG INJ SC/IM: CPT | Performed by: FAMILY MEDICINE

## 2021-09-03 PROCEDURE — 2580000003 HC RX 258: Performed by: EMERGENCY MEDICINE

## 2021-09-03 PROCEDURE — 2580000003 HC RX 258: Performed by: NURSE PRACTITIONER

## 2021-09-03 PROCEDURE — 1200000000 HC SEMI PRIVATE

## 2021-09-03 RX ORDER — SODIUM CHLORIDE 0.9 % (FLUSH) 0.9 %
10 SYRINGE (ML) INJECTION PRN
Status: DISCONTINUED | OUTPATIENT
Start: 2021-09-03 | End: 2021-09-03 | Stop reason: SDUPTHER

## 2021-09-03 RX ORDER — HYDROCHLOROTHIAZIDE 25 MG/1
25 TABLET ORAL DAILY
Status: DISCONTINUED | OUTPATIENT
Start: 2021-09-04 | End: 2021-09-06 | Stop reason: HOSPADM

## 2021-09-03 RX ORDER — CEFTRIAXONE 500 MG/1
500 INJECTION, POWDER, FOR SOLUTION INTRAMUSCULAR; INTRAVENOUS ONCE
Status: COMPLETED | OUTPATIENT
Start: 2021-09-03 | End: 2021-09-03

## 2021-09-03 RX ORDER — POTASSIUM CHLORIDE 7.45 MG/ML
10 INJECTION INTRAVENOUS PRN
Status: DISCONTINUED | OUTPATIENT
Start: 2021-09-03 | End: 2021-09-06 | Stop reason: HOSPADM

## 2021-09-03 RX ORDER — POTASSIUM CHLORIDE 20 MEQ/1
40 TABLET, EXTENDED RELEASE ORAL PRN
Status: DISCONTINUED | OUTPATIENT
Start: 2021-09-03 | End: 2021-09-06 | Stop reason: HOSPADM

## 2021-09-03 RX ORDER — ACETAMINOPHEN 325 MG/1
650 TABLET ORAL EVERY 6 HOURS PRN
Status: DISCONTINUED | OUTPATIENT
Start: 2021-09-03 | End: 2021-09-06 | Stop reason: HOSPADM

## 2021-09-03 RX ORDER — 0.9 % SODIUM CHLORIDE 0.9 %
80 INTRAVENOUS SOLUTION INTRAVENOUS ONCE
Status: COMPLETED | OUTPATIENT
Start: 2021-09-03 | End: 2021-09-03

## 2021-09-03 RX ORDER — PANTOPRAZOLE SODIUM 40 MG/1
40 TABLET, DELAYED RELEASE ORAL
Status: DISCONTINUED | OUTPATIENT
Start: 2021-09-04 | End: 2021-09-06 | Stop reason: HOSPADM

## 2021-09-03 RX ORDER — ONDANSETRON 2 MG/ML
4 INJECTION INTRAMUSCULAR; INTRAVENOUS EVERY 6 HOURS PRN
Status: DISCONTINUED | OUTPATIENT
Start: 2021-09-03 | End: 2021-09-06 | Stop reason: HOSPADM

## 2021-09-03 RX ORDER — SODIUM CHLORIDE 9 MG/ML
INJECTION, SOLUTION INTRAVENOUS CONTINUOUS
Status: DISCONTINUED | OUTPATIENT
Start: 2021-09-03 | End: 2021-09-06 | Stop reason: HOSPADM

## 2021-09-03 RX ORDER — NICOTINE POLACRILEX 4 MG
15 LOZENGE BUCCAL PRN
Status: DISCONTINUED | OUTPATIENT
Start: 2021-09-03 | End: 2021-09-06 | Stop reason: HOSPADM

## 2021-09-03 RX ORDER — SODIUM CHLORIDE 0.9 % (FLUSH) 0.9 %
5-40 SYRINGE (ML) INJECTION EVERY 12 HOURS SCHEDULED
Status: DISCONTINUED | OUTPATIENT
Start: 2021-09-03 | End: 2021-09-06 | Stop reason: HOSPADM

## 2021-09-03 RX ORDER — POLYETHYLENE GLYCOL 3350 17 G/17G
17 POWDER, FOR SOLUTION ORAL DAILY PRN
Status: DISCONTINUED | OUTPATIENT
Start: 2021-09-03 | End: 2021-09-06 | Stop reason: HOSPADM

## 2021-09-03 RX ORDER — DEXTROSE MONOHYDRATE 50 MG/ML
100 INJECTION, SOLUTION INTRAVENOUS PRN
Status: DISCONTINUED | OUTPATIENT
Start: 2021-09-03 | End: 2021-09-06 | Stop reason: HOSPADM

## 2021-09-03 RX ORDER — DULAGLUTIDE 0.75 MG/.5ML
0.75 INJECTION, SOLUTION SUBCUTANEOUS WEEKLY
Qty: 3 PEN | Refills: 3 | Status: SHIPPED | OUTPATIENT
Start: 2021-09-03 | End: 2021-09-10

## 2021-09-03 RX ORDER — SODIUM CHLORIDE 0.9 % (FLUSH) 0.9 %
10 SYRINGE (ML) INJECTION PRN
Status: DISCONTINUED | OUTPATIENT
Start: 2021-09-03 | End: 2021-09-06 | Stop reason: HOSPADM

## 2021-09-03 RX ORDER — SODIUM CHLORIDE 9 MG/ML
25 INJECTION, SOLUTION INTRAVENOUS PRN
Status: DISCONTINUED | OUTPATIENT
Start: 2021-09-03 | End: 2021-09-06 | Stop reason: HOSPADM

## 2021-09-03 RX ORDER — ACETAMINOPHEN 650 MG/1
650 SUPPOSITORY RECTAL EVERY 6 HOURS PRN
Status: DISCONTINUED | OUTPATIENT
Start: 2021-09-03 | End: 2021-09-06 | Stop reason: HOSPADM

## 2021-09-03 RX ORDER — DULOXETIN HYDROCHLORIDE 30 MG/1
30 CAPSULE, DELAYED RELEASE ORAL DAILY
Status: DISCONTINUED | OUTPATIENT
Start: 2021-09-04 | End: 2021-09-06 | Stop reason: HOSPADM

## 2021-09-03 RX ORDER — DEXTROSE MONOHYDRATE 25 G/50ML
12.5 INJECTION, SOLUTION INTRAVENOUS PRN
Status: DISCONTINUED | OUTPATIENT
Start: 2021-09-03 | End: 2021-09-06 | Stop reason: HOSPADM

## 2021-09-03 RX ORDER — GABAPENTIN 300 MG/1
600 CAPSULE ORAL 3 TIMES DAILY
Status: DISCONTINUED | OUTPATIENT
Start: 2021-09-04 | End: 2021-09-06 | Stop reason: HOSPADM

## 2021-09-03 RX ORDER — ONDANSETRON 4 MG/1
4 TABLET, ORALLY DISINTEGRATING ORAL EVERY 8 HOURS PRN
Status: DISCONTINUED | OUTPATIENT
Start: 2021-09-03 | End: 2021-09-06 | Stop reason: HOSPADM

## 2021-09-03 RX ORDER — MAGNESIUM SULFATE 1 G/100ML
1000 INJECTION INTRAVENOUS PRN
Status: DISCONTINUED | OUTPATIENT
Start: 2021-09-03 | End: 2021-09-06 | Stop reason: HOSPADM

## 2021-09-03 RX ORDER — METOPROLOL SUCCINATE 50 MG/1
50 TABLET, EXTENDED RELEASE ORAL DAILY
Status: DISCONTINUED | OUTPATIENT
Start: 2021-09-04 | End: 2021-09-06 | Stop reason: HOSPADM

## 2021-09-03 RX ADMIN — INSULIN LISPRO 2 UNITS: 100 INJECTION, SOLUTION INTRAVENOUS; SUBCUTANEOUS at 23:40

## 2021-09-03 RX ADMIN — VANCOMYCIN HYDROCHLORIDE 2500 MG: 1 INJECTION, POWDER, LYOPHILIZED, FOR SOLUTION INTRAVENOUS at 19:22

## 2021-09-03 RX ADMIN — IOPAMIDOL 75 ML: 755 INJECTION, SOLUTION INTRAVENOUS at 13:59

## 2021-09-03 RX ADMIN — CEFTRIAXONE 500 MG: 500 INJECTION, POWDER, FOR SOLUTION INTRAMUSCULAR; INTRAVENOUS at 12:18

## 2021-09-03 RX ADMIN — ACETAMINOPHEN 650 MG: 325 TABLET ORAL at 23:31

## 2021-09-03 RX ADMIN — SODIUM CHLORIDE, PRESERVATIVE FREE 10 ML: 5 INJECTION INTRAVENOUS at 13:59

## 2021-09-03 RX ADMIN — SODIUM CHLORIDE 80 ML: 9 INJECTION, SOLUTION INTRAVENOUS at 13:59

## 2021-09-03 RX ADMIN — SODIUM CHLORIDE: 9 INJECTION, SOLUTION INTRAVENOUS at 23:17

## 2021-09-03 RX ADMIN — CEFTRIAXONE 500 MG: 500 INJECTION, POWDER, FOR SOLUTION INTRAMUSCULAR; INTRAVENOUS at 12:17

## 2021-09-03 ASSESSMENT — PATIENT HEALTH QUESTIONNAIRE - PHQ9
1. LITTLE INTEREST OR PLEASURE IN DOING THINGS: 0
2. FEELING DOWN, DEPRESSED OR HOPELESS: 0
SUM OF ALL RESPONSES TO PHQ QUESTIONS 1-9: 0
SUM OF ALL RESPONSES TO PHQ9 QUESTIONS 1 & 2: 0

## 2021-09-03 ASSESSMENT — ENCOUNTER SYMPTOMS
FACIAL SWELLING: 0
COLOR CHANGE: 0
DIARRHEA: 0
SHORTNESS OF BREATH: 0
COUGH: 0
EYE DISCHARGE: 0
ABDOMINAL PAIN: 0
EYE REDNESS: 0
VOMITING: 0
CONSTIPATION: 0

## 2021-09-03 ASSESSMENT — PAIN SCALES - GENERAL: PAINLEVEL_OUTOF10: 6

## 2021-09-03 NOTE — PROGRESS NOTES
Progress Note    Sulaiman Paul is a 74 y.o.  female who presents today alone for evaluation of   Chief Complaint   Patient presents with    Leg Pain     wound           HPI:   Patient is here for same day visit to discuss RLE wound. Patient has had an undiagnosed mass over her RLE since she est with me as her PCP. Patient did have a MRI ordered for her right tib/fib however her insurance denied it. Patient is having blood/purulent drainage out of the mass. Patient states the mass is red and painful. Patient denies f/c. Health Maintenance Due   Topic Date Due    Hepatitis C screen  Never done    COVID-19 Vaccine (1) Never done    DTaP/Tdap/Td vaccine (1 - Tdap) Never done    Shingles Vaccine (1 of 2) Never done    DEXA (modify frequency per FRAX score)  Never done    Diabetic retinal exam  05/05/2008    Diabetic microalbuminuria test  12/08/2014    Breast cancer screen  02/22/2018    Pneumococcal 65+ years Vaccine (1 of 1 - PPSV23) Never done    A1C test (Diabetic or Prediabetic)  08/16/2020    Lipid screen  08/16/2020    Potassium monitoring  08/16/2020    Creatinine monitoring  08/16/2020    Diabetic foot exam  02/19/2021    Flu vaccine (1) Never done        Current Medications:     Current Outpatient Medications   Medication Sig Dispense Refill    gabapentin (NEURONTIN) 300 MG capsule TAKE 2 CAPSULES BY MOUTH 3  TIMES DAILY 180 capsule 0    silver sulfADIAZINE (SILVADENE) 1 % cream Apply topically daily. 300 g 0    fluconazole (DIFLUCAN) 100 MG tablet Take 1 tablet by mouth daily as needed (yeast infection) Indications: Patient self diagnoses and takes fluconaole for yeast infections.  4 tablet 0    metoprolol succinate (TOPROL XL) 50 MG extended release tablet Take 1 tablet by mouth daily 90 tablet 1    DULoxetine (CYMBALTA) 30 MG extended release capsule Take 1 capsule by mouth daily 90 capsule 1    hydroCHLOROthiazide (HYDRODIURIL) 25 MG tablet TAKE 1 TABLET BY MOUTH  DAILY 90 tablet 1    omeprazole (PRILOSEC) 40 MG delayed release capsule TAKE 1 CAPSULE BY MOUTH  EVERY DAY 90 capsule 1    apixaban (ELIQUIS) 5 MG TABS tablet TAKE 1 TABLET BY MOUTH TWO  TIMES DAILY 180 tablet 1    apixaban (ELIQUIS) 5 MG TABS tablet Take 1 tablet by mouth 2 times daily 60 tablet 0    glyBURIDE (DIABETA) 5 MG tablet TAKE 1 TABLET BY MOUTH  TWICE DAILY WITH MEALS 180 tablet 1    Dulaglutide (TRULICITY) 4.95 UX/0.5FA SOPN Inject 0.75 mg into the skin once a week 3 pen 3    methocarbamol (ROBAXIN) 750 MG tablet Twice daily prn for pain 180 tablet 0    glucose monitoring kit (FREESTYLE) monitoring kit Please fill with what is covered by ins Patient test once a day DM E11.9 1 kit 0    acetaminophen (TYLENOL) 500 MG tablet Take 500 mg by mouth every 6 hours as needed for Pain. Pt only takes 1-2 times per week      Glucose Blood (BLOOD GLUCOSE TEST STRIPS) STRP by Does not apply route 3 times daily. Freestyle [place] Test Strips      metFORMIN (GLUCOPHAGE) 1000 MG tablet Take 1 tablet by mouth 2 times daily (with meals) (Patient not taking: Reported on 9/3/2021) 180 tablet 1     Current Facility-Administered Medications   Medication Dose Route Frequency Provider Last Rate Last Admin    cefTRIAXone (ROCEPHIN) injection 500 mg  500 mg IntraMUSCular Once Nancey Mutton, DO        cefTRIAXone (ROCEPHIN) injection 500 mg  500 mg IntraMUSCular Once Nancey Mutton, DO           Allergies: Allergies   Allergen Reactions    Erythromycin Hives    Lisinopril Itching        Medical History:     Past Medical History:   Diagnosis Date    Back pain 6/22/2012    Cellulitis 10/9/2015    Lt.  Lower Leg    DM (diabetes mellitus) ([facility] 06/22/2012    DVT (deep venous thrombosis) ([facility] 6/22/2012    GERD (gastroesophageal reflux disease) 6/22/2012    MVP (mitral valve prolapse) 6/22/2012       Past Surgical History:   Procedure Laterality Date    HYSTERECTOMY      TONSILLECTOMY         Family History Problem Relation Age of Onset    Heart Disease Mother         Social History:     Social History     Socioeconomic History    Marital status:      Spouse name: Not on file    Number of children: Not on file    Years of education: Not on file    Highest education level: Not on file   Occupational History    Not on file   Tobacco Use    Smoking status: Former Smoker     Packs/day: 1.00     Years: 30.00     Pack years: 30.00     Types: Cigarettes     Quit date: 1983     Years since quittin.2    Smokeless tobacco: Never Used   Vaping Use    Vaping Use: Never used   Substance and Sexual Activity    Alcohol use: No    Drug use: No    Sexual activity: Not on file   Other Topics Concern    Not on file   Social History Narrative    Not on file     Social Determinants of Health     Financial Resource Strain: Low Risk     Difficulty of Paying Living Expenses: Not hard at all   Food Insecurity: No Food Insecurity    Worried About [address] in the Last Year: Never true    [facility] in the Last Year: Never true   Transportation Needs: No Transportation Needs    Lack of Transportation (Medical): No    Lack of Transportation (Non-Medical): No   Physical Activity:     Days of Exercise per Week:     Minutes of Exercise per Session:    Stress:     Feeling of Stress :    Social Connections:     Frequency of Communication with Friends and Family:     Frequency of Social Gatherings with Friends and Family:     Attends Voodoo Services:     Active Member of Clubs or Organizations:     Attends Club or Organization Meetings:     Marital Status:    Intimate Partner Violence:     Fear of Current or Ex-Partner:     Emotionally Abused:     Physically Abused:     Sexually Abused:         ROS:     Constitutional: No fevers, chills, fatigue. ENT: No nasal congestion or sore throat  Respiratory: No difficulty in breathing or cough.    Cardiovascular: No chest pain, palpitations or shortness of breath  Gastrointestinal: No abdominal pain or change in bowel movements. Genitourinary: No change in urinary frequency or dysuria. Skin: No rashes or skin lesions. Neurological: No weakness. No headaches. MSK: +RLE mass and redness         Last Filed Vitals:  /82   Pulse 100   Temp 97.2 °F (36.2 °C) (Temporal)   Wt (!) 362 lb (164.2 kg)   SpO2 96%   BMI 49.10 kg/m²      Physical Examination:     GENERAL APPEARANCE: in no acute distress, morbidly obese, in wheelchair. HEAD: normocephalic, atraumatic. EYES: extraocular movement intact (EOMI), pupils equal, round, reactive to light and accommodation. EARS: normal, tympanic membrane intact, clear, auditory canal clear. NOSE: nares patent, no erythema, sinuses nontender bilaterally, no rhinorrhea. ORAL CAVITY: mucosa moist, no lesions. THROAT: clear, no mass, no exudate. NECK/THYROID: neck supple, full range of motion, no thyromegaly. HEART: no murmurs, regular rate and rhythm, S1, S2 normal.   LUNGS: clear to auscultation bilaterally, no wheezes, rales, rhonchi. ABDOMEN: normal, bowel sounds present, soft, nontender, nondistended, no rebound guarding or rigidity  MSK: +Large erythematous mass with purulent drainage. Recent Labs/ In Office Testing/ Radiograph review:     Telephone on 09/11/2020   Component Date Value Ref Range Status    Color, UA 09/11/2020 Yellow   Final    Clarity, UA 09/11/2020 Clear   Final    Glucose, UA POC 09/11/2020 500   Final    Bilirubin, UA 09/11/2020 Negative   Final    Ketones, UA 09/11/2020 Negative   Final    Spec Grav, UA 09/11/2020 1.020   Final    Blood, UA POC 09/11/2020 Moderate   Final    pH, UA 09/11/2020 6.0   Final    Protein, UA POC 09/11/2020 30   Final    Urobilinogen, UA 09/11/2020 0.2   Final    Leukocytes, UA 09/11/2020 Negative   Final    Nitrite, UA 09/11/2020 Trace   Final       No results found for this visit on 09/03/21.            Assessment/Plan:     Fabrice Mcdaniels was seen today for leg pain. Diagnoses and all orders for this visit:    Cellulitis of right lower extremity  -     ALT; Future  -     AST; Future  -     CBC Auto Differential; Future  -     Renal Function Panel; Future  -     Magnesium; Future  -     C-Reactive Protein; Future  -     Sedimentation Rate; Future  -     Creatine Kinase; Future  -     Lactic Acid, Plasma; Future  -     [address]  -     cefTRIAXone (ROCEPHIN) injection 500 mg  -     cefTRIAXone (ROCEPHIN) injection 500 mg    Leg mass, right  -     CT TIBIA FIBULA RIGHT W CONTRAST; Future  -     [address]    Localized swelling of right lower extremity  -     US DUP LOWER EXTREMITY RIGHT DOMINICK; Future    Follow up on labs. STAT orders. Referral for wound clinic provided. HHC ordered for wound care. Strict ED precautions provided. Discussed if CT grossly abnormal will be sending to ED today. R/O DVT. Patient will need HHC as she is limited in leaving her home due to deconditioning. All questions answered and addressed to patient satisfaction. Patient understands and agrees to the plan. The patient was evaluated and treated today based on the osteopathic principle that each person is a unit of body, mind, and spirit, the body is capable of self-regulation, self-healing, and health maintenance and that structure and function are reciprocally interrelated. Follow-up:   Return in about 1 week (around 9/10/2021) for CPE; 40 min appt.       William Clements D.O.

## 2021-09-03 NOTE — TELEPHONE ENCOUNTER
Spoke to Dr. Daniele Goldstein regarding patient's tib/fib CT  Dr. Daniele Goldstein instructed patient to go directly to [facility] ED  There is a gas present in CT result that could possibly be large amt of infection.      Patient was told by Radiology team

## 2021-09-03 NOTE — TELEPHONE ENCOUNTER
Pharmacy called and said that on the trulicity 3 pens were ordered and they come in boxes of 4 for a months supply. Ok to dispense 4 pens instead of 3?

## 2021-09-04 LAB
-: ABNORMAL
AMORPHOUS: ABNORMAL
ANION GAP SERPL CALCULATED.3IONS-SCNC: 12 MMOL/L (ref 9–17)
BACTERIA: ABNORMAL
BILIRUBIN URINE: ABNORMAL
BUN BLDV-MCNC: 18 MG/DL (ref 8–23)
BUN/CREAT BLD: 13 (ref 9–20)
CALCIUM SERPL-MCNC: 8.4 MG/DL (ref 8.6–10.4)
CASTS UA: ABNORMAL /LPF
CHLORIDE BLD-SCNC: 97 MMOL/L (ref 98–107)
CO2: 24 MMOL/L (ref 20–31)
COLOR: YELLOW
COMMENT UA: ABNORMAL
CREAT SERPL-MCNC: 1.36 MG/DL (ref 0.5–0.9)
CRYSTALS, UA: ABNORMAL /HPF
EPITHELIAL CELLS UA: ABNORMAL /HPF (ref 0–5)
ESTIMATED AVERAGE GLUCOSE: 263 MG/DL
ESTIMATED AVERAGE GLUCOSE: 263 MG/DL
GFR AFRICAN AMERICAN: 47 ML/MIN
GFR NON-AFRICAN AMERICAN: 39 ML/MIN
GFR SERPL CREATININE-BSD FRML MDRD: ABNORMAL ML/MIN/{1.73_M2}
GFR SERPL CREATININE-BSD FRML MDRD: ABNORMAL ML/MIN/{1.73_M2}
GLUCOSE BLD-MCNC: 192 MG/DL (ref 65–105)
GLUCOSE BLD-MCNC: 286 MG/DL (ref 65–105)
GLUCOSE BLD-MCNC: 295 MG/DL (ref 65–105)
GLUCOSE BLD-MCNC: 301 MG/DL (ref 65–105)
GLUCOSE BLD-MCNC: 301 MG/DL (ref 70–99)
GLUCOSE URINE: NEGATIVE
HBA1C MFR BLD: 10.8 % (ref 4–6)
HBA1C MFR BLD: 10.8 % (ref 4–6)
HCT VFR BLD CALC: 34 % (ref 36.3–47.1)
HEMOGLOBIN: 10.6 G/DL (ref 11.9–15.1)
KETONES, URINE: NEGATIVE
LACTIC ACID, SEPSIS WHOLE BLOOD: ABNORMAL MMOL/L (ref 0.5–1.9)
LACTIC ACID, SEPSIS: 2.5 MMOL/L (ref 0.5–1.9)
LEUKOCYTE ESTERASE, URINE: ABNORMAL
MCH RBC QN AUTO: 29 PG (ref 25.2–33.5)
MCHC RBC AUTO-ENTMCNC: 31.2 G/DL (ref 28.4–34.8)
MCV RBC AUTO: 92.9 FL (ref 82.6–102.9)
MUCUS: ABNORMAL
NITRITE, URINE: POSITIVE
NRBC AUTOMATED: 0 PER 100 WBC
OSMOLALITY URINE: 515 MOSM/KG (ref 300–1170)
OTHER OBSERVATIONS UA: ABNORMAL
PDW BLD-RTO: 13.3 % (ref 11.8–14.4)
PH UA: 5 (ref 5–8)
PLATELET # BLD: 304 K/UL (ref 138–453)
PMV BLD AUTO: 9.5 FL (ref 8.1–13.5)
POTASSIUM SERPL-SCNC: 3.8 MMOL/L (ref 3.7–5.3)
PROTEIN UA: ABNORMAL
RBC # BLD: 3.66 M/UL (ref 3.95–5.11)
RBC UA: ABNORMAL /HPF (ref 0–2)
RENAL EPITHELIAL, UA: ABNORMAL /HPF
SERUM OSMOLALITY: 290 MOSM/KG (ref 282–298)
SODIUM BLD-SCNC: 133 MMOL/L (ref 135–144)
SODIUM,UR: 26 MMOL/L
SPECIFIC GRAVITY UA: 1.02 (ref 1–1.03)
TRICHOMONAS: ABNORMAL
TURBIDITY: ABNORMAL
URINE HGB: ABNORMAL
UROBILINOGEN, URINE: NORMAL
WBC # BLD: 10.4 K/UL (ref 3.5–11.3)
WBC UA: ABNORMAL /HPF (ref 0–5)
YEAST: ABNORMAL

## 2021-09-04 PROCEDURE — 87070 CULTURE OTHR SPECIMN AEROBIC: CPT

## 2021-09-04 PROCEDURE — 6370000000 HC RX 637 (ALT 250 FOR IP): Performed by: SURGERY

## 2021-09-04 PROCEDURE — 83605 ASSAY OF LACTIC ACID: CPT

## 2021-09-04 PROCEDURE — 2580000003 HC RX 258: Performed by: NURSE PRACTITIONER

## 2021-09-04 PROCEDURE — 36415 COLL VENOUS BLD VENIPUNCTURE: CPT

## 2021-09-04 PROCEDURE — 0J9N0ZZ DRAINAGE OF RIGHT LOWER LEG SUBCUTANEOUS TISSUE AND FASCIA, OPEN APPROACH: ICD-10-PCS | Performed by: SURGERY

## 2021-09-04 PROCEDURE — 81001 URINALYSIS AUTO W/SCOPE: CPT

## 2021-09-04 PROCEDURE — 87205 SMEAR GRAM STAIN: CPT

## 2021-09-04 PROCEDURE — 2580000003 HC RX 258: Performed by: INTERNAL MEDICINE

## 2021-09-04 PROCEDURE — 2500000003 HC RX 250 WO HCPCS: Performed by: SURGERY

## 2021-09-04 PROCEDURE — 2580000003 HC RX 258: Performed by: FAMILY MEDICINE

## 2021-09-04 PROCEDURE — 6360000002 HC RX W HCPCS: Performed by: FAMILY MEDICINE

## 2021-09-04 PROCEDURE — 86403 PARTICLE AGGLUT ANTBDY SCRN: CPT

## 2021-09-04 PROCEDURE — 6370000000 HC RX 637 (ALT 250 FOR IP): Performed by: INTERNAL MEDICINE

## 2021-09-04 PROCEDURE — 1200000000 HC SEMI PRIVATE

## 2021-09-04 PROCEDURE — 80048 BASIC METABOLIC PNL TOTAL CA: CPT

## 2021-09-04 PROCEDURE — 6370000000 HC RX 637 (ALT 250 FOR IP): Performed by: NURSE PRACTITIONER

## 2021-09-04 PROCEDURE — 6360000002 HC RX W HCPCS: Performed by: INTERNAL MEDICINE

## 2021-09-04 PROCEDURE — 85027 COMPLETE CBC AUTOMATED: CPT

## 2021-09-04 PROCEDURE — 83930 ASSAY OF BLOOD OSMOLALITY: CPT

## 2021-09-04 PROCEDURE — 99233 SBSQ HOSP IP/OBS HIGH 50: CPT | Performed by: INTERNAL MEDICINE

## 2021-09-04 PROCEDURE — 82947 ASSAY GLUCOSE BLOOD QUANT: CPT

## 2021-09-04 PROCEDURE — 87186 SC STD MICRODIL/AGAR DIL: CPT

## 2021-09-04 PROCEDURE — 87086 URINE CULTURE/COLONY COUNT: CPT

## 2021-09-04 PROCEDURE — 84300 ASSAY OF URINE SODIUM: CPT

## 2021-09-04 PROCEDURE — 83036 HEMOGLOBIN GLYCOSYLATED A1C: CPT

## 2021-09-04 PROCEDURE — 83935 ASSAY OF URINE OSMOLALITY: CPT

## 2021-09-04 RX ORDER — SODIUM HYPOCHLORITE 1.25 MG/ML
SOLUTION TOPICAL DAILY
Status: DISCONTINUED | OUTPATIENT
Start: 2021-09-04 | End: 2021-09-06 | Stop reason: HOSPADM

## 2021-09-04 RX ORDER — HYDROCODONE BITARTRATE AND ACETAMINOPHEN 5; 325 MG/1; MG/1
2 TABLET ORAL EVERY 4 HOURS PRN
Status: DISCONTINUED | OUTPATIENT
Start: 2021-09-04 | End: 2021-09-06 | Stop reason: HOSPADM

## 2021-09-04 RX ORDER — LIDOCAINE HYDROCHLORIDE 20 MG/ML
5 INJECTION, SOLUTION EPIDURAL; INFILTRATION; INTRACAUDAL; PERINEURAL ONCE
Status: COMPLETED | OUTPATIENT
Start: 2021-09-04 | End: 2021-09-04

## 2021-09-04 RX ORDER — NICOTINE POLACRILEX 4 MG
15 LOZENGE BUCCAL PRN
Status: DISCONTINUED | OUTPATIENT
Start: 2021-09-04 | End: 2021-09-06 | Stop reason: HOSPADM

## 2021-09-04 RX ORDER — GLYBURIDE 5 MG/1
5 TABLET ORAL 2 TIMES DAILY WITH MEALS
Status: DISCONTINUED | OUTPATIENT
Start: 2021-09-04 | End: 2021-09-06 | Stop reason: HOSPADM

## 2021-09-04 RX ORDER — HYDROCODONE BITARTRATE AND ACETAMINOPHEN 5; 325 MG/1; MG/1
1 TABLET ORAL EVERY 4 HOURS PRN
Status: DISCONTINUED | OUTPATIENT
Start: 2021-09-04 | End: 2021-09-06 | Stop reason: HOSPADM

## 2021-09-04 RX ORDER — TIZANIDINE 4 MG/1
4 TABLET ORAL EVERY 6 HOURS PRN
Status: DISCONTINUED | OUTPATIENT
Start: 2021-09-04 | End: 2021-09-06 | Stop reason: HOSPADM

## 2021-09-04 RX ORDER — DEXTROSE MONOHYDRATE 25 G/50ML
12.5 INJECTION, SOLUTION INTRAVENOUS PRN
Status: DISCONTINUED | OUTPATIENT
Start: 2021-09-04 | End: 2021-09-06 | Stop reason: HOSPADM

## 2021-09-04 RX ORDER — DEXTROSE MONOHYDRATE 50 MG/ML
100 INJECTION, SOLUTION INTRAVENOUS PRN
Status: DISCONTINUED | OUTPATIENT
Start: 2021-09-04 | End: 2021-09-06 | Stop reason: HOSPADM

## 2021-09-04 RX ADMIN — SODIUM CHLORIDE: 9 INJECTION, SOLUTION INTRAVENOUS at 14:09

## 2021-09-04 RX ADMIN — GLYBURIDE 5 MG: 5 TABLET ORAL at 17:53

## 2021-09-04 RX ADMIN — METOPROLOL SUCCINATE 50 MG: 50 TABLET, EXTENDED RELEASE ORAL at 08:38

## 2021-09-04 RX ADMIN — APIXABAN 5 MG: 5 TABLET, FILM COATED ORAL at 08:37

## 2021-09-04 RX ADMIN — INSULIN LISPRO 6 UNITS: 100 INJECTION, SOLUTION INTRAVENOUS; SUBCUTANEOUS at 12:45

## 2021-09-04 RX ADMIN — APIXABAN 5 MG: 5 TABLET, FILM COATED ORAL at 20:37

## 2021-09-04 RX ADMIN — HYDROCHLOROTHIAZIDE 25 MG: 25 TABLET ORAL at 08:38

## 2021-09-04 RX ADMIN — LIDOCAINE HYDROCHLORIDE 5 ML: 20 INJECTION, SOLUTION EPIDURAL; INFILTRATION; INTRACAUDAL; PERINEURAL at 08:20

## 2021-09-04 RX ADMIN — INSULIN LISPRO 8 UNITS: 100 INJECTION, SOLUTION INTRAVENOUS; SUBCUTANEOUS at 08:37

## 2021-09-04 RX ADMIN — DULOXETINE HYDROCHLORIDE 30 MG: 30 CAPSULE, DELAYED RELEASE ORAL at 08:37

## 2021-09-04 RX ADMIN — HYDROCODONE BITARTRATE AND ACETAMINOPHEN 2 TABLET: 5; 325 TABLET ORAL at 08:02

## 2021-09-04 RX ADMIN — GABAPENTIN 600 MG: 300 CAPSULE ORAL at 06:27

## 2021-09-04 RX ADMIN — CEFTRIAXONE SODIUM 1000 MG: 1 INJECTION, POWDER, FOR SOLUTION INTRAMUSCULAR; INTRAVENOUS at 08:37

## 2021-09-04 RX ADMIN — PANTOPRAZOLE SODIUM 40 MG: 40 TABLET, DELAYED RELEASE ORAL at 06:27

## 2021-09-04 RX ADMIN — VANCOMYCIN HYDROCHLORIDE 1250 MG: 5 INJECTION, POWDER, LYOPHILIZED, FOR SOLUTION INTRAVENOUS at 20:24

## 2021-09-04 RX ADMIN — INSULIN LISPRO 2 UNITS: 100 INJECTION, SOLUTION INTRAVENOUS; SUBCUTANEOUS at 17:53

## 2021-09-04 RX ADMIN — GABAPENTIN 600 MG: 300 CAPSULE ORAL at 20:37

## 2021-09-04 RX ADMIN — GABAPENTIN 600 MG: 300 CAPSULE ORAL at 14:08

## 2021-09-04 RX ADMIN — ACETAMINOPHEN 650 MG: 325 TABLET ORAL at 21:45

## 2021-09-04 RX ADMIN — INSULIN LISPRO 3 UNITS: 100 INJECTION, SOLUTION INTRAVENOUS; SUBCUTANEOUS at 20:37

## 2021-09-04 RX ADMIN — GLYBURIDE 5 MG: 5 TABLET ORAL at 08:37

## 2021-09-04 RX ADMIN — TIZANIDINE 4 MG: 4 TABLET ORAL at 04:18

## 2021-09-04 RX ADMIN — HYDROCODONE BITARTRATE AND ACETAMINOPHEN 1 TABLET: 5; 325 TABLET ORAL at 04:17

## 2021-09-04 RX ADMIN — SODIUM HYPOCHLORITE: 1.25 SOLUTION TOPICAL at 08:21

## 2021-09-04 RX ADMIN — HYDROCODONE BITARTRATE AND ACETAMINOPHEN 2 TABLET: 5; 325 TABLET ORAL at 12:45

## 2021-09-04 ASSESSMENT — PAIN DESCRIPTION - PAIN TYPE
TYPE: ACUTE PAIN
TYPE: ACUTE PAIN

## 2021-09-04 ASSESSMENT — ENCOUNTER SYMPTOMS
COLOR CHANGE: 1
VOMITING: 0
DIARRHEA: 0
NAUSEA: 0
SHORTNESS OF BREATH: 0
ABDOMINAL PAIN: 0
COUGH: 0
SORE THROAT: 0
SINUS PRESSURE: 0

## 2021-09-04 ASSESSMENT — PAIN SCALES - GENERAL
PAINLEVEL_OUTOF10: 6
PAINLEVEL_OUTOF10: 6
PAINLEVEL_OUTOF10: 7
PAINLEVEL_OUTOF10: 8

## 2021-09-04 ASSESSMENT — PAIN DESCRIPTION - LOCATION
LOCATION: LEG
LOCATION: LEG

## 2021-09-04 ASSESSMENT — PAIN DESCRIPTION - ORIENTATION
ORIENTATION: RIGHT
ORIENTATION: RIGHT

## 2021-09-04 ASSESSMENT — PAIN DESCRIPTION - FREQUENCY: FREQUENCY: INTERMITTENT

## 2021-09-04 ASSESSMENT — PAIN - FUNCTIONAL ASSESSMENT: PAIN_FUNCTIONAL_ASSESSMENT: PREVENTS OR INTERFERES SOME ACTIVE ACTIVITIES AND ADLS

## 2021-09-04 ASSESSMENT — PAIN DESCRIPTION - ONSET: ONSET: ON-GOING

## 2021-09-04 ASSESSMENT — PAIN DESCRIPTION - DESCRIPTORS: DESCRIPTORS: ACHING;DISCOMFORT

## 2021-09-04 NOTE — CARE COORDINATION
Spoke to [facility]. She cannot accept the patient. Patient is OON. Notified Brissa reyes. She is checking and will call writer back with determination. [facility] cannot accept.

## 2021-09-04 NOTE — CONSULTS
Vancomycin Dosing by Pharmacy - Initial Note   TODAY'S DATE:  9/4/2021  Patient name, age:  Jocelyne Singh, 74 y.o. Consulting provider: Dr Lily Gramajo    Indication: SSTI - abscess of right leg  Additional antimicrobials:  No other antimicrobials ordered at this time    Actual Weight:    Wt Readings from Last 1 Encounters:   09/03/21 (!) 362 lb (164.2 kg)     Labs/Ancillary Data  Estimated Creatinine Clearance: 64 mL/min (A) (based on SCr of 1.45 mg/dL (H)). Recent Labs     09/03/21  1340 09/03/21  1342 09/03/21  1850   CREATININE 1.09* 1.2 1.45*   BUN 14  --  16   WBC 10.3  --  9.7     No results found for: PROCAL  Temp: 98.8 F    Pertinent Cultures  9/3/21   Blood     Pending  Lactate, Sepsis [[id number]] (Abnormal)    Collected: 09/04/21 0045    Updated: 09/04/21 0103    Specimen Source: Blood     Lactic Acid, Sepsis 2. 5High  mmol/L    Lactic Acid, Sepsis, Whole Blood NOT         MRSA Nasal Swab: N/A. Non-respiratory infection. Kaylyn Mccann PLAN   Initial loading dose of 25mg/kg (max of 3,000 mg) = 2500 mg  x 1, then  vancomycin 1250 mg IV every 24 hours. Ensured BUN/sCr ordered at baseline and every 48 hours x at least 3 levels, then at least weekly. Vancomycin level ordered for 9/5/21 @ 0600. Will use bayesian method for dosing. This level will not be a trough. Target AUC/RONEL: 400-600. Vancomycin Target Concentration Parameters  Treatment  Population Target AUC/RONEL Target Trough   Invasive MRSA Infection (bacteremia, pneumonia, meningitis, endocarditis, osteomyelitis)  Sepsis (undifferentiated) 400-600 N/A   Infection due to non-MRSA pathogen  Empiric treatment of non-invasive MRSA infection  (SSTI, UTI) <500 10-15 mg/L   CrCl < 29 mL/min  Rapidly fluctuating serum creatinine   BRIANNA N/A < 15 mg/L     Renal replacement therapy is dosed by levels, per hospital protocol. Abbreviations  * Pauc: probability that AUC is >400 (efficacy); Pconc: probability that Ctrough is above 20 ?g/mL (toxicity);  Tox:

## 2021-09-04 NOTE — PROGRESS NOTES
[facility]  Office: [address], DO, Marcela Press, DO, Julia Patriciaing, DO, Viky Sosa Blood, DO, Mickey Conner MD, Vanesa Urias MD, Loi Carson MD, Ema Gentile MD, Chaim Akhtar MD, Lotus Weinberg MD, Liz Fernandez MD, Mani Anthony, DO, Drew Dolan MD, Peggy Dorado, DO, Channing Martinez MD,  Gena Siemens, DO, Yovany Maldonado MD, Lor Bernardo MD, Nandini Wills MD, Mabel Conklin MD, Aguila Tsang MD, Tian Olea MD, Sruthi Odonnell MD, Arthur Hunt, [facility], [facility], CNP, Renata Shearer, CNP, Wilfrido Davis, CNS, Inocencia Benjamin, [facility], Himanshu Gayle, CNP, Barby Araujo, CNP, Jono Nunes, CNP, Sandra Bernstein, CNP, Roylene Goodell, PA-C, Yonis Kaplan, [facility], Maddy Oswald, CNP, Ember Correia, CNP, Usha Norwood, CNP, Ely Marquez, CNP, Salima Lorenz, CNP, Tammie Hsieh, CNP, Awais Gudino, [facility], [facility], [facility]    Progress Note    9/4/2021    9:23 AM    Name:   Hosea Kovacs  MRN:     [id number]     [place]:      [de-identified]   Room:   2007/2007-02  IP Day:  1  Admit Date:  9/3/2021  6:33 PM    PCP:   Romayne Fail, DO  Code Status:  Full Code    Subjective:     C/C:   Chief Complaint   Patient presents with    Wound Check     Interval History Status: Improved    Patient reports she underwent bedside incision and drainage for her abscess. She still complains of some pain however it has improved. Her creatinine was noted to be elevated however it is trending down. We will continue her on IV fluids. Patient's sugars were uncontrolled this morning. We will resume dulaglutide and glyburide. We will also increase sliding scale insulin to moderate dose. Patient was also noted to have UTI for which she was started on Rocephin. Brief History:      This is a 41-year-old female with history of diabetes, DVT, GERD, chronic lymphedema of the bilateral lower extremities, cellulitis came in with complains of redness and drainage to the right lower extremity. Patient reports that she noticed large raised area to the right leg for last 2 weeks. Patient was initially on doxycycline as an outpatient. Patient reports that she noted purulent discharge. Patient underwent outpatient CT scan which showed large abscess 10.5 x 6 x 14 cm. Patient underwent bedside I&D    Review of Systems:     Constitutional:  negative for chills, fevers, sweats  Respiratory:  negative for cough, dyspnea on exertion, shortness of breath, wheezing  Cardiovascular:  negative for chest pain, chest pressure/discomfort, lower extremity edema, palpitations  Gastrointestinal:  negative for abdominal pain, constipation, diarrhea, nausea, vomiting  Neurological:  negative for dizziness, headache    Medications: Allergies:     Allergies   Allergen Reactions    Erythromycin Hives    Lisinopril Itching       Current Meds:   Scheduled Meds:    vancomycin (VANCOCIN) intermittent dosing (placeholder)   Other RX Placeholder    vancomycin  1,250 mg IntraVENous Q24H    cefTRIAXone (ROCEPHIN) IV  1,000 mg IntraVENous Q24H    sodium hypochlorite   Irrigation Daily    glyBURIDE  5 mg Oral BID WC    Dulaglutide  0.75 mg SubCUTAneous Weekly    insulin lispro  0-12 Units SubCUTAneous TID WC    insulin lispro  0-6 Units SubCUTAneous Nightly    apixaban  5 mg Oral BID    DULoxetine  30 mg Oral Daily    gabapentin  600 mg Oral TID    hydroCHLOROthiazide  25 mg Oral Daily    metoprolol succinate  50 mg Oral Daily    pantoprazole  40 mg Oral QAM AC    sodium chloride flush  5-40 mL IntraVENous 2 times per day     Continuous Infusions:    dextrose      dextrose      sodium chloride 75 mL/hr at 09/03/21 2317    sodium chloride       PRN Meds: HYDROcodone 5 mg - acetaminophen **OR** HYDROcodone 5 mg - acetaminophen, tiZANidine, glucose, dextrose, glucagon (rDNA), dextrose, glucose, dextrose, glucagon (rDNA), dextrose, sodium chloride flush, sodium chloride, potassium chloride **OR** potassium alternative oral replacement **OR** potassium chloride, magnesium sulfate, ondansetron **OR** ondansetron, polyethylene glycol, acetaminophen **OR** acetaminophen    Data:     Past Medical History:   has a past medical history of Back pain, Cellulitis, DM (diabetes mellitus) ([facility] [address] [facility] GERD (gastroesophageal reflux disease), and MVP (mitral valve prolapse). Social History:   reports that she quit smoking about 38 years ago. Her smoking use included cigarettes. She has a 30.00 pack-year smoking history. She has never used smokeless tobacco. She reports that she does not drink alcohol and does not use drugs. Family History:   Family History   Problem Relation Age of Onset    Heart Disease Mother        Vitals:  /63   Pulse 73   Temp 98.1 °F (36.7 °C) (Oral)   Resp 18   Ht 6' (1.829 m)   Wt (!) 363 lb 1.6 oz (164.7 kg)   SpO2 93%   BMI 49.25 kg/m²   Temp (24hrs), Av.1 °F (36.7 °C), Min:97.2 °F (36.2 °C), Max:98.8 °F (37.1 °C)    Recent Labs     21  2335 21  0629   POCGLU 343* 301*       I/O (24Hr):     Intake/Output Summary (Last 24 hours) at 2021 0923  Last data filed at 2021 0624  Gross per 24 hour   Intake 472.29 ml   Output --   Net 472.29 ml       Labs:  Hematology:  Recent Labs     21  1340 21  1850 21  0611   WBC 10.3 9.7 10.4   RBC 4.66 4.56 3.66*   HGB 13.6 13.2 10.6*   HCT 42.2 41.6 34.0*   MCV 90.6 91.2 92.9   MCH 29.2 28.9 29.0   MCHC 32.2 31.7 31.2   RDW 13.2 13.2 13.3   * 454* 304   MPV 9.8 9.3 9.5   SEDRATE 107*  --   --    CRP 54.8*  --   --      Chemistry:  Recent Labs     21  1340 21  1342 21  1850 21  0611     --  134* 133*   K 4.0  --  4.1 3.8   CL 96*  --  94* 97*   CO2 20  --  22 24   GLUCOSE 275*  --  345* 301*   BUN 14  --  16 18   CREATININE 1.09* 1.2 1.45* 1.36*   MG 1.5*  --   --   --    ANIONGAP 21*  -- 18* 12   LABGLOM 50*  --  36* 39*   GFRAA >60  --  44* 47*   CALCIUM 9.6  --  9.5 8.4*   PHOS 3.4  --   --   --    CKTOTAL 30  --   --   --      Recent Labs     09/03/21  1340 09/03/21  2335 09/04/21  0629   LABALBU 3.5  --   --    AST 17  --   --    ALT 8  --   --    POCGLU  --  343* 301*     ABG:No results found for: POCPH, PHART, PH, POCPCO2, YBY5QKF, PCO2, POCPO2, PO2ART, PO2, POCHCO3, GGW0TEM, HCO3, NBEA, PBEA, BEART, BE, THGBART, THB, WME6MCN, HABH5SRY, F4RMFHMR, O2SAT, FIO2  Lab Results   Component Value Date/Time    SPECIAL LAC 12ML 09/03/2021 06:57 PM     Lab Results   Component Value Date/Time    CULTURE NO GROWTH 4 HOURS 09/03/2021 06:57 PM       Radiology:  CT TIBIA FIBULA RIGHT W CONTRAST    Result Date: 9/3/2021  1. Large thick-walled complex fluid and gas collection in the subcutaneous fat of the anterolateral proximal leg, measuring approximately 10.5 x 6.0 x 14 cm, most likely an abscess. There is communication to the skin surface. No visible extension into the deep soft tissues. 2.  No acute osseous abnormality or evidence of osteomyelitis. 3.  Moderate to severe degenerative changes in the knee and subtalar joint. US DUP LOWER EXTREMITY RIGHT DOMINICK    Result Date: 9/3/2021  No convincing evidence of DVT in the right lower extremity. Limited exam due to patient's size; calf veins and distal femoral vein could not be visualized.        Physical Examination:        General appearance:  alert, cooperative and no distress  Mental Status:  oriented to person, place and time and normal affect  Lungs:  clear to auscultation bilaterally, normal effort  Heart:  regular rate and rhythm, no murmur  Abdomen:  soft, nontender, nondistended, normal bowel sounds, no masses, hepatomegaly, splenomegaly  Extremities:  no edema, redness, tenderness in the calves  Skin:  no gross lesions, rashes, induration    Assessment:        Hospital Problems         Last Modified POA    * (Principal) Leg abscess 9/4/2021 Yes Type 2 diabetes mellitus with skin complication, without long-term current use of insulin ([facility] [address] 9/4/2021 Yes    GERD (gastroesophageal reflux disease) (Chronic) 9/4/2021 Yes    Essential hypertension 9/4/2021 Yes    BRIANNA (acute kidney injury) ([facility] [address] 9/4/2021 Yes    Lymphedema of both lower extremities (Chronic) 9/4/2021 Yes    Mor[facility] 9/4/2021 Yes          Plan:        Right lower extremity abscess  -Continue vancomycin and Rocephin  -Awaiting cultures    Hypertension  -continue hydrochlorothiazide, metoprolol    Hyponatremia  -Sodium low at 133 this morning    Acute kidney injury  -Admitting creatinine elevated at 1.45  -Continue IV fluids    Diabetes with hyperglycemia  -Blood sugar elevated at 301 this morning  -Resume dulaglutide, glyburide.   Hold Metformin.  -We will increase sliding scale insulin to moderate dose    Anemia  -Hemoglobin low at 10.6    Acute cystitis  -Patient started on Rocephin    Depression  -Continue Cymbalta    Diabetic neuropathy  -Continue gabapentin    Paroxysmal A. fib  -Continue Lexie Salazar MD  9/4/2021  9:23 AM

## 2021-09-04 NOTE — ACP (ADVANCE CARE PLANNING)
Decision Maker regarding differences between Advance Directives and portable DNR orders.     Length of ACP Conversation in minutes:  5    Conversation Outcomes:  [x] ACP discussion completed  [] Existing advance directive reviewed with patient; no changes to patient's previously recorded wishes  [] New Advance Directive completed  [] Portable Do Not Rescitate prepared for Provider review and signature  [] POLST/POST/MOLST/MOST prepared for Provider review and signature

## 2021-09-04 NOTE — PROGRESS NOTES
[address] Continence Nursing        NAME:  Delma Mabry  MEDICAL RECORD NUMBER:  [id number]  AGE: 74 y.o. GENDER: female  : 1955  TODAY'S DATE:  2021     WO nursing consult noted from vascular service for dressing changes. Discussed with RN. [id number] is typically a consultative service and not available for regular dressing changes. Wound care orders already in place and wound being managed by vascular service. Will defer consult and sign off case. Please call or reconsult if wound concerns arise. Thank you.     Juan SCHRADERN, RN, [facility] [address]  Wound, Ostomy, and Continence Nursing  [phone]

## 2021-09-04 NOTE — H&P
[facility]  Office: [address], DO, Prakash Kassidy, DO, Lavelle Alva, DO, Inocencia Turneralice Guy, DO, Shantelle Delaney MD, Forrest Ortez MD, Emely Gordillo MD, Rishi Marin MD, Kenia Baltazar MD, Julio Cesar Dykes MD, Martell Ruth MD, Natacha Valadez, DO, Maribeth Martins MD, Linn Acharya DO, Dakota Murdock MD,  Luh Ricks DO, Dietrich Halsted, MD, Giselle Hogan MD, Yang Velásquez MD, Rosa Solorzano MD, Linh Shipley MD, Lashell Turk MD, Stanley Michel MD, Kobe Wood [facility], [facility], CNP, Jeff Collazo, CNP, Nelly Trejo, CNS, Mamadou Sanford, CNP, Willem Rutherford, CNP, Arvind Orr, CNP, Charles Mcfadden, CNP, Marleny Brown, CNP, Zhane Colindres, PAAreliC, Ron Murphy, [facility], Nayely Conway, [facility], Wilber Ovalles, CNP, Brice Mills, CNP, Jane Parsons, CNP, Bernie Villegas, CNP, Arlen James, CNP, Belle Baltazar, CNP, [facility], [facility]    HISTORY AND PHYSICAL EXAMINATION            Date:   9/4/2021  Patient name:  Claribel Cowart  Date of admission:  9/3/2021  6:33 PM  MRN:   [id number]  Account:  [de-identified]  YOB: 1955  PCP:    Beltran Sunshine DO  Room:   2007/2007-02  Code Status:    Full Code    Chief Complaint:     Chief Complaint   Patient presents with    Wound Check       History Obtained From:     patient, electronic medical record    History of Present Illness:     Claribel Cowart is a 74 y.o. Non- / non  female who presents with Wound Check   and is admitted to the hospital for the management of Leg abscess. 78-year-old female presented to the emergency room with continued redness and drainage to the right lower leg. 1 to 2 weeks ago she noticed a large raised area to the right leg. She called her PCP and was placed on doxycycline but she was not seen at that time. Several days later she noticed that her skin looked very \" thin to the area\" and had a small central black dot. She states that she wiped the skin in the area and it opened up and began oozing large amounts of what she described as purulent drainage. Its been draining since that time. She saw her PCP today who ordered outpatient CT scan which showed large abscess, 10.5 x 6.0 x 14 cm. She was sent here for additional evaluation and treatment. She states that she feels fine aside from the leg. No systemic symptoms. She has history of chronic lymphedema to the bilateral lower extremities. She has had cellulitis several times to the legs. She is gotten oral doxycycline multiple times, IV vancomycin, and IV Ancef. She has needed to be admitted for infections in the past. She has an upcoming MRI of the leg ordered by her PCP. I cannot get a clear story in the patient as to why that was ordered. Indication for the order is leg mass. She has a normal white count. BUN is 16 with creatinine of 1.45. Earlier today her outpatient lab work showed BUN of 14 and creatinine of 1.09. In 2019 her kidney function was normal.  Sodium is only slightly low at 134 and she has normal potassium. Blood cultures are pending. Lactic acid in the ER was elevated 2.4 and repeat is further elevated at 5.0. Clinically she does not appear septic she is stable vital signs with blood pressure 147/63, heart rate 38, temp 36.8 and oxygen 95% on room air. She is speaking in full sentences and is not in any distress. She received IV vancomycin in the emergency room and will be admitted for further evaluation and treatment. CT right tib fib W 9/3/21  1.  Large thick-walled complex fluid and gas collection in the subcutaneous   fat of the anterolateral proximal leg, measuring approximately 10.5 x 6.0 x   14 cm, most likely an abscess. Hollace Gondola is communication to the skin surface.    No visible extension into the deep soft tissues.       2.  No acute osseous abnormality or evidence of osteomyelitis.       3.  Moderate to severe degenerative changes in the knee and subtalar joint. Venous doppler RLE 9/3/21  No convincing evidence of DVT in the right lower extremity.       Limited exam due to patient's size; calf veins and distal femoral vein could   not be visualized. Past Medical History:     Past Medical History:   Diagnosis Date    Back pain 6/22/2012    Cellulitis 10/9/2015    Lt. Lower Leg    DM (diabetes mellitus) ([facility] [address] 06/22/2012    DVT (deep venous thrombosis) ([facility] [address] 6/22/2012    GERD (gastroesophageal reflux disease) 6/22/2012    MVP (mitral valve prolapse) 6/22/2012        Past Surgical History:     Past Surgical History:   Procedure Laterality Date    HYSTERECTOMY      TONSILLECTOMY          Medications Prior to Admission:     Prior to Admission medications    Medication Sig Start Date End Date Taking? Authorizing Provider   Dulaglutide (TRULICITY) 4.90 HG/0.4HN SOPN Inject 0.75 mg into the skin once a week 9/3/21  Yes Skyler Arenas DO   gabapentin (NEURONTIN) 300 MG capsule TAKE 2 CAPSULES BY MOUTH 3  TIMES DAILY 8/23/21 9/22/21 Yes Skyler Arenas DO   silver sulfADIAZINE (SILVADENE) 1 % cream Apply topically daily. 8/23/21  Yes Skyler Arenas DO   fluconazole (DIFLUCAN) 100 MG tablet Take 1 tablet by mouth daily as needed (yeast infection) Indications: Patient self diagnoses and takes fluconaole for yeast infections.  8/17/21  Yes Skyler Arenas DO   metoprolol succinate (TOPROL XL) 50 MG extended release tablet Take 1 tablet by mouth daily 8/5/21  Yes Skyler Arenas DO   DULoxetine (CYMBALTA) 30 MG extended release capsule Take 1 capsule by mouth daily 8/5/21  Yes Skyler Arenas DO   hydroCHLOROthiazide (HYDRODIURIL) 25 MG tablet TAKE 1 TABLET BY MOUTH  DAILY 8/5/21  Yes Skyler Arenas DO   omeprazole (PRILOSEC) 40 MG delayed release capsule TAKE 1 CAPSULE BY MOUTH  EVERY DAY 8/5/21  Yes Skyler Arenas DO   apixaban (ELIQUIS) 5 MG TABS tablet TAKE 1 TABLET BY MOUTH TWO  TIMES DAILY 8/5/21  Yes Skyler Arenas, DO glyBURIDE (DIABETA) 5 MG tablet TAKE 1 TABLET BY MOUTH  TWICE DAILY WITH MEALS 7/29/21  Yes Shaq Dominguez, DO   methocarbamol (ROBAXIN) 750 MG tablet Twice daily prn for pain 5/20/21  Yes Shaq Case, DO   glucose monitoring kit (FREESTYLE) monitoring kit Please fill with what is covered by ins Patient test once a day DM E11.9 9/15/17  Yes Sebas Rios MD   acetaminophen (TYLENOL) 500 MG tablet Take 500 mg by mouth every 6 hours as needed for Pain. Pt only takes 1-2 times per week   Yes Historical Provider, MD   Glucose Blood (BLOOD GLUCOSE TEST STRIPS) STRP by Does not apply route 3 times daily. Freestyle [place] Test Strips   Yes Historical Provider, MD   metFORMIN (GLUCOPHAGE) 1000 MG tablet Take 1 tablet by mouth 2 times daily (with meals)  Patient not taking: Reported on 9/3/2021 8/5/21   Shaq Alberto,         Allergies:     Erythromycin and Lisinopril    Social History:     Tobacco:    reports that she quit smoking about 38 years ago. Her smoking use included cigarettes. She has a 30.00 pack-year smoking history. She has never used smokeless tobacco.  Alcohol:      reports no history of alcohol use. Drug Use:  reports no history of drug use. Family History:     Family History   Problem Relation Age of Onset    Heart Disease Mother        Review of Systems:     Positive and Negative as described in HPI. Review of Systems   Constitutional: Negative for activity change and fever. HENT: Negative for congestion, sinus pressure and sore throat. Respiratory: Negative for cough and shortness of breath. Cardiovascular: Positive for leg swelling. Negative for chest pain and palpitations. Gastrointestinal: Negative for abdominal pain, diarrhea, nausea and vomiting. Genitourinary: Negative for difficulty urinating, dysuria and flank pain. Musculoskeletal: Positive for arthralgias. Negative for myalgias. Skin: Positive for color change and wound.    Neurological: Negative for dizziness, light-headedness and numbness. Psychiatric/Behavioral: Negative for confusion and decreased concentration. Physical Exam:   BP (!) 147/63   Pulse 83   Temp 98.2 °F (36.8 °C) (Oral)   Resp 18   Ht 6' (1.829 m)   Wt (!) 362 lb (164.2 kg)   SpO2 95%   BMI 49.10 kg/m²   Temp (24hrs), Av.1 °F (36.7 °C), Min:97.2 °F (36.2 °C), Max:98.8 °F (37.1 °C)    Recent Labs     21  2335   POCGLU 343*     No intake or output data in the 24 hours ending 21 0352    Physical Exam  Constitutional:       Appearance: Normal appearance. She is not toxic-appearing. HENT:      Right Ear: External ear normal.      Left Ear: External ear normal.   Eyes:      General:         Right eye: No discharge. Left eye: No discharge. Conjunctiva/sclera: Conjunctivae normal.   Cardiovascular:      Rate and Rhythm: Normal rate and regular rhythm. Pulses: Normal pulses. Pulmonary:      Effort: Pulmonary effort is normal.      Breath sounds: Normal breath sounds. Abdominal:      Palpations: Abdomen is soft. Tenderness: There is no abdominal tenderness. Musculoskeletal:      Right lower leg: Edema present. Left lower leg: Edema present. Skin:     General: Skin is warm and dry. Capillary Refill: Capillary refill takes less than 2 seconds. Findings: Erythema present. Neurological:      General: No focal deficit present. Mental Status: She is alert and oriented to person, place, and time.    Psychiatric:         Mood and Affect: Mood normal.         Behavior: Behavior normal.         Investigations:      Laboratory Testing:  Recent Results (from the past 24 hour(s))   Sedimentation Rate    Collection Time: 21  1:40 PM   Result Value Ref Range    Sed Rate 107 (H) 0 - 30 mm   C-Reactive Protein    Collection Time: 21  1:40 PM   Result Value Ref Range    CRP 54.8 (H) 0.0 - 5.0 mg/L   Magnesium    Collection Time: 21  1:40 PM   Result Value Ref Range Magnesium 1.5 (L) 1.6 - 2.6 mg/dL   Renal Function Panel    Collection Time: 09/03/21  1:40 PM   Result Value Ref Range    Glucose 275 (H) 70 - 99 mg/dL    BUN 14 8 - 23 mg/dL    CREATININE 1.09 (H) 0.50 - 0.90 mg/dL    Bun/Cre Ratio NOT REPORTED 9 - 20    Calcium 9.6 8.6 - 10.4 mg/dL    Albumin 3.5 3.5 - 5.2 g/dL    Phosphorus 3.4 2.6 - 4.5 mg/dL    Sodium 137 135 - 144 mmol/L    Potassium 4.0 3.7 - 5.3 mmol/L    Chloride 96 (L) 98 - 107 mmol/L    CO2 20 20 - 31 mmol/L    Anion Gap 21 (H) 9 - 17 mmol/L    GFR Non-African American 50 (L) >60 mL/min    GFR African American >60 >60 mL/min    GFR Comment          GFR Staging NOT REPORTED    CBC Auto Differential    Collection Time: 09/03/21  1:40 PM   Result Value Ref Range    WBC 10.3 3.5 - 11.3 k/uL    RBC 4.66 3.95 - 5.11 m/uL    Hemoglobin 13.6 11.9 - 15.1 g/dL    Hematocrit 42.2 36.3 - 47.1 %    MCV 90.6 82.6 - 102.9 fL    MCH 29.2 25.2 - 33.5 pg    MCHC 32.2 28.4 - 34.8 g/dL    RDW 13.2 11.8 - 14.4 %    Platelets 908 (H) 924 - 453 k/uL    MPV 9.8 8.1 - 13.5 fL    NRBC Automated 0.0 0.0 per 100 WBC    Differential Type NOT REPORTED     Seg Neutrophils 74 (H) 36 - 65 %    Lymphocytes 14 (L) 24 - 43 %    Monocytes 9 3 - 12 %    Eosinophils % 1 1 - 4 %    Basophils 1 0 - 2 %    Immature Granulocytes 1 (H) 0 %    Segs Absolute 7.75 1.50 - 8.10 k/uL    Absolute Lymph # 1.43 1.10 - 3.70 k/uL    Absolute Mono # 0.89 0.10 - 1.20 k/uL    Absolute Eos # 0.09 0.00 - 0.44 k/uL    Basophils Absolute 0.06 0.00 - 0.20 k/uL    Absolute Immature Granulocyte 0.05 0.00 - 0.30 k/uL    WBC Morphology NOT REPORTED     RBC Morphology NOT REPORTED     Platelet Estimate NOT REPORTED    AST    Collection Time: 09/03/21  1:40 PM   Result Value Ref Range    AST 17 <32 U/L   ALT    Collection Time: 09/03/21  1:40 PM   Result Value Ref Range    ALT 8 5 - 33 U/L   CK    Collection Time: 09/03/21  1:40 PM   Result Value Ref Range    Total CK 30 26 - 192 U/L   SPECIMEN REJECTION    Collection Time: 09/03/21  1:40 PM   Result Value Ref Range    Specimen Source . BLOOD     Ordered Test  LACTIC     Reason for Rejection       Unable to perform testing: Specimen age beyond stability limit.    - NOT REPORTED    POCT Creatinine    Collection Time: 09/03/21  1:42 PM   Result Value Ref Range    POC Creatinine 1.2 0.6 - 1.4 mg/dL   CBC Auto Differential    Collection Time: 09/03/21  6:50 PM   Result Value Ref Range    WBC 9.7 3.5 - 11.3 k/uL    RBC 4.56 3.95 - 5.11 m/uL    Hemoglobin 13.2 11.9 - 15.1 g/dL    Hematocrit 41.6 36.3 - 47.1 %    MCV 91.2 82.6 - 102.9 fL    MCH 28.9 25.2 - 33.5 pg    MCHC 31.7 28.4 - 34.8 g/dL    RDW 13.2 11.8 - 14.4 %    Platelets 761 (H) 491 - 453 k/uL    MPV 9.3 8.1 - 13.5 fL    NRBC Automated 0.0 0.0 per 100 WBC    Differential Type NOT REPORTED     Seg Neutrophils 74 (H) 36 - 65 %    Lymphocytes 14 (L) 24 - 43 %    Monocytes 9 3 - 12 %    Eosinophils % 1 1 - 4 %    Basophils 1 0 - 2 %    Immature Granulocytes 1 (H) 0 %    Segs Absolute 7.25 1.50 - 8.10 k/uL    Absolute Lymph # 1.31 1.10 - 3.70 k/uL    Absolute Mono # 0.90 0.10 - 1.20 k/uL    Absolute Eos # 0.08 0.00 - 0.44 k/uL    Basophils Absolute 0.06 0.00 - 0.20 k/uL    Absolute Immature Granulocyte 0.06 0.00 - 0.30 k/uL    WBC Morphology NOT REPORTED     RBC Morphology NOT REPORTED     Platelet Estimate NOT REPORTED    Basic Metabolic Panel    Collection Time: 09/03/21  6:50 PM   Result Value Ref Range    Glucose 345 (H) 70 - 99 mg/dL    BUN 16 8 - 23 mg/dL    CREATININE 1.45 (H) 0.50 - 0.90 mg/dL    Bun/Cre Ratio 11 9 - 20    Calcium 9.5 8.6 - 10.4 mg/dL    Sodium 134 (L) 135 - 144 mmol/L    Potassium 4.1 3.7 - 5.3 mmol/L    Chloride 94 (L) 98 - 107 mmol/L    CO2 22 20 - 31 mmol/L    Anion Gap 18 (H) 9 - 17 mmol/L    GFR Non-African American 36 (L) >60 mL/min    GFR  44 (L) >60 mL/min    GFR Comment          GFR Staging NOT REPORTED    Lactic Acid    Collection Time: 09/03/21  6:50 PM   Result Value Ref Range Lactic Acid 2.4 (H) 0.5 - 2.2 mmol/L   Lactic Acid    Collection Time: 09/03/21  8:43 PM   Result Value Ref Range    Lactic Acid 5.0 (H) 0.5 - 2.2 mmol/L   Lactate, Sepsis    Collection Time: 09/03/21 10:49 PM   Result Value Ref Range    Lactic Acid, Sepsis 2.5 (H) 0.5 - 1.9 mmol/L    Lactic Acid, Sepsis, Whole Blood NOT REPORTED 0.5 - 1.9 mmol/L   POC Glucose Fingerstick    Collection Time: 09/03/21 11:35 PM   Result Value Ref Range    POC Glucose 343 (H) 65 - 105 mg/dL   Lactate, Sepsis    Collection Time: 09/04/21 12:45 AM   Result Value Ref Range    Lactic Acid, Sepsis 2.5 (H) 0.5 - 1.9 mmol/L    Lactic Acid, Sepsis, Whole Blood NOT REPORTED 0.5 - 1.9 mmol/L       Imaging/Diagnostics:  CT TIBIA FIBULA RIGHT W CONTRAST    Result Date: 9/3/2021  1. Large thick-walled complex fluid and gas collection in the subcutaneous fat of the anterolateral proximal leg, measuring approximately 10.5 x 6.0 x 14 cm, most likely an abscess. There is communication to the skin surface. No visible extension into the deep soft tissues. 2.  No acute osseous abnormality or evidence of osteomyelitis. 3.  Moderate to severe degenerative changes in the knee and subtalar joint. US DUP LOWER EXTREMITY RIGHT DOMINICK    Result Date: 9/3/2021  No convincing evidence of DVT in the right lower extremity. Limited exam due to patient's size; calf veins and distal femoral vein could not be visualized. Assessment :      Hospital Problems         Last Modified POA    * (Principal) Leg abscess 9/4/2021 Yes    Type 2 diabetes mellitus with skin complication, without long-term current use of insulin ([address] 9/4/2021 Yes    GERD (gastroesophageal reflux disease) (Chronic) 9/4/2021 Yes    Essential hypertension 9/4/2021 Yes    BRIANNA (acute kidney injury) ([address] 9/4/2021 Yes    Lymphedema of both lower extremities (Chronic) 9/4/2021 Yes          Plan:     Patient status inpatient in the [facility] unit    1.  Leg abscess/chronic lymphedema-continue IV antibiotics. Monitor lab work. Monitor vital signs and treat fevers as needed. Wound care. Vascular consult. Wound culture  2. BRIANNA-monitor renal function. Trend labs. Gentle hydration. Avoid nephrotoxic agents. Will add urine sodium and osmole and serum osmole  3. Diabetes-blood sugar over 300 on arrival.  Continue to monitor and treat. Diabetic diet. Hypoglycemia protocol. 4. Essential hypertension-monitor and control blood pressure. Continue home hydrochlorothiazide and Toprol  5. GERD-Protonix  6. Morbid obesity -lifestyle modifications    Consultations:   IP CONSULT TO HOSPITALIST  IP CONSULT TO GENERAL SURGERY  PHARMACY TO DOSE VANCOMYCIN  IP CONSULT TO VASCULAR SURGERY    Patient is admitted as inpatient status because of co-morbidities listed above, severity of signs and symptoms as outlined, requirement for current medical therapies and most importantly because of direct risk to patient if care not provided in a hospital setting. Expected length of stay > 48 hours.     ARTURO HARRIS - CNP  9/4/2021  3:52 AM    Copy sent to Dr. Hazel Dawn DO

## 2021-09-04 NOTE — PROCEDURES
Surgeon:  Nickolas Ritchie MD    Anesthesia: 5mg lidocaine 2%    EBL:  Minimal    This is a 58-year-old female with a fluctuant area in the right leg. CT scan showed a large air-containing abscess. It was decided to drain this bedside. The right leg was cleaned and prepped. Lidocaine was used to locally anesthetize the site of the incision. Transverse incision was made over the fluctuance and immediately there is a large amount of pus. The purulent drainage which is likely an infected hematoma was expressed and likely about 300 mL of pus were removed. The abscess cavity measures about 15 cm x 10 cm x 6 cm according to CT scan. After irrigating the cavity an entire roll of Kerlix gauze with Dakin's was packed.   The procedure was tolerated well without complication

## 2021-09-04 NOTE — PROGRESS NOTES
Physical Therapy  DATE: 2021    NAME: Juan Carlos Pace  MRN: [id number]   : 1955    Patient not seen this date for Physical Therapy due to:  [] Blood transfusion in progress  [] Cancel by RN  [] Hemodialysis  []  Refusal by Patient   [] Spine Precautions   [] Strict Bedrest  [] Surgery  [x] Testing      [] Other        [] PT being discontinued at this time. Patient independent. No further needs. [] PT being discontinued at this time as the patient has been transferred to hospice care. No further needs.     [address], PT

## 2021-09-04 NOTE — PROGRESS NOTES
Pt admitted to room 2007 from ER. Oriented to room and call light/tv controls. Bed in lowest position, wheels locked, 2/4 side rails up  Call light in reach, room free of clutter, adequate lighting provided.

## 2021-09-04 NOTE — FLOWSHEET NOTE
patient states good family support. States about her medical situation. States her fears of losing her leg.   states her many stays in the hospital.   shared in presence, listening, prayers. Follow up as needed. 09/04/21 1531   Encounter Summary   Services provided to: Patient   Referral/Consult From: [facility]   Support System Spouse; Children   Continue Visiting   (9-4-21)   Complexity of Encounter Moderate   Length of Encounter 30 minutes   Spiritual Assessment Completed Yes   Routine   Type Initial   Assessment Calm; Approachable   Intervention Active listening;Explored feelings, thoughts, concerns;Prayer;Sustaining presence/ Ministry of presence; Discussed illness/injury and it's impact; Discussed belief system/Buddhist practices/keyana   Outcome Expressed gratitude;Receptive;Engaged in conversation;Expressed feelings/needs/concerns

## 2021-09-04 NOTE — CARE COORDINATION
Case Management Initial Discharge Plan  Renea Salinas,             Met with:patient or spouse/SO to discuss discharge plans. Information verified: address, contacts, phone number, , insurance Yes  Insurance Provider: [employer]    Emergency Contact/Next of Kin name & number: Michael/spouse   [phone]  Who are involved in patient's support system? spouse    PCP: Maddy Kaplan DO  Date of last visit: 9/3      Discharge Planning    Living Arrangements:  Spouse/Significant Other, Children     Home has 1 stories  ramp stairs to climb to get into front door, 0stairs to climb to reach second floor  Location of bedroom/bathroom in home main    Patient able to perform ADL's:Independent    Current Services (outpatient & in home) none  DME equipment: cane  DME provider: -    Is patient receiving oral anticoagulation therapy? Yes    If indicated:   Physician managing anticoagulation treatment: PCP  Where does patient obtain lab work for ATC treatment? -      Potential Assistance Needed:  N/A    Patient agreeable to home care: Yes  Freedom of choice provided:  yes    Prior SNF/Rehab Placement and Facility: n/a  Agreeable to SNF/Rehab: No  [place] of choice provided: n/a     Evaluation: no    Expected Discharge date:  21    Patient expects to be discharged to: If home: is the family and/or caregiver wiling & able to provide support at home? yes  Who will be providing this support? Spouse and self care    Follow Up Appointment: Best Day/ Time: Monday AM    Transportation provider: spouse  Transportation arrangements needed for discharge: No    Readmission Risk              Risk of Unplanned Readmission:  16             Does patient have a readmission risk score greater than 14?: Yes  If yes, follow-up appointment must be made within 7 days of discharge.      Goals of Care:       Educated patient and spouse on transitional options, provided freedom of choice and are agreeable with plan      Discharge Plan: Abscess of right leg  Patient lives with spouse in a 1 story home with a ramp  Pharmacy is [facility]an in [address] [place] [address]  Patient is agreeable to [facility]. for daily dressing changes  Patient uses a cane PRN  Freedom of choice is [facility] or [facility]. Referrals made  Continue to follow for any other needs.            Electronically signed by Lyudmila Hunt RN on 9/4/21 at 10:34 AM EDT

## 2021-09-04 NOTE — CONSULTS
Keith Quijano      Name: Jocelyne Singh  MRN: [id number]     [place]: [de-identified]  Room: 2007/2007-02    Admit Date: 9/3/2021  PCP: Yamila Cerna DO    Physician Requesting Consult:  Barbara Damian    Reason for Consult:  Right leg abscess    Chief Complaint:     Chief Complaint   Patient presents with    Wound Check         History Obtained From:     patient    History of Present Illness:      Jocelyne Singh is a  74 y.o.  female who presents with Wound Check      This is a 58-year-old female with known lymphedema. She states that about a month ago she woke up with redness and pain in her right leg. Eventually this did not resolve and she had a CT scan which shows a large abscess on the right anterior tibia which is outside the fascia. I discussed this with her and she agreed to have the abscess drained bedside    Past Medical History:     Past Medical History:   Diagnosis Date    Back pain 6/22/2012    Cellulitis 10/9/2015    Lt. Lower Leg    DM (diabetes mellitus) ([facility] 06/22/2012    DVT (deep venous thrombosis) ([facility] 6/22/2012    GERD (gastroesophageal reflux disease) 6/22/2012    MVP (mitral valve prolapse) 6/22/2012        Past Surgical History:     Past Surgical History:   Procedure Laterality Date    HYSTERECTOMY      TONSILLECTOMY          Medications Prior to Admission:       Prior to Admission medications    Medication Sig Start Date End Date Taking? Authorizing Provider   Dulaglutide (TRULICITY) 5.25 KQ/7.1FT SOPN Inject 0.75 mg into the skin once a week 9/3/21  Yes Junius Cameron, DO   gabapentin (NEURONTIN) 300 MG capsule TAKE 2 CAPSULES BY MOUTH 3  TIMES DAILY 8/23/21 9/22/21 Yes Junius Cameron, DO   silver sulfADIAZINE (SILVADENE) 1 % cream Apply topically daily.  8/23/21  Yes Junius Cameron, DO   fluconazole (DIFLUCAN) 100 MG tablet Take 1 tablet by mouth daily as needed (yeast infection) Indications: Patient self diagnoses and takes fluconaole for yeast infections. 8/17/21  Yes Shaq Case, DO   metoprolol succinate (TOPROL XL) 50 MG extended release tablet Take 1 tablet by mouth daily 8/5/21  Yes Shaq Case, DO   DULoxetine (CYMBALTA) 30 MG extended release capsule Take 1 capsule by mouth daily 8/5/21  Yes Shaq Case, DO   hydroCHLOROthiazide (HYDRODIURIL) 25 MG tablet TAKE 1 TABLET BY MOUTH  DAILY 8/5/21  Yes Shaq Case, DO   omeprazole (PRILOSEC) 40 MG delayed release capsule TAKE 1 CAPSULE BY MOUTH  EVERY DAY 8/5/21  Yes Shaq Case, DO   apixaban (ELIQUIS) 5 MG TABS tablet TAKE 1 TABLET BY MOUTH TWO  TIMES DAILY 8/5/21  Yes Shaq Case, DO   glyBURIDE (DIABETA) 5 MG tablet TAKE 1 TABLET BY MOUTH  TWICE DAILY WITH MEALS 7/29/21  Yes Shaq Case, DO   methocarbamol (ROBAXIN) 750 MG tablet Twice daily prn for pain 5/20/21  Yes Shaq Case, DO   glucose monitoring kit (FREESTYLE) monitoring kit Please fill with what is covered by ins Patient test once a day DM E11.9 9/15/17  Yes Sebas Rios MD   acetaminophen (TYLENOL) 500 MG tablet Take 500 mg by mouth every 6 hours as needed for Pain. Pt only takes 1-2 times per week   Yes Historical Provider, MD   Glucose Blood (BLOOD GLUCOSE TEST STRIPS) STRP by Does not apply route 3 times daily. Freestyle [place] Test Strips   Yes Historical Provider, MD   metFORMIN (GLUCOPHAGE) 1000 MG tablet Take 1 tablet by mouth 2 times daily (with meals)  Patient not taking: Reported on 9/3/2021 8/5/21   Shaq Case, DO        Allergies:       Erythromycin and Lisinopril    Social History:     Tobacco:    reports that she quit smoking about 38 years ago. Her smoking use included cigarettes. She has a 30.00 pack-year smoking history. She has never used smokeless tobacco.  Alcohol:      reports no history of alcohol use. Drug Use:  reports no history of drug use.     Family History:     Family History   Problem Relation Age of Onset    Heart Disease Mother        Review of Systems:     Positive and Negative as described in HPI    Constitutional:  negative for  fevers, chills, sweats, fatigue, and weight loss  HEENT:  negative for vision or hearing changes,   Respiratory:  negative for shortness of breath, cough, or congestion  Cardiovascular:  negative for  chest pain, palpitations  Gastrointestinal:  negative for nausea, vomiting, diarrhea, constipation, abdominal pain  Genitourinary:  negative for frequency, dysuria  Integument/Breast:  negative for rash, skin lesions  Musculoskeletal:  negative for muscle aches or joint pain  Neurological:  negative for headaches, dizziness, lightheadedness, numbness, pain and tingling extremities  Behavior/Psych:  negative for depression and anxiety    Code Status:  Full Code    Physical Exam:     Vitals:  /63   Pulse 73   Temp 98.1 °F (36.7 °C) (Oral)   Resp 18   Ht 6' (1.829 m)   Wt (!) 363 lb 1.6 oz (164.7 kg)   SpO2 93%   BMI 49.25 kg/m²   Temp (24hrs), Av.1 °F (36.7 °C), Min:97.2 °F (36.2 °C), Max:98.8 °F (37.1 °C)      General appearance - alert, well appearing and in no acute distress  Mental status - oriented to person, place and time with normal affect  Head - normocephalic and atraumatic  Eyes - pupils equal and reactive, extraocular eye movements intact, conjunctiva clear  Ears - hearing appears to be intact  Nose - no drainage noted  Mouth - mucous membranes moist  Neck - supple, no carotid bruits, thyroid not palpable, no JVD  Chest - clear to auscultation, normal effort  Heart - normal rate, regular rhythm, no murmurs  Abdomen - soft, non-tender, non-distended, bowel sounds present all four quadrants, no masses, hepatomegaly, splenomegaly or aortic enlargement  Neurological - normal speech, no focal findings or movement disorder noted, cranial nerves II through XII grossly intact  Extremities -nonpitting edema bilaterally.   Large area of fluctuance with active purulent drainage  Skin - no gross lesions, rashes, or induration noted        R brachial 1+ L brachial 1+   R radial 1+ L radial 1+   R femoral 1+ L femoral 1+   R popliteal 1+ L popliteal 1+   R posterior tibial 1+ L posterior tibial 1+   R dorsalis pedis 1+ L dorsalis pedis 1+         Data:     Lab Results   Component Value Date    WBC 10.4 09/04/2021    HGB 10.6 (L) 09/04/2021    HCT 34.0 (L) 09/04/2021    MCV 92.9 09/04/2021     09/04/2021     Lab Results   Component Value Date     09/04/2021    K 3.8 09/04/2021    CL 97 09/04/2021    CO2 24 09/04/2021    BUN 18 09/04/2021    CREATININE 1.36 09/04/2021    GLUCOSE 301 09/04/2021    CALCIUM 8.4 09/04/2021      No results found for: INR, PROTIME    Assessment:     Primary Problem  Leg abscess  53-year-old female with right leg abscess  Active Hospital Problems    Diagnosis Date Noted    Leg abscess [L02.419] 09/03/2021    Morbid obesity ([address] [E66.01]     Lymphedema of both lower extremities [I89.0] 07/04/2017    BRIANNA (acute kidney injury) ([address] [N17.9] 10/13/2015    Essential hypertension [I10]     GERD (gastroesophageal reflux disease) [K21.9] 06/22/2012    Type 2 diabetes mellitus with skin complication, without long-term current use of insulin ([address] [E11.628]        Plan:     1. I&D performed bedside  2. Wound care services been consulted for daily dressing changes  3.  Okay to discharge from a vascular standpoint if home health care is involved      Electronically signed by Nickolas Ritchie MD on 9/4/2021 at 8:21 AM     Copy sent to Dr. Winsome Acosta DO

## 2021-09-05 PROBLEM — E43 SEVERE MALNUTRITION (HCC): Status: ACTIVE | Noted: 2021-09-05

## 2021-09-05 PROBLEM — L02.419 LEG ABSCESS: Status: RESOLVED | Noted: 2021-09-03 | Resolved: 2021-09-05

## 2021-09-05 LAB
ANION GAP SERPL CALCULATED.3IONS-SCNC: 11 MMOL/L (ref 9–17)
BUN BLDV-MCNC: 18 MG/DL (ref 8–23)
BUN/CREAT BLD: 15 (ref 9–20)
CALCIUM SERPL-MCNC: 8.1 MG/DL (ref 8.6–10.4)
CHLORIDE BLD-SCNC: 99 MMOL/L (ref 98–107)
CO2: 21 MMOL/L (ref 20–31)
CREAT SERPL-MCNC: 1.21 MG/DL (ref 0.5–0.9)
CULTURE: NORMAL
GFR AFRICAN AMERICAN: 54 ML/MIN
GFR NON-AFRICAN AMERICAN: 44 ML/MIN
GFR SERPL CREATININE-BSD FRML MDRD: ABNORMAL ML/MIN/{1.73_M2}
GFR SERPL CREATININE-BSD FRML MDRD: ABNORMAL ML/MIN/{1.73_M2}
GLUCOSE BLD-MCNC: 245 MG/DL (ref 70–99)
GLUCOSE BLD-MCNC: 250 MG/DL (ref 65–105)
GLUCOSE BLD-MCNC: 266 MG/DL (ref 65–105)
GLUCOSE BLD-MCNC: 272 MG/DL (ref 65–105)
GLUCOSE BLD-MCNC: 280 MG/DL (ref 65–105)
Lab: NORMAL
POTASSIUM SERPL-SCNC: 3.7 MMOL/L (ref 3.7–5.3)
SODIUM BLD-SCNC: 131 MMOL/L (ref 135–144)
SPECIMEN DESCRIPTION: NORMAL
VANCOMYCIN RANDOM DATE LAST DOSE: NORMAL
VANCOMYCIN RANDOM DOSE AMOUNT: NORMAL
VANCOMYCIN RANDOM TIME LAST DOSE: NORMAL
VANCOMYCIN RANDOM: 19.5 UG/ML

## 2021-09-05 PROCEDURE — 99239 HOSP IP/OBS DSCHRG MGMT >30: CPT | Performed by: INTERNAL MEDICINE

## 2021-09-05 PROCEDURE — 97162 PT EVAL MOD COMPLEX 30 MIN: CPT

## 2021-09-05 PROCEDURE — 6370000000 HC RX 637 (ALT 250 FOR IP): Performed by: NURSE PRACTITIONER

## 2021-09-05 PROCEDURE — 97116 GAIT TRAINING THERAPY: CPT

## 2021-09-05 PROCEDURE — 97166 OT EVAL MOD COMPLEX 45 MIN: CPT

## 2021-09-05 PROCEDURE — 6370000000 HC RX 637 (ALT 250 FOR IP): Performed by: INTERNAL MEDICINE

## 2021-09-05 PROCEDURE — 82947 ASSAY GLUCOSE BLOOD QUANT: CPT

## 2021-09-05 PROCEDURE — 36415 COLL VENOUS BLD VENIPUNCTURE: CPT

## 2021-09-05 PROCEDURE — 97535 SELF CARE MNGMENT TRAINING: CPT

## 2021-09-05 PROCEDURE — 80202 ASSAY OF VANCOMYCIN: CPT

## 2021-09-05 PROCEDURE — 6360000002 HC RX W HCPCS: Performed by: INTERNAL MEDICINE

## 2021-09-05 PROCEDURE — 2580000003 HC RX 258: Performed by: NURSE PRACTITIONER

## 2021-09-05 PROCEDURE — 1200000000 HC SEMI PRIVATE

## 2021-09-05 PROCEDURE — 2580000003 HC RX 258: Performed by: INTERNAL MEDICINE

## 2021-09-05 PROCEDURE — 80048 BASIC METABOLIC PNL TOTAL CA: CPT

## 2021-09-05 RX ORDER — DOXYCYCLINE 100 MG/1
100 CAPSULE ORAL EVERY 12 HOURS SCHEDULED
Status: DISCONTINUED | OUTPATIENT
Start: 2021-09-05 | End: 2021-09-06 | Stop reason: HOSPADM

## 2021-09-05 RX ORDER — INSULIN GLARGINE 100 [IU]/ML
12 INJECTION, SOLUTION SUBCUTANEOUS DAILY
Status: DISCONTINUED | OUTPATIENT
Start: 2021-09-05 | End: 2021-09-06 | Stop reason: HOSPADM

## 2021-09-05 RX ORDER — DOXYCYCLINE HYCLATE 100 MG
100 TABLET ORAL 2 TIMES DAILY
Qty: 20 TABLET | Refills: 0 | Status: SHIPPED | OUTPATIENT
Start: 2021-09-05 | End: 2022-01-04 | Stop reason: SDUPTHER

## 2021-09-05 RX ADMIN — GABAPENTIN 600 MG: 300 CAPSULE ORAL at 20:20

## 2021-09-05 RX ADMIN — CEFTRIAXONE SODIUM 1000 MG: 1 INJECTION, POWDER, FOR SOLUTION INTRAMUSCULAR; INTRAVENOUS at 08:05

## 2021-09-05 RX ADMIN — GLYBURIDE 5 MG: 5 TABLET ORAL at 08:04

## 2021-09-05 RX ADMIN — INSULIN LISPRO 6 UNITS: 100 INJECTION, SOLUTION INTRAVENOUS; SUBCUTANEOUS at 12:09

## 2021-09-05 RX ADMIN — SODIUM HYPOCHLORITE: 1.25 SOLUTION TOPICAL at 12:10

## 2021-09-05 RX ADMIN — INSULIN GLARGINE 12 UNITS: 100 INJECTION, SOLUTION SUBCUTANEOUS at 09:35

## 2021-09-05 RX ADMIN — HYDROCHLOROTHIAZIDE 25 MG: 25 TABLET ORAL at 08:05

## 2021-09-05 RX ADMIN — GABAPENTIN 600 MG: 300 CAPSULE ORAL at 14:44

## 2021-09-05 RX ADMIN — GLYBURIDE 5 MG: 5 TABLET ORAL at 17:10

## 2021-09-05 RX ADMIN — HYDROCODONE BITARTRATE AND ACETAMINOPHEN 2 TABLET: 5; 325 TABLET ORAL at 10:24

## 2021-09-05 RX ADMIN — APIXABAN 5 MG: 5 TABLET, FILM COATED ORAL at 20:20

## 2021-09-05 RX ADMIN — METOPROLOL SUCCINATE 50 MG: 50 TABLET, EXTENDED RELEASE ORAL at 08:05

## 2021-09-05 RX ADMIN — DULOXETINE HYDROCHLORIDE 30 MG: 30 CAPSULE, DELAYED RELEASE ORAL at 08:04

## 2021-09-05 RX ADMIN — INSULIN LISPRO 6 UNITS: 100 INJECTION, SOLUTION INTRAVENOUS; SUBCUTANEOUS at 17:10

## 2021-09-05 RX ADMIN — DOXYCYCLINE 100 MG: 100 CAPSULE ORAL at 20:20

## 2021-09-05 RX ADMIN — HYDROCODONE BITARTRATE AND ACETAMINOPHEN 2 TABLET: 5; 325 TABLET ORAL at 21:49

## 2021-09-05 RX ADMIN — APIXABAN 5 MG: 5 TABLET, FILM COATED ORAL at 08:04

## 2021-09-05 RX ADMIN — GABAPENTIN 600 MG: 300 CAPSULE ORAL at 05:55

## 2021-09-05 RX ADMIN — SODIUM CHLORIDE: 9 INJECTION, SOLUTION INTRAVENOUS at 21:49

## 2021-09-05 RX ADMIN — INSULIN LISPRO 3 UNITS: 100 INJECTION, SOLUTION INTRAVENOUS; SUBCUTANEOUS at 20:20

## 2021-09-05 RX ADMIN — SODIUM CHLORIDE: 9 INJECTION, SOLUTION INTRAVENOUS at 08:02

## 2021-09-05 RX ADMIN — PANTOPRAZOLE SODIUM 40 MG: 40 TABLET, DELAYED RELEASE ORAL at 05:55

## 2021-09-05 RX ADMIN — INSULIN LISPRO 6 UNITS: 100 INJECTION, SOLUTION INTRAVENOUS; SUBCUTANEOUS at 08:05

## 2021-09-05 RX ADMIN — ACETAMINOPHEN 650 MG: 325 TABLET ORAL at 05:55

## 2021-09-05 ASSESSMENT — PAIN DESCRIPTION - DESCRIPTORS
DESCRIPTORS: DISCOMFORT
DESCRIPTORS: ACHING;DISCOMFORT

## 2021-09-05 ASSESSMENT — PAIN DESCRIPTION - LOCATION
LOCATION: BACK
LOCATION: BACK

## 2021-09-05 ASSESSMENT — PAIN SCALES - GENERAL
PAINLEVEL_OUTOF10: 7
PAINLEVEL_OUTOF10: 3
PAINLEVEL_OUTOF10: 4
PAINLEVEL_OUTOF10: 7

## 2021-09-05 ASSESSMENT — PAIN DESCRIPTION - ORIENTATION
ORIENTATION: LOWER
ORIENTATION: LOWER

## 2021-09-05 ASSESSMENT — PAIN DESCRIPTION - PAIN TYPE
TYPE: ACUTE PAIN
TYPE: ACUTE PAIN

## 2021-09-05 ASSESSMENT — PAIN - FUNCTIONAL ASSESSMENT: PAIN_FUNCTIONAL_ASSESSMENT: ACTIVITIES ARE NOT PREVENTED

## 2021-09-05 ASSESSMENT — PAIN DESCRIPTION - ONSET
ONSET: ON-GOING
ONSET: ON-GOING

## 2021-09-05 ASSESSMENT — PAIN DESCRIPTION - FREQUENCY
FREQUENCY: INTERMITTENT
FREQUENCY: INTERMITTENT

## 2021-09-05 NOTE — CARE COORDINATION
Notified [facility] to set up home care. They cannot take the patient at this time. Patient needs PT and daily dressing changes.  is teachable. Writer attempted [facility] and [facility] and they cannot accommodate either. Patient lives in [facility]. Left a  with [address], to see if they go to [place]. Waiting on return phone call.     [address] care does not go to [place] per Daryn Sumner [place] [address]

## 2021-09-05 NOTE — DISCHARGE INSTR - COC
Continuity of Care Form    Patient Name: Brionna Grant   :    MRN:  [id number]    Admit date:  9/3/2021  Discharge date:  ***    Code Status Order: Full Code   Advance Directives:      Admitting Physician:  Sanjiv Hatfield MD  PCP: Trevin Nogueira DO    Discharging Nurse: [facility] Unit/Room#:   Discharging Unit Phone Number: ***    Emergency Contact:   Extended Emergency Contact Information  Primary Emergency Contact: Ramsey Fitch, [address] [place] [address] Phone: [phone]  Relation: Spouse  Secondary Emergency Contact: Memo Dolan [address] Phone: [phone]  Mobile Phone: [phone]  Relation: Step Child    Past Surgical History:  Past Surgical History:   Procedure Laterality Date    HYSTERECTOMY      TONSILLECTOMY         Immunization History: There is no immunization history on file for this patient. Active Problems:  Patient Active Problem List   Diagnosis Code    Type 2 diabetes mellitus with skin complication, without long-term current use of insulin ([facility]) E11.628    GERD (gastroesophageal reflux disease) K21.9    Back pain M54.9    MVP (mitral valve prolapse) I34.1    DVT (deep venous thrombosis) ([facility]) I82.409    Essential hypertension I10    HIGH CHOLESTEROL     Left leg cellulitis L03. 116    Radiculopathy of lumbosacral region M54.17    Staghorn renal calculus N20.0    Foraminal stenosis of lumbar region M48.061    Acute cystitis N30.00    SIRS (systemic inflammatory response syndrome) ([facility]) R65.10    Nausea and vomiting R11.2    Lymphedema of both lower extremities I89.0    Leukocytosis D72.829    Morbid obesity ([facility]) E66.01    Chronic hypoxemic respiratory failure ([facility]) J96.11    Pulmonary hypertension ([facility]) I27.20       Isolation/Infection:   Isolation            Contact          Patient Infection Status       Infection Onset Added Last Indicated Last Indicated By Review Planned Expiration Resolved Resolved By    MDRO (multi-drug resistant organism)  07/07/17 07/07/17 Radha Sampson RN        Klebsiella - Urine 7/2017      MRSA  04/27/16 04/27/16 Radha Sampson RN        Foot - 4/2016            Nurse Assessment:  Last Vital Signs: /74   Pulse 90   Temp 97.5 °F (36.4 °C)   Resp 18   Ht 6' (1.829 m)   Wt (!) 365 lb (165.6 kg)   SpO2 93%   BMI 49.50 kg/m²     Last documented pain score (0-10 scale): Pain Level: 3  Last Weight:   Wt Readings from Last 1 Encounters:   09/05/21 (!) 365 lb (165.6 kg)     Mental Status:  {IP PT MENTAL STATUS:20030:::0}    IV Access:  { CORRY IV ACCESS:297333450:::0}    Nursing Mobility/ADLs:  Walking   {CHP DME ADLs:796193595:::0}  Transfer  {CHP DME ADLs:481406730:::0}  Bathing  {CHP DME ADLs:369093863:::0}  Dressing  {CHP DME ADLs:474330053:::0}  Toileting  {CHP DME ADLs:032699950:::0}  Feeding  {CHP DME ADLs:773026398:::0}  Med Admin  {CHP DME ADLs:414593199:::0}  Med Delivery   { CORRY MED Delivery:008019804:::0}    Wound Care Documentation and Therapy:        Elimination:  Continence: Bowel: {YES / AS:69314}  Bladder: {YES / UK:69911}  Urinary Catheter: {Urinary Catheter:362178163:::0}   Colostomy/Ileostomy/Ileal Conduit: {YES / MO:99430}       Date of Last BM: ***    Intake/Output Summary (Last 24 hours) at 9/5/2021 1008  Last data filed at 9/5/2021 0759  Gross per 24 hour   Intake 2275.75 ml   Output 1800 ml   Net 475.75 ml     I/O last 3 completed shifts:   In: 2275.8 [I.V.:2275.8]  Out: 1000 [Urine:1000]    Safety Concerns:     [address] CORRY Safety Concerns:922807834:::0}    Impairments/Disabilities:      [address] [place] Impairments/Disabilities:674537714:::0}    Nutrition Therapy:  Current Nutrition Therapy:   [address] CORRY Diet List:906978404:::0}    Routes of Feeding: {CHP DME Other Feedings:712641525:::0}  Liquids: {Slp liquid thickness:16860}  Daily Fluid Restriction: {CHP DME Yes amt example:676386779:::0}  Last Modified Barium Swallow with Video (Video Swallowing Test): {Done Not Done HWF:515861599:::9}    Treatments at the Time of Hospital Discharge:   Respiratory Treatments: ***  Oxygen Therapy:  {Therapy; copd oxygen:26711:::0}  Ventilator:    {[facility] Vent List:467639178:::0}    Rehab Therapies: {THERAPEUTIC INTERVENTION:2162733754}  Weight Bearing Status/Restrictions: { CC Weight Bearin:::0}  Other Medical Equipment (for information only, NOT a DME order):  {EQUIPMENT:058029107}  Other Treatments: ***    Patient's personal belongings (please select all that are sent with patient):  {CHP DME Belongings:865113788:::0}    RN SIGNATURE:  {Esignature:375072183:::0}    CASE MANAGEMENT/SOCIAL WORK SECTION    Inpatient Status Date: ***    Readmission Risk Assessment Score:  Readmission Risk              Risk of Unplanned Readmission:  15           Discharging to Facility/ Agency   Name:   Address:  Phone:  Fax:    Dialysis Facility (if applicable)   Name:  Address:  Dialysis Schedule:  Phone:  Fax:    / signature: {Esignature:193234089:::0}    PHYSICIAN SECTION    Prognosis: Good    Condition at Discharge: Stable    Rehab Potential (if transferring to Rehab): Good    Recommended Labs or Other Treatments After Discharge: Continue antibiotics    Physician Certification: I certify the above information and transfer of Renea Salinas  is necessary for the continuing treatment of the diagnosis listed and that she requires Home Care for less 30 days.      Update Admission H&P: No change in H&P    PHYSICIAN SIGNATURE:  Electronically signed by Laurel Kaufman MD on 21 at 10:09 AM EDT

## 2021-09-05 NOTE — CARE COORDINATION
Spoke with patient and spouse. Clarified that writer could not get any home care established. Patient states that she would not mind coming to the wound care clinic. Writer ordered O/P wound care for the patient. The plan is home with spouse independently and follow up with wound care.

## 2021-09-05 NOTE — PROGRESS NOTES
Vascular Surgery   Progress Note    9/5/2021 8:24 AM  Subjective:   Admit Date: 9/3/2021  PCP: Andrey Gaxiola DO  Interval History: Patient doing well overnight. Denies any significant pain. From a vascular standpoint I do not do any follow-up wound care and have clinic in [place] only 2 days a month. I am recommending that she follow-up with wound care center and have home health care change her packing. Diet: ADULT DIET; Regular; 4 carb choices (60 gm/meal)    Medications:   Scheduled Meds:   vancomycin (VANCOCIN) intermittent dosing (placeholder)   Other RX Placeholder    vancomycin  1,250 mg IntraVENous Q24H    cefTRIAXone (ROCEPHIN) IV  1,000 mg IntraVENous Q24H    sodium hypochlorite   Irrigation Daily    glyBURIDE  5 mg Oral BID WC    [START ON 9/10/2021] Dulaglutide  0.75 mg SubCUTAneous Weekly    insulin lispro  0-12 Units SubCUTAneous TID WC    insulin lispro  0-6 Units SubCUTAneous Nightly    apixaban  5 mg Oral BID    DULoxetine  30 mg Oral Daily    gabapentin  600 mg Oral TID    hydroCHLOROthiazide  25 mg Oral Daily    metoprolol succinate  50 mg Oral Daily    pantoprazole  40 mg Oral QAM AC    sodium chloride flush  5-40 mL IntraVENous 2 times per day     Continuous Infusions:   dextrose      dextrose      sodium chloride 75 mL/hr at 09/05/21 0802    sodium chloride           Labs:   CBC:   Recent Labs     09/03/21  1340 09/03/21  1850 09/04/21  0611   WBC 10.3 9.7 10.4   HGB 13.6 13.2 10.6*   * 454* 304     BMP:    Recent Labs     09/03/21  1850 09/04/21  0611 09/05/21  0548   * 133* 131*   K 4.1 3.8 3.7   CL 94* 97* 99   CO2 22 24 21   BUN 16 18 18   CREATININE 1.45* 1.36* 1.21*   GLUCOSE 345* 301* 245*     Hepatic:   Recent Labs     09/03/21  1340   AST 17   ALT 8     Troponin: Invalid input(s): TROPONIN  BNP: No results for input(s): BNP in the last 72 hours. Lipids: No results for input(s): CHOL, HDL in the last 72 hours.     Invalid input(s): LDLCALCU  INR: No results for input(s): INR in the last 72 hours. Objective:   Vitals: BP (!) 161/52   Pulse 122   Temp 97.5 °F (36.4 °C)   Resp 18   Ht 6' (1.829 m)   Wt (!) 365 lb (165.6 kg)   SpO2 93%   BMI 49.50 kg/m²   General appearance: alert, cooperative and no distress  Mental Status: oriented to person, place and time with normal affect  Neck: good carotid pulses, no JVD  Lungs: clear to auscultation bilaterally, normal effort  Heart: regular rate and rhythm, no murmur,  Abdomen: soft, non-tender, non-distended, bowel sounds present all four quadrants, no masses, hepatomegaly, splenomegaly or aortic enlargement  Extremities: Edema. The ABDs appear to be leaking out some of the pus. Skin: no gross lesions, rashes, or induration    Assessment:   3 69-year-old female with right leg abscess status post I&D    Patient Active Problem List:     Type 2 diabetes mellitus with skin complication, without long-term current use of insulin (HCC)     GERD (gastroesophageal reflux disease)     Back pain     MVP (mitral valve prolapse)     DVT (deep venous thrombosis) ([facility])     Essential hypertension     HIGH CHOLESTEROL     Left leg cellulitis     BRIANNA (acute kidney injury) ([address]     Radiculopathy of lumbosacral region     Staghorn renal calculus     Foraminal stenosis of lumbar region     Acute cystitis     SIRS (systemic inflammatory response syndrome) ([facility])     Nausea and vomiting     Lymphedema of both lower extremities     Leukocytosis     Morbid obesity (HCC)     Chronic hypoxemic respiratory failure (HCC)     Pulmonary hypertension ([address]     Leg abscess      Plan:   1. Vascular signoff  2. Will defer all wound care recommendations to the wound care service and recommend outpatient follow-up with the wound care center here at [facility].     Electronically signed by Pasha Padilla MD on 9/5/2021 at 8:24 AM

## 2021-09-05 NOTE — PROGRESS NOTES
Physical Therapy    Facility/Department: [facility]  Initial Assessment    NAME: Carolynn Jose  : 1955  MRN: [id number]    Date of Service: 2021      Discharge Recommendations:  Patient would benefit from continued therapy after discharge      Due to recent hospitalization and medical condition, pt would benefit from additional therapy at time of discharge to ensure safety. Please refer to the AM-PAC score for current functional status. Assessment   Body structures, Functions, Activity limitations: Decreased functional mobility ; Decreased safe awareness;Decreased balance;Decreased endurance;Decreased strength  Assessment: Skilled therapy needed to maximize independence with mobility as well as improve strength,endurance and balance. Patient not very motivated to work with therapy but would benefit from continued therapy. Recommend continued therapy after discharge. Prognosis: Good  Decision Making: Medium Complexity  Exam: AROM, strength, endurance, balance, mobility, AM-PAC  PT Education: Goals;PT Role;Plan of Care;Transfer Training;Gait Training;General Safety  REQUIRES PT FOLLOW UP: Yes  Activity Tolerance  Activity Tolerance: Patient limited by endurance; Patient limited by fatigue       Patient Diagnosis(es): The encounter diagnosis was Abscess of right leg.     has a past medical history of Back pain, Cellulitis, DM (diabetes mellitus) ([address] DVT (deep venous thrombosis) ([address] GERD (gastroesophageal reflux disease), and MVP (mitral valve prolapse). has a past surgical history that includes Hysterectomy and Tonsillectomy.     Restrictions  Restrictions/Precautions  Restrictions/Precautions: Up as Tolerated, General Precautions  Required Braces or Orthoses?: No  Vision/Hearing  Vision: Impaired  Vision Exceptions: Wears glasses at all times  Hearing: Within functional limits     Subjective  General  Chart Reviewed: Yes  Patient assessed for rehabilitation services?: Yes  Response To Previous Treatment: Patient with no complaints from previous session. Family / Caregiver Present: No  General Comment  Comments: Amadou Vo RN states patient appropriate for therapy  Subjective  Subjective: patient initially stated \"I don't need therapy\" but with encouragement was agreeable to work with therapy for assessment          Orientation  Orientation  Overall Orientation Status: Within Functional Limits  Social/Functional History  Social/Functional History  Lives With: Spouse, Son  Type of Home: House  Home Layout: One level  Home Access: Ramped entrance  Bathroom Shower/Tub: Walk-in shower  Bathroom Toilet: Standard  Bathroom Equipment: [facility] Sajan: [address]: [address]: Independent  Homemaking Responsibilities: Yes  Ambulation Assistance: Independent  Transfer Assistance: Independent  Active : No  Patient's  Info:  drives  Occupation: Retired  Type of occupation:   Leisure & Hobbies: eliza, tv, tablet  Additional Comments: no falls  Cognition   Cognition  Overall Cognitive Status: Exceptions  Arousal/Alertness: Appropriate responses to stimuli  Following Commands:  Follows all commands without difficulty  Attention Span: Appears intact  Memory: Appears intact  Safety Judgement: Decreased awareness of need for assistance;Decreased awareness of need for safety  Problem Solving: Decreased awareness of errors  Insights: Decreased awareness of deficits  Initiation: Requires cues for some  Sequencing: Requires cues for some    Objective     Observation/Palpation  Posture: Fair  Observation: BMI 49.50, R LE wrapped s/p I&D  Edema: B LE edema    AROM RLE (degrees)  RLE AROM: WFL  AROM LLE (degrees)  LLE AROM : WFL  AROM RUE (degrees)  RUE General AROM: see OT assessment  AROM LUE (degrees)  LUE General AROM: see OT assessment  Strength RLE  Comment: ankle 4/5, knee 4-/5, hip 3+/5  Strength LLE  Comment: 4/5  Strength JARED  Comment: refer to OT assessment  Strength JAISON  Comment: refer to OT assessment  Motor Control  Gross Motor?: WFL  Coordination  Heel to Shin: Normal  Sensation  Overall Sensation Status: Impaired (minimal neuropathy B LE)  Bed mobility  Supine to Sit: Minimal assistance  Comment: patient needed min A with LEs to move off side of bed  Transfers  Sit to Stand: Contact guard assistance  Stand to sit: Contact guard assistance  Comment: patient needed manual/verbal cues for safety techniques  Ambulation  Ambulation?: Yes  More Ambulation?: No  Ambulation 1  Surface: level tile  Device: Rolling Walker  Assistance: Contact guard assistance  Quality of Gait: antalgic gait due to LLE radicular sx's from back pain, decreased weight shift to LLE  Gait Deviations: Slow Yasmeen;Decreased step length  Distance: 20'  Comments: encouraged patient to walk further but she stated she needed to sit down     Balance  Sitting - Static: Good  Sitting - Dynamic: Good  Standing - Static: Fair;+  Standing - Dynamic: [address]  Times per week: 1-2x/ day for 5-6 days/week  Current Treatment Recommendations: Strengthening, Transfer Training, Endurance Training, Patient/Caregiver Education & Training, Balance Training, Gait Training, Safety Education & Training, Home Exercise Program  Safety Devices  Type of devices: Gait belt, Left in chair, Call light within reach      AM-PAC Score  AM-PAC Inpatient Mobility Raw Score : 18 (09/05/21 1032)  AM-PAC Inpatient T-Scale Score : 43.63 (09/05/21 1032)  Mobility Inpatient CMS 0-100% Score: 46.58 (09/05/21 1032)  Mobility Inpatient CMS G-Code Modifier : CK (09/05/21 1032)          Goals  Short term goals  Time Frame for Short term goals: 12 visits  Short term goal 1: Modified independent bed mobility and transfers  Short term goal 2: Patient to ambulate 48' without device, improved weight shift to L LE, independent  Short term goal 3: Patient to demonstrate independence with HEP for JANETT strengthening  Patient Goals   Patient goals : to go home       Therapy Time   Individual Concurrent Group Co-treatment   Time In 3148         Time Out 0842 (additional 10 minutes for chart review)         Minutes 18+10=28             Treatment Time: 10 minutes    Aracely Hernandez PT

## 2021-09-05 NOTE — PROGRESS NOTES
Comprehensive Nutrition Assessment    Type and Reason for Visit:  Positive Nutrition Screen (Unplanned weight loss, decreased appetite, pressure ulcer or non-healing wound. Dietitian consult needed: poor intake, diet education, poor appetite)    Nutrition Recommendations/Plan:   1. Continue ADULT DIET; Regular; 4 carb choices (60 gm/meal)  2. Start Ensure High Protein 2x/day  3. Monitor p.o intakes, labs and wound healing    Nutrition Assessment:  Patient reports decreased appetite and 40 lb weight in 2.5 weeks due to right leg infection. Patient appears to have acute severe malnutrition. Patient is status post bedside right leg incision and drainage of abscess (9/4). Patient reports she is feeling much better since receiving antibiotics and I&D. Patient is eating % at meals. Continue current diet and encourage protein intake. Start Ensure High Protein 2x/day. Monitor p.o intakes, labs and wound healing. Malnutrition Assessment:  Malnutrition Status:  Severe malnutrition    Context:  Acute Illness     Findings of the 6 clinical characteristics of malnutrition:  Energy Intake:  7 - 50% or less of estimated energy requirements for 5 or more days  Weight Loss:  7 - Greater than 2% over 1 week     Body Fat Loss:  Unable to assess     Muscle Mass Loss:  Unable to assess    Fluid Accumulation:  No significant fluid accumulation Extremities   Strength:  Not Performed    Estimated Daily Nutrient Needs:  Energy (kcal):  0229-8472 kcal (10-13 kcal/kg); Weight Used for Energy Requirements:  Current     Protein (g):  109-130 gm of protein (1.5-1.8 gm/kg); Weight Used for Protein Requirements:  Ideal          Nutrition Related Findings:  Active bowel sounds. Status post bedside incision and drainage of left leg abscess (300 mL of pus removed)      Wounds:  Surgical Incision       Current Nutrition Therapies:    ADULT DIET;  Regular; 4 carb choices (60 gm/meal)    Anthropometric Measures:  · Height: 6' (182.9 cm)  · Current Body Weight: 365 lb (165.6 kg)   · Admission Body Weight: 362 lb (164.2 kg)    · Usual Body Weight: 396 lb (179.6 kg)     · Ideal Body Weight: 160 lbs; % Ideal Body Weight 228.1 %   · BMI: 49.5  · BMI Categories: Obese Class 3 (BMI 40.0 or greater)       Nutrition Diagnosis:   · Severe malnutrition, In context of acute, non-illness related related to inadequate protein-energy intake as evidenced by weight loss greater than or equal to 2% in 1 week, intake 0-25%    · Increased nutrient needs related to increase demand for energy/nutrients as evidenced by wounds (large wound after incision and drainage)      Nutrition Interventions:   Food and/or Nutrient Delivery:  Continue Current Diet, Start Oral Nutrition Supplement  Nutrition Education/Counseling:  Education not indicated   Coordination of Nutrition Care:  Continue to monitor while inpatient    Goals:  PO intakes are greater than 75% at meals       Nutrition Monitoring and Evaluation:   Food/Nutrient Intake Outcomes:  Food and Nutrient Intake, Supplement Intake  Physical Signs/Symptoms Outcomes:  Biochemical Data, Fluid Status or Edema, Skin, Weight     Discharge Planning:    Continue current diet, Continue Oral Nutrition Supplement         Jayson Nicole  MFN, RDN, LDN  Lead Clinical Dietitian  RD Office Phone [phone]

## 2021-09-05 NOTE — DISCHARGE SUMMARY
[facility]  Office: [address], DO, Paddy Childress, DO, Carlos Manuel Goldman, DO, Blanquita Angela Blood, DO, Jena Grove MD, Kae Horta MD, Linda Patel MD, Alison Escobedo MD, Dennis Valenzuela MD, Ingrid Qureshi MD, Raza Nick MD, Alireza Azul, DO, Carlos Lechuga MD, Collin Jain DO, Eh Carey MD,  Og Tang DO, Rosi Alvarez MD, Antolin Cool MD, Mariana See MD, Jame Barajas MD, Chadd Pollard MD, Amy Olivo MD, Joseph Rutherford MD, Bruce Sandoval [facility], [facility], CNP, Dasia Ivan, CNP, Yadira Dominguez, CNS, Fabrice Ramsey, CNP, Subha Fuentes, CNP, Yoli Reilly, CNP, Giovanni Franklin, CNP, Tres Bruner, CNP, Molina Bailey PA-C, Sapna Son, [facility], Dwayne Tavares, CNP, Brendan Malloy, CNP, Casimiro Pepe, CNP, Lashell Araiza, CNP, Severiano Spalding, CNP, Amita Jaffe, CNP, Craigisai Ocasioland, CNP, [facility]    Discharge Summary     Patient ID: Puja Foster  :     MRN: [id number]     ACCOUNT:  [de-identified]   Patient's PCP: Jose C Farnsworth DO  Admit Date: 9/3/2021   Discharge Date: 2021     Length of Stay: 3  Code Status:  Full Code  Admitting Physician: Antolin Cool MD  Discharge Physician: Antolin Cool MD     Active Discharge Diagnoses:     Hospital Problem Lists:  Principal Problem (Resolved):    Leg abscess  Active Problems:    Type 2 diabetes mellitus with skin complication, without long-term current use of insulin (HCC)    GERD (gastroesophageal reflux disease)    Essential hypertension    Lymphedema of both lower extremities    Morbid obesity ([address] [address]    Severe malnutrition ([address] [address] [address]  Resolved Problems:    BRIANNA (acute kidney injury) [facility]      Admission Condition:  poor     Discharged Condition: fair    Hospital Stay:     Hospital Course:      This is a 67-year-old female with history of diabetes, DVT, GERD, chronic lymphedema of the bilateral lower extremities, cellulitis came in with complains of redness and drainage to the right lower extremity. Patient reports that she noticed large raised area to the right leg for last 2 weeks. Patient was initially on doxycycline as an outpatient. Patient reports that she noted purulent discharge. Patient underwent outpatient CT scan which showed large abscess 10.5 x 6 x 14 cm. Patient underwent bedside I&D. She has been cleared for discharge by vascular surgery.       Significant therapeutic interventions: Incision and drainage    Significant Diagnostic Studies:   Labs / Micro:  CBC:   Lab Results   Component Value Date    WBC 10.4 09/04/2021    RBC 3.66 09/04/2021    HGB 10.6 09/04/2021    HCT 34.0 09/04/2021    MCV 92.9 09/04/2021    MCH 29.0 09/04/2021    MCHC 31.2 09/04/2021    RDW 13.3 09/04/2021     09/04/2021     BMP:    Lab Results   Component Value Date    GLUCOSE 249 09/06/2021     09/06/2021    K 3.6 09/06/2021     09/06/2021    CO2 23 09/06/2021    ANIONGAP 11 09/06/2021    BUN 19 09/06/2021    CREATININE 1.14 09/06/2021    BUNCRER 17 09/06/2021    CALCIUM 7.9 09/06/2021    LABGLOM 47 09/06/2021    GFRAA 57 09/06/2021    GFR      09/06/2021    GFR NOT REPORTED 09/06/2021     HFP:    Lab Results   Component Value Date    PROT 7.7 07/02/2017     CMP:    Lab Results   Component Value Date    GLUCOSE 249 09/06/2021     09/06/2021    K 3.6 09/06/2021     09/06/2021    CO2 23 09/06/2021    BUN 19 09/06/2021    CREATININE 1.14 09/06/2021    ANIONGAP 11 09/06/2021    ALKPHOS 67 08/16/2019    ALT 8 09/03/2021    AST 17 09/03/2021    BILITOT 0.5 08/16/2019    LABALBU 3.5 09/03/2021    ALBUMIN NOT REPORTED 07/02/2017    LABGLOM 47 09/06/2021    GFRAA 57 09/06/2021    GFR      09/06/2021    GFR NOT REPORTED 09/06/2021    PROT 7.7 07/02/2017    CALCIUM 7.9 09/06/2021     PT/INR:  No results found for: PTINR, PROTIME, INR  PTT:   Lab Results   Component Value Date    APTT 29.2 07/04/2017     FLP:    Lab Results at your convenience. Requiring Further Evaluation/Follow Up POST HOSPITALIZATION/Incidental Findings: Continue antibiotics    Diet: diabetic diet    Activity: As tolerated    Instructions to Patient: Be compliant with your diet, medications, follow-up    Discharge Medications:      Medication List      START taking these medications    doxycycline hyclate 100 MG tablet  Commonly known as: VIBRA-TABS  Take 1 tablet by mouth 2 times daily for 10 days        CONTINUE taking these medications    acetaminophen 500 MG tablet  Commonly known as: TYLENOL     apixaban 5 MG Tabs tablet  Commonly known as: Eliquis  TAKE 1 TABLET BY MOUTH TWO  TIMES DAILY     blood glucose test strips     DULoxetine 30 MG extended release capsule  Commonly known as: CYMBALTA  Take 1 capsule by mouth daily     fluconazole 100 MG tablet  Commonly known as: DIFLUCAN  Take 1 tablet by mouth daily as needed (yeast infection) Indications: Patient self diagnoses and takes fluconaole for yeast infections. gabapentin 300 MG capsule  Commonly known as: NEURONTIN  TAKE 2 CAPSULES BY MOUTH 3  TIMES DAILY     glucose monitoring kit  Please fill with what is covered by ins Patient test once a day DM E11.9     glyBURIDE 5 MG tablet  Commonly known as: DIABETA  TAKE 1 TABLET BY MOUTH  TWICE DAILY WITH MEALS     hydroCHLOROthiazide 25 MG tablet  Commonly known as: HYDRODIURIL  TAKE 1 TABLET BY MOUTH  DAILY     metFORMIN 1000 MG tablet  Commonly known as: GLUCOPHAGE  Take 1 tablet by mouth 2 times daily (with meals)     methocarbamol 750 MG tablet  Commonly known as: ROBAXIN  Twice daily prn for pain     metoprolol succinate 50 MG extended release tablet  Commonly known as: TOPROL XL  Take 1 tablet by mouth daily     omeprazole 40 MG delayed release capsule  Commonly known as: PRILOSEC  TAKE 1 CAPSULE BY MOUTH  EVERY DAY     silver sulfADIAZINE 1 % cream  Commonly known as: SILVADENE  Apply topically daily.      Trulicity 6.25 SF/1.8AQ Sopn  Generic drug: Dulaglutide  Inject 0.75 mg into the skin once a week           Where to Get Your Medications      These medications were sent to [facility] [address], [address] [place] [phone]  [address] [facility] [address]    Phone: [phone]   · doxycycline hyclate 100 MG tablet         Discharge Procedure Orders   [address] [facility] [address]   Referral Priority: Routine Referral Type: Eval and Treat   Referral Reason: Specialty Services Required   Number of Visits Requested: 1       Time Spent on discharge is  40 mins in patient examination, evaluation, counseling as well as medication reconciliation, prescriptions for required medications, discharge plan and follow up. Electronically signed by   Bridgett Mcnally MD  9/6/2021  8:05 AM      Thank you Dr. Dameon Cobian DO for the opportunity to be involved in this patient's care.

## 2021-09-05 NOTE — PLAN OF CARE
Problem: Falls - Risk of:  Goal: Will remain free from falls  Description: Will remain free from falls  9/5/2021 0030 by Radha Aparicio RN  Outcome: Ongoing  Note: Siderails up x 2  Hourly rounding  Call light in reach  Instructed to call for assist before attempting out of bed. Remains free from falls and accidental injury at this time   Floor free from obstacles  Bed is locked and in lowest position  Adequate lighting provided  Bed alarm on, Red Falling star and Stay with Me signs posted         Problem: Falls - Risk of:  Goal: Absence of physical injury  Description: Absence of physical injury  9/5/2021 0030 by Radha Aparicio RN  Outcome: Ongoing     Problem: Skin Integrity:  Goal: Will show no infection signs and symptoms  Description: Will show no infection signs and symptoms  9/5/2021 0030 by Radha Aparicio RN  Outcome: Ongoing  Note: Checked for incontinence every 2 hours and prn. Pericare as needed. Assisted to reposition off back frequently. On waffle mattress. Heels off bed with pillows. Problem: Skin Integrity:  Goal: Absence of new skin breakdown  Description: Absence of new skin breakdown  9/5/2021 0030 by Radha Aparicio RN  Outcome: Ongoing     Problem: Pain:  Goal: Pain level will decrease  Description: Pain level will decrease  9/5/2021 0030 by Radha Aparicio RN  Outcome: Ongoing     Problem: Pain:  Goal: Control of acute pain  Description: Control of acute pain  9/5/2021 0030 by Radha Aparicio RN  Outcome: Ongoing  Note: Pain level assessment complete.    Patient educated on pain scale and control interventions  PRN pain medication given per patient request  Patient instructed to call out with new onset of pain or unrelieved pain       Problem: Pain:  Goal: Control of chronic pain  Description: Control of chronic pain  9/5/2021 0030 by Radha Aparicio RN  Outcome: Ongoing

## 2021-09-05 NOTE — PROGRESS NOTES
No    Subjective   General  Chart Reviewed: Yes  Patient assessed for rehabilitation services?: Yes  Family / Caregiver Present: No  Pain Assessment  Pain Assessment: 0-10  Pain Level: 7  Pain Type: Acute pain  Pain Location: Back  Pain Orientation: Lower  Pain Descriptors: Aching;Discomfort  Pain Frequency: Intermittent  Pain Onset: On-going  Non-Pharmaceutical Pain Intervention(s): Rest;Repositioned  Social/Functional History  Social/Functional History  Lives With: Spouse, Son  Type of Home: House  Home Layout: One level  Home Access: Ramped entrance  Bathroom Shower/Tub: Walk-in shower  Bathroom Toilet: Standard  Bathroom Equipment: Built-in shower seat  Home Equipment: Cane  ADL Assistance: Independent  Homemaking Assistance: Independent  Homemaking Responsibilities: Yes  Ambulation Assistance: Independent  Transfer Assistance: Independent  Active : No  Patient's  Info:  drives  Occupation: Retired  Type of occupation:   Leisure & Hobbies: eliza, tv, tablet  Additional Comments: no falls       Objective   Vision: Impaired  Vision Exceptions: Wears glasses at all times  Hearing: Within functional limits    Orientation  Overall Orientation Status: Within Functional Limits  Observation/Palpation  Posture: Fair  Observation: BMI 49.50, R LE wrapped s/p I&D  Edema: B LE edema  Balance  Sitting Balance: Independent  Standing Balance: Contact guard assistance  Functional Mobility  Functional - Mobility Device: No device  Assist Level: Contact guard assistance  Functional Mobility Comments: Pt completed functional mob in room with CGA; pt fatigues easily with one trip around room; agreeable to sit up in chair. Pt has slow, hesitant gait at times d/t pain from back radiating down L LE. Pt would possibly benefit from device. PT to follow as well. ADL  Feeding: Independent;Setup  Grooming: Independent;Setup  UE Bathing: Stand by assistance  LE Bathing: Minimal assistance; Moderate assistance (difficulty with reaching feet/below knee)  UE Dressing: Stand by assistance  LE Dressing: Moderate assistance (assist for socks this session)  Toileting: Minimal assistance;Contact guard assistance  Tone RUE  RUE Tone: Normotonic  Tone LUE  LUE Tone: Normotonic  Coordination  Movements Are Fluid And Coordinated: Yes     Bed mobility  Supine to Sit: Minimal assistance  Comment: pt needing min A to move LEs to EOB. Transfers  Sit to stand: Contact guard assistance  Stand to sit: Contact guard assistance     Cognition  Overall Cognitive Status: Exceptions  Arousal/Alertness: Appropriate responses to stimuli  Following Commands:  Follows all commands without difficulty  Attention Span: Appears intact  Memory: Appears intact  Safety Judgement: Decreased awareness of need for assistance;Decreased awareness of need for safety  Problem Solving: Decreased awareness of errors  Insights: Decreased awareness of deficits  Initiation: Requires cues for some  Sequencing: Requires cues for some  Perception  Overall Perceptual Status: WFL     Sensation  Overall Sensation Status: Impaired (minimal neuropathy B LE)        LUE AROM (degrees)  LUE AROM : WFL  RUE AROM (degrees)  RUE AROM : WFL  LUE Strength  Gross LUE Strength: WFL  RUE Strength  Gross RUE Strength: WFL                   Plan   Plan  Times per week: 4-5x/week  Current Treatment Recommendations: Strengthening, Balance Training, Functional Mobility Training, Endurance Training, Safety Education & Training, Self-Care / ADL, Patient/Caregiver Education & Training, Equipment Evaluation, Education, & procurement, Positioning       AM-PAC Inpatient Daily Activity Raw Score: 19 (09/05/21 1134)  -PAC Inpatient ADL T-Scale Score : 40.22 (09/05/21 1134)  ADL Inpatient CMS 0-100% Score: 42.8 (09/05/21 1134)  ADL Inpatient CMS G-Code Modifier : CK (09/05/21 1134)    Goals  Short term goals  Time Frame for Short term goals: by discharge, pt will  Short term goal 1: demo S/MI with ADL transfers and functional mob in room distances with AD/DME as needed and good safety/pacing  Short term goal 2: demo S/MI with functional mob in room with good safety/pacing for ADL completion  Short term goal 3: demo S/MI with UB/LB ADL routine with good safety/pacing, AE/DME as needed  Short term goal 4: demo and verb good understanding of fall prevention techs, EC/WS techs, equip needs, and B UE HEP  Patient Goals   Patient goals : to go home       Therapy Time   Individual Concurrent Group Co-treatment   Time In 0824         Time Out 0842 (+10 min chart review)         Minutes 28          treatment min: [address] [facility], OT

## 2021-09-05 NOTE — PROGRESS NOTES
Vancomycin Dosing by Pharmacy - Daily Note   Vancomycin Therapy Day:  2  Indication: SSTI - abscess of right leg    Allergies:  Erythromycin and Lisinopril   Actual Weight:    Wt Readings from Last 1 Encounters:   09/05/21 (!) 365 lb (165.6 kg)       Labs/Ancillary Data  Estimated Creatinine Clearance: 77 mL/min (A) (based on SCr of 1.21 mg/dL (H)). Recent Labs     09/03/21  1340 09/03/21  1342 09/03/21  1850 09/04/21  0611 09/05/21  0548   CREATININE 1.09*   < > 1.45* 1.36* 1.21*   BUN 14   < > 16 18 18   WBC 10.3  --  9.7 10.4  --     < > = values in this interval not displayed. No results found for: PROCAL    Intake/Output Summary (Last 24 hours) at 9/5/2021 0810  Last data filed at 9/5/2021 0759  Gross per 24 hour   Intake 2275.75 ml   Output 1800 ml   Net 475.75 ml     Temp: 99.1    Culture Date / Source  /  Results  Wound, gram negative rods/gram positive cocci in pairs, gram positive cocci in chains  Recent vancomycin administrations                     vancomycin (VANCOCIN) 1,250 mg in dextrose 5 % 250 mL IVPB (mg) 1,250 mg New Bag 09/04/21 2024    vancomycin (VANCOCIN) 2,500 mg in dextrose 5 % 500 mL IVPB (mg) 2,500 mg New Bag 09/03/21 1922                  Vanco Random: 9/5/21 @ 0548 vanco random level was 19.5 - predicted AUC on current regimen is 382 which is below goal  Vanco Trough: No results for input(s): VANCOTROUGH in the last 72 hours. MRSA Nasal Swab: N/A. Non-respiratory infection. [facility]     PLAN   Increase dose from 1250mg q24h to 1500mg q24h - predicted AUC on new regimen is 456 (goal 400-500 for cellulitis)  Next level 9/7      Vancomycin Target Concentration Parameters  Treatment  Population Target AUC/RONEL Target Trough   Invasive MRSA Infection (bacteremia, pneumonia, meningitis, endocarditis, osteomyelitis)  Sepsis (undifferentiated) 400-600 N/A   Infection due to non-MRSA pathogen  Empiric treatment of non-invasive MRSA infection  (SSTI, UTI) <500 10-15 mg/L   CrCl < 29 mL/min  Rapidly fluctuating serum creatinine   BRIANNA N/A < 15 mg/L     Renal replacement therapy is dosed by levels, per hospital protocol. Abbreviations  * Pauc: probability that AUC is >400 (efficacy); Pconc: probability that Ctrough is above 20 ?g/mL (toxicity); Tox: Probability of nephrotoxicity, based on Dorie et al. Clin Infect Dis 2009. Thank you for the consult. Pharmacy will continue to follow.

## 2021-09-05 NOTE — PLAN OF CARE
Problem: Falls - Risk of:  Goal: Will remain free from falls  Description: Will remain free from falls  Outcome: Ongoing  Note: Patient is a fall risk during this admission. Fall risk assessment was performed. Patient is absent of falls. Bed is in the lowest position. Wheels on the bed are locked. Call light and bed side table are within reach. Clutter is removed. Patient was educated to call out when needing assistance or wanting to get out of bed. Patient offered toileting assistance during rounding. Hourly rounds have been performed.     Goal: Absence of physical injury  Description: Absence of physical injury  Outcome: Ongoing     Problem: Skin Integrity:  Goal: Will show no infection signs and symptoms  Description: Will show no infection signs and symptoms  Outcome: Ongoing  Goal: Absence of new skin breakdown  Description: Absence of new skin breakdown  Outcome: Ongoing     Problem: Pain:  Goal: Pain level will decrease  Description: Pain level will decrease  Outcome: Ongoing  Goal: Control of acute pain  Description: Control of acute pain  Outcome: Ongoing  Goal: Control of chronic pain  Description: Control of chronic pain  Outcome: Ongoing     Problem: IP MOBILITY  Goal: LTG - patient will ambulate household distance  9/5/2021 1609 by Pérez Gamez RN  Outcome: Ongoing  9/5/2021 1046 by Marcella Ramirez PT  Outcome: Ongoing     Problem: Nutrition  Goal: Optimal nutrition therapy  Outcome: Ongoing     Problem: Discharge Planning:  Goal: Discharged to appropriate level of care  Description: Discharged to appropriate level of care  Outcome: Ongoing

## 2021-09-06 VITALS
WEIGHT: 293 LBS | SYSTOLIC BLOOD PRESSURE: 149 MMHG | DIASTOLIC BLOOD PRESSURE: 73 MMHG | OXYGEN SATURATION: 94 % | HEIGHT: 72 IN | HEART RATE: 60 BPM | BODY MASS INDEX: 39.68 KG/M2 | RESPIRATION RATE: 18 BRPM | TEMPERATURE: 98 F

## 2021-09-06 LAB
ANION GAP SERPL CALCULATED.3IONS-SCNC: 11 MMOL/L (ref 9–17)
BUN BLDV-MCNC: 19 MG/DL (ref 8–23)
BUN/CREAT BLD: 17 (ref 9–20)
CALCIUM SERPL-MCNC: 7.9 MG/DL (ref 8.6–10.4)
CHLORIDE BLD-SCNC: 100 MMOL/L (ref 98–107)
CO2: 23 MMOL/L (ref 20–31)
CREAT SERPL-MCNC: 1.14 MG/DL (ref 0.5–0.9)
GFR AFRICAN AMERICAN: 57 ML/MIN
GFR NON-AFRICAN AMERICAN: 47 ML/MIN
GFR SERPL CREATININE-BSD FRML MDRD: ABNORMAL ML/MIN/{1.73_M2}
GFR SERPL CREATININE-BSD FRML MDRD: ABNORMAL ML/MIN/{1.73_M2}
GLUCOSE BLD-MCNC: 249 MG/DL (ref 70–99)
GLUCOSE BLD-MCNC: 254 MG/DL (ref 65–105)
GLUCOSE BLD-MCNC: 321 MG/DL (ref 65–105)
POTASSIUM SERPL-SCNC: 3.6 MMOL/L (ref 3.7–5.3)
SODIUM BLD-SCNC: 134 MMOL/L (ref 135–144)

## 2021-09-06 PROCEDURE — 6370000000 HC RX 637 (ALT 250 FOR IP): Performed by: NURSE PRACTITIONER

## 2021-09-06 PROCEDURE — 99231 SBSQ HOSP IP/OBS SF/LOW 25: CPT | Performed by: INTERNAL MEDICINE

## 2021-09-06 PROCEDURE — 36415 COLL VENOUS BLD VENIPUNCTURE: CPT

## 2021-09-06 PROCEDURE — 82947 ASSAY GLUCOSE BLOOD QUANT: CPT

## 2021-09-06 PROCEDURE — 80048 BASIC METABOLIC PNL TOTAL CA: CPT

## 2021-09-06 PROCEDURE — 6370000000 HC RX 637 (ALT 250 FOR IP): Performed by: INTERNAL MEDICINE

## 2021-09-06 RX ORDER — HYDROCODONE BITARTRATE AND ACETAMINOPHEN 5; 325 MG/1; MG/1
1 TABLET ORAL EVERY 4 HOURS PRN
Qty: 18 TABLET | Refills: 0 | Status: SHIPPED | OUTPATIENT
Start: 2021-09-06 | End: 2021-10-11 | Stop reason: SDUPTHER

## 2021-09-06 RX ADMIN — PANTOPRAZOLE SODIUM 40 MG: 40 TABLET, DELAYED RELEASE ORAL at 05:54

## 2021-09-06 RX ADMIN — SODIUM HYPOCHLORITE: 1.25 SOLUTION TOPICAL at 11:49

## 2021-09-06 RX ADMIN — GABAPENTIN 600 MG: 300 CAPSULE ORAL at 05:54

## 2021-09-06 RX ADMIN — METOPROLOL SUCCINATE 50 MG: 50 TABLET, EXTENDED RELEASE ORAL at 08:04

## 2021-09-06 RX ADMIN — APIXABAN 5 MG: 5 TABLET, FILM COATED ORAL at 08:05

## 2021-09-06 RX ADMIN — INSULIN LISPRO 8 UNITS: 100 INJECTION, SOLUTION INTRAVENOUS; SUBCUTANEOUS at 11:33

## 2021-09-06 RX ADMIN — HYDROCODONE BITARTRATE AND ACETAMINOPHEN 2 TABLET: 5; 325 TABLET ORAL at 08:56

## 2021-09-06 RX ADMIN — INSULIN GLARGINE 12 UNITS: 100 INJECTION, SOLUTION SUBCUTANEOUS at 08:05

## 2021-09-06 RX ADMIN — GLYBURIDE 5 MG: 5 TABLET ORAL at 08:05

## 2021-09-06 RX ADMIN — HYDROCHLOROTHIAZIDE 25 MG: 25 TABLET ORAL at 08:05

## 2021-09-06 RX ADMIN — DULOXETINE HYDROCHLORIDE 30 MG: 30 CAPSULE, DELAYED RELEASE ORAL at 08:04

## 2021-09-06 RX ADMIN — INSULIN LISPRO 6 UNITS: 100 INJECTION, SOLUTION INTRAVENOUS; SUBCUTANEOUS at 08:05

## 2021-09-06 RX ADMIN — DOXYCYCLINE 100 MG: 100 CAPSULE ORAL at 08:05

## 2021-09-06 ASSESSMENT — PAIN DESCRIPTION - PAIN TYPE: TYPE: ACUTE PAIN

## 2021-09-06 ASSESSMENT — PAIN DESCRIPTION - ONSET: ONSET: ON-GOING

## 2021-09-06 ASSESSMENT — PAIN DESCRIPTION - LOCATION: LOCATION: BACK;LEG

## 2021-09-06 ASSESSMENT — PAIN SCALES - GENERAL: PAINLEVEL_OUTOF10: 7

## 2021-09-06 ASSESSMENT — PAIN DESCRIPTION - FREQUENCY: FREQUENCY: INTERMITTENT

## 2021-09-06 ASSESSMENT — PAIN DESCRIPTION - DESCRIPTORS: DESCRIPTORS: ACHING;DISCOMFORT

## 2021-09-06 ASSESSMENT — PAIN - FUNCTIONAL ASSESSMENT: PAIN_FUNCTIONAL_ASSESSMENT: PREVENTS OR INTERFERES SOME ACTIVE ACTIVITIES AND ADLS

## 2021-09-06 ASSESSMENT — PAIN DESCRIPTION - ORIENTATION: ORIENTATION: LOWER;RIGHT

## 2021-09-06 NOTE — PROGRESS NOTES
Pt discharged to home with belongings via spouse. Discharge instructions given. AVS understood and signed. Norco script given to patient. Pt denies having any further questions at this time  Patient to follow up with wound care clinic tomorrow for daily dressing changes. They are suppose to call her tomorrow, but if they do not she is instructed to call them.

## 2021-09-06 NOTE — PLAN OF CARE
Problem: Falls - Risk of:  Goal: Will remain free from falls  Description: Will remain free from falls  9/6/2021 1221 by Brielle Mcmahon RN  Outcome: Ongoing  Note: Siderails up x 2  Hourly rounding  Call light in reach  Instructed to call for assist before attempting out of bed. Remains free from falls and accidental injury at this time   Floor free from obstacles  Bed is locked and in lowest position  Adequate lighting provided  Bed alarm on, Red Falling star and Stay with Me signs posted    Goal: Absence of physical injury  Description: Absence of physical injury  9/6/2021 1221 by Brielle Mcmahon RN  Outcome: Ongoing     Problem: Skin Integrity:  Goal: Will show no infection signs and symptoms  Description: Will show no infection signs and symptoms  9/6/2021 1221 by Brielle Mcmahon RN  Outcome: Ongoing  Note: Checked for incontinence every 2 hours and prn. Pericare as needed. Assisted to reposition off back frequently. On waffle mattress. Heels off bed with pillows. Goal: Absence of new skin breakdown  Description: Absence of new skin breakdown  9/6/2021 1221 by Brielle Mcmahon RN  Outcome: Ongoing  Problem: Pain:  Goal: Pain level will decrease  Description: Pain level will decrease  9/6/2021 1221 by Brielle Mcmahon RN  Outcome: Ongoing  Note: Pain level assessment complete.    Patient educated on pain scale and control interventions  PRN pain medication given per patient request-Norco  Patient instructed to call out with new onset of pain or unrelieved pain  Goal: Control of acute pain  Description: Control of acute pain  9/6/2021 1221 by Brielle Mcmahon RN  Outcome: Ongoing    Goal: Control of chronic pain  Description: Control of chronic pain  9/6/2021 1221 by Brielle Mcmahon RN  Outcome: Ongoing    Problem: IP MOBILITY  Goal: LTG - patient will ambulate household distance  9/6/2021 1221 by Brielle Mcmahon RN  Outcome: Ongoing     Problem: Nutrition  Goal: Optimal nutrition

## 2021-09-06 NOTE — DISCHARGE SUMMARY
fevers, sweats  Respiratory:  negative for cough, dyspnea on exertion, shortness of breath, wheezing  Cardiovascular:  negative for chest pain, chest pressure/discomfort, lower extremity edema, palpitations  Gastrointestinal:  negative for abdominal pain, constipation, diarrhea, nausea, vomiting  Neurological:  negative for dizziness, headache    Medications: Allergies: Allergies   Allergen Reactions    Erythromycin Hives    Lisinopril Itching       Current Meds:   Scheduled Meds:    insulin glargine  12 Units SubCUTAneous Daily    doxycycline monohydrate  100 mg Oral 2 times per day    sodium hypochlorite   Irrigation Daily    glyBURIDE  5 mg Oral BID     [START ON 9/10/2021] Dulaglutide  0.75 mg SubCUTAneous Weekly    insulin lispro  0-12 Units SubCUTAneous TID     insulin lispro  0-6 Units SubCUTAneous Nightly    apixaban  5 mg Oral BID    DULoxetine  30 mg Oral Daily    gabapentin  600 mg Oral TID    hydroCHLOROthiazide  25 mg Oral Daily    metoprolol succinate  50 mg Oral Daily    pantoprazole  40 mg Oral QAM AC    sodium chloride flush  5-40 mL IntraVENous 2 times per day     Continuous Infusions:    dextrose      dextrose      sodium chloride 75 mL/hr at 09/05/21 2149    sodium chloride       PRN Meds: HYDROcodone 5 mg - acetaminophen **OR** HYDROcodone 5 mg - acetaminophen, tiZANidine, glucose, dextrose, glucagon (rDNA), dextrose, glucose, dextrose, glucagon (rDNA), dextrose, sodium chloride flush, sodium chloride, potassium chloride **OR** potassium alternative oral replacement **OR** potassium chloride, magnesium sulfate, ondansetron **OR** ondansetron, polyethylene glycol, acetaminophen **OR** acetaminophen    Data:     Past Medical History:   has a past medical history of Back pain, Cellulitis, DM (diabetes mellitus) ([facility] [address] DVT (deep venous thrombosis) ([facility] [address] GERD (gastroesophageal reflux disease), and MVP (mitral valve prolapse).     Social History:   reports that she quit [employer] 3.5  --   --   --   --   --   --   --   --   --    LABA1C  --    < > 10.8*  --   --   --   --   --   --   --    AST 17  --   --   --   --   --   --   --   --   --    ALT 8  --   --   --   --   --   --   --   --   --    POCGLU  --    < >  --    < > 286* 250* 280* 266* 272* 254*    < > = values in this interval not displayed. ABG:No results found for: POCPH, PHART, PH, POCPCO2, LVP6TWD, PCO2, POCPO2, PO2ART, PO2, POCHCO3, TKK9TIE, HCO3, NBEA, PBEA, BEART, BE, THGBART, THB, PMZ1AKC, KWPK5WZK, B3ELPEIY, O2SAT, FIO2  Lab Results   Component Value Date/Time    SPECIAL NOT REPORTED 09/04/2021 09:30 AM     Lab Results   Component Value Date/Time    CULTURE CULTURE IN PROGRESS 09/04/2021 09:30 AM       Radiology:  CT TIBIA FIBULA RIGHT W CONTRAST    Result Date: 9/3/2021  1. Large thick-walled complex fluid and gas collection in the subcutaneous fat of the anterolateral proximal leg, measuring approximately 10.5 x 6.0 x 14 cm, most likely an abscess. There is communication to the skin surface. No visible extension into the deep soft tissues. 2.  No acute osseous abnormality or evidence of osteomyelitis. 3.  Moderate to severe degenerative changes in the knee and subtalar joint. US DUP LOWER EXTREMITY RIGHT DOMINICK    Result Date: 9/3/2021  No convincing evidence of DVT in the right lower extremity. Limited exam due to patient's size; calf veins and distal femoral vein could not be visualized.        Physical Examination:        General appearance:  alert, cooperative and no distress  Mental Status:  oriented to person, place and time and normal affect  Lungs:  clear to auscultation bilaterally, normal effort  Heart:  regular rate and rhythm, no murmur  Abdomen:  soft, nontender, nondistended, normal bowel sounds, no masses, hepatomegaly, splenomegaly  Extremities:  no edema, redness, tenderness in the calves  Skin:  no gross lesions, rashes, induration    Assessment:        Hospital Problems         Last Modified POA    Type 2 diabetes mellitus with skin complication, without long-term current use of insulin ([address] 9/5/2021 Yes    GERD (gastroesophageal reflux disease) (Chronic) 9/5/2021 Yes    Essential hypertension 9/5/2021 Yes    Lymphedema of both lower extremities (Chronic) 9/5/2021 Yes    Morbid obesity ([address] 9/5/2021 Yes    Severe malnutrition ([address] 9/5/2021 Yes          Plan:        Refer to discharge summary dictated yesterday. Pt being discharged. She will come to wound clinic. She was not accepted by home care agency.        Giselle Hogan MD  9/6/2021  8:06 AM

## 2021-09-06 NOTE — PLAN OF CARE
Problem: Falls - Risk of:  Goal: Will remain free from falls  Description: Will remain free from falls  9/6/2021 0219 by Karly Curran RN  Outcome: Ongoing  Note: Siderails up x 2  Hourly rounding  Call light in reach  Instructed to call for assist before attempting out of bed. Remains free from falls and accidental injury at this time   Floor free from obstacles  Bed is locked and in lowest position  Adequate lighting provided  Bed alarm on, Red Falling star and Stay with Me signs posted         Problem: Falls - Risk of:  Goal: Absence of physical injury  Description: Absence of physical injury  9/6/2021 0219 by Karly Curran RN  Outcome: Ongoing     Problem: Skin Integrity:  Goal: Will show no infection signs and symptoms  Description: Will show no infection signs and symptoms  9/6/2021 0219 by Karly Curran RN  Outcome: Ongoing  Note: Checked for incontinence every 2 hours and prn. Pericare as needed. Assisted to reposition off back frequently. On waffle mattress. Heels off bed with pillows. Problem: Skin Integrity:  Goal: Absence of new skin breakdown  Description: Absence of new skin breakdown  9/6/2021 0219 by Karly Curran RN  Outcome: Ongoing     Problem: Pain:  Goal: Pain level will decrease  Description: Pain level will decrease  9/6/2021 0219 by Karly Curran RN  Outcome: Ongoing     Problem: Pain:  Goal: Control of acute pain  Description: Control of acute pain  9/6/2021 0219 by Karly Curran RN  Outcome: Ongoing  Note: Pain level assessment complete.    Patient educated on pain scale and control interventions  PRN pain medication given per patient request  Patient instructed to call out with new onset of pain or unrelieved pain       Problem: Pain:  Goal: Control of chronic pain  Description: Control of chronic pain  9/6/2021 0219 by Karly Curran RN  Outcome: Ongoing

## 2021-09-07 ENCOUNTER — HOSPITAL ENCOUNTER (OUTPATIENT)
Dept: WOUND CARE | Age: 66
Discharge: HOME | End: 2021-09-07
Payer: COMMERCIAL

## 2021-09-07 ENCOUNTER — TELEPHONE (OUTPATIENT)
Dept: FAMILY MEDICINE CLINIC | Age: 66
End: 2021-09-07

## 2021-09-07 VITALS
SYSTOLIC BLOOD PRESSURE: 139 MMHG | WEIGHT: 293 LBS | BODY MASS INDEX: 39.68 KG/M2 | HEIGHT: 72 IN | TEMPERATURE: 99.4 F | DIASTOLIC BLOOD PRESSURE: 71 MMHG | RESPIRATION RATE: 16 BRPM | HEART RATE: 74 BPM

## 2021-09-07 DIAGNOSIS — L97.813 SKIN ULCER OF PRETIBIAL REGION OF RIGHT LOWER EXTREMITY, WITH NECROSIS OF MUSCLE (HCC): Primary | ICD-10-CM

## 2021-09-07 DIAGNOSIS — E11.42 DIABETIC POLYNEUROPATHY ASSOCIATED WITH TYPE 2 DIABETES MELLITUS (HCC): ICD-10-CM

## 2021-09-07 LAB
CULTURE: ABNORMAL
DIRECT EXAM: ABNORMAL
Lab: ABNORMAL
SPECIMEN DESCRIPTION: ABNORMAL

## 2021-09-07 PROCEDURE — 99214 OFFICE O/P EST MOD 30 MIN: CPT

## 2021-09-07 RX ORDER — BETAMETHASONE DIPROPIONATE 0.05 %
OINTMENT (GRAM) TOPICAL ONCE
Status: CANCELLED | OUTPATIENT
Start: 2021-09-07 | End: 2021-09-07

## 2021-09-07 RX ORDER — LIDOCAINE HYDROCHLORIDE 20 MG/ML
JELLY TOPICAL ONCE
Status: CANCELLED | OUTPATIENT
Start: 2021-09-07 | End: 2021-09-07

## 2021-09-07 RX ORDER — GENTAMICIN SULFATE 1 MG/G
OINTMENT TOPICAL ONCE
Status: CANCELLED | OUTPATIENT
Start: 2021-09-07 | End: 2021-09-07

## 2021-09-07 RX ORDER — LIDOCAINE HYDROCHLORIDE 40 MG/ML
SOLUTION TOPICAL ONCE
Status: CANCELLED | OUTPATIENT
Start: 2021-09-07 | End: 2021-09-07

## 2021-09-07 RX ORDER — LIDOCAINE 50 MG/G
OINTMENT TOPICAL ONCE
Status: CANCELLED | OUTPATIENT
Start: 2021-09-07 | End: 2021-09-07

## 2021-09-07 RX ORDER — BACITRACIN, NEOMYCIN, POLYMYXIN B 400; 3.5; 5 [USP'U]/G; MG/G; [USP'U]/G
OINTMENT TOPICAL ONCE
Status: CANCELLED | OUTPATIENT
Start: 2021-09-07 | End: 2021-09-07

## 2021-09-07 RX ORDER — CLOBETASOL PROPIONATE 0.5 MG/G
OINTMENT TOPICAL ONCE
Status: CANCELLED | OUTPATIENT
Start: 2021-09-07 | End: 2021-09-07

## 2021-09-07 RX ORDER — GINSENG 100 MG
CAPSULE ORAL ONCE
Status: CANCELLED | OUTPATIENT
Start: 2021-09-07 | End: 2021-09-07

## 2021-09-07 RX ORDER — LIDOCAINE 40 MG/G
CREAM TOPICAL ONCE
Status: CANCELLED | OUTPATIENT
Start: 2021-09-07 | End: 2021-09-07

## 2021-09-07 RX ORDER — BACITRACIN ZINC AND POLYMYXIN B SULFATE 500; 1000 [USP'U]/G; [USP'U]/G
OINTMENT TOPICAL ONCE
Status: CANCELLED | OUTPATIENT
Start: 2021-09-07 | End: 2021-09-07

## 2021-09-07 RX ORDER — LIDOCAINE HYDROCHLORIDE 20 MG/ML
JELLY TOPICAL ONCE
Status: DISCONTINUED | OUTPATIENT
Start: 2021-09-07 | End: 2021-09-08 | Stop reason: HOSPADM

## 2021-09-07 NOTE — PROGRESS NOTES
[address]   Progress Note and Procedure Note      Yohan Sanchez  MEDICAL RECORD NUMBER:  [id number]  AGE: 74 y.o. GENDER: female  : 1955  EPISODE DATE:  2021    Subjective:     No chief complaint on file. HISTORY of PRESENT ILLNESS HPI     Yohan Sanchez is a 74 y.o. female who presents today for wound/ulcer evaluation. History of Wound Context: PT DESCRIBES HAVING TRAUMA TO RIGHT LEG 4 YEARS. APPROX 3 WEEKS AGO, THE AREA BECAME \"SHINY\", THE PT TOUCHED IT AND PUS AND BLOOD \"SHOT OUT\". SHE WAS ADMITT[facility] TO [facility], DR DELOS REYES I&D THE WOUND AND PACKED DAKIN SOAKED RAYNA IN.  REFERRED HERE FOR FOLLOWUP. Wound/Ulcer Pain Timing/Severity: none    Ulcer Identification:  Ulcer Type: diabetic    Contributing Factors: edema    PAST MEDICAL HISTORY        Diagnosis Date    Back pain 2012    Cellulitis 10/9/2015    Lt.  Lower Leg    DM (diabetes mellitus) ([facility] [address] 2012    DVT (deep venous thrombosis) ([facility] [address] 2012    GERD (gastroesophageal reflux disease) 2012    MVP (mitral valve prolapse) 2012       PAST SURGICAL HISTORY    Past Surgical History:   Procedure Laterality Date    HYSTERECTOMY      TONSILLECTOMY         FAMILY HISTORY    Family History   Problem Relation Age of Onset    Heart Disease Mother        SOCIAL HISTORY    Social History     Tobacco Use    Smoking status: Former Smoker     Packs/day: 1.00     Years: 30.00     Pack years: 30.00     Types: Cigarettes     Quit date: 1983     Years since quittin.2    Smokeless tobacco: Never Used   Vaping Use    Vaping Use: Never used   Substance Use Topics    Alcohol use: No    Drug use: No       ALLERGIES    Allergies   Allergen Reactions    Erythromycin Hives    Lisinopril Itching       MEDICATIONS    Current Outpatient Medications on File Prior to Encounter   Medication Sig Dispense Refill    HYDROcodone-acetaminophen (NORCO) 5-325 MG per tablet Take 1 tablet by mouth every 4 hours as needed for Pain for up to 3 days. 18 tablet 0    doxycycline hyclate (VIBRA-TABS) 100 MG tablet Take 1 tablet by mouth 2 times daily for 10 days 20 tablet 0    Dulaglutide (TRULICITY) 0.33 NV/7.7GR SOPN Inject 0.75 mg into the skin once a week (Patient taking differently: Inject 0.75 mg into the skin once a week Indications: on fridays ) 3 pen 3    gabapentin (NEURONTIN) 300 MG capsule TAKE 2 CAPSULES BY MOUTH 3  TIMES DAILY 180 capsule 0    silver sulfADIAZINE (SILVADENE) 1 % cream Apply topically daily. 300 g 0    fluconazole (DIFLUCAN) 100 MG tablet Take 1 tablet by mouth daily as needed (yeast infection) Indications: Patient self diagnoses and takes fluconaole for yeast infections. 4 tablet 0    metFORMIN (GLUCOPHAGE) 1000 MG tablet Take 1 tablet by mouth 2 times daily (with meals) (Patient not taking: Reported on 9/3/2021) 180 tablet 1    metoprolol succinate (TOPROL XL) 50 MG extended release tablet Take 1 tablet by mouth daily 90 tablet 1    DULoxetine (CYMBALTA) 30 MG extended release capsule Take 1 capsule by mouth daily 90 capsule 1    hydroCHLOROthiazide (HYDRODIURIL) 25 MG tablet TAKE 1 TABLET BY MOUTH  DAILY 90 tablet 1    omeprazole (PRILOSEC) 40 MG delayed release capsule TAKE 1 CAPSULE BY MOUTH  EVERY DAY 90 capsule 1    apixaban (ELIQUIS) 5 MG TABS tablet TAKE 1 TABLET BY MOUTH TWO  TIMES DAILY 180 tablet 1    glyBURIDE (DIABETA) 5 MG tablet TAKE 1 TABLET BY MOUTH  TWICE DAILY WITH MEALS 180 tablet 1    methocarbamol (ROBAXIN) 750 MG tablet Twice daily prn for pain 180 tablet 0    glucose monitoring kit (FREESTYLE) monitoring kit Please fill with what is covered by ins Patient test once a day DM E11.9 1 kit 0    acetaminophen (TYLENOL) 500 MG tablet Take 500 mg by mouth every 6 hours as needed for Pain. Pt only takes 1-2 times per week      Glucose Blood (BLOOD GLUCOSE TEST STRIPS) STRP by Does not apply route 3 times daily.  Freestyle [place] Test Strips

## 2021-09-07 NOTE — PROGRESS NOTES
Order for V.A.C.®  Negative Pressure Wound Therapy  Please fax this form to [employer] at [phone] [facility] Customer Service: [phone]  [facility]  [phone]  WOUND CARE Dept: [phone]   Wound Care Fax [phone]  Patient Information:   Ulis Cowden  [facility] [address] [facility]   [phone]   : 1955  AGE: 74 y.o. GENDER: female   TODAYS DATE:  2021  Insurance:   PRIMARY INSURANCE:  Plan: MED[employer] M[employer]UAL [address]  Coverage: MEDICAL MUTUAL  Effective Date: 2020  [id number] - (Commercial)    Payor/Plan Subscr  Sex Relation Sub. Ins. ID Effective Group Num   1. [address] 1966 Male Spouse [id number]                                   [address]       Patient Wound Information:     Duration of the V.A.C.®  Negative Pressure Wound Therapy: 3 Month which includes up to 15 dressings per wound and up to 10 canisters per month    Goal at the completion of V.A.C.®  Negative Pressure Therapy: Assist in granulation tissue formation     Will HomeCare provide V.A.C. ®  Therapy? Yes Homecare will provide VAC Therapy. The date in which Homecare will initiate VAC Therapy is 2021     Equipment for Delivery:     Delivery Location V.A.C. ® Therapy Pump: Patient's Home Address located under Patient Information on the top of this form    Up to 15 dressings per wound, per month  VAC Canisters: Up to 10 canisters per month  V.A.C.® Dressing: SIMPLACE VAC Dressing Medium    Clinical Information by Wound Type: Was NPWT initiated in an inpatient facility? No or  Has the patient been on NPWT anytime during the last 60 days? No     Is the patients nutritional status compromised?  No    Please sharon all dressings that apply  Please indicate all other therapies that have been previously tried and/or failed to maintain a moist wound environment: Saline moisten gauze  and Other Dakins Solution    Please sharon all that apply  If other therapies were considered and ruled out, what conditions prevented you from using other therapies prior to applying V.A.C. ® Therapy? Presence of co-morbidities, High risk of infections and Need for accelerated granulation tissue    Please sharon all that apply  Which of the following co?morbidities apply? Diabetes No the patient is not on a comprehensive diabetic management program and Obesity    Is Osteomyelitis present in Wound? No    Patient's Primary Wound Type:     Patient's Primary Wound Type: Surgical This wound was surgically created , Please include a description of surgical procedure I&D and Please include the date of the surgical procedure 9/4/2021    Is this wound a direct result of an accident? No     Wound(s) Description:     Is there eschar tissue present in the wound? No Eschar in noted in Wound(s) base  Has debridement been attempted in the last 10 days? No  Are serial debridements required?  Yes    Wound 09/07/21 Leg Proximal;Right;Lateral;Lower #1  (Active)   Wound Image   09/07/21 1408   Wound Etiology Non-Healing Surgical 09/07/21 1408   Dressing Status Old drainage noted;New drainage noted 09/07/21 1408   Wound Cleansed Irrigated with saline 09/07/21 1408   Wound Length (cm) 1 cm 09/07/21 1408   Wound Width (cm) 3.7 cm 09/07/21 1408   Wound Depth (cm) 5 cm 09/07/21 1408   Wound Surface Area (cm^2) 3.7 cm^2 09/07/21 1408   Wound Volume (cm^3) 18.5 cm^3 09/07/21 1408   Post-Procedure Length (cm) 1 cm 09/07/21 1408   Post-Procedure Width (cm) 3.7 cm 09/07/21 1408   Post-Procedure Depth (cm) 8.5 cm 09/07/21 1408   Post-Procedure Surface Area (cm^2) 3.7 cm^2 09/07/21 1408   Post-Procedure Volume (cm^3) 31.45 cm^3 09/07/21 1408   Distance Tunneling (cm) 9 cm 09/07/21 1408   Tunneling Position ___ O'Clock 12 09/07/21 1408   Undermining Starts ___ O'Clock 12 09/07/21 1408   Undermining Ends___ O'Clock 12 09/07/21 1408   Wound Assessment Pink/red 09/07/21 1408   Drainage Amount Moderate 09/07/21 1408   Drainage Description Brown 09/07/21 1408   Odor Moderate 09/07/21 1408   Kaylin-wound Assessment Blanchable erythema; Hemosiderin staining (brown yellow) 09/07/21 1408   Margins Defined edges 09/07/21 1408   Wound Thickness Description not for Pressure Injury Full thickness 09/07/21 1408   Number of days: 0       Electronically signed by Danie Osei RN on 9/7/2021 at 2:43 PM    Providers Information:      PROVIDER: Dr. Warden Aase, DPM  NPI: [id number]

## 2021-09-07 NOTE — TELEPHONE ENCOUNTER
[facility] Transitions Initial Follow Up Call    Outreach made within 2 business days of discharge: Yes    Patient: Vernno Branch Patient : 1955   MRN: [id number]  Reason for Admission: There are no discharge diagnoses documented for the most recent discharge.   Discharge Date: 21       Spoke with: Left message to call office     Discharge department/facility: [facility]       Scheduled appointment with PCP within 7-14 days    Follow Up  Future Appointments   Date Time Provider Larry Klein   9/10/2021 11:40 AM DO RIVER Valencia [facility]   2021 10:00 AM DO RIVER Valencia [facility]   2021  2:30 PM SONA Traore W[facility] [address]

## 2021-09-08 NOTE — PROGRESS NOTES
[place] Encounter Date/Time: 2021 [address] [facility] Account: [de-identified]    MRN: [id number]    Patient: Gilles Alvarez    Contact Serial #: [id number]      ENCOUNTER          Patient Class: O Private Enc? No Unit RM BD: [facility] WND [place] Room/bed info not found   Hospital Service:     Encounter DX:     ADM Provider:     Procedure:     ATT Provider:     REF Provider: Samir Vivas      Admission DX:  and DX codes:       PATIENT                 Name: Gilles Alvarez : 1955 (66 yrs)   Address: [address] [place] [address] Sex: Female   City: [facility] [address]         Marital Status:    Employer: [address] [place] [address] Avenue: [facility]   Primary Care Provider: Ethan Chong DO         Primary Phone: [phone]   EMERGENCY CONTACT   Contact Name Legal Guardian? Relationship to Patient Home Phone Work Phone   1. Petey Spence  2. Bharath Tayna      Spouse  Step Child [phone]              GUARANTOR            Guarantor: Gilles Alvarez     : 1955   Address: [facility] Sex: Female     [place] [address]     Relation to Patient: Self       Home Phone: [phone]   Guarantor ID: [id number]       Work Phone:     Guarantor Employer: [employer]         Status: RETIRED      COVERAGE        PRIMARY INSURANCE   Payor: MED[employer] M[employer]UAL Plan: MEDICAL MUTUAL  [address]   [facility] [address] [place] [address]       Group Number: [id number] Insurance Type: INDEMNITY   Subscriber Name: Tam Raessel : 1966   Subscriber ID: [id number] Pat. Rel. to Sub: Spouse   SECONDARY INSURANCE   Payor:   Plan:     Payor Address:  ,           Group Number:   Insurance Type:     Subscriber Name:   Subscriber :     Subscriber ID:   Pat.  Rel. to Sub:             CSN: [id number]

## 2021-09-09 LAB
CULTURE: NORMAL
Lab: NORMAL
SPECIMEN DESCRIPTION: NORMAL

## 2021-09-09 NOTE — TELEPHONE ENCOUNTER
[facility] Transitions Initial Follow Up Call    Outreach made within 2 business days of discharge: Yes    Patient: Av Porter Patient : 1955   MRN: [id number]  Reason for Admission: There are no discharge diagnoses documented for the most recent discharge. Discharge Date: 21       Spoke with: Maureen Santa    Discharge department/facility: [facility]    TCM Interactive Patient Contact:  Was patient able to fill all prescriptions: Yes  Was patient instructed to bring all medications to the follow-up visit: Yes  Is patient taking all medications as directed in the discharge summary?  Yes  Does patient understand their discharge instructions: Yes  Does patient have questions or concerns that need addressed prior to 7-14 day follow up office visit: no    Scheduled appointment with PCP within 7-14 days    Follow Up  Future Appointments   Date Time Provider Larry Klein   9/10/2021 11:40 AM DO RIVER Hawkins [facility]   2021  2:30 PM SONA Farley  [place], [place]

## 2021-09-10 ENCOUNTER — OFFICE VISIT (OUTPATIENT)
Dept: FAMILY MEDICINE CLINIC | Age: 66
End: 2021-09-10
Payer: COMMERCIAL

## 2021-09-10 VITALS
HEART RATE: 79 BPM | WEIGHT: 293 LBS | OXYGEN SATURATION: 96 % | DIASTOLIC BLOOD PRESSURE: 80 MMHG | SYSTOLIC BLOOD PRESSURE: 130 MMHG | BODY MASS INDEX: 51.13 KG/M2

## 2021-09-10 DIAGNOSIS — Z09 HOSPITAL DISCHARGE FOLLOW-UP: Primary | ICD-10-CM

## 2021-09-10 DIAGNOSIS — E11.65 TYPE 2 DIABETES MELLITUS WITH HYPERGLYCEMIA, WITH LONG-TERM CURRENT USE OF INSULIN (HCC): ICD-10-CM

## 2021-09-10 DIAGNOSIS — L02.91 ABSCESS: ICD-10-CM

## 2021-09-10 DIAGNOSIS — L03.115 CELLULITIS OF RIGHT LOWER EXTREMITY: ICD-10-CM

## 2021-09-10 DIAGNOSIS — Z79.4 TYPE 2 DIABETES MELLITUS WITH HYPERGLYCEMIA, WITH LONG-TERM CURRENT USE OF INSULIN (HCC): ICD-10-CM

## 2021-09-10 LAB
CULTURE: NORMAL
Lab: NORMAL
SPECIMEN DESCRIPTION: NORMAL

## 2021-09-10 PROCEDURE — 99496 TRANSJ CARE MGMT HIGH F2F 7D: CPT | Performed by: FAMILY MEDICINE

## 2021-09-10 PROCEDURE — 1111F DSCHRG MED/CURRENT MED MERGE: CPT | Performed by: FAMILY MEDICINE

## 2021-09-10 RX ORDER — DULAGLUTIDE 1.5 MG/.5ML
1.5 INJECTION, SOLUTION SUBCUTANEOUS WEEKLY
Qty: 4 PEN | Refills: 0 | COMMUNITY
Start: 2021-09-10 | End: 2021-11-01

## 2021-09-10 RX ORDER — INSULIN GLARGINE 300 U/ML
INJECTION, SOLUTION SUBCUTANEOUS
Qty: 2 PEN | Refills: 0 | COMMUNITY
Start: 2021-09-10 | End: 2021-10-12 | Stop reason: SDUPTHER

## 2021-09-10 NOTE — PROGRESS NOTES
Post-Discharge Transitional Care Management Services or Hospital Follow Up      Marcia Jama   YOB: 1955    Date of Office Visit:  9/10/2021  Date of Hospital Admission: 9/3/21  Date of Hospital Discharge: 9/6/21  Readmission Risk Score(high >=14%. Medium >=10%):Readmission Risk Score: 16      Care management risk score Rising risk (score 2-5) and Complex Care (Scores >=6): 2     Non face to face  following discharge, date last encounter closed (first attempt may have been earlier): 9/9/2021  3:04 PM 9/9/2021  3:04 PM    Call initiated 2 business days of discharge: Yes     Patient Active Problem List   Diagnosis    Type 2 diabetes mellitus with skin complication, without long-term current use of insulin ([facility] [address]    GERD (gastroesophageal reflux disease)    Back pain    MVP (mitral valve prolapse)    DVT (deep venous thrombosis) (HCC)    Essential hypertension    HIGH CHOLESTEROL    Left leg cellulitis    Radiculopathy of lumbosacral region    Staghorn renal calculus    Foraminal stenosis of lumbar region    Acute cystitis    SIRS (systemic inflammatory response syndrome) (HCC)    Nausea and vomiting    Lymphedema of both lower extremities    Leukocytosis    Morbid obesity (HCC)    Chronic hypoxemic respiratory failure (HCC)    Pulmonary hypertension (HCC)    Severe malnutrition (HCC)    Skin ulcer of pretibial region of right lower extremity, with necrosis of muscle (HCC)    Diabetic polyneuropathy (HCC)       Allergies   Allergen Reactions    Erythromycin Hives    Lisinopril Itching       Medications listed as ordered at the time of discharge from [facility] [place] [place] Medication Instructions ADRIANA:    Printed on:09/10/21 4121   Medication Information                      acetaminophen (TYLENOL) 500 MG tablet  Take 500 mg by mouth every 6 hours as needed for Pain.  Pt only takes 1-2 times per week             apixaban (ELIQUIS) 5 MG TABS tablet  TAKE 1 TABLET BY MOUTH TWO  TIMES DAILY             blood glucose monitor kit and supplies  Dispense sufficient amount for indicated testing frequency plus additional to accommodate PRN testing needs. Dispense all needed supplies to include: monitor, strips, lancing device, lancets, control solutions, alcohol swabs. doxycycline hyclate (VIBRA-TABS) 100 MG tablet  Take 1 tablet by mouth 2 times daily for 10 days             Dulaglutide (TRULICITY) 1.5 DC/7.7LU SOPN  Inject 1.5 mg into the skin once a week             DULoxetine (CYMBALTA) 30 MG extended release capsule  Take 1 capsule by mouth daily             fluconazole (DIFLUCAN) 100 MG tablet  Take 1 tablet by mouth daily as needed (yeast infection) Indications: Patient self diagnoses and takes fluconaole for yeast infections. gabapentin (NEURONTIN) 300 MG capsule  TAKE 2 CAPSULES BY MOUTH 3  TIMES DAILY             Glucose Blood (BLOOD GLUCOSE TEST STRIPS) STRP  by Does not apply route 3 times daily. Freestyle [place] Test Strips             glucose monitoring kit (FREESTYLE) monitoring kit  Please fill with what is covered by ins Patient test once a day DM E11.9             glyBURIDE (DIABETA) 5 MG tablet  TAKE 1 TABLET BY MOUTH  TWICE DAILY WITH MEALS             hydroCHLOROthiazide (HYDRODIURIL) 25 MG tablet  TAKE 1 TABLET BY MOUTH  DAILY             Insulin Glargine, 1 Unit Dial, (TOUJEO SOLOSTAR) 300 UNIT/ML SOPN  Inject 34 units nightly             methocarbamol (ROBAXIN) 750 MG tablet  Twice daily prn for pain             metoprolol succinate (TOPROL XL) 50 MG extended release tablet  Take 1 tablet by mouth daily             omeprazole (PRILOSEC) 40 MG delayed release capsule  TAKE 1 CAPSULE BY MOUTH  EVERY DAY             silver sulfADIAZINE (SILVADENE) 1 % cream  Apply topically daily.                    Medications marked \"taking\" at this time  Outpatient Medications Marked as Taking for the 9/10/21 encounter (Office Visit) with Linda العلي Anna Simpson, DO   Medication Sig Dispense Refill    Insulin Glargine, 1 Unit Dial, (TOUJEO SOLOSTAR) 300 UNIT/ML SOPN Inject 34 units nightly 2 pen 0    Dulaglutide (TRULICITY) 1.5 VS/8.2FW SOPN Inject 1.5 mg into the skin once a week 4 pen 0    blood glucose monitor kit and supplies Dispense sufficient amount for indicated testing frequency plus additional to accommodate PRN testing needs. Dispense all needed supplies to include: monitor, strips, lancing device, lancets, control solutions, alcohol swabs. 1 kit 0        Medications patient taking as of now reconciled against medications ordered at time of hospital discharge: Yes    Chief Complaint   Patient presents with   [address] from [facility] for leg abscess       HPI    Patient is here for [facility] appt for RLE abscess/cellulitis. Patient was admitted to [address] [place] [address],[address] 9/3-9/6. Patient is a 67-year-old female with history of diabetes, DVT, GERD, chronic lymphedema of the bilateral lower extremities. Patient presented with complaints of redness and drainage to the right lower extremity. Patient was initially on doxycycline and given rocephin in the office as an outpatient prior to presentation to the ED. Patient underwent outpatient CT scan which showed large abscess 10.5 x 6 x 14 cm.  Patient underwent bedside I&D. She has been cleared for discharge by vascular surgery. Patient is following with wound care clinic. Patient has home wound care set up. Patient has wound vac. Patient is supposed to have it set up today.     Inpatient course: Discharge summary reviewed- see chart. Interval history/Current status: stable      Vitals:    09/10/21 1143   BP: 130/80   Pulse: 79   SpO2: 96%   Weight: (!) 377 lb (171 kg)     Body mass index is 51.13 kg/m².    Wt Readings from Last 3 Encounters:   09/10/21 (!) 377 lb (171 kg)   09/07/21 (!) 365 lb (165.6 kg)   09/05/21 (!) 365 lb 8 oz (165.8 kg)     BP Readings from Last 3 Encounters:   09/10/21 130/80 09/07/21 139/71   09/06/21 (!) 149/73       ROS:    Constitutional: No fevers, chills, fatigue. ENT: No nasal congestion or sore throat  Respiratory: No difficulty in breathing or cough. Cardiovascular: No chest pain, palpitations or shortness of breath  Gastrointestinal: No abdominal pain or change in bowel movements. Genitourinary: No change in urinary frequency or dysuria. Skin: No rashes or skin lesions. Neurological: No weakness. No headaches. PE:    GENERAL APPEARANCE: in no acute distress, well developed, well nourished. HEAD: normocephalic, atraumatic. EYES: extraocular movement intact (EOMI), pupils equal, round, reactive to light and accommodation. EARS: normal, tympanic membrane intact, clear, auditory canal clear. NOSE: nares patent, no erythema, sinuses nontender bilaterally, no rhinorrhea. ORAL CAVITY: mucosa moist, no lesions. THROAT: clear, no mass, no exudate. NECK/THYROID: neck supple, full range of motion, no thyromegaly. HEART: no murmurs, regular rate and rhythm, S1, S2 normal.   LUNGS: clear to auscultation bilaterally, no wheezes, rales, rhonchi. ABDOMEN: normal, bowel sounds present, soft, nontender, nondistended, no rebound guarding or rigidity          Assessment/Plan:  1. Hospital discharge follow-up    - MA DISCHARGE MEDS RECONCILED W/ CURRENT OUTPATIENT MED LIST    2. Cellulitis of right lower extremity    - MA DISCHARGE MEDS RECONCILED W/ CURRENT OUTPATIENT MED LIST  - CBC Auto Differential; Future  - Renal Function Panel; Future  - Magnesium; Future    3. Abscess    - MA DISCHARGE MEDS RECONCILED W/ CURRENT OUTPATIENT MED LIST  - CBC Auto Differential; Future  - Renal Function Panel; Future  - Magnesium; Future    4. Type 2 diabetes mellitus with hyperglycemia, with long-term current use of insulin ([facility])    - CBC Auto Differential; Future  - Renal Function Panel; Future  - Magnesium;  Future  - Insulin Glargine, 1 Unit Dial, (TOUJEO SOLOSTAR) 300 UNIT/ML SOPN; Inject 34 units nightly  Dispense: 2 pen; Refill: 0  - Dulaglutide (TRULICITY) 1.5 IZ/4.3VK SOPN; Inject 1.5 mg into the skin once a week  Dispense: 4 pen; Refill: 0  - blood glucose monitor kit and supplies; Dispense sufficient amount for indicated testing frequency plus additional to accommodate PRN testing needs. Dispense all needed supplies to include: monitor, strips, lancing device, lancets, control solutions, alcohol swabs. Dispense: 1 kit; Refill: 0        Medical Decision Making: high complexity      Follow up on labs. Start toujeo. Increase trulicity to 1.5 mg weekly. Patient cannot tolerate metformin. Updated med list. Patient has [facility]. Patient is following with wound care as well. Glucometer rx given. Need tighter glycemic control. Discussed insulin titration. Edy Dawn, DO     Return in about 1 month (around 10/10/2021) for dm; 20 min appt.

## 2021-09-14 ENCOUNTER — HOSPITAL ENCOUNTER (OUTPATIENT)
Dept: WOUND CARE | Age: 66
Discharge: HOME | End: 2021-09-14
Payer: COMMERCIAL

## 2021-09-14 VITALS
SYSTOLIC BLOOD PRESSURE: 113 MMHG | RESPIRATION RATE: 18 BRPM | WEIGHT: 293 LBS | BODY MASS INDEX: 39.68 KG/M2 | DIASTOLIC BLOOD PRESSURE: 60 MMHG | HEIGHT: 72 IN | TEMPERATURE: 97.4 F | HEART RATE: 101 BPM

## 2021-09-14 DIAGNOSIS — E11.42 DIABETIC POLYNEUROPATHY ASSOCIATED WITH TYPE 2 DIABETES MELLITUS (HCC): ICD-10-CM

## 2021-09-14 DIAGNOSIS — L97.813 SKIN ULCER OF PRETIBIAL REGION OF RIGHT LOWER EXTREMITY, WITH NECROSIS OF MUSCLE (HCC): Primary | ICD-10-CM

## 2021-09-14 PROCEDURE — 11045 DBRDMT SUBQ TISS EACH ADDL: CPT

## 2021-09-14 PROCEDURE — 11042 DBRDMT SUBQ TIS 1ST 20SQCM/<: CPT

## 2021-09-14 RX ORDER — LIDOCAINE 50 MG/G
OINTMENT TOPICAL ONCE
Status: CANCELLED | OUTPATIENT
Start: 2021-09-14 | End: 2021-09-14

## 2021-09-14 RX ORDER — LIDOCAINE HYDROCHLORIDE 20 MG/ML
JELLY TOPICAL ONCE
Status: COMPLETED | OUTPATIENT
Start: 2021-09-14 | End: 2021-09-14

## 2021-09-14 RX ORDER — LIDOCAINE HYDROCHLORIDE 20 MG/ML
JELLY TOPICAL ONCE
Status: CANCELLED | OUTPATIENT
Start: 2021-09-14 | End: 2021-09-14

## 2021-09-14 RX ORDER — BACITRACIN, NEOMYCIN, POLYMYXIN B 400; 3.5; 5 [USP'U]/G; MG/G; [USP'U]/G
OINTMENT TOPICAL ONCE
Status: CANCELLED | OUTPATIENT
Start: 2021-09-14 | End: 2021-09-14

## 2021-09-14 RX ORDER — LIDOCAINE HYDROCHLORIDE 40 MG/ML
SOLUTION TOPICAL ONCE
Status: CANCELLED | OUTPATIENT
Start: 2021-09-14 | End: 2021-09-14

## 2021-09-14 RX ORDER — GENTAMICIN SULFATE 1 MG/G
OINTMENT TOPICAL ONCE
Status: CANCELLED | OUTPATIENT
Start: 2021-09-14 | End: 2021-09-14

## 2021-09-14 RX ORDER — LIDOCAINE 40 MG/G
CREAM TOPICAL ONCE
Status: CANCELLED | OUTPATIENT
Start: 2021-09-14 | End: 2021-09-14

## 2021-09-14 RX ORDER — BETAMETHASONE DIPROPIONATE 0.05 %
OINTMENT (GRAM) TOPICAL ONCE
Status: CANCELLED | OUTPATIENT
Start: 2021-09-14 | End: 2021-09-14

## 2021-09-14 RX ORDER — GINSENG 100 MG
CAPSULE ORAL ONCE
Status: CANCELLED | OUTPATIENT
Start: 2021-09-14 | End: 2021-09-14

## 2021-09-14 RX ORDER — BACITRACIN ZINC AND POLYMYXIN B SULFATE 500; 1000 [USP'U]/G; [USP'U]/G
OINTMENT TOPICAL ONCE
Status: CANCELLED | OUTPATIENT
Start: 2021-09-14 | End: 2021-09-14

## 2021-09-14 RX ORDER — CLOBETASOL PROPIONATE 0.5 MG/G
OINTMENT TOPICAL ONCE
Status: CANCELLED | OUTPATIENT
Start: 2021-09-14 | End: 2021-09-14

## 2021-09-14 RX ADMIN — LIDOCAINE HYDROCHLORIDE 6 ML: 20 JELLY TOPICAL at 15:10

## 2021-09-14 NOTE — PROGRESS NOTES
[address]   Progress Note and Procedure Note      Awilda Nair  MEDICAL RECORD NUMBER:  [id number]  AGE: 74 y.o. GENDER: female  : 1955  EPISODE DATE:  2021    Subjective:     No chief complaint on file. HISTORY of PRESENT ILLNESS HPI     Awilda Nair is a 74 y.o. female who presents today for wound/ulcer evaluation. History of Wound Context: PT DESCRIBES HAVING TRAUMA TO RIGHT LEG 4 YEARS. APPROX 4 WEEKS AGO, THE AREA  OPENED UP. WE WERE UNABLE TO FIND HOME CARE TO APPLY THE [employer] VAC. Wound/Ulcer Pain Timing/Severity: none    Ulcer Identification:  Ulcer Type: diabetic    Contributing Factors: edema    PAST MEDICAL HISTORY        Diagnosis Date    Back pain 2012    Cellulitis 10/9/2015    Lt.  Lower Leg    DM (diabetes mellitus) ([facility] [address] 2012    DVT (deep venous thrombosis) ([facility] 2012    GERD (gastroesophageal reflux disease) 2012    MVP (mitral valve prolapse) 2012       PAST SURGICAL HISTORY    Past Surgical History:   Procedure Laterality Date    HYSTERECTOMY      TONSILLECTOMY         FAMILY HISTORY    Family History   Problem Relation Age of Onset    Heart Disease Mother        SOCIAL HISTORY    Social History     Tobacco Use    Smoking status: Former Smoker     Packs/day: 1.00     Years: 30.00     Pack years: 30.00     Types: Cigarettes     Quit date: 1983     Years since quittin.2    Smokeless tobacco: Never Used   Vaping Use    Vaping Use: Never used   Substance Use Topics    Alcohol use: No    Drug use: No       ALLERGIES    Allergies   Allergen Reactions    Erythromycin Hives    Lisinopril Itching       MEDICATIONS    Current Outpatient Medications on File Prior to Encounter   Medication Sig Dispense Refill    Insulin Glargine, 1 Unit Dial, (TOUJEO SOLOSTAR) 300 UNIT/ML SOPN Inject 34 units nightly 2 pen 0    Dulaglutide (TRULICITY) 1.5 UJ/6.9TP SOPN Inject 1.5 mg into the skin once a week 4 pen 0    blood glucose monitor kit and supplies Dispense sufficient amount for indicated testing frequency plus additional to accommodate PRN testing needs. Dispense all needed supplies to include: monitor, strips, lancing device, lancets, control solutions, alcohol swabs. 1 kit 0    doxycycline hyclate (VIBRA-TABS) 100 MG tablet Take 1 tablet by mouth 2 times daily for 10 days 20 tablet 0    gabapentin (NEURONTIN) 300 MG capsule TAKE 2 CAPSULES BY MOUTH 3  TIMES DAILY 180 capsule 0    silver sulfADIAZINE (SILVADENE) 1 % cream Apply topically daily. 300 g 0    fluconazole (DIFLUCAN) 100 MG tablet Take 1 tablet by mouth daily as needed (yeast infection) Indications: Patient self diagnoses and takes fluconaole for yeast infections. 4 tablet 0    metoprolol succinate (TOPROL XL) 50 MG extended release tablet Take 1 tablet by mouth daily 90 tablet 1    DULoxetine (CYMBALTA) 30 MG extended release capsule Take 1 capsule by mouth daily 90 capsule 1    hydroCHLOROthiazide (HYDRODIURIL) 25 MG tablet TAKE 1 TABLET BY MOUTH  DAILY 90 tablet 1    omeprazole (PRILOSEC) 40 MG delayed release capsule TAKE 1 CAPSULE BY MOUTH  EVERY DAY 90 capsule 1    apixaban (ELIQUIS) 5 MG TABS tablet TAKE 1 TABLET BY MOUTH TWO  TIMES DAILY 180 tablet 1    glyBURIDE (DIABETA) 5 MG tablet TAKE 1 TABLET BY MOUTH  TWICE DAILY WITH MEALS 180 tablet 1    methocarbamol (ROBAXIN) 750 MG tablet Twice daily prn for pain 180 tablet 0    glucose monitoring kit (FREESTYLE) monitoring kit Please fill with what is covered by ins Patient test once a day DM E11.9 1 kit 0    acetaminophen (TYLENOL) 500 MG tablet Take 500 mg by mouth every 6 hours as needed for Pain. Pt only takes 1-2 times per week      Glucose Blood (BLOOD GLUCOSE TEST STRIPS) STRP by Does not apply route 3 times daily. Freestyle [place] Test Strips       No current facility-administered medications on file prior to encounter.        REVIEW OF SYSTEMS    Pertinent items are noted in HPI. Objective: There were no vitals taken for this visit. Wt Readings from Last 3 Encounters:   09/10/21 (!) 377 lb (171 kg)   09/07/21 (!) 365 lb (165.6 kg)   09/05/21 (!) 365 lb 8 oz (165.8 kg)       PHYSICAL EXAM    Wound:         Wound Description: RIGHT PRETIBIAL     Integument:    Open lesions present, Right.      Vascular:  DP/PT pulses palpable 1/4, Bilateral.    CFT <5 seconds to digits 1-5, Bilateral .   Hair growth absent to level of digits, Bilateral.      Neurological:  Sensation absent to light touch to level of digits, Bilateral.  Protective sensation  absent via 5.07/10g [place] monofilament 10/10 sites, Bilateral.  Vibratory sensation absent to 1st MPJ, Bilateral.     Musculoskeletal:  Muscle strength 4/5 all LE groups tested, Bilateral.     Wound 09/07/21 Leg Proximal;Right;Lateral;Lower #1  (Active)   Wound Image   09/07/21 1408   Wound Etiology Non-Healing Surgical 09/14/21 1511   Dressing Status Old drainage noted;New drainage noted 09/14/21 1511   Wound Cleansed Cleansed with saline 09/14/21 1511   Wound Length (cm) 1.8 cm 09/14/21 1511   Wound Width (cm) 3.7 cm 09/14/21 1511   Wound Depth (cm) 3.5 cm 09/14/21 1511   Wound Surface Area (cm^2) 6.66 cm^2 09/14/21 1511   Change in Wound Size % (l*w) -80 09/14/21 1511   Wound Volume (cm^3) 23.31 cm^3 09/14/21 1511   Wound Healing % -26 09/14/21 1511   Post-Procedure Length (cm) 1 cm 09/07/21 1408   Post-Procedure Width (cm) 3.7 cm 09/07/21 1408   Post-Procedure Depth (cm) 8.5 cm 09/07/21 1408   Post-Procedure Surface Area (cm^2) 3.7 cm^2 09/07/21 1408   Post-Procedure Volume (cm^3) 31.45 cm^3 09/07/21 1408   Distance Tunneling (cm) 9 cm 09/07/21 1408   Tunneling Position ___ O'Clock 12 09/07/21 1408   Undermining Starts ___ O'Clock 12 09/14/21 1511   Undermining Ends___ O'Clock 12 09/14/21 1511   Undermining [place] Distance (cm) 4.5cm @12 09/14/21 1511   Wound Assessment Pink/red;Sl[place] 09/14/21 1511   Drainage Amount

## 2021-09-21 ENCOUNTER — HOSPITAL ENCOUNTER (OUTPATIENT)
Dept: WOUND CARE | Age: 66
Discharge: HOME | End: 2021-09-21
Payer: COMMERCIAL

## 2021-09-21 VITALS
WEIGHT: 293 LBS | DIASTOLIC BLOOD PRESSURE: 65 MMHG | SYSTOLIC BLOOD PRESSURE: 164 MMHG | HEART RATE: 58 BPM | TEMPERATURE: 97.8 F | HEIGHT: 72 IN | BODY MASS INDEX: 39.68 KG/M2 | RESPIRATION RATE: 19 BRPM

## 2021-09-21 DIAGNOSIS — E11.42 DIABETIC POLYNEUROPATHY ASSOCIATED WITH TYPE 2 DIABETES MELLITUS (HCC): ICD-10-CM

## 2021-09-21 DIAGNOSIS — L97.813 SKIN ULCER OF PRETIBIAL REGION OF RIGHT LOWER EXTREMITY, WITH NECROSIS OF MUSCLE (HCC): Primary | ICD-10-CM

## 2021-09-21 DIAGNOSIS — L02.91 ABSCESS: ICD-10-CM

## 2021-09-21 PROCEDURE — 10060 I&D ABSCESS SIMPLE/SINGLE: CPT

## 2021-09-21 RX ORDER — BACITRACIN, NEOMYCIN, POLYMYXIN B 400; 3.5; 5 [USP'U]/G; MG/G; [USP'U]/G
OINTMENT TOPICAL ONCE
Status: CANCELLED | OUTPATIENT
Start: 2021-09-21 | End: 2021-09-21

## 2021-09-21 RX ORDER — CLOBETASOL PROPIONATE 0.5 MG/G
OINTMENT TOPICAL ONCE
Status: CANCELLED | OUTPATIENT
Start: 2021-09-21 | End: 2021-09-21

## 2021-09-21 RX ORDER — GINSENG 100 MG
CAPSULE ORAL ONCE
Status: CANCELLED | OUTPATIENT
Start: 2021-09-21 | End: 2021-09-21

## 2021-09-21 RX ORDER — LIDOCAINE HYDROCHLORIDE 40 MG/ML
SOLUTION TOPICAL ONCE
Status: CANCELLED | OUTPATIENT
Start: 2021-09-21 | End: 2021-09-21

## 2021-09-21 RX ORDER — LIDOCAINE HYDROCHLORIDE 20 MG/ML
JELLY TOPICAL ONCE
Status: CANCELLED | OUTPATIENT
Start: 2021-09-21 | End: 2021-09-21

## 2021-09-21 RX ORDER — BACITRACIN ZINC AND POLYMYXIN B SULFATE 500; 1000 [USP'U]/G; [USP'U]/G
OINTMENT TOPICAL ONCE
Status: CANCELLED | OUTPATIENT
Start: 2021-09-21 | End: 2021-09-21

## 2021-09-21 RX ORDER — LIDOCAINE 40 MG/G
CREAM TOPICAL ONCE
Status: CANCELLED | OUTPATIENT
Start: 2021-09-21 | End: 2021-09-21

## 2021-09-21 RX ORDER — BETAMETHASONE DIPROPIONATE 0.05 %
OINTMENT (GRAM) TOPICAL ONCE
Status: CANCELLED | OUTPATIENT
Start: 2021-09-21 | End: 2021-09-21

## 2021-09-21 RX ORDER — LIDOCAINE 50 MG/G
OINTMENT TOPICAL ONCE
Status: CANCELLED | OUTPATIENT
Start: 2021-09-21 | End: 2021-09-21

## 2021-09-21 RX ORDER — CLINDAMYCIN HYDROCHLORIDE 300 MG/1
300 CAPSULE ORAL 3 TIMES DAILY
Qty: 30 CAPSULE | Refills: 0 | Status: SHIPPED | OUTPATIENT
Start: 2021-09-21 | End: 2021-10-01

## 2021-09-21 RX ORDER — GENTAMICIN SULFATE 1 MG/G
OINTMENT TOPICAL ONCE
Status: CANCELLED | OUTPATIENT
Start: 2021-09-21 | End: 2021-09-21

## 2021-09-21 RX ORDER — LIDOCAINE HYDROCHLORIDE 20 MG/ML
JELLY TOPICAL ONCE
Status: COMPLETED | OUTPATIENT
Start: 2021-09-21 | End: 2021-09-21

## 2021-09-21 RX ADMIN — LIDOCAINE HYDROCHLORIDE 6 ML: 20 JELLY TOPICAL at 15:10

## 2021-09-21 NOTE — PROGRESS NOTES
Negative Pressure    NAME:  Guy Garcai  YOB: 1955  MEDICAL RECORD NUMBER:  [id number]  DATE:  9/21/2021     Applied Negative Pressure to right lower leg wound(s)/ulcer(s).  [x] Applied skin barrier prep to girma-wound.  [x] Cut strips of plastic drape to picture frame wound so that girma-wound is     covered with the drape.  [x] If bridging dressing to less prominent site, cover any intact skin that will come in contact with the Negative Pressure Therapy sponge, gauze or channel drain with plastic drape. The sponge should never touch intact skin.  [x] Cut sponge, gauze or channel drain to size which will fit into the wound/ulcer bed without being forced.  [x] Be sure the sponge is large enough to hold the entire round plastic flange which is attached to the tubing. Never allow flange to be larger than the sponge or it will produce suction damaging intact skin.  Total number of individual pieces of foam used within the wound bed: 1 piece black foam    [x] If bridging the dressing away from the primary site, be sure the bridge leads to a piece of sponge large enough to hold the entire flange without allowing any of the flange to overlap onto intact skin.  [x] Covered sponge, gauze or channel drain with plastic drape.  [x] Cut a hole in this plastic drape directly over the sponge the same size as the plastic drain tubing.  [x] Removed plastic liner from flange and apply it directly over the hole you cut.  [x] Removed the plastic cover from the flange.  [x] Attached the tubing to the wound/ulcer Negative Pressure Therapy and turn it on to be sure a vacuum is created and that there are no leaks.  [x] If air leaks occur, use plastic drape to patch them.  [x] Secured Negative Pressure Therapy dressing with ace wrap loosely if located on an extremity. Maintain tubing outside of ace wrap. Tubing must not exert pressure on intact skin.     Applied per [employer] Guidelines      Electronically signed by Chelsy Mosley RN on 9/21/2021 at 3:48 PM

## 2021-09-21 NOTE — PROGRESS NOTES
[address]   Progress Note and Procedure Note      Shakir Chavis  MEDICAL RECORD NUMBER:  [id number]  AGE: 74 y.o. GENDER: female  : 1955  EPISODE DATE:  2021    Subjective:     Chief Complaint   Patient presents with    Wound Check     right lower leg         HISTORY of PRESENT ILLNESS HPI     Sahkir Chavis is a 74 y.o. female who presents today for wound/ulcer evaluation. History of Wound Context: PT DESCRIBES HAVING TRAUMA TO RIGHT LEG 4 YEARS. PT CHANGED THE SILVERCELL ROPE. BROUGHT IN THE VAC    Wound/Ulcer Pain Timing/Severity: none    Ulcer Identification:  Ulcer Type: diabetic    Contributing Factors: edema    PAST MEDICAL HISTORY        Diagnosis Date    Back pain 2012    Cellulitis 10/9/2015    Lt.  Lower Leg    DM (diabetes mellitus) ([facility] [address] 2012    DVT (deep venous thrombosis) ([facility] 2012    GERD (gastroesophageal reflux disease) 2012    MVP (mitral valve prolapse) 2012       PAST SURGICAL HISTORY    Past Surgical History:   Procedure Laterality Date    HYSTERECTOMY      TONSILLECTOMY         FAMILY HISTORY    Family History   Problem Relation Age of Onset    Heart Disease Mother        SOCIAL HISTORY    Social History     Tobacco Use    Smoking status: Former Smoker     Packs/day: 1.00     Years: 30.00     Pack years: 30.00     Types: Cigarettes     Quit date: 1983     Years since quittin.2    Smokeless tobacco: Never Used   Vaping Use    Vaping Use: Never used   Substance Use Topics    Alcohol use: No    Drug use: No       ALLERGIES    Allergies   Allergen Reactions    Erythromycin Hives    Lisinopril Itching       MEDICATIONS    Current Outpatient Medications on File Prior to Encounter   Medication Sig Dispense Refill    gabapentin (NEURONTIN) 300 MG capsule TAKE 2 CAPSULES BY MOUTH 3  TIMES DAILY 180 capsule 0    Insulin Glargine, 1 Unit Dial, (TOUJEO SOLOSTAR) 300 UNIT/ML SOPN Inject 34 units nightly 2 pen 0    Dulaglutide (TRULICITY) 1.5 PRESCOTT/6.5TA SOPN Inject 1.5 mg into the skin once a week 4 pen 0    blood glucose monitor kit and supplies Dispense sufficient amount for indicated testing frequency plus additional to accommodate PRN testing needs. Dispense all needed supplies to include: monitor, strips, lancing device, lancets, control solutions, alcohol swabs. 1 kit 0    silver sulfADIAZINE (SILVADENE) 1 % cream Apply topically daily. 300 g 0    fluconazole (DIFLUCAN) 100 MG tablet Take 1 tablet by mouth daily as needed (yeast infection) Indications: Patient self diagnoses and takes fluconaole for yeast infections. 4 tablet 0    metoprolol succinate (TOPROL XL) 50 MG extended release tablet Take 1 tablet by mouth daily 90 tablet 1    DULoxetine (CYMBALTA) 30 MG extended release capsule Take 1 capsule by mouth daily 90 capsule 1    hydroCHLOROthiazide (HYDRODIURIL) 25 MG tablet TAKE 1 TABLET BY MOUTH  DAILY 90 tablet 1    omeprazole (PRILOSEC) 40 MG delayed release capsule TAKE 1 CAPSULE BY MOUTH  EVERY DAY 90 capsule 1    apixaban (ELIQUIS) 5 MG TABS tablet TAKE 1 TABLET BY MOUTH TWO  TIMES DAILY 180 tablet 1    glyBURIDE (DIABETA) 5 MG tablet TAKE 1 TABLET BY MOUTH  TWICE DAILY WITH MEALS 180 tablet 1    methocarbamol (ROBAXIN) 750 MG tablet Twice daily prn for pain 180 tablet 0    glucose monitoring kit (FREESTYLE) monitoring kit Please fill with what is covered by ins Patient test once a day DM E11.9 1 kit 0    acetaminophen (TYLENOL) 500 MG tablet Take 500 mg by mouth every 6 hours as needed for Pain. Pt only takes 1-2 times per week      Glucose Blood (BLOOD GLUCOSE TEST STRIPS) STRP by Does not apply route 3 times daily. Freestyle [place] Test Strips       No current facility-administered medications on file prior to encounter. REVIEW OF SYSTEMS    Pertinent items are noted in HPI.     Objective:      BP (!) 164/65   Pulse 58   Temp 97.8 °F (36.6 °C) (Tympanic)   Resp 19 Ht 6' (1.829 m)   Wt (!) 377 lb (171 kg)   BMI 51.13 kg/m²     Wt Readings from Last 3 Encounters:   09/21/21 (!) 377 lb (171 kg)   09/14/21 (!) 377 lb (171 kg)   09/10/21 (!) 377 lb (171 kg)       PHYSICAL EXAM    Wound:         Wound Description: RIGHT PRETIBIAL     Integument:    Open lesions present, Right. Vascular:  DP/PT pulses palpable 1/4, Bilateral.    CFT <5 seconds to digits 1-5, Bilateral .   Hair growth absent to level of digits, Bilateral.      Neurological:  Sensation absent to light touch to level of digits, Bilateral.  Protective sensation  absent via 5.07/10g Walls-Dustin monofilament 10/10 sites, Bilateral.  Vibratory sensation absent to 1st MPJ, Bilateral.     Musculoskeletal:  Muscle strength 4/5 all LE groups tested, Bilateral.     Wound 09/07/21 Leg Right; Lower; Lateral #1  (Active)   Wound Image   09/07/21 1408   Wound Etiology Non-Healing Surgical 09/21/21 1510   Dressing Status Old drainage noted;New drainage noted 09/21/21 1510   Wound Cleansed Cleansed with saline 09/21/21 1510   Wound Length (cm) 1 cm 09/21/21 1510   Wound Width (cm) 3.1 cm 09/21/21 1510   Wound Depth (cm) 4 cm 09/21/21 1510   Wound Surface Area (cm^2) 3.1 cm^2 09/21/21 1510   Change in Wound Size % (l*w) 16.22 09/21/21 1510   Wound Volume (cm^3) 12.4 cm^3 09/21/21 1510   Wound Healing % 33 09/21/21 1510   Post-Procedure Length (cm) 1 cm 09/21/21 1510   Post-Procedure Width (cm) 3.1 cm 09/21/21 1510   Post-Procedure Depth (cm) 4 cm 09/21/21 1510   Post-Procedure Surface Area (cm^2) 3.1 cm^2 09/21/21 1510   Post-Procedure Volume (cm^3) 12.4 cm^3 09/21/21 1510   Distance Tunneling (cm) 9 cm 09/07/21 1408   Tunneling Position ___ O'Clock 12 09/07/21 1408   Undermining Starts ___ O'Clock 9 09/21/21 1510   Undermining Ends___ O'Clock 3 09/21/21 1510   Undermining Maxium Distance (cm) 8.5cm @12 09/21/21 1510   Wound Assessment Pink/red;Slough 09/21/21 1510   Drainage Amount Moderate 09/21/21 1510   Drainage Description Serosanguinous 09/21/21 1510   Odor None 09/21/21 1510   Kaylin-wound Assessment Intact; Hemosiderin staining (brown yellow) 09/21/21 1510   Margins Defined edges 09/21/21 1510   Wound Thickness Description not for Pressure Injury Full thickness 09/21/21 1510   Number of days: 14        Procedure:  Wound Location:RIGHT LATERAL CALF       Lidocaine gel soaked gauze was applied at beginning of wound evaluation. The wound(s) was excisionally debrided sharply of fibrotic, necrotic, and hyperkeratotic tissue, including a layer of surrounding healthy tissue using curette. The wound was debrided down through and including subcutaneous - ABSCESS DRAINED 30 CC OF PURULENCE IN WOUND - CALF MASSAGED, AND WOUND WAS FLUSHED. Percent of Wound Debrided: 100%    Total Surface Area Debrided:  (see above for sq cm)    Bleeding: Minimal    Patient tolerated procedure well and was given proper instruction. Assessment:        Active Hospital Problems    Diagnosis Date Noted    Abscess [L02.91] 09/21/2021    Skin ulcer of pretibial region of right lower extremity, with necrosis of muscle ([address] [L97.813] 09/07/2021    Diabetic polyneuropathy ([address] [E11.42] 09/07/2021               Plan:   I&D ABSCESS RIGHT CALF - APPLY VAC RIGHT LEG - COME IN  THURS FOR  VAC CHANGE - I WILL SEE HER ON TUES    RX CLINDAMYCIN (BASED ON CULTURE RESULTS FROM 9/4/21)    [address] 1 WEEK    Treatment Note please see attached Discharge Instructions    Written patient dismissal instructions given to patient and signed by patient or POA.

## 2021-09-24 ENCOUNTER — HOSPITAL ENCOUNTER (OUTPATIENT)
Dept: WOUND CARE | Age: 66
Discharge: HOME | End: 2021-09-24
Payer: COMMERCIAL

## 2021-09-24 VITALS
DIASTOLIC BLOOD PRESSURE: 70 MMHG | WEIGHT: 293 LBS | TEMPERATURE: 97.9 F | HEIGHT: 72 IN | BODY MASS INDEX: 39.68 KG/M2 | RESPIRATION RATE: 18 BRPM | HEART RATE: 64 BPM | SYSTOLIC BLOOD PRESSURE: 152 MMHG

## 2021-09-24 DIAGNOSIS — I89.0 LYMPHEDEMA OF BOTH LOWER EXTREMITIES: Chronic | ICD-10-CM

## 2021-09-24 DIAGNOSIS — L97.813 SKIN ULCER OF PRETIBIAL REGION OF RIGHT LOWER EXTREMITY, WITH NECROSIS OF MUSCLE (HCC): Primary | ICD-10-CM

## 2021-09-24 DIAGNOSIS — E11.42 DIABETIC POLYNEUROPATHY ASSOCIATED WITH TYPE 2 DIABETES MELLITUS (HCC): ICD-10-CM

## 2021-09-24 DIAGNOSIS — E66.01 MORBID OBESITY (HCC): ICD-10-CM

## 2021-09-24 PROCEDURE — 11042 DBRDMT SUBQ TIS 1ST 20SQCM/<: CPT | Performed by: NURSE PRACTITIONER

## 2021-09-24 PROCEDURE — 97605 NEG PRS WND THER DME<=50SQCM: CPT

## 2021-09-24 PROCEDURE — 11042 DBRDMT SUBQ TIS 1ST 20SQCM/<: CPT

## 2021-09-24 RX ORDER — BETAMETHASONE DIPROPIONATE 0.05 %
OINTMENT (GRAM) TOPICAL ONCE
Status: CANCELLED | OUTPATIENT
Start: 2021-09-24 | End: 2021-09-24

## 2021-09-24 RX ORDER — CLOBETASOL PROPIONATE 0.5 MG/G
OINTMENT TOPICAL ONCE
Status: CANCELLED | OUTPATIENT
Start: 2021-09-24 | End: 2021-09-24

## 2021-09-24 RX ORDER — BACITRACIN, NEOMYCIN, POLYMYXIN B 400; 3.5; 5 [USP'U]/G; MG/G; [USP'U]/G
OINTMENT TOPICAL ONCE
Status: CANCELLED | OUTPATIENT
Start: 2021-09-24 | End: 2021-09-24

## 2021-09-24 RX ORDER — LIDOCAINE HYDROCHLORIDE 40 MG/ML
SOLUTION TOPICAL ONCE
Status: CANCELLED | OUTPATIENT
Start: 2021-09-24 | End: 2021-09-24

## 2021-09-24 RX ORDER — LIDOCAINE 50 MG/G
OINTMENT TOPICAL ONCE
Status: CANCELLED | OUTPATIENT
Start: 2021-09-24 | End: 2021-09-24

## 2021-09-24 RX ORDER — LIDOCAINE HYDROCHLORIDE 20 MG/ML
JELLY TOPICAL ONCE
Status: CANCELLED | OUTPATIENT
Start: 2021-09-24 | End: 2021-09-24

## 2021-09-24 RX ORDER — BACITRACIN ZINC AND POLYMYXIN B SULFATE 500; 1000 [USP'U]/G; [USP'U]/G
OINTMENT TOPICAL ONCE
Status: CANCELLED | OUTPATIENT
Start: 2021-09-24 | End: 2021-09-24

## 2021-09-24 RX ORDER — LIDOCAINE HYDROCHLORIDE 40 MG/ML
SOLUTION TOPICAL ONCE
Status: COMPLETED | OUTPATIENT
Start: 2021-09-24 | End: 2021-09-24

## 2021-09-24 RX ORDER — GENTAMICIN SULFATE 1 MG/G
OINTMENT TOPICAL ONCE
Status: CANCELLED | OUTPATIENT
Start: 2021-09-24 | End: 2021-09-24

## 2021-09-24 RX ORDER — GINSENG 100 MG
CAPSULE ORAL ONCE
Status: CANCELLED | OUTPATIENT
Start: 2021-09-24 | End: 2021-09-24

## 2021-09-24 RX ORDER — LIDOCAINE 40 MG/G
CREAM TOPICAL ONCE
Status: CANCELLED | OUTPATIENT
Start: 2021-09-24 | End: 2021-09-24

## 2021-09-24 RX ADMIN — LIDOCAINE HYDROCHLORIDE 10 ML: 40 SOLUTION TOPICAL at 13:39

## 2021-09-24 ASSESSMENT — PAIN SCALES - GENERAL: PAINLEVEL_OUTOF10: 0

## 2021-09-24 NOTE — PROGRESS NOTES
Negative Pressure    NAME:  Diana Zhong  YOB: 1955  MEDICAL RECORD NUMBER:  [id number]  DATE:  9/24/2021     Applied Negative Pressure to R leg wound(s)/ulcer(s).  [x] Applied skin barrier prep to girma-wound.  [x] Cut strips of plastic drape to picture frame wound so that girma-wound is     covered with the drape.  [x] If bridging dressing to less prominent site, cover any intact skin that will come in contact with the Negative Pressure Therapy sponge, gauze or channel drain with plastic drape. The sponge should never touch intact skin.  [x] Cut sponge, gauze or channel drain to size which will fit into the wound/ulcer bed without being forced.  [x] Be sure the sponge is large enough to hold the entire round plastic flange which is attached to the tubing. Never allow flange to be larger than the sponge or it will produce suction damaging intact skin.  Total number of individual pieces of foam used within the wound bed: 1     [x] If bridging the dressing away from the primary site, be sure the bridge leads to a piece of sponge large enough to hold the entire flange without allowing any of the flange to overlap onto intact skin.  [x] Covered sponge, gauze or channel drain with plastic drape.  [x] Cut a hole in this plastic drape directly over the sponge the same size as the plastic drain tubing.  [x] Removed plastic liner from flange and apply it directly over the hole you cut.  [x] Removed the plastic cover from the flange.  [x] Attached the tubing to the wound/ulcer Negative Pressure Therapy and turn it on to be sure a vacuum is created and that there are no leaks.  [x] If air leaks occur, use plastic drape to patch them.  [x] Secured Negative Pressure Therapy dressing with ace wrap loosely if located on an extremity. Maintain tubing outside of ace wrap. Tubing must not exert pressure on intact skin.     Applied per  Guidelines      Electronically signed by Adrienne Montgomery RN on 9/24/2021 at 2:44 PM

## 2021-09-24 NOTE — PROGRESS NOTES
[address]   Progress Note and Procedure Note      Maverick Dumont  MEDICAL RECORD NUMBER:  [id number]  AGE: 74 y.o. GENDER: female  : 1955  EPISODE DATE:  2021    Subjective:     Chief Complaint   Patient presents with    Wound Check     RLE         HISTORY of PRESENT ILLNESS HPI     Maverick Dumont is a 74 y.o. female who presents today for wound/ulcer evaluation. History of Wound Context: here to follow up on right leg wound after bedside evacuation of large hematoma while hospitalized at [facility] 9/3/2021-2021. Initial injury was 4 years ago and always had lump. Had CT ordered by Dr Carlos Sims which showed gas in tissue and infection concern which is why she was admitted. Current local therapy is that of wound vac. She is being seen twice weekly in wound clinic for wound vac changes as there was difficulty getting home care. Wound/Ulcer Pain Timing/Severity: none  Quality of pain: N/A  Severity:  0 / 10   Modifying Factors: None  Associated Signs/Symptoms: none    Ulcer Identification:  Ulcer Type: non-healing surgical  Contributing Factors: edema, lymphedema, diabetes, decreased mobility and obesity    Wound: N/A        PAST MEDICAL HISTORY        Diagnosis Date    Back pain 2012    Cellulitis 10/9/2015    Lt.  Lower Leg    DM (diabetes mellitus) ([facility] [address] 2012    DVT (deep venous thrombosis) ([facility] [address] 2012    GERD (gastroesophageal reflux disease) 2012    MVP (mitral valve prolapse) 2012       PAST SURGICAL HISTORY    Past Surgical History:   Procedure Laterality Date    HYSTERECTOMY      TONSILLECTOMY         FAMILY HISTORY    Family History   Problem Relation Age of Onset    Heart Disease Mother        SOCIAL HISTORY    Social History     Tobacco Use    Smoking status: Former Smoker     Packs/day: 1.00     Years: 30.00     Pack years: 30.00     Types: Cigarettes     Quit date: 1983     Years since quittin.2    Smokeless tobacco: Never Used   Vaping Use    Vaping Use: Never used   Substance Use Topics    Alcohol use: No    Drug use: No       ALLERGIES    Allergies   Allergen Reactions    Erythromycin Hives    Lisinopril Itching       MEDICATIONS    Current Outpatient Medications on File Prior to Encounter   Medication Sig Dispense Refill    clindamycin (CLEOCIN) 300 MG capsule Take 1 capsule by mouth 3 times daily for 10 days 30 capsule 0    gabapentin (NEURONTIN) 300 MG capsule TAKE 2 CAPSULES BY MOUTH 3  TIMES DAILY 180 capsule 0    Insulin Glargine, 1 Unit Dial, (TOUJEO SOLOSTAR) 300 UNIT/ML SOPN Inject 34 units nightly 2 pen 0    Dulaglutide (TRULICITY) 1.5 AE/2.5AR SOPN Inject 1.5 mg into the skin once a week 4 pen 0    blood glucose monitor kit and supplies Dispense sufficient amount for indicated testing frequency plus additional to accommodate PRN testing needs. Dispense all needed supplies to include: monitor, strips, lancing device, lancets, control solutions, alcohol swabs. 1 kit 0    silver sulfADIAZINE (SILVADENE) 1 % cream Apply topically daily. 300 g 0    fluconazole (DIFLUCAN) 100 MG tablet Take 1 tablet by mouth daily as needed (yeast infection) Indications: Patient self diagnoses and takes fluconaole for yeast infections.  4 tablet 0    metoprolol succinate (TOPROL XL) 50 MG extended release tablet Take 1 tablet by mouth daily 90 tablet 1    DULoxetine (CYMBALTA) 30 MG extended release capsule Take 1 capsule by mouth daily 90 capsule 1    hydroCHLOROthiazide (HYDRODIURIL) 25 MG tablet TAKE 1 TABLET BY MOUTH  DAILY 90 tablet 1    omeprazole (PRILOSEC) 40 MG delayed release capsule TAKE 1 CAPSULE BY MOUTH  EVERY DAY 90 capsule 1    apixaban (ELIQUIS) 5 MG TABS tablet TAKE 1 TABLET BY MOUTH TWO  TIMES DAILY 180 tablet 1    glyBURIDE (DIABETA) 5 MG tablet TAKE 1 TABLET BY MOUTH  TWICE DAILY WITH MEALS 180 tablet 1    methocarbamol (ROBAXIN) 750 MG tablet Twice daily prn for pain 180 tablet 0    glucose monitoring kit (FREESTYLE) monitoring kit Please fill with what is covered by ins Patient test once a day DM E11.9 1 kit 0    acetaminophen (TYLENOL) 500 MG tablet Take 500 mg by mouth every 6 hours as needed for Pain. Pt only takes 1-2 times per week      Glucose Blood (BLOOD GLUCOSE TEST STRIPS) STRP by Does not apply route 3 times daily. Freestyle [place] Test Strips       No current facility-administered medications on file prior to encounter.        REVIEW OF SYSTEMS    Constitutional: negative  Eyes: negative  Ears, nose, mouth, throat, and face: negative  Respiratory: negative  Cardiovascular: negative except for lower extremity edema  Gastrointestinal: negative  Genitourinary:negative  Integument/breast: negative except for right leg wound  Hematologic/lymphatic: negative  Musculoskeletal:negative  Neurological: negative except for paresthesia  Behavioral/Psych: negative  Endocrine: negative  Allergic/Immunologic: negative    Objective:      BP (!) 152/70   Pulse 64   Temp 97.9 °F (36.6 °C) (Tympanic)   Resp 18   Ht 6' (1.829 m)   Wt (!) 377 lb (171 kg)   BMI 51.13 kg/m²     Wt Readings from Last 3 Encounters:   09/24/21 (!) 377 lb (171 kg)   09/21/21 (!) 377 lb (171 kg)   09/14/21 (!) 377 lb (171 kg)       PHYSICAL EXAM    General Appearance: alert and oriented to person, place and time, well-developed and well-nourished, in no acute distress  Skin: warm and dry, no rash or erythema  Head: normocephalic and atraumatic  Eyes: pupils equal, round, extraocular eye movements intact, and conjunctivae normal  Pulmonary/Chest: normal air movement, no respiratory distress  Extremities: no cyanosis and no clubbing or edema   Musculoskeletal: no joint swelling, deformity or tenderness  Neurologic: gait, coordination normal and speech normal      Assessment:     Problem List Items Addressed This Visit     Diabetic polyneuropathy ([address]    Relevant Orders    Initiate Outpatient Wound Care Protocol    Neg. 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 Pressure Wound Therapy           Procedure Note  Indications:  Based on my examination of this patient's wound(s)/ulcer(s) today, debridement is required to promote healing and evaluate the wound base. Performed by: ARTURO Barrett CNP    Consent obtained:  Yes    Time out taken:  Yes    Pain Control: Anesthetic  Anesthetic: 4% Lidocaine Liquid Topical     Debridement:Excisional Debridement    Using curette the wound(s)/ulcer(s) was/were sharply debrided down through and including the removal of subcutaneous tissue. Devitalized Tissue Debrided:  fibrin, biofilm and slough    Pre Debridement Measurements:  Are located in the [place]  Documentation Flow Sheet    Wound/Ulcer #: 1    Post Debridement Measurements:  Wound/Ulcer Descriptions are Pre Debridement except measurements:    Wound 09/07/21 Leg Right; Lower; Lateral #1  (Active)   Wound Image   09/07/21 1408   Wound Etiology Non-Healing Surgical 09/24/21 1337   Dressing Status New drainage noted; Old drainage noted 09/24/21 1337   Wound Cleansed Soap and water 09/24/21 1337   Dressing/Treatment Negative pressure wound therapy 09/21/21 1550   Wound Length (cm) 1.1 cm 09/24/21 1337   Wound Width (cm) 3.2 cm 09/24/21 1337   Wound Depth (cm) 2.9 cm 09/24/21 1337   Wound Surface Area (cm^2) 3.52 cm^2 09/24/21 1337   Change in Wound Size % (l*w) 4.86 09/24/21 1337   Wound Volume (cm^3) 10.208 cm^3 09/24/21 1337   Wound Healing % 45 09/24/21 1337   Post-Procedure Length (cm) 1.1 cm 09/24/21 1337   Post-Procedure Width (cm) 3.2 cm 09/24/21 1337   Post-Procedure Depth (cm) 2.9 cm 09/24/21 1337   Post-Procedure Surface Area (cm^2) 3.52 cm^2 09/24/21 1337   Post-Procedure Volume (cm^3) 10.208 cm^3 09/24/21 1337   Distance Tunneling (cm) 3 cm 09/24/21 1337   Tunneling Position ___ O'Clock 12 09/24/21 1337   Undermining Starts ___ O'Clock 0900 09/24/21 1337   Undermining Ends___ O'Clock 0300 09/24/21 1337   Undermining Maxium Distance (cm) [email or web address] 09/24/21 1337   Wound Assessment Bleeding;Pink/red 09/24/21 1337   Drainage Amount Moderate 09/24/21 1337   Drainage Description Serosanguinous 09/24/21 1337   Odor None 09/24/21 1337   Kaylin-wound Assessment Blanchable erythema 09/24/21 1337   Margins Defined edges 09/24/21 1337   Wound Thickness Description not for Pressure Injury Full thickness 09/21/21 1510   Number of days: 17          Percent of Wound(s)/Ulcer(s) Debrided: 100%    Total Surface Area Debrided:  3.52 sq cm     Estimated Blood Loss:  Minimal    Hemostasis Achieved:  by pressure    Procedural Pain:  0  / 10     Post Procedural Pain:  0 / 10     Response to treatment:  Well tolerated by patient. Plan:     Treatment Note please see Discharge Instructions    Written patient dismissal instructions given to patient and signed by patient or POA.            Electronically signed by ARTURO Watkins CNP on 9/24/2021 at 2:20 PM

## 2021-09-25 DIAGNOSIS — E11.65 TYPE 2 DIABETES MELLITUS WITH HYPERGLYCEMIA, WITHOUT LONG-TERM CURRENT USE OF INSULIN (HCC): ICD-10-CM

## 2021-09-26 RX ORDER — DULAGLUTIDE 0.75 MG/.5ML
INJECTION, SOLUTION SUBCUTANEOUS
Qty: 2 ML | Refills: 11 | OUTPATIENT
Start: 2021-09-26

## 2021-09-28 ENCOUNTER — HOSPITAL ENCOUNTER (OUTPATIENT)
Dept: WOUND CARE | Age: 66
Discharge: HOME | End: 2021-09-28
Payer: COMMERCIAL

## 2021-09-28 VITALS
DIASTOLIC BLOOD PRESSURE: 92 MMHG | HEART RATE: 81 BPM | BODY MASS INDEX: 39.68 KG/M2 | TEMPERATURE: 97.4 F | HEIGHT: 72 IN | SYSTOLIC BLOOD PRESSURE: 149 MMHG | RESPIRATION RATE: 20 BRPM | WEIGHT: 293 LBS

## 2021-09-28 DIAGNOSIS — B35.1 ONYCHOMYCOSIS: ICD-10-CM

## 2021-09-28 DIAGNOSIS — E11.42 DIABETIC POLYNEUROPATHY ASSOCIATED WITH TYPE 2 DIABETES MELLITUS (HCC): ICD-10-CM

## 2021-09-28 DIAGNOSIS — L02.91 ABSCESS: Primary | ICD-10-CM

## 2021-09-28 DIAGNOSIS — L97.813 SKIN ULCER OF PRETIBIAL REGION OF RIGHT LOWER EXTREMITY, WITH NECROSIS OF MUSCLE (HCC): ICD-10-CM

## 2021-09-28 PROCEDURE — 11042 DBRDMT SUBQ TIS 1ST 20SQCM/<: CPT

## 2021-09-28 PROCEDURE — 11721 DEBRIDE NAIL 6 OR MORE: CPT

## 2021-09-28 RX ORDER — LIDOCAINE HYDROCHLORIDE 20 MG/ML
JELLY TOPICAL ONCE
Status: CANCELLED | OUTPATIENT
Start: 2021-09-28 | End: 2021-09-28

## 2021-09-28 RX ORDER — LIDOCAINE 50 MG/G
OINTMENT TOPICAL ONCE
Status: CANCELLED | OUTPATIENT
Start: 2021-09-28 | End: 2021-09-28

## 2021-09-28 RX ORDER — LIDOCAINE HYDROCHLORIDE 40 MG/ML
SOLUTION TOPICAL ONCE
Status: CANCELLED | OUTPATIENT
Start: 2021-09-28 | End: 2021-09-28

## 2021-09-28 RX ORDER — GENTAMICIN SULFATE 1 MG/G
OINTMENT TOPICAL ONCE
Status: CANCELLED | OUTPATIENT
Start: 2021-09-28 | End: 2021-09-28

## 2021-09-28 RX ORDER — GINSENG 100 MG
CAPSULE ORAL ONCE
Status: CANCELLED | OUTPATIENT
Start: 2021-09-28 | End: 2021-09-28

## 2021-09-28 RX ORDER — LIDOCAINE HYDROCHLORIDE 20 MG/ML
JELLY TOPICAL ONCE
Status: COMPLETED | OUTPATIENT
Start: 2021-09-28 | End: 2021-09-28

## 2021-09-28 RX ORDER — BACITRACIN ZINC AND POLYMYXIN B SULFATE 500; 1000 [USP'U]/G; [USP'U]/G
OINTMENT TOPICAL ONCE
Status: CANCELLED | OUTPATIENT
Start: 2021-09-28 | End: 2021-09-28

## 2021-09-28 RX ORDER — BETAMETHASONE DIPROPIONATE 0.05 %
OINTMENT (GRAM) TOPICAL ONCE
Status: CANCELLED | OUTPATIENT
Start: 2021-09-28 | End: 2021-09-28

## 2021-09-28 RX ORDER — LIDOCAINE 40 MG/G
CREAM TOPICAL ONCE
Status: CANCELLED | OUTPATIENT
Start: 2021-09-28 | End: 2021-09-28

## 2021-09-28 RX ORDER — BACITRACIN, NEOMYCIN, POLYMYXIN B 400; 3.5; 5 [USP'U]/G; MG/G; [USP'U]/G
OINTMENT TOPICAL ONCE
Status: CANCELLED | OUTPATIENT
Start: 2021-09-28 | End: 2021-09-28

## 2021-09-28 RX ORDER — CLOBETASOL PROPIONATE 0.5 MG/G
OINTMENT TOPICAL ONCE
Status: CANCELLED | OUTPATIENT
Start: 2021-09-28 | End: 2021-09-28

## 2021-09-28 RX ADMIN — LIDOCAINE HYDROCHLORIDE 6 ML: 20 JELLY TOPICAL at 14:31

## 2021-09-28 NOTE — PROGRESS NOTES
[address]   Progress Note and Procedure Note      Pj Kolb  MEDICAL RECORD NUMBER:  [id number]  AGE: 74 y.o. GENDER: female  : 1955  EPISODE DATE:  2021    Subjective:     No chief complaint on file. HISTORY of PRESENT ILLNESS HPI     Pj Kolb is a 74 y.o. female who presents today for wound/ulcer evaluation. History of Wound Context: PT DESCRIBES HAVING TRAUMA TO RIGHT LEG 4 YEARS. HAD A VAC CHANGE LAST WEEK AT [address] [place] [address]. PT WOULD LIKE TO HAVE NAILS ADDRESSED - USUALLY SEES DR ALY - LAST APPT WAS IN     Wound/Ulcer Pain Timing/Severity: none    Ulcer Identification:  Ulcer Type: diabetic    Contributing Factors: edema    PAST MEDICAL HISTORY        Diagnosis Date    Back pain 2012    Cellulitis 10/9/2015    Lt.  Lower Leg    [facility] 2012    D[facility] thrombosis[facility] 2012    GERD (gastroesophageal reflux disease) 2012    MVP (mitral valve prolapse) 2012       PAST SURGICAL HISTORY    Past Surgical History:   Procedure Laterality Date    HYSTERECTOMY      TONSILLECTOMY         FAMILY HISTORY    Family History   Problem Relation Age of Onset    Heart Disease Mother        SOCIAL HISTORY    Social History     Tobacco Use    Smoking status: Former Smoker     Packs/day: 1.00     Years: 30.00     Pack years: 30.00     Types: Cigarettes     Quit date: 1983     Years since quittin.2    Smokeless tobacco: Never Used   Vaping Use    Vaping Use: Never used   Substance Use Topics    Alcohol use: No    Drug use: No       ALLERGIES    Allergies   Allergen Reactions    Erythromycin Hives    Lisinopril Itching       MEDICATIONS    Current Outpatient Medications on File Prior to Encounter   Medication Sig Dispense Refill    clindamycin (CLEOCIN) 300 MG capsule Take 1 capsule by mouth 3 times daily for 10 days 30 capsule 0    gabapentin (NEURONTIN) 300 MG capsule TAKE 2 SYSTEMS    Pertinent items are noted in HPI. Objective: There were no vitals taken for this visit. Wt Readings from Last 3 Encounters:   09/24/21 (!) 377 lb (171 kg)   09/21/21 (!) 377 lb (171 kg)   09/14/21 (!) 377 lb (171 kg)       PHYSICAL EXAM    Wound:         Wound Description: RIGHT PRETIBIAL     Integument:    Open lesions present, Right. Nails are thickened, discolored, elongated with subungual > 6. Nail thickness 1-2 mm. debris and Web spaces are clear. Vascular:  DP/PT pulses palpable 1/4, Bilateral.    CFT <5 seconds to digits 1-5, Bilateral .   Hair growth absent to level of digits, Bilateral.      Neurological:  Sensation absent to light touch to level of digits, Bilateral.  Protective sensation  absent via 5.07/10g [place] monofilament 10/10 sites, Bilateral.  Vibratory sensation absent to 1st MPJ, Bilateral.     Musculoskeletal:  Muscle strength 4/5 all LE groups tested, Bilateral.     Wound 09/07/21 Leg Right; Lower; Lateral #1  (Active)   Wound Image   09/07/21 1408   Wound Etiology Non-Healing Surgical 09/28/21 1432   Dressing Status New drainage noted; Old drainage noted 09/28/21 1432   Wound Cleansed Cleansed with saline 09/28/21 1432   Dressing/Treatment Negative pressure wound therapy 09/24/21 1441   Wound Length (cm) 1.1 cm 09/24/21 1337   Wound Width (cm) 3.2 cm 09/24/21 1337   Wound Depth (cm) 2.9 cm 09/24/21 1337   Wound Surface Area (cm^2) 3.52 cm^2 09/24/21 1337   Change in Wound Size % (l*w) 4.86 09/24/21 1337   Wound Volume (cm^3) 10.208 cm^3 09/24/21 1337   Wound Healing % 45 09/24/21 1337   Post-Procedure Length (cm) 1 cm 09/28/21 1432   Post-Procedure Width (cm) 3 cm 09/28/21 1432   Post-Procedure Depth (cm) 4 cm 09/28/21 1432   Post-Procedure Surface Area (cm^2) 3 cm^2 09/28/21 1432   Post-Procedure Volume (cm^3) 12 cm^3 09/28/21 1432   Distance Tunneling (cm) 3 cm 09/24/21 1337   Tunneling Position ___ O'Clock 12 09/24/21 1337   Undermining Starts ___ O'Clock 9 09/28/21 1432   Undermining Ends___ O'Clock 3 09/28/21 1432   Undermining Maxium Distance (cm) 2.5 at 9 09/28/21 1432   Wound Assessment Bleeding;Pink/red 09/24/21 1337   Drainage Amount Moderate 09/24/21 1337   Drainage Description Serosanguinous 09/24/21 1337   Odor None 09/24/21 1337   Kaylin-wound Assessment Blanchable erythema 09/24/21 1337   Margins Defined edges 09/24/21 1337   Wound Thickness Description not for Pressure Injury Full thickness 09/21/21 1510   Number of days: 21        Procedure:  Wound Location:RIGHT LATERAL CALF       Lidocaine gel soaked gauze was applied at beginning of wound evaluation. The wound(s) was excisionally debrided sharply of fibrotic, necrotic, and hyperkeratotic tissue, including a layer of surrounding healthy tissue using curette. The wound was debrided down through and including subcutaneous - WOUND APPEARS TO BE MUCH IMPROVED       Percent of Wound Debrided: 100%    Total Surface Area Debrided:  (see above for sq cm)    Bleeding: Minimal    Patient tolerated procedure well and was given proper instruction. Assessment:        Active Hospital Problems    Diagnosis Date Noted    Abscess [L02.91] 09/21/2021    Skin ulcer of pretibial region of right lower extremity, with necrosis of muscle ([address] [L97.813] 09/07/2021    Diabetic polyneuropathy ([facility] [E11.42] 09/07/2021               Plan:   EXCISIONAL DEBRIDEMENT RIGHT LEG WOUND - APPLY VAC -COME IN  THURS FOR  VAC CHANGE - I WILL SEE HER ON TUES    Debrided nails 1-5 bilateral with sterile nail nippers without incident. [address] 1 WEEK    Treatment Note please see attached Discharge Instructions    Written patient dismissal instructions given to patient and signed by patient or POA.

## 2021-09-30 ENCOUNTER — HOSPITAL ENCOUNTER (OUTPATIENT)
Dept: WOUND CARE | Age: 66
Discharge: HOME | End: 2021-09-30
Payer: COMMERCIAL

## 2021-09-30 VITALS
SYSTOLIC BLOOD PRESSURE: 155 MMHG | RESPIRATION RATE: 20 BRPM | BODY MASS INDEX: 39.68 KG/M2 | DIASTOLIC BLOOD PRESSURE: 78 MMHG | WEIGHT: 293 LBS | TEMPERATURE: 97.2 F | HEIGHT: 72 IN | HEART RATE: 94 BPM

## 2021-09-30 DIAGNOSIS — B35.1 ONYCHOMYCOSIS: ICD-10-CM

## 2021-09-30 DIAGNOSIS — E11.42 DIABETIC POLYNEUROPATHY ASSOCIATED WITH TYPE 2 DIABETES MELLITUS (HCC): ICD-10-CM

## 2021-09-30 DIAGNOSIS — L02.91 ABSCESS: ICD-10-CM

## 2021-09-30 DIAGNOSIS — I89.0 LYMPHEDEMA OF BOTH LOWER EXTREMITIES: Chronic | ICD-10-CM

## 2021-09-30 DIAGNOSIS — L97.813 SKIN ULCER OF PRETIBIAL REGION OF RIGHT LOWER EXTREMITY, WITH NECROSIS OF MUSCLE (HCC): Primary | ICD-10-CM

## 2021-09-30 PROCEDURE — 97605 NEG PRS WND THER DME<=50SQCM: CPT

## 2021-09-30 PROCEDURE — 11042 DBRDMT SUBQ TIS 1ST 20SQCM/<: CPT

## 2021-09-30 PROCEDURE — 11042 DBRDMT SUBQ TIS 1ST 20SQCM/<: CPT | Performed by: NURSE PRACTITIONER

## 2021-09-30 RX ORDER — LIDOCAINE HYDROCHLORIDE 20 MG/ML
JELLY TOPICAL ONCE
Status: CANCELLED | OUTPATIENT
Start: 2021-09-30 | End: 2021-09-30

## 2021-09-30 RX ORDER — BETAMETHASONE DIPROPIONATE 0.05 %
OINTMENT (GRAM) TOPICAL ONCE
Status: CANCELLED | OUTPATIENT
Start: 2021-09-30 | End: 2021-09-30

## 2021-09-30 RX ORDER — LIDOCAINE 50 MG/G
OINTMENT TOPICAL ONCE
Status: CANCELLED | OUTPATIENT
Start: 2021-09-30 | End: 2021-09-30

## 2021-09-30 RX ORDER — CLOBETASOL PROPIONATE 0.5 MG/G
OINTMENT TOPICAL ONCE
Status: CANCELLED | OUTPATIENT
Start: 2021-09-30 | End: 2021-09-30

## 2021-09-30 RX ORDER — GENTAMICIN SULFATE 1 MG/G
OINTMENT TOPICAL ONCE
Status: CANCELLED | OUTPATIENT
Start: 2021-09-30 | End: 2021-09-30

## 2021-09-30 RX ORDER — LIDOCAINE HYDROCHLORIDE 20 MG/ML
JELLY TOPICAL ONCE
Status: COMPLETED | OUTPATIENT
Start: 2021-09-30 | End: 2021-09-30

## 2021-09-30 RX ORDER — GINSENG 100 MG
CAPSULE ORAL ONCE
Status: CANCELLED | OUTPATIENT
Start: 2021-09-30 | End: 2021-09-30

## 2021-09-30 RX ORDER — LIDOCAINE 40 MG/G
CREAM TOPICAL ONCE
Status: CANCELLED | OUTPATIENT
Start: 2021-09-30 | End: 2021-09-30

## 2021-09-30 RX ORDER — BACITRACIN ZINC AND POLYMYXIN B SULFATE 500; 1000 [USP'U]/G; [USP'U]/G
OINTMENT TOPICAL ONCE
Status: CANCELLED | OUTPATIENT
Start: 2021-09-30 | End: 2021-09-30

## 2021-09-30 RX ORDER — BACITRACIN, NEOMYCIN, POLYMYXIN B 400; 3.5; 5 [USP'U]/G; MG/G; [USP'U]/G
OINTMENT TOPICAL ONCE
Status: CANCELLED | OUTPATIENT
Start: 2021-09-30 | End: 2021-09-30

## 2021-09-30 RX ORDER — LIDOCAINE HYDROCHLORIDE 40 MG/ML
SOLUTION TOPICAL ONCE
Status: CANCELLED | OUTPATIENT
Start: 2021-09-30 | End: 2021-09-30

## 2021-09-30 RX ADMIN — LIDOCAINE HYDROCHLORIDE 6 ML: 20 JELLY TOPICAL at 13:36

## 2021-09-30 NOTE — PROGRESS NOTES
Negative Pressure    NAME:  Citlalli Triana  YOB: 1955  MEDICAL RECORD NUMBER:  [id number]  DATE:  9/30/2021     Applied Negative Pressure to RLEwound(s)/ulcer(s).  [x] Applied skin barrier prep to girma-wound.  [x] Cut strips of plastic drape to picture frame wound so that girma-wound is     covered with the drape.  [x] If bridging dressing to less prominent site, cover any intact skin that will come in contact with the Negative Pressure Therapy sponge, gauze or channel drain with plastic drape. The sponge should never touch intact skin.  [x] Cut sponge, gauze or channel drain to size which will fit into the wound/ulcer bed without being forced.  [x] Be sure the sponge is large enough to hold the entire round plastic flange which is attached to the tubing. Never allow flange to be larger than the sponge or it will produce suction damaging intact skin.  Total number of individual pieces of foam used within the wound bed: 1 piece black foam   [x] If bridging the dressing away from the primary site, be sure the bridge leads to a piece of sponge large enough to hold the entire flange without allowing any of the flange to overlap onto intact skin.  [x] Covered sponge, gauze or channel drain with plastic drape.  [x] Cut a hole in this plastic drape directly over the sponge the same size as the plastic drain tubing.  [x] Removed plastic liner from flange and apply it directly over the hole you cut.  [x] Removed the plastic cover from the flange.  [x] Attached the tubing to the wound/ulcer Negative Pressure Therapy and turn it on to be sure a vacuum is created and that there are no leaks.  [x] If air leaks occur, use plastic drape to patch them.  [x] Secured Negative Pressure Therapy dressing with ace wrap loosely if located on an extremity. Maintain tubing outside of ace wrap. Tubing must not exert pressure on intact skin.     Applied per Jillian Vu Guidelines      Electronically signed by Georgette Kendall RN on 9/30/2021 at 2:37 PM

## 2021-09-30 NOTE — PROGRESS NOTES
[address]   Progress Note and Procedure Note      Adi Chacon  MEDICAL RECORD NUMBER:  [id number]  AGE: 74 y.o. GENDER: female  : 1955  EPISODE DATE:  2021    Subjective:     Chief Complaint   Patient presents with    Wound Check     right lower leg         HISTORY of PRESENT ILLNESS HPI     Adi Chacon is a 74 y.o. female who presents today for wound/ulcer evaluation. History of Wound Context: here to follow up on right leg wound after bedside evacuation of large hematoma while hospitalized at [facility] 9/3/2021-2021. Initial injury was 4 years ago and always had lump. Had CT ordered by Dr Claudine Torres which showed gas in tissue and infection concern which is why she was admitted. Current local therapy is that of wound vac. She is being seen twice weekly in wound clinic for wound vac changes as there was difficulty getting home care. Wound/Ulcer Pain Timing/Severity: none  Quality of pain: N/A  Severity:  0 / 10   Modifying Factors: None  Associated Signs/Symptoms: none    Ulcer Identification:  Ulcer Type: non-healing surgical  Contributing Factors: edema, lymphedema, diabetes, decreased mobility and obesity    Wound: N/A        PAST MEDICAL HISTORY        Diagnosis Date    Back pain 2012    Cellulitis 10/9/2015    Lt.  Lower Leg    [facility]di[facility] 2012    DVT (deep venous thrombosis) ([facility] 2012    GERD (gastroesophageal reflux disease) 2012    MVP (mitral valve prolapse) 2012       PAST SURGICAL HISTORY    Past Surgical History:   Procedure Laterality Date    HYSTERECTOMY      TONSILLECTOMY         FAMILY HISTORY    Family History   Problem Relation Age of Onset    Heart Disease Mother        SOCIAL HISTORY    Social History     Tobacco Use    Smoking status: Former Smoker     Packs/day: 1.00     Years: 30.00     Pack years: 30.00     Types: Cigarettes     Quit date: 1983     Years since quittin.2  Smokeless tobacco: Never Used   Vaping Use    Vaping Use: Never used   Substance Use Topics    Alcohol use: No    Drug use: No       ALLERGIES    Allergies   Allergen Reactions    Erythromycin Hives    Lisinopril Itching       MEDICATIONS    Current Outpatient Medications on File Prior to Encounter   Medication Sig Dispense Refill    clindamycin (CLEOCIN) 300 MG capsule Take 1 capsule by mouth 3 times daily for 10 days 30 capsule 0    gabapentin (NEURONTIN) 300 MG capsule TAKE 2 CAPSULES BY MOUTH 3  TIMES DAILY 180 capsule 0    Insulin Glargine, 1 Unit Dial, (TOUJEO SOLOSTAR) 300 UNIT/ML SOPN Inject 34 units nightly 2 pen 0    Dulaglutide (TRULICITY) 1.5 TY/5.7DO SOPN Inject 1.5 mg into the skin once a week 4 pen 0    blood glucose monitor kit and supplies Dispense sufficient amount for indicated testing frequency plus additional to accommodate PRN testing needs. Dispense all needed supplies to include: monitor, strips, lancing device, lancets, control solutions, alcohol swabs. 1 kit 0    silver sulfADIAZINE (SILVADENE) 1 % cream Apply topically daily. 300 g 0    fluconazole (DIFLUCAN) 100 MG tablet Take 1 tablet by mouth daily as needed (yeast infection) Indications: Patient self diagnoses and takes fluconaole for yeast infections.  4 tablet 0    metoprolol succinate (TOPROL XL) 50 MG extended release tablet Take 1 tablet by mouth daily 90 tablet 1    DULoxetine (CYMBALTA) 30 MG extended release capsule Take 1 capsule by mouth daily 90 capsule 1    hydroCHLOROthiazide (HYDRODIURIL) 25 MG tablet TAKE 1 TABLET BY MOUTH  DAILY 90 tablet 1    omeprazole (PRILOSEC) 40 MG delayed release capsule TAKE 1 CAPSULE BY MOUTH  EVERY DAY 90 capsule 1    apixaban (ELIQUIS) 5 MG TABS tablet TAKE 1 TABLET BY MOUTH TWO  TIMES DAILY 180 tablet 1    glyBURIDE (DIABETA) 5 MG tablet TAKE 1 TABLET BY MOUTH  TWICE DAILY WITH MEALS 180 tablet 1    methocarbamol (ROBAXIN) 750 MG tablet Twice daily prn for pain 180 tablet 0    glucose monitoring kit (FREESTYLE) monitoring kit Please fill with what is covered by ins Patient test once a day DM E11.9 1 kit 0    acetaminophen (TYLENOL) 500 MG tablet Take 500 mg by mouth every 6 hours as needed for Pain. Pt only takes 1-2 times per week      Glucose Blood (BLOOD GLUCOSE TEST STRIPS) STRP by Does not apply route 3 times daily. Freestyle [place] Test Strips       No current facility-administered medications on file prior to encounter.        REVIEW OF SYSTEMS    Constitutional: negative  Eyes: negative  Ears, nose, mouth, throat, and face: negative  Respiratory: negative  Cardiovascular: negative except for lower extremity edema  Gastrointestinal: negative  Genitourinary:negative  Integument/breast: negative except for right leg wound  Hematologic/lymphatic: negative  Musculoskeletal:negative  Neurological: negative except for paresthesia  Behavioral/Psych: negative  Endocrine: negative  Allergic/Immunologic: negative    Objective:      BP (!) 155/78   Pulse 94   Temp 97.2 °F (36.2 °C) (Tympanic)   Resp 20   Ht 6' (1.829 m)   Wt (!) 377 lb (171 kg)   BMI 51.13 kg/m²     Wt Readings from Last 3 Encounters:   09/30/21 (!) 377 lb (171 kg)   09/28/21 (!) 377 lb (171 kg)   09/24/21 (!) 377 lb (171 kg)       PHYSICAL EXAM    General Appearance: alert and oriented to person, place and time, well-developed and obese, in no acute distress  Skin: warm and dry, no rash or erythema, right leg ulcer   Head: normocephalic and atraumatic  Eyes: pupils equal, round, extraocular eye movements intact, and conjunctivae normal  Pulmonary/Chest: normal air movement, no respiratory distress  Extremities: no cyanosis and no clubbing   Musculoskeletal: no joint swelling, deformity or tenderness  Neurologic: gait, coordination normal and speech normal      Assessment:     Problem List Items Addressed This Visit     Diabetic polyneuropathy ([address]    Relevant Orders    Initiate Outpatient Wound Care Protocol    Lymphedema of both lower extremities (Chronic)    Skin ulcer of pretibial region of right lower extremity, with necrosis of muscle ([facility] - Primary    Relevant Orders    Initiate Outpatient Wound Care Protocol           Procedure Note  Indications:  Based on my examination of this patient's wound(s)/ulcer(s) today, debridement is required to promote healing and evaluate the wound base. Performed by: ARTURO Magana CNP    Consent obtained:  Yes    Time out taken:  Yes    Pain Control: Anesthetic  Anesthetic: 2% Lidocaine Gel Topical     Debridement:Excisional Debridement    Using curette the wound(s)/ulcer(s) was/were sharply debrided down through and including the removal of subcutaneous tissue. Devitalized Tissue Debrided:  fibrin, biofilm and slough    Pre Debridement Measurements:  Are located in the [place]  Documentation Flow Sheet    Wound/Ulcer #: 1    Post Debridement Measurements:  Wound/Ulcer Descriptions are Pre Debridement except measurements:    Wound 09/07/21 Leg Right; Lower; Lateral #1  (Active)   Wound Image   09/07/21 1408   Wound Etiology Non-Healing Surgical 09/30/21 1326   Dressing Status New drainage noted; Old drainage noted 09/30/21 1326   Wound Cleansed Cleansed with saline 09/30/21 1326   Dressing/Treatment Negative pressure wound therapy 09/24/21 1441   Wound Length (cm) 0.9 cm 09/30/21 1326   Wound Width (cm) 2.6 cm 09/30/21 1326   Wound Depth (cm) 2.7 cm 09/30/21 1326   Wound Surface Area (cm^2) 2.34 cm^2 09/30/21 1326   Change in Wound Size % (l*w) 36.76 09/30/21 1326   Wound Volume (cm^3) 6.318 cm^3 09/30/21 1326   Wound Healing % 66 09/30/21 1326   Post-Procedure Length (cm) 0.9 cm 09/30/21 1326   Post-Procedure Width (cm) 2.6 cm 09/30/21 1326   Post-Procedure Depth (cm) 2.7 cm 09/30/21 1326   Post-Procedure Surface Area (cm^2) 2.34 cm^2 09/30/21 1326   Post-Procedure Volume (cm^3) 6.318 cm^3 09/30/21 1326   Distance Tunneling (cm) 3 cm 09/24/21 1959 Tunneling Position ___ O'Clock 12 09/24/21 1337   Undermining Starts ___ O'Clock 12 09/30/21 1326   Undermining Ends___ O'Clock 3 09/30/21 1326   Undermining Maxium Distance (cm) 1.8cm @3 09/30/21 1326   Wound Assessment Bleeding;Pink/red 09/30/21 1326   Drainage Amount Moderate 09/30/21 1326   Drainage Description Serosanguinous 09/30/21 1326   Odor None 09/30/21 1326   Kaylin-wound Assessment Blanchable erythema 09/30/21 1326   Margins Defined edges 09/30/21 1326   Wound Thickness Description not for Pressure Injury Full thickness 09/30/21 1326   Number of days: 23          Percent of Wound(s)/Ulcer(s) Debrided: 100%    Total Surface Area Debrided:  2.34 sq cm     Estimated Blood Loss:  Minimal    Hemostasis Achieved:  by pressure    Procedural Pain:  0  / 10     Post Procedural Pain:  0 / 10     Response to treatment:  Well tolerated by patient. Plan:     Treatment Note please see Discharge Instructions    Written patient dismissal instructions given to patient and signed by patient or POA.            Electronically signed by ARTURO Odell CNP on 9/30/2021 at 2:21 PM

## 2021-10-04 DIAGNOSIS — E11.65 TYPE 2 DIABETES MELLITUS WITH HYPERGLYCEMIA, WITHOUT LONG-TERM CURRENT USE OF INSULIN (HCC): Primary | ICD-10-CM

## 2021-10-04 NOTE — TELEPHONE ENCOUNTER
Patient called and said she needs pen needles called in to her pharmacy because she has none for her insulin.  She wants them sent to [employer] mail service and not a local pharmacy

## 2021-10-05 ENCOUNTER — HOSPITAL ENCOUNTER (OUTPATIENT)
Dept: WOUND CARE | Age: 66
Discharge: HOME | End: 2021-10-05

## 2021-10-06 ENCOUNTER — CARE COORDINATION (OUTPATIENT)
Dept: CARE COORDINATION | Age: 66
End: 2021-10-06

## 2021-10-07 ENCOUNTER — HOSPITAL ENCOUNTER (OUTPATIENT)
Dept: WOUND CARE | Age: 66
Discharge: HOME | End: 2021-10-07
Payer: COMMERCIAL

## 2021-10-07 VITALS
TEMPERATURE: 97.8 F | BODY MASS INDEX: 39.68 KG/M2 | WEIGHT: 293 LBS | DIASTOLIC BLOOD PRESSURE: 89 MMHG | SYSTOLIC BLOOD PRESSURE: 191 MMHG | HEIGHT: 72 IN | HEART RATE: 77 BPM | RESPIRATION RATE: 20 BRPM

## 2021-10-07 DIAGNOSIS — E66.01 MORBID OBESITY (HCC): ICD-10-CM

## 2021-10-07 DIAGNOSIS — L97.813 SKIN ULCER OF PRETIBIAL REGION OF RIGHT LOWER EXTREMITY, WITH NECROSIS OF MUSCLE (HCC): Primary | ICD-10-CM

## 2021-10-07 DIAGNOSIS — I89.0 LYMPHEDEMA OF BOTH LOWER EXTREMITIES: Chronic | ICD-10-CM

## 2021-10-07 DIAGNOSIS — E11.42 DIABETIC POLYNEUROPATHY ASSOCIATED WITH TYPE 2 DIABETES MELLITUS (HCC): ICD-10-CM

## 2021-10-07 PROCEDURE — 11042 DBRDMT SUBQ TIS 1ST 20SQCM/<: CPT

## 2021-10-07 PROCEDURE — 97608 NEG PRS WND THER NDME>50SQCM: CPT

## 2021-10-07 PROCEDURE — 97607 NEG PRS WND THR NDME<=50SQCM: CPT

## 2021-10-07 PROCEDURE — 11042 DBRDMT SUBQ TIS 1ST 20SQCM/<: CPT | Performed by: NURSE PRACTITIONER

## 2021-10-07 RX ORDER — BACITRACIN, NEOMYCIN, POLYMYXIN B 400; 3.5; 5 [USP'U]/G; MG/G; [USP'U]/G
OINTMENT TOPICAL ONCE
Status: CANCELLED | OUTPATIENT
Start: 2021-10-07 | End: 2021-10-07

## 2021-10-07 RX ORDER — LIDOCAINE 40 MG/G
CREAM TOPICAL ONCE
Status: CANCELLED | OUTPATIENT
Start: 2021-10-07 | End: 2021-10-07

## 2021-10-07 RX ORDER — GENTAMICIN SULFATE 1 MG/G
OINTMENT TOPICAL ONCE
Status: CANCELLED | OUTPATIENT
Start: 2021-10-07 | End: 2021-10-07

## 2021-10-07 RX ORDER — GINSENG 100 MG
CAPSULE ORAL ONCE
Status: CANCELLED | OUTPATIENT
Start: 2021-10-07 | End: 2021-10-07

## 2021-10-07 RX ORDER — BETAMETHASONE DIPROPIONATE 0.05 %
OINTMENT (GRAM) TOPICAL ONCE
Status: CANCELLED | OUTPATIENT
Start: 2021-10-07 | End: 2021-10-07

## 2021-10-07 RX ORDER — LIDOCAINE HYDROCHLORIDE 20 MG/ML
JELLY TOPICAL ONCE
Status: COMPLETED | OUTPATIENT
Start: 2021-10-07 | End: 2021-10-07

## 2021-10-07 RX ORDER — LIDOCAINE HYDROCHLORIDE 20 MG/ML
JELLY TOPICAL ONCE
Status: CANCELLED | OUTPATIENT
Start: 2021-10-07 | End: 2021-10-07

## 2021-10-07 RX ORDER — LIDOCAINE 50 MG/G
OINTMENT TOPICAL ONCE
Status: CANCELLED | OUTPATIENT
Start: 2021-10-07 | End: 2021-10-07

## 2021-10-07 RX ORDER — BACITRACIN ZINC AND POLYMYXIN B SULFATE 500; 1000 [USP'U]/G; [USP'U]/G
OINTMENT TOPICAL ONCE
Status: CANCELLED | OUTPATIENT
Start: 2021-10-07 | End: 2021-10-07

## 2021-10-07 RX ORDER — CLOBETASOL PROPIONATE 0.5 MG/G
OINTMENT TOPICAL ONCE
Status: CANCELLED | OUTPATIENT
Start: 2021-10-07 | End: 2021-10-07

## 2021-10-07 RX ORDER — LIDOCAINE HYDROCHLORIDE 40 MG/ML
SOLUTION TOPICAL ONCE
Status: CANCELLED | OUTPATIENT
Start: 2021-10-07 | End: 2021-10-07

## 2021-10-07 RX ADMIN — LIDOCAINE HYDROCHLORIDE 6 ML: 20 JELLY TOPICAL at 14:29

## 2021-10-07 NOTE — PROGRESS NOTES
[address]   Progress Note and Procedure Note      Anjali Kelly  MEDICAL RECORD NUMBER:  [id number]  AGE: 74 y.o. GENDER: female  : 1955  EPISODE DATE:  10/7/2021    Subjective:     Chief Complaint   Patient presents with    Wound Check     RIGHT LOWER LEG         HISTORY of PRESENT ILLNESS HPI     Anjali Kelly is a 74 y.o. female who presents today for wound/ulcer evaluation. History of Wound Context: here to follow up on right leg wound after bedside evacuation of large hematoma while hospitalized at [facility] 9/3/2021-2021. Initial injury was 4 years ago and always had lump. Had CT ordered by Dr Deyanira Queen which showed gas in tissue and infection concern which is why she was admitted. Current local therapy is that of wound vac. Did find home care agency to do wound vac dressing changes twice weekly, will continue to see her in wound clinic weekly. Wound/Ulcer Pain Timing/Severity: none  Quality of pain: N/A  Severity:  0 / 10   Modifying Factors: None  Associated Signs/Symptoms: none    Ulcer Identification:  Ulcer Type: non-healing surgical  Contributing Factors: edema, lymphedema, diabetes, decreased mobility and obesity    Wound: N/A        PAST MEDICAL HISTORY        Diagnosis Date    Back pain 2012    Cellulitis 10/9/2015    Lt.  Lower Leg    [facility]di[facility] 2012    DVT (deep venous thrombosis) ([facility] 2012    GERD (gastroesophageal reflux disease) 2012    MVP (mitral valve prolapse) 2012       PAST SURGICAL HISTORY    Past Surgical History:   Procedure Laterality Date    HYSTERECTOMY      TONSILLECTOMY         FAMILY HISTORY    Family History   Problem Relation Age of Onset    Heart Disease Mother        SOCIAL HISTORY    Social History     Tobacco Use    Smoking status: Former Smoker     Packs/day: 1.00     Years: 30.00     Pack years: 30.00     Types: Cigarettes     Quit date: 1983     Years since quittin.3    Smokeless tobacco: Never Used   Vaping Use    Vaping Use: Never used   Substance Use Topics    Alcohol use: No    Drug use: No       ALLERGIES    Allergies   Allergen Reactions    Erythromycin Hives    Lisinopril Itching       MEDICATIONS    Current Outpatient Medications on File Prior to Encounter   Medication Sig Dispense Refill    Insulin Pen Needle 32G X 4 MM MISC 1 each by Does not apply route daily 100 each 3    gabapentin (NEURONTIN) 300 MG capsule TAKE 2 CAPSULES BY MOUTH 3  TIMES DAILY 180 capsule 0    Insulin Glargine, 1 Unit Dial, (TOUJEO SOLOSTAR) 300 UNIT/ML SOPN Inject 34 units nightly 2 pen 0    Dulaglutide (TRULICITY) 1.5 ZW/2.7LJ SOPN Inject 1.5 mg into the skin once a week 4 pen 0    blood glucose monitor kit and supplies Dispense sufficient amount for indicated testing frequency plus additional to accommodate PRN testing needs. Dispense all needed supplies to include: monitor, strips, lancing device, lancets, control solutions, alcohol swabs. 1 kit 0    silver sulfADIAZINE (SILVADENE) 1 % cream Apply topically daily. 300 g 0    fluconazole (DIFLUCAN) 100 MG tablet Take 1 tablet by mouth daily as needed (yeast infection) Indications: Patient self diagnoses and takes fluconaole for yeast infections.  4 tablet 0    metoprolol succinate (TOPROL XL) 50 MG extended release tablet Take 1 tablet by mouth daily 90 tablet 1    DULoxetine (CYMBALTA) 30 MG extended release capsule Take 1 capsule by mouth daily 90 capsule 1    hydroCHLOROthiazide (HYDRODIURIL) 25 MG tablet TAKE 1 TABLET BY MOUTH  DAILY 90 tablet 1    omeprazole (PRILOSEC) 40 MG delayed release capsule TAKE 1 CAPSULE BY MOUTH  EVERY DAY 90 capsule 1    apixaban (ELIQUIS) 5 MG TABS tablet TAKE 1 TABLET BY MOUTH TWO  TIMES DAILY 180 tablet 1    glyBURIDE (DIABETA) 5 MG tablet TAKE 1 TABLET BY MOUTH  TWICE DAILY WITH MEALS 180 tablet 1    methocarbamol (ROBAXIN) 750 MG tablet Twice daily prn for pain 180 tablet 0    glucose monitoring kit (FREESTYLE) monitoring kit Please fill with what is covered by ins Patient test once a day DM E11.9 1 kit 0    acetaminophen (TYLENOL) 500 MG tablet Take 500 mg by mouth every 6 hours as needed for Pain. Pt only takes 1-2 times per week      Glucose Blood (BLOOD GLUCOSE TEST STRIPS) STRP by Does not apply route 3 times daily. Freestyle [place] Test Strips       No current facility-administered medications on file prior to encounter.        REVIEW OF SYSTEMS    Constitutional: negative  Eyes: negative  Ears, nose, mouth, throat, and face: negative  Respiratory: negative  Cardiovascular: negative except for lower extremity edema  Gastrointestinal: negative  Genitourinary:negative  Integument/breast: negative except for right leg wound   Hematologic/lymphatic: negative  Musculoskeletal:negative  Neurological: negative except for paresthesia  Behavioral/Psych: negative  Endocrine: negative  Allergic/Immunologic: negative    Objective:      BP (!) 191/89   Pulse 77   Temp 97.8 °F (36.6 °C) (Tympanic)   Resp 20   Ht 6' (1.829 m)   Wt (!) 377 lb (171 kg)   BMI 51.13 kg/m²     Wt Readings from Last 3 Encounters:   10/07/21 (!) 377 lb (171 kg)   09/30/21 (!) 377 lb (171 kg)   09/28/21 (!) 377 lb (171 kg)       PHYSICAL EXAM    General Appearance: alert and oriented to person, place and time, well-developed and obese, in no acute distress  Skin: warm and dry, no rash or erythema, right leg ulcer   Head: normocephalic and atraumatic  Eyes: pupils equal, round, extraocular eye movements intact, and conjunctivae normal  Pulmonary/Chest: normal air movement, no respiratory distress  Extremities: no cyanosis and no clubbing  Musculoskeletal: no joint swelling, deformity or tenderness  Neurologic: gait, coordination normal and speech normal      Assessment:     Problem List Items Addressed This Visit     Diabetic polyneuropathy ([address]    Relevant Orders    Initiate Outpatient Wound Care Protocol Lymphedema of both lower extremities (Chronic)    Morbid obesity (HCC)    Skin ulcer of pretibial region of right lower extremity, with necrosis of muscle ([address] - Primary    Relevant Orders    Initiate Outpatient Wound Care Protocol           Procedure Note  Indications:  Based on my examination of this patient's wound(s)/ulcer(s) today, debridement is required to promote healing and evaluate the wound base. Performed by: ARTURO Magana CNP    Consent obtained:  Yes    Time out taken:  Yes    Pain Control: Anesthetic  Anesthetic: 2% Lidocaine Gel Topical     Debridement:Excisional Debridement    Using curette the wound(s)/ulcer(s) was/were sharply debrided down through and including the removal of subcutaneous tissue. Devitalized Tissue Debrided:  fibrin, biofilm and slough    Pre Debridement Measurements:  Are located in the [place]  Documentation Flow Sheet    Wound/Ulcer #: 1    Post Debridement Measurements:  Wound/Ulcer Descriptions are Pre Debridement except measurements:    Wound 09/07/21 Leg Right; Lower; Lateral #1  (Active)   Wound Image   10/07/21 1429   Wound Etiology Non-Healing Surgical 10/07/21 1429   Dressing Status New drainage noted; Old drainage noted 10/07/21 1429   Wound Cleansed Cleansed with saline 10/07/21 1429   Dressing/Treatment Negative pressure wound therapy 09/24/21 1441   Wound Length (cm) 0.7 cm 10/07/21 1429   Wound Width (cm) 2.5 cm 10/07/21 1429   Wound Depth (cm) 2.1 cm 10/07/21 1429   Wound Surface Area (cm^2) 1.75 cm^2 10/07/21 1429   Change in Wound Size % (l*w) 52.7 10/07/21 1429   Wound Volume (cm^3) 3.675 cm^3 10/07/21 1429   Wound Healing % 80 10/07/21 1429   Post-Procedure Length (cm) 0.7 cm 10/07/21 1429   Post-Procedure Width (cm) 2.5 cm 10/07/21 1429   Post-Procedure Depth (cm) 2.1 cm 10/07/21 1429   Post-Procedure Surface Area (cm^2) 1.75 cm^2 10/07/21 1429   Post-Procedure Volume (cm^3) 3.675 cm^3 10/07/21 1429   Distance Tunneling (cm) 3 cm 09/24/21 1337   Tunneling Position ___ O'Clock 12 09/24/21 1337   Undermining Starts ___ O'Clock 12 09/30/21 1326   Undermining Ends___ O'Clock 3 09/30/21 1326   Undermining Maxium Distance (cm) 1.8cm @3 09/30/21 1326   Wound Assessment Bleeding;Pink/red 10/07/21 1429   Drainage Amount Moderate 10/07/21 1429   Drainage Description Serosanguinous 10/07/21 1429   Odor None 10/07/21 1429   Kaylin-wound Assessment Blanchable erythema 10/07/21 1429   Margins Defined edges 10/07/21 1429   Wound Thickness Description not for Pressure Injury Full thickness 10/07/21 1429   Number of days: 30          Percent of Wound(s)/Ulcer(s) Debrided: 100%    Total Surface Area Debrided:  1.75 sq cm     Estimated Blood Loss:  Minimal    Hemostasis Achieved:  by pressure    Procedural Pain:  0  / 10     Post Procedural Pain:  0 / 10     Response to treatment:  Well tolerated by patient. Plan:     Treatment Note please see Discharge Instructions    Written patient dismissal instructions given to patient and signed by patient or POA.            Electronically signed by ARTURO Jo CNP on 10/7/2021 at 2:48 PM

## 2021-10-07 NOTE — PROGRESS NOTES
Negative Pressure    NAME:  Cesario Vazquez  YOB: 1955  MEDICAL RECORD NUMBER:  [id number]  DATE:  10/7/2021     Applied Negative Pressure to Right leg wound(s)/ulcer(s).  [x] Applied skin barrier prep to girma-wound.  [x] Cut strips of plastic drape to picture frame wound so that girma-wound is     covered with the drape.  [x] If bridging dressing to less prominent site, cover any intact skin that will come in contact with the Negative Pressure Therapy sponge, gauze or channel drain with plastic drape. The sponge should never touch intact skin.  [x] Cut sponge, gauze or channel drain to size which will fit into the wound/ulcer bed without being forced.  [x] Be sure the sponge is large enough to hold the entire round plastic flange which is attached to the tubing. Never allow flange to be larger than the sponge or it will produce suction damaging intact skin.  Total number of individual pieces of foam used within the wound bed: 1     [x] If bridging the dressing away from the primary site, be sure the bridge leads to a piece of sponge large enough to hold the entire flange without allowing any of the flange to overlap onto intact skin.  [x] Covered sponge, gauze or channel drain with plastic drape.  [x] Cut a hole in this plastic drape directly over the sponge the same size as the plastic drain tubing.  [x] Removed plastic liner from flange and apply it directly over the hole you cut.  [x] Removed the plastic cover from the flange.  [] Attached the tubing to the wound/ulcer Negative Pressure Therapy and turn it on to be sure a vacuum is created and that there are no leaks.  [x] If air leaks occur, use plastic drape to patch them.  [x] Secured Negative Pressure Therapy dressing with ace wrap loosely if located on an extremity. Maintain tubing outside of ace wrap. Tubing must not exert pressure on intact skin.     Applied per Jillian Vu Guidelines      Electronically signed by Ralph Brown RN on 10/7/2021 at 2:48 PM

## 2021-10-11 ENCOUNTER — TELEPHONE (OUTPATIENT)
Dept: FAMILY MEDICINE CLINIC | Age: 66
End: 2021-10-11

## 2021-10-11 DIAGNOSIS — L02.415 ABSCESS OF RIGHT LEG: ICD-10-CM

## 2021-10-11 RX ORDER — HYDROCODONE BITARTRATE AND ACETAMINOPHEN 5; 325 MG/1; MG/1
1 TABLET ORAL EVERY 8 HOURS PRN
Qty: 21 TABLET | Refills: 0 | Status: SHIPPED | OUTPATIENT
Start: 2021-10-11 | End: 2021-10-18

## 2021-10-11 NOTE — TELEPHONE ENCOUNTER
She is having bad leg pain and she would like to know if you can call her in just a few of those hydromorphone that she took before just to take the edge off please advise

## 2021-10-12 DIAGNOSIS — E11.65 TYPE 2 DIABETES MELLITUS WITH HYPERGLYCEMIA, WITH LONG-TERM CURRENT USE OF INSULIN (HCC): ICD-10-CM

## 2021-10-12 DIAGNOSIS — Z79.4 TYPE 2 DIABETES MELLITUS WITH HYPERGLYCEMIA, WITH LONG-TERM CURRENT USE OF INSULIN (HCC): ICD-10-CM

## 2021-10-12 RX ORDER — INSULIN GLARGINE 300 U/ML
INJECTION, SOLUTION SUBCUTANEOUS
Qty: 6 PEN | Refills: 0 | Status: SHIPPED | OUTPATIENT
Start: 2021-10-12 | End: 2021-11-01 | Stop reason: SDUPTHER

## 2021-10-13 NOTE — PROGRESS NOTES
Physician Progress Note      PATIENT:               Dave Mitchell  CSN #:                  [id number]  :                       1955  ADMIT DATE:       9/3/2021 6:33 PM  [address] DATE:        2021 12:40 PM  RESPONDING  PROVIDER #:        Jason Carr MD          QUERY TEXT:    Patient admitted with right leg abscess. Per procedure note dated 2021   documentation of I&D. To accurately reflect the procedure performed please further specify the depth   of tissue incised and drained: The medical record reflects the following:  Risk Factors: DM type 2  Clinical Indicators: per procedure note \"Transverse incision was made over the   fluctuance and immediately there is a large amount of pus\" and \"he abscess   cavity measures about 15 cm x 10 cm x 6 cm according to CT scan\"  Treatment: I&D  Options provided:  -- Skin only  -- Subcutaneous tissue  -- Fascia  -- Muscle  -- Other - I will add my own diagnosis  -- Disagree - Not applicable / Not valid  -- Disagree - Clinically unable to determine / Unknown  -- Refer to Clinical Documentation Reviewer    PROVIDER RESPONSE TEXT:    Addendum to 2021 procedure note The depth of the drainage to right leg was   down to and including subcutaneous tissue.     Query created by: Yarelis Martini on 10/8/2021 6:51 AM      Electronically signed by:  Jason Carr MD 10/12/2021 8:42 PM

## 2021-10-14 ENCOUNTER — HOSPITAL ENCOUNTER (OUTPATIENT)
Dept: WOUND CARE | Age: 66
Discharge: HOME | End: 2021-10-14
Payer: COMMERCIAL

## 2021-10-14 VITALS
RESPIRATION RATE: 18 BRPM | HEART RATE: 75 BPM | TEMPERATURE: 97.3 F | BODY MASS INDEX: 39.68 KG/M2 | HEIGHT: 72 IN | WEIGHT: 293 LBS

## 2021-10-14 DIAGNOSIS — L97.813 SKIN ULCER OF PRETIBIAL REGION OF RIGHT LOWER EXTREMITY, WITH NECROSIS OF MUSCLE (HCC): Primary | ICD-10-CM

## 2021-10-14 DIAGNOSIS — E43 SEVERE MALNUTRITION (HCC): ICD-10-CM

## 2021-10-14 DIAGNOSIS — E66.01 MORBID OBESITY (HCC): ICD-10-CM

## 2021-10-14 DIAGNOSIS — E11.628 TYPE 2 DIABETES MELLITUS WITH OTHER SKIN COMPLICATION, WITHOUT LONG-TERM CURRENT USE OF INSULIN (HCC): ICD-10-CM

## 2021-10-14 DIAGNOSIS — E78.5 HYPERLIPIDEMIA, UNSPECIFIED HYPERLIPIDEMIA TYPE: Primary | ICD-10-CM

## 2021-10-14 DIAGNOSIS — E11.42 DIABETIC POLYNEUROPATHY ASSOCIATED WITH TYPE 2 DIABETES MELLITUS (HCC): ICD-10-CM

## 2021-10-14 PROCEDURE — 11042 DBRDMT SUBQ TIS 1ST 20SQCM/<: CPT

## 2021-10-14 PROCEDURE — 11042 DBRDMT SUBQ TIS 1ST 20SQCM/<: CPT | Performed by: NURSE PRACTITIONER

## 2021-10-14 PROCEDURE — 97607 NEG PRS WND THR NDME<=50SQCM: CPT

## 2021-10-14 RX ORDER — BETAMETHASONE DIPROPIONATE 0.05 %
OINTMENT (GRAM) TOPICAL ONCE
Status: CANCELLED | OUTPATIENT
Start: 2021-10-14 | End: 2021-10-14

## 2021-10-14 RX ORDER — LIDOCAINE HYDROCHLORIDE 40 MG/ML
SOLUTION TOPICAL ONCE
Status: CANCELLED | OUTPATIENT
Start: 2021-10-14 | End: 2021-10-14

## 2021-10-14 RX ORDER — LIDOCAINE HYDROCHLORIDE 20 MG/ML
JELLY TOPICAL ONCE
Status: CANCELLED | OUTPATIENT
Start: 2021-10-14 | End: 2021-10-14

## 2021-10-14 RX ORDER — LIDOCAINE 40 MG/G
CREAM TOPICAL ONCE
Status: CANCELLED | OUTPATIENT
Start: 2021-10-14 | End: 2021-10-14

## 2021-10-14 RX ORDER — CLOBETASOL PROPIONATE 0.5 MG/G
OINTMENT TOPICAL ONCE
Status: CANCELLED | OUTPATIENT
Start: 2021-10-14 | End: 2021-10-14

## 2021-10-14 RX ORDER — LIDOCAINE 50 MG/G
OINTMENT TOPICAL ONCE
Status: CANCELLED | OUTPATIENT
Start: 2021-10-14 | End: 2021-10-14

## 2021-10-14 RX ORDER — LIDOCAINE HYDROCHLORIDE 20 MG/ML
JELLY TOPICAL ONCE
Status: COMPLETED | OUTPATIENT
Start: 2021-10-14 | End: 2021-10-14

## 2021-10-14 RX ORDER — BACITRACIN, NEOMYCIN, POLYMYXIN B 400; 3.5; 5 [USP'U]/G; MG/G; [USP'U]/G
OINTMENT TOPICAL ONCE
Status: CANCELLED | OUTPATIENT
Start: 2021-10-14 | End: 2021-10-14

## 2021-10-14 RX ORDER — BACITRACIN ZINC AND POLYMYXIN B SULFATE 500; 1000 [USP'U]/G; [USP'U]/G
OINTMENT TOPICAL ONCE
Status: CANCELLED | OUTPATIENT
Start: 2021-10-14 | End: 2021-10-14

## 2021-10-14 RX ORDER — GENTAMICIN SULFATE 1 MG/G
OINTMENT TOPICAL ONCE
Status: CANCELLED | OUTPATIENT
Start: 2021-10-14 | End: 2021-10-14

## 2021-10-14 RX ORDER — GINSENG 100 MG
CAPSULE ORAL ONCE
Status: CANCELLED | OUTPATIENT
Start: 2021-10-14 | End: 2021-10-14

## 2021-10-14 RX ADMIN — LIDOCAINE HYDROCHLORIDE 6 ML: 20 JELLY TOPICAL at 14:14

## 2021-10-14 NOTE — PROGRESS NOTES
[address]   Progress Note and Procedure Note      Citlalli Triana  MEDICAL RECORD NUMBER:  [id number]  AGE: 74 y.o. GENDER: female  : 1955  EPISODE DATE:  10/14/2021    Subjective:     Chief Complaint   Patient presents with    Wound Check     RIGHT LOWER EXTREMITY         HISTORY of PRESENT ILLNESS HPI     Citlalli Triana is a 74 y.o. female who presents today for wound/ulcer evaluation. History of Wound Context: here to follow up on right leg wound. Doing well with wound vac. Wound/Ulcer Pain Timing/Severity: none  Quality of pain: N/A  Severity:  0 / 10   Modifying Factors: None  Associated Signs/Symptoms: none    Ulcer Identification:  Ulcer Type: non-healing surgical  Contributing Factors: edema, lymphedema, diabetes, decreased mobility and obesity    Wound: N/A        PAST MEDICAL HISTORY        Diagnosis Date    Back pain 2012    Cellulitis 10/9/2015    Lt.  Lower Leg    DM [facility]di[facility]) [facility] 2012    DVT (deep venous thrombosis) ([facility] 2012    GERD (gastroesophageal reflux disease) 2012    MVP (mitral valve prolapse) 2012       PAST SURGICAL HISTORY    Past Surgical History:   Procedure Laterality Date    HYSTERECTOMY      TONSILLECTOMY         FAMILY HISTORY    Family History   Problem Relation Age of Onset    Heart Disease Mother        SOCIAL HISTORY    Social History     Tobacco Use    Smoking status: Former Smoker     Packs/day: 1.00     Years: 30.00     Pack years: 30.00     Types: Cigarettes     Quit date: 1983     Years since quittin.3    Smokeless tobacco: Never Used   Vaping Use    Vaping Use: Never used   Substance Use Topics    Alcohol use: No    Drug use: No       ALLERGIES    Allergies   Allergen Reactions    Erythromycin Hives    Lisinopril Itching       MEDICATIONS    Current Outpatient Medications on File Prior to Encounter   Medication Sig Dispense Refill    Insulin Glargine, 1 Unit Dial, (TOUJEO SOLOSTAR) 300 UNIT/ML SOPN Inject 34 units nightly 6 pen 0    HYDROcodone-acetaminophen (NORCO) 5-325 MG per tablet Take 1 tablet by mouth every 8 hours as needed for Pain for up to 7 days. 21 tablet 0    Insulin Pen Needle 32G X 4 MM MISC 1 each by Does not apply route daily 100 each 3    gabapentin (NEURONTIN) 300 MG capsule TAKE 2 CAPSULES BY MOUTH 3  TIMES DAILY 180 capsule 0    Dulaglutide (TRULICITY) 1.5 VD/4.6NZ SOPN Inject 1.5 mg into the skin once a week 4 pen 0    blood glucose monitor kit and supplies Dispense sufficient amount for indicated testing frequency plus additional to accommodate PRN testing needs. Dispense all needed supplies to include: monitor, strips, lancing device, lancets, control solutions, alcohol swabs. 1 kit 0    silver sulfADIAZINE (SILVADENE) 1 % cream Apply topically daily. 300 g 0    fluconazole (DIFLUCAN) 100 MG tablet Take 1 tablet by mouth daily as needed (yeast infection) Indications: Patient self diagnoses and takes fluconaole for yeast infections. 4 tablet 0    metoprolol succinate (TOPROL XL) 50 MG extended release tablet Take 1 tablet by mouth daily 90 tablet 1    DULoxetine (CYMBALTA) 30 MG extended release capsule Take 1 capsule by mouth daily 90 capsule 1    hydroCHLOROthiazide (HYDRODIURIL) 25 MG tablet TAKE 1 TABLET BY MOUTH  DAILY 90 tablet 1    omeprazole (PRILOSEC) 40 MG delayed release capsule TAKE 1 CAPSULE BY MOUTH  EVERY DAY 90 capsule 1    apixaban (ELIQUIS) 5 MG TABS tablet TAKE 1 TABLET BY MOUTH TWO  TIMES DAILY 180 tablet 1    glyBURIDE (DIABETA) 5 MG tablet TAKE 1 TABLET BY MOUTH  TWICE DAILY WITH MEALS 180 tablet 1    methocarbamol (ROBAXIN) 750 MG tablet Twice daily prn for pain 180 tablet 0    glucose monitoring kit (FREESTYLE) monitoring kit Please fill with what is covered by ins Patient test once a day DM E11.9 1 kit 0    acetaminophen (TYLENOL) 500 MG tablet Take 500 mg by mouth every 6 hours as needed for Pain.  Pt only takes 1-2 times per week      Glucose Blood (BLOOD GLUCOSE TEST STRIPS) STRP by Does not apply route 3 times daily. Freestyle [place] Test Strips       No current facility-administered medications on file prior to encounter.        REVIEW OF SYSTEMS    Constitutional: negative  Eyes: negative  Ears, nose, mouth, throat, and face: negative  Respiratory: negative  Cardiovascular: negative except for lower extremity edema  Gastrointestinal: negative  Genitourinary:negative  Integument/breast: negative except for right leg wound  Hematologic/lymphatic: negative  Musculoskeletal:negative  Neurological: negative except for paresthesia  Behavioral/Psych: negative  Endocrine: negative  Allergic/Immunologic: negative    Objective:      Pulse 75   Temp 97.3 °F (36.3 °C) (Tympanic)   Resp 18   Ht 6' (1.829 m)   Wt (!) 377 lb (171 kg)   BMI 51.13 kg/m²     Wt Readings from Last 3 Encounters:   10/14/21 (!) 377 lb (171 kg)   10/07/21 (!) 377 lb (171 kg)   09/30/21 (!) 377 lb (171 kg)       PHYSICAL EXAM    General Appearance: alert and oriented to person, place and time, well-developed and well-nourished, in no acute distress  Skin: warm and dry, no rash or erythema  Head: normocephalic and atraumatic  Eyes: pupils equal, round, extraocular eye movements intact, and conjunctivae normal  Pulmonary/Chest: normal air movement, no respiratory distress  Extremities: no cyanosis and no clubbing or edema   Musculoskeletal: no joint swelling, deformity or tenderness  Neurologic: gait, coordination normal and speech normal      Assessment:     Problem List Items Addressed This Visit     Diabetic polyneuropathy ([facility]    Relevant Orders    Initiate Outpatient Wound Care Protocol    Morbid obesity ([address]    Severe malnutrition ([address]    Skin ulcer of pretibial region of right lower extremity, with necrosis of muscle (HCC) - Primary    Relevant Orders    Initiate Outpatient Wound Care Protocol    Type 2 diabetes mellitus with skin complication, without long-term current use of insulin ([facility]           Procedure Note  Indications:  Based on my examination of this patient's wound(s)/ulcer(s) today, debridement is required to promote healing and evaluate the wound base. Performed by: ARTURO Chou CNP    Consent obtained:  Yes    Time out taken:  Yes    Pain Control: Anesthetic  Anesthetic: 2% Lidocaine Gel Topical     Debridement:Excisional Debridement    Using curette the wound(s)/ulcer(s) was/were sharply debrided down through and including the removal of subcutaneous tissue. Devitalized Tissue Debrided:  fibrin, biofilm and slough    Pre Debridement Measurements:  Are located in the [place]  Documentation Flow Sheet    Wound/Ulcer #: 1    Post Debridement Measurements:  Wound/Ulcer Descriptions are Pre Debridement except measurements:    Wound 09/07/21 Leg Right; Lower; Lateral #1  (Active)   Wound Image   10/07/21 1429   Wound Etiology Non-Healing Surgical 10/14/21 1415   Dressing Status New drainage noted; Old drainage noted 10/14/21 1415   Wound Cleansed Cleansed with saline 10/14/21 1415   Dressing/Treatment Negative pressure wound therapy 09/24/21 1441   Wound Length (cm) 0.5 cm 10/14/21 1415   Wound Width (cm) 2.2 cm 10/14/21 1415   Wound Depth (cm) 1.9 cm 10/14/21 1415   Wound Surface Area (cm^2) 1.1 cm^2 10/14/21 1415   Change in Wound Size % (l*w) 70.27 10/14/21 1415   Wound Volume (cm^3) 2.09 cm^3 10/14/21 1415   Wound Healing % 89 10/14/21 1415   Post-Procedure Length (cm) 0.5 cm 10/14/21 1415   Post-Procedure Width (cm) 2.2 cm 10/14/21 1415   Post-Procedure Depth (cm) 1.9 cm 10/14/21 1415   Post-Procedure Surface Area (cm^2) 1.1 cm^2 10/14/21 1415   Post-Procedure Volume (cm^3) 2.09 cm^3 10/14/21 1415   Distance Tunneling (cm) 3 cm 09/24/21 1337   Tunneling Position ___ O'Clock 12 09/24/21 1337   Undermining Starts ___ O'Clock 12 09/30/21 1326   Undermining Ends___ O'Clock 3 09/30/21 1326   Undermining Maxium Distance (cm) 1.8cm @3 09/30/21 1326   Wound Assessment Bleeding;Pink/red 10/14/21 1415   Drainage Amount Moderate 10/14/21 1415   Drainage Description Serosanguinous 10/14/21 1415   Odor None 10/14/21 1415   Kaylin-wound Assessment Blanchable erythema 10/14/21 1415   Margins Defined edges 10/14/21 1415   Wound Thickness Description not for Pressure Injury Full thickness 10/14/21 1415   Number of days: 37          Percent of Wound(s)/Ulcer(s) Debrided: 100%    Total Surface Area Debrided:  1.10 sq cm     Estimated Blood Loss:  Minimal    Hemostasis Achieved:  by pressure    Procedural Pain:  0  / 10     Post Procedural Pain:  0 / 10     Response to treatment:  Well tolerated by patient. Plan:     Treatment Note please see Discharge Instructions    Written patient dismissal instructions given to patient and signed by patient or POA.            Electronically signed by ARTURO Galaviz CNP on 10/14/2021 at 2:32 PM

## 2021-10-14 NOTE — PROGRESS NOTES
Negative Pressure    NAME:  Frankie Elaine  YOB: 1955  MEDICAL RECORD NUMBER:  [id number]  DATE:  10/14/2021     Applied Negative Pressure to RLE wound(s)/ulcer(s).  [x] Applied skin barrier prep to girma-wound.  [x] Cut strips of plastic drape to picture frame wound so that girma-wound is     covered with the drape.  [x] If bridging dressing to less prominent site, cover any intact skin that will come in contact with the Negative Pressure Therapy sponge, gauze or channel drain with plastic drape. The sponge should never touch intact skin.  [x] Cut sponge, gauze or channel drain to size which will fit into the wound/ulcer bed without being forced.  [x] Be sure the sponge is large enough to hold the entire round plastic flange which is attached to the tubing. Never allow flange to be larger than the sponge or it will produce suction damaging intact skin.  Total number of individual pieces of foam used within the wound bed: 1 PIECE BLACK FOAM   [x] If bridging the dressing away from the primary site, be sure the bridge leads to a piece of sponge large enough to hold the entire flange without allowing any of the flange to overlap onto intact skin.  [x] Covered sponge, gauze or channel drain with plastic drape.  [x] Cut a hole in this plastic drape directly over the sponge the same size as the plastic drain tubing.  [x] Removed plastic liner from flange and apply it directly over the hole you cut.  [x] Removed the plastic cover from the flange.  [x] Attached the tubing to the wound/ulcer Negative Pressure Therapy and turn it on to be sure a vacuum is created and that there are no leaks.  [x] If air leaks occur, use plastic drape to patch them.  [x] Secured Negative Pressure Therapy dressing with ace wrap loosely if located on an extremity. Maintain tubing outside of ace wrap. Tubing must not exert pressure on intact skin.     Applied per Jillian Vu Guidelines      Electronically signed by Raymundo Butcher RN on 10/14/2021 at 2:39 PM

## 2021-10-16 LAB
ALBUMIN SERPL-MCNC: 3.3 G/DL
BASOPHILS ABSOLUTE: ABNORMAL
BASOPHILS RELATIVE PERCENT: ABNORMAL
BUN / CREAT RATIO: NORMAL
BUN BLDV-MCNC: 16 MG/DL
CALCIUM SERPL-MCNC: 8.9 MG/DL
CHLORIDE BLD-SCNC: 103 MMOL/L
CHOLESTEROL, FASTING: 173
CO2: 25 MMOL/L
CREAT SERPL-MCNC: 1.12 MG/DL
EOSINOPHILS ABSOLUTE: ABNORMAL
EOSINOPHILS RELATIVE PERCENT: ABNORMAL
GLUCOSE: 157
HCT VFR BLD CALC: 35.7 % (ref 36–46)
HDLC SERPL-MCNC: 34 MG/DL (ref 35–70)
HEMOGLOBIN: 12 G/DL (ref 12–16)
LDL CHOLESTEROL CALCULATED: 85 MG/DL (ref 0–160)
LYMPHOCYTES ABSOLUTE: ABNORMAL
LYMPHOCYTES RELATIVE PERCENT: ABNORMAL
MAGNESIUM: 1.3 MG/DL
MCH RBC QN AUTO: ABNORMAL PG
MCHC RBC AUTO-ENTMCNC: ABNORMAL G/DL
MCV RBC AUTO: ABNORMAL FL
MONOCYTES ABSOLUTE: ABNORMAL
MONOCYTES RELATIVE PERCENT: ABNORMAL
NEUTROPHILS ABSOLUTE: ABNORMAL
NEUTROPHILS RELATIVE PERCENT: ABNORMAL
PDW BLD-RTO: ABNORMAL %
PHOSPHORUS: 3.4 MG/DL
PLATELET # BLD: ABNORMAL 10*3/UL
PMV BLD AUTO: ABNORMAL FL
POTASSIUM SERPL-SCNC: 3.8 MMOL/L
RBC # BLD: ABNORMAL 10*6/UL
SODIUM BLD-SCNC: 138 MMOL/L
TRIGLYCERIDE, FASTING: 269
WBC # BLD: ABNORMAL 10*3/UL

## 2021-10-19 ENCOUNTER — HOSPITAL ENCOUNTER (OUTPATIENT)
Dept: WOUND CARE | Age: 66
Discharge: HOME | End: 2021-10-19
Payer: COMMERCIAL

## 2021-10-19 DIAGNOSIS — B35.1 ONYCHOMYCOSIS: Primary | ICD-10-CM

## 2021-10-19 DIAGNOSIS — L97.813 SKIN ULCER OF PRETIBIAL REGION OF RIGHT LOWER EXTREMITY, WITH NECROSIS OF MUSCLE (HCC): ICD-10-CM

## 2021-10-19 DIAGNOSIS — E11.42 DIABETIC POLYNEUROPATHY ASSOCIATED WITH TYPE 2 DIABETES MELLITUS (HCC): ICD-10-CM

## 2021-10-19 DIAGNOSIS — L02.91 ABSCESS: ICD-10-CM

## 2021-10-19 PROCEDURE — 97605 NEG PRS WND THER DME<=50SQCM: CPT

## 2021-10-19 PROCEDURE — 11043 DBRDMT MUSC&/FSCA 1ST 20/<: CPT

## 2021-10-19 RX ORDER — LIDOCAINE HYDROCHLORIDE 40 MG/ML
SOLUTION TOPICAL ONCE
Status: CANCELLED | OUTPATIENT
Start: 2021-10-19 | End: 2021-10-19

## 2021-10-19 RX ORDER — LIDOCAINE HYDROCHLORIDE 20 MG/ML
JELLY TOPICAL ONCE
Status: CANCELLED | OUTPATIENT
Start: 2021-10-19 | End: 2021-10-19

## 2021-10-19 RX ORDER — LIDOCAINE HYDROCHLORIDE 20 MG/ML
JELLY TOPICAL ONCE
Status: COMPLETED | OUTPATIENT
Start: 2021-10-19 | End: 2021-10-19

## 2021-10-19 RX ADMIN — LIDOCAINE HYDROCHLORIDE 6 ML: 20 JELLY TOPICAL at 14:56

## 2021-10-19 NOTE — PROGRESS NOTES
Negative Pressure    NAME:  Lyn Lofton  YOB: 1955  MEDICAL RECORD NUMBER:  [id number]  DATE:  10/19/2021     Applied Negative Pressure to Right leg wound(s)/ulcer(s).  [x] Applied skin barrier prep to girma-wound.  [x] Cut strips of plastic drape to picture frame wound so that girma-wound is     covered with the drape.  [x] If bridging dressing to less prominent site, cover any intact skin that will come in contact with the Negative Pressure Therapy sponge, gauze or channel drain with plastic drape. The sponge should never touch intact skin.  [x] Cut sponge, gauze or channel drain to size which will fit into the wound/ulcer bed without being forced.  [x] Be sure the sponge is large enough to hold the entire round plastic flange which is attached to the tubing. Never allow flange to be larger than the sponge or it will produce suction damaging intact skin.  Total number of individual pieces of foam used within the wound bed: 1     [x] If bridging the dressing away from the primary site, be sure the bridge leads to a piece of sponge large enough to hold the entire flange without allowing any of the flange to overlap onto intact skin.  [x] Covered sponge, gauze or channel drain with plastic drape.  [x] Cut a hole in this plastic drape directly over the sponge the same size as the plastic drain tubing.  [x] Removed plastic liner from flange and apply it directly over the hole you cut.  [x] Removed the plastic cover from the flange.  [x] Attached the tubing to the wound/ulcer Negative Pressure Therapy and turn it on to be sure a vacuum is created and that there are no leaks.  [x] If air leaks occur, use plastic drape to patch them.  [x] Secured Negative Pressure Therapy dressing with ace wrap loosely if located on an extremity. Maintain tubing outside of ace wrap. Tubing must not exert pressure on intact skin.     Applied per Jillian Vu Guidelines      Electronically signed by Tiny Rene RN on 10/19/2021 at 3:27 PM

## 2021-10-19 NOTE — PROGRESS NOTES
Insulin Glargine, 1 Unit Dial, (TOUJEO SOLOSTAR) 300 UNIT/ML SOPN Inject 34 units nightly 6 pen 0    Insulin Pen Needle 32G X 4 MM MISC 1 each by Does not apply route daily 100 each 3    gabapentin (NEURONTIN) 300 MG capsule TAKE 2 CAPSULES BY MOUTH 3  TIMES DAILY 180 capsule 0    Dulaglutide (TRULICITY) 1.5 KJ/3.8LO SOPN Inject 1.5 mg into the skin once a week 4 pen 0    blood glucose monitor kit and supplies Dispense sufficient amount for indicated testing frequency plus additional to accommodate PRN testing needs. Dispense all needed supplies to include: monitor, strips, lancing device, lancets, control solutions, alcohol swabs. 1 kit 0    silver sulfADIAZINE (SILVADENE) 1 % cream Apply topically daily. 300 g 0    fluconazole (DIFLUCAN) 100 MG tablet Take 1 tablet by mouth daily as needed (yeast infection) Indications: Patient self diagnoses and takes fluconaole for yeast infections. 4 tablet 0    metoprolol succinate (TOPROL XL) 50 MG extended release tablet Take 1 tablet by mouth daily 90 tablet 1    DULoxetine (CYMBALTA) 30 MG extended release capsule Take 1 capsule by mouth daily 90 capsule 1    hydroCHLOROthiazide (HYDRODIURIL) 25 MG tablet TAKE 1 TABLET BY MOUTH  DAILY 90 tablet 1    omeprazole (PRILOSEC) 40 MG delayed release capsule TAKE 1 CAPSULE BY MOUTH  EVERY DAY 90 capsule 1    apixaban (ELIQUIS) 5 MG TABS tablet TAKE 1 TABLET BY MOUTH TWO  TIMES DAILY 180 tablet 1    glyBURIDE (DIABETA) 5 MG tablet TAKE 1 TABLET BY MOUTH  TWICE DAILY WITH MEALS 180 tablet 1    methocarbamol (ROBAXIN) 750 MG tablet Twice daily prn for pain 180 tablet 0    glucose monitoring kit (FREESTYLE) monitoring kit Please fill with what is covered by ins Patient test once a day DM E11.9 1 kit 0    acetaminophen (TYLENOL) 500 MG tablet Take 500 mg by mouth every 6 hours as needed for Pain. Pt only takes 1-2 times per week      Glucose Blood (BLOOD GLUCOSE TEST STRIPS) STRP by Does not apply route 3 times daily. Freestyle [place] Test Strips       No current facility-administered medications on file prior to encounter. REVIEW OF SYSTEMS    Constitutional: negative  Eyes: negative  Ears, nose, mouth, throat, and face: negative  Respiratory: negative  Cardiovascular: negative except for lower extremity edema  Gastrointestinal: negative  Genitourinary:negative  Integument/breast: negative except for right leg wound  Hematologic/lymphatic: negative  Musculoskeletal:negative  Neurological: negative except for paresthesia  Behavioral/Psych: negative  Endocrine: negative  Allergic/Immunologic: negative    Objective: There were no vitals taken for this visit. Wt Readings from Last 3 Encounters:   10/14/21 (!) 377 lb (171 kg)   10/07/21 (!) 377 lb (171 kg)   09/30/21 (!) 377 lb (171 kg)       PHYSICAL EXAM    General Appearance: alert and oriented to person, place and time, well-developed and well-nourished, in no acute distress  Skin: warm and dry, no rash or erythema  Head: normocephalic and atraumatic  Eyes: pupils equal, round, extraocular eye movements intact, and conjunctivae normal  Pulmonary/Chest: normal air movement, no respiratory distress  Extremities: no cyanosis and no clubbing or edema   Musculoskeletal: no joint swelling, deformity or tenderness  Neurologic: gait, coordination normal and speech normal    Wound 09/07/21 Leg Right; Lower; Lateral #1  (Active)   Wound Image   10/07/21 1429   Wound Etiology Non-Healing Surgical 10/19/21 1453   Dressing Status New drainage noted; Old drainage noted 10/19/21 1453   Wound Cleansed Cleansed with saline 10/19/21 1453   Dressing/Treatment Negative pressure wound therapy 09/24/21 1441   Wound Length (cm) 0.5 cm 10/19/21 1453   Wound Width (cm) 2.3 cm 10/19/21 1453   Wound Depth (cm) 2.2 cm 10/19/21 1453   Wound Surface Area (cm^2) 1.15 cm^2 10/19/21 1453   Change in Wound Size % (l*w) 68.92 10/19/21 1453   Wound Volume (cm^3) 2.53 cm^3 10/19/21 1453   Wound Healing % 86 10/19/21 1453   Post-Procedure Length (cm) 0.8 cm 10/19/21 1453   Post-Procedure Width (cm) 3 cm 10/19/21 1453   Post-Procedure Depth (cm) 2.2 cm 10/19/21 1453   Post-Procedure Surface Area (cm^2) 2.4 cm^2 10/19/21 1453   Post-Procedure Volume (cm^3) 5.28 cm^3 10/19/21 1453   Distance Tunneling (cm) 3 cm 09/24/21 1337   Tunneling Position ___ O'Clock 12 09/24/21 1337   Undermining Starts ___ O'Clock 12 09/30/21 1326   Undermining Ends___ O'Clock 3 09/30/21 1326   Undermining Maxium Distance (cm) 1.8cm @3 09/30/21 1326   Wound Assessment Bleeding;Pink/red 10/19/21 1453   Drainage Amount Moderate 10/19/21 1453   Drainage Description Serosanguinous 10/19/21 1453   Odor None 10/19/21 1453   Kaylin-wound Assessment Blanchable erythema 10/19/21 1453   Margins Defined edges 10/19/21 1453   Wound Thickness Description not for Pressure Injury Full thickness 10/19/21 1453   Number of days: 42          Assessment:     PRETIBIAL WOUND RIGHT WITH MUSCLE NECROSIS  DM WITH NEUROPATHY       Procedure Note  Indications:  Based on my examination of this patient's wound(s)/ulcer(s) today, debridement is required to promote healing and evaluate the wound base. Performed by:  Cynthia Mayers DPM    Consent obtained:  Yes    Time out taken:  Yes    Pain Control: Anesthetic  Anesthetic: 2% Lidocaine Gel Topical     Debridement:Excisional Debridement    Using curette the wound(s)/ulcer(s) was/were sharply debrided down through and including the removal of subcutaneous tissue AND MUSCLE - NO DEEP ABSCESS UPON PRODDING AND MASSAGE        Devitalized Tissue Debrided:  fibrin, biofilm and slough    Pre Debridement Measurements:  Are located in the Wound/Ulcer Documentation Flow Sheet    Wound/Ulcer #: 1    Post Debridement Measurements:  Wound/Ulcer Descriptions are Pre Debridement except measurements:  Percent of Wound(s)/Ulcer(s) Debrided: 100%    Total Surface Area Debrided:  AS ABOVE    Estimated Blood Loss:  Minimal    Hemostasis Achieved:  by pressure    Procedural Pain:  0  / 10     Post Procedural Pain:  0 / 10     Response to treatment:  Well tolerated by patient. Plan:   EXCISIONAL DEBRIDEMENT PERFORMED - CONT VAC AT HOME     [facility] 1 WEEK    Treatment Note please see Discharge Instructions    Written patient dismissal instructions given to patient and signed by patient or POA.            Electronically signed by Freda Delgado DPM on 10/19/2021 at 3:26 PM

## 2021-10-21 DIAGNOSIS — L03.115 CELLULITIS OF RIGHT LOWER EXTREMITY: ICD-10-CM

## 2021-10-21 DIAGNOSIS — L02.91 ABSCESS: ICD-10-CM

## 2021-10-21 DIAGNOSIS — E78.5 HYPERLIPIDEMIA, UNSPECIFIED HYPERLIPIDEMIA TYPE: ICD-10-CM

## 2021-10-21 DIAGNOSIS — R79.0 LOW MAGNESIUM LEVEL: Primary | ICD-10-CM

## 2021-10-21 DIAGNOSIS — E11.65 TYPE 2 DIABETES MELLITUS WITH HYPERGLYCEMIA, WITH LONG-TERM CURRENT USE OF INSULIN (HCC): ICD-10-CM

## 2021-10-21 DIAGNOSIS — Z79.4 TYPE 2 DIABETES MELLITUS WITH HYPERGLYCEMIA, WITH LONG-TERM CURRENT USE OF INSULIN (HCC): ICD-10-CM

## 2021-10-26 ENCOUNTER — HOSPITAL ENCOUNTER (OUTPATIENT)
Dept: WOUND CARE | Age: 66
Discharge: HOME | End: 2021-10-26
Payer: COMMERCIAL

## 2021-10-26 VITALS
TEMPERATURE: 96.1 F | SYSTOLIC BLOOD PRESSURE: 181 MMHG | HEIGHT: 72 IN | BODY MASS INDEX: 39.68 KG/M2 | RESPIRATION RATE: 16 BRPM | DIASTOLIC BLOOD PRESSURE: 81 MMHG | HEART RATE: 77 BPM | WEIGHT: 293 LBS

## 2021-10-26 DIAGNOSIS — E11.42 DIABETIC POLYNEUROPATHY ASSOCIATED WITH TYPE 2 DIABETES MELLITUS (HCC): ICD-10-CM

## 2021-10-26 DIAGNOSIS — L02.91 ABSCESS: ICD-10-CM

## 2021-10-26 DIAGNOSIS — B35.1 ONYCHOMYCOSIS: Primary | ICD-10-CM

## 2021-10-26 DIAGNOSIS — L97.813 SKIN ULCER OF PRETIBIAL REGION OF RIGHT LOWER EXTREMITY, WITH NECROSIS OF MUSCLE (HCC): ICD-10-CM

## 2021-10-26 PROCEDURE — 11043 DBRDMT MUSC&/FSCA 1ST 20/<: CPT

## 2021-10-26 PROCEDURE — 97605 NEG PRS WND THER DME<=50SQCM: CPT

## 2021-10-26 RX ORDER — LIDOCAINE HYDROCHLORIDE 20 MG/ML
JELLY TOPICAL ONCE
Status: CANCELLED | OUTPATIENT
Start: 2021-10-26 | End: 2021-10-26

## 2021-10-26 RX ORDER — LIDOCAINE HYDROCHLORIDE 40 MG/ML
SOLUTION TOPICAL ONCE
Status: CANCELLED | OUTPATIENT
Start: 2021-10-26 | End: 2021-10-26

## 2021-10-26 RX ORDER — LIDOCAINE HYDROCHLORIDE 20 MG/ML
JELLY TOPICAL ONCE
Status: DISCONTINUED | OUTPATIENT
Start: 2021-10-26 | End: 2021-10-27 | Stop reason: HOSPADM

## 2021-10-26 NOTE — PROGRESS NOTES
Refill    Insulin Glargine, 1 Unit Dial, (TOUJEO SOLOSTAR) 300 UNIT/ML SOPN Inject 34 units nightly 6 pen 0    Insulin Pen Needle 32G X 4 MM MISC 1 each by Does not apply route daily 100 each 3    gabapentin (NEURONTIN) 300 MG capsule TAKE 2 CAPSULES BY MOUTH 3  TIMES DAILY 180 capsule 0    Dulaglutide (TRULICITY) 1.5 DM/8.6XV SOPN Inject 1.5 mg into the skin once a week 4 pen 0    blood glucose monitor kit and supplies Dispense sufficient amount for indicated testing frequency plus additional to accommodate PRN testing needs. Dispense all needed supplies to include: monitor, strips, lancing device, lancets, control solutions, alcohol swabs. 1 kit 0    silver sulfADIAZINE (SILVADENE) 1 % cream Apply topically daily. 300 g 0    fluconazole (DIFLUCAN) 100 MG tablet Take 1 tablet by mouth daily as needed (yeast infection) Indications: Patient self diagnoses and takes fluconaole for yeast infections. 4 tablet 0    metoprolol succinate (TOPROL XL) 50 MG extended release tablet Take 1 tablet by mouth daily 90 tablet 1    DULoxetine (CYMBALTA) 30 MG extended release capsule Take 1 capsule by mouth daily 90 capsule 1    hydroCHLOROthiazide (HYDRODIURIL) 25 MG tablet TAKE 1 TABLET BY MOUTH  DAILY 90 tablet 1    omeprazole (PRILOSEC) 40 MG delayed release capsule TAKE 1 CAPSULE BY MOUTH  EVERY DAY 90 capsule 1    apixaban (ELIQUIS) 5 MG TABS tablet TAKE 1 TABLET BY MOUTH TWO  TIMES DAILY 180 tablet 1    glyBURIDE (DIABETA) 5 MG tablet TAKE 1 TABLET BY MOUTH  TWICE DAILY WITH MEALS 180 tablet 1    methocarbamol (ROBAXIN) 750 MG tablet Twice daily prn for pain 180 tablet 0    glucose monitoring kit (FREESTYLE) monitoring kit Please fill with what is covered by ins Patient test once a day DM E11.9 1 kit 0    acetaminophen (TYLENOL) 500 MG tablet Take 500 mg by mouth every 6 hours as needed for Pain.  Pt only takes 1-2 times per week      Glucose Blood (BLOOD GLUCOSE TEST STRIPS) STRP by Does not apply route 3 times daily. Freestyle [place] Test Strips       No current facility-administered medications on file prior to encounter. REVIEW OF SYSTEMS    Constitutional: negative  Eyes: negative  Ears, nose, mouth, throat, and face: negative  Respiratory: negative  Cardiovascular: negative except for lower extremity edema  Gastrointestinal: negative  Genitourinary:negative  Integument/breast: negative except for right leg wound  Hematologic/lymphatic: negative  Musculoskeletal:negative  Neurological: negative except for paresthesia  Behavioral/Psych: negative  Endocrine: negative  Allergic/Immunologic: negative    Objective:      BP (!) 181/81   Pulse 77   Temp 96.1 °F (35.6 °C) (Tympanic)   Resp 16   Ht 6' (1.829 m)   Wt (!) 377 lb (171 kg)   BMI 51.13 kg/m²     Wt Readings from Last 3 Encounters:   10/26/21 (!) 377 lb (171 kg)   10/14/21 (!) 377 lb (171 kg)   10/07/21 (!) 377 lb (171 kg)       PHYSICAL EXAM    General Appearance: alert and oriented to person, place and time, well-developed and well-nourished, in no acute distress  Skin: warm and dry, no rash or erythema  Head: normocephalic and atraumatic  Eyes: pupils equal, round, extraocular eye movements intact, and conjunctivae normal  Pulmonary/Chest: normal air movement, no respiratory distress  Extremities: no cyanosis and no clubbing or edema   Musculoskeletal: no joint swelling, deformity or tenderness  Neurologic: gait, coordination normal and speech normal    Wound 09/07/21 Leg Right; Lower; Lateral #1  (Active)   Wound Image   10/07/21 1429   Wound Etiology Non-Healing Surgical 10/26/21 1402   Dressing Status New drainage noted; Old drainage noted 10/26/21 1402   Wound Cleansed Cleansed with saline 10/19/21 1453   Dressing/Treatment Negative pressure wound therapy 09/24/21 1441   Wound Length (cm) 0.3 cm 10/26/21 1402   Wound Width (cm) 2.4 cm 10/26/21 1402   Wound Depth (cm) 1.2 cm 10/26/21 1402   Wound Surface Area (cm^2) 0.72 cm^2 10/26/21 1402 Change in Wound Size % (l*w) 80.54 10/26/21 1402   Wound Volume (cm^3) 0.864 cm^3 10/26/21 1402   Wound Healing % 95 10/26/21 1402   Post-Procedure Length (cm) 0.4 cm 10/26/21 1402   Post-Procedure Width (cm) 2.5 cm 10/26/21 1402   Post-Procedure Depth (cm) 1.7 cm 10/26/21 1402   Post-Procedure Surface Area (cm^2) 1 cm^2 10/26/21 1402   Post-Procedure Volume (cm^3) 1.7 cm^3 10/26/21 1402   Distance Tunneling (cm) 3 cm 09/24/21 1337   Tunneling Position ___ O'Clock 12 09/24/21 1337   Undermining Starts ___ O'Clock 12 09/30/21 1326   Undermining Ends___ O'Clock 3 09/30/21 1326   Undermining Maxium Distance (cm) 1.8cm @3 09/30/21 1326   Wound Assessment Bleeding;Pink/red 10/26/21 1402   Drainage Amount Moderate 10/26/21 1402   Drainage Description Serosanguinous 10/26/21 1402   Odor None 10/26/21 1402   Kaylin-wound Assessment Blanchable erythema 10/26/21 1402   Margins Defined edges 10/26/21 1402   Wound Thickness Description not for Pressure Injury Full thickness 10/26/21 1402   Number of days: 49          Assessment:     PRETIBIAL WOUND RIGHT WITH MUSCLE NECROSIS  DM WITH NEUROPATHY       Procedure Note  Indications:  Based on my examination of this patient's wound(s)/ulcer(s) today, debridement is required to promote healing and evaluate the wound base. Performed by:  Marcia East DPM    Consent obtained:  Yes    Time out taken:  Yes    Pain Control:       Debridement:Excisional Debridement    Using curette the wound(s)/ulcer(s) was/were sharply debrided down through and including the removal of subcutaneous tissue AND MUSCLE - NO DEEP ABSCESS UPON PRODDING AND MASSAGE        Devitalized Tissue Debrided:  FIBRIN, BIOFILM - NO SIG ODOR    Pre Debridement Measurements:  Are located in the Wound/Ulcer Documentation Flow Sheet    Wound/Ulcer #: 1    Post Debridement Measurements:  Wound/Ulcer Descriptions are Pre Debridement except measurements:  Percent of Wound(s)/Ulcer(s) Debrided: 100%    Total Surface Area Debrided:  AS ABOVE    Estimated Blood Loss:  Minimal    Hemostasis Achieved:  by pressure    Procedural Pain:  0  / 10     Post Procedural Pain:  0 / 10     Response to treatment:  Well tolerated by patient. Plan:   EXCISIONAL DEBRIDEMENT PERFORMED - CONT VAC AT HOME     [facility] 1 WEEK    Treatment Note please see Discharge Instructions    Written patient dismissal instructions given to patient and signed by patient or POA.            Electronically signed by Elio Scott DPM on 10/26/2021 at 2:11 PM

## 2021-10-26 NOTE — PROGRESS NOTES
Negative Pressure    NAME:  Renea Salinas  YOB: 1955  MEDICAL RECORD NUMBER:  [id number]  DATE:  10/26/2021     Applied Negative Pressure to right leg wound(s)/ulcer(s).  [x] Applied skin barrier prep to girma-wound.  [x] Cut strips of plastic drape to picture frame wound so that girma-wound is     covered with the drape.  [x] If bridging dressing to less prominent site, cover any intact skin that will come in contact with the Negative Pressure Therapy sponge, gauze or channel drain with plastic drape. The sponge should never touch intact skin.  [x] Cut sponge, gauze or channel drain to size which will fit into the wound/ulcer bed without being forced.  [x] Be sure the sponge is large enough to hold the entire round plastic flange which is attached to the tubing. Never allow flange to be larger than the sponge or it will produce suction damaging intact skin.  Total number of individual pieces of foam used within the wound bed: 1     [x] If bridging the dressing away from the primary site, be sure the bridge leads to a piece of sponge large enough to hold the entire flange without allowing any of the flange to overlap onto intact skin.  [x] Covered sponge, gauze or channel drain with plastic drape.  [x] Cut a hole in this plastic drape directly over the sponge the same size as the plastic drain tubing.  [x] Removed plastic liner from flange and apply it directly over the hole you cut.  [x] Removed the plastic cover from the flange.  [x] Attached the tubing to the wound/ulcer Negative Pressure Therapy and turn it on to be sure a vacuum is created and that there are no leaks.  [x] If air leaks occur, use plastic drape to patch them.  [x] Secured Negative Pressure Therapy dressing with ace wrap loosely if located on an extremity. Maintain tubing outside of ace wrap. Tubing must not exert pressure on intact skin.     Applied per Jillina Vu Guidelines      Electronically signed by Tamara Thomas RN on 10/26/2021 at 2:58 PM

## 2021-11-01 ENCOUNTER — OFFICE VISIT (OUTPATIENT)
Dept: FAMILY MEDICINE CLINIC | Age: 66
End: 2021-11-01
Payer: COMMERCIAL

## 2021-11-01 VITALS
HEART RATE: 80 BPM | BODY MASS INDEX: 51.81 KG/M2 | DIASTOLIC BLOOD PRESSURE: 74 MMHG | OXYGEN SATURATION: 97 % | SYSTOLIC BLOOD PRESSURE: 138 MMHG | WEIGHT: 293 LBS

## 2021-11-01 DIAGNOSIS — L03.115 CELLULITIS OF RIGHT LOWER EXTREMITY: ICD-10-CM

## 2021-11-01 DIAGNOSIS — E11.65 TYPE 2 DIABETES MELLITUS WITH HYPERGLYCEMIA, WITH LONG-TERM CURRENT USE OF INSULIN (HCC): Primary | ICD-10-CM

## 2021-11-01 DIAGNOSIS — I10 HTN, GOAL BELOW 130/80: ICD-10-CM

## 2021-11-01 DIAGNOSIS — Z71.3 DIETARY COUNSELING: ICD-10-CM

## 2021-11-01 DIAGNOSIS — E78.5 DYSLIPIDEMIA: ICD-10-CM

## 2021-11-01 DIAGNOSIS — Z79.4 TYPE 2 DIABETES MELLITUS WITH HYPERGLYCEMIA, WITH LONG-TERM CURRENT USE OF INSULIN (HCC): Primary | ICD-10-CM

## 2021-11-01 DIAGNOSIS — E66.01 SEVERE OBESITY (BMI >= 40) (HCC): ICD-10-CM

## 2021-11-01 PROCEDURE — G8417 CALC BMI ABV UP PARAM F/U: HCPCS | Performed by: FAMILY MEDICINE

## 2021-11-01 PROCEDURE — 1123F ACP DISCUSS/DSCN MKR DOCD: CPT | Performed by: FAMILY MEDICINE

## 2021-11-01 PROCEDURE — G8400 PT W/DXA NO RESULTS DOC: HCPCS | Performed by: FAMILY MEDICINE

## 2021-11-01 PROCEDURE — 1090F PRES/ABSN URINE INCON ASSESS: CPT | Performed by: FAMILY MEDICINE

## 2021-11-01 PROCEDURE — G8427 DOCREV CUR MEDS BY ELIG CLIN: HCPCS | Performed by: FAMILY MEDICINE

## 2021-11-01 PROCEDURE — 99214 OFFICE O/P EST MOD 30 MIN: CPT | Performed by: FAMILY MEDICINE

## 2021-11-01 PROCEDURE — 4040F PNEUMOC VAC/ADMIN/RCVD: CPT | Performed by: FAMILY MEDICINE

## 2021-11-01 PROCEDURE — 2022F DILAT RTA XM EVC RTNOPTHY: CPT | Performed by: FAMILY MEDICINE

## 2021-11-01 PROCEDURE — 1036F TOBACCO NON-USER: CPT | Performed by: FAMILY MEDICINE

## 2021-11-01 PROCEDURE — G8484 FLU IMMUNIZE NO ADMIN: HCPCS | Performed by: FAMILY MEDICINE

## 2021-11-01 PROCEDURE — 3017F COLORECTAL CA SCREEN DOC REV: CPT | Performed by: FAMILY MEDICINE

## 2021-11-01 PROCEDURE — 3046F HEMOGLOBIN A1C LEVEL >9.0%: CPT | Performed by: FAMILY MEDICINE

## 2021-11-01 RX ORDER — HYDROCODONE BITARTRATE AND ACETAMINOPHEN 5; 325 MG/1; MG/1
1 TABLET ORAL 2 TIMES DAILY
Qty: 28 TABLET | Refills: 0 | Status: SHIPPED | OUTPATIENT
Start: 2021-11-01 | End: 2021-11-15

## 2021-11-01 RX ORDER — INSULIN GLARGINE 300 U/ML
INJECTION, SOLUTION SUBCUTANEOUS
Qty: 2 PEN | Refills: 0 | COMMUNITY
Start: 2021-11-01 | End: 2022-04-08 | Stop reason: SDUPTHER

## 2021-11-01 RX ORDER — DULAGLUTIDE 1.5 MG/.5ML
1.5 INJECTION, SOLUTION SUBCUTANEOUS WEEKLY
Qty: 12 PEN | Refills: 0 | Status: SHIPPED | OUTPATIENT
Start: 2021-11-01 | End: 2021-12-30

## 2021-11-01 RX ORDER — INSULIN GLARGINE 300 U/ML
INJECTION, SOLUTION SUBCUTANEOUS
Qty: 6 PEN | Refills: 0
Start: 2021-11-01 | End: 2021-11-01 | Stop reason: SDUPTHER

## 2021-11-01 RX ORDER — INSULIN GLARGINE 300 U/ML
INJECTION, SOLUTION SUBCUTANEOUS
Qty: 6 PEN | Refills: 0 | Status: SHIPPED | OUTPATIENT
Start: 2021-11-01 | End: 2021-11-18 | Stop reason: SDUPTHER

## 2021-11-01 NOTE — PATIENT INSTRUCTIONS

## 2021-11-01 NOTE — PROGRESS NOTES
Progress Note    Lyn Lofton is a 74 y.o.  female who presents today alone for evaluation of   Chief Complaint   Patient presents with    Diabetes    Hyperlipidemia    Hypertension           HPI:   Patient is here for follow up on DM. Patient last HbA1c 10.8 9/2021. Patient was started on 34 units of toujeo daily and 1.5 mg of trulicity once weekly. Patient is not checking her FBG. Patient states she did not start either the trulicity or the toujeo. Patient would like a refill of norco for acute pain exacerbations of her LE. Patient states her wound vac on her RLE will be removed tomorrow. Patient states she would like samples of toujeo today. Health Maintenance Due   Topic Date Due    Hepatitis C screen  Never done    COVID-19 Vaccine (1) Never done    DTaP/Tdap/Td vaccine (1 - Tdap) Never done    Shingles Vaccine (1 of 2) Never done    DEXA (modify frequency per FRAX score)  Never done    Diabetic retinal exam  05/05/2008    Diabetic microalbuminuria test  12/08/2014    Breast cancer screen  02/22/2018    Pneumococcal 65+ years Vaccine (1 of 1 - PPSV23) Never done    Diabetic foot exam  02/19/2021        Current Medications:     Current Outpatient Medications   Medication Sig Dispense Refill    Dulaglutide (TRULICITY) 1.5 SD/3.8QF SOPN Inject 1.5 mg into the skin once a week 12 pen 0    Insulin Glargine, 1 Unit Dial, (TOUJEO SOLOSTAR) 300 UNIT/ML SOPN Inject 34 units every morning 6 pen 0    HYDROcodone-acetaminophen (NORCO) 5-325 MG per tablet Take 1 tablet by mouth 2 times daily for 14 days. Intended supply: 7 days.  Take lowest dose possible to manage pain 28 tablet 0    Dulaglutide 0.75 MG/0.5ML SOPN Inject 1.5 mg into the skin once a week 4 pen 0    Insulin Glargine, 1 Unit Dial, (TOUJEO SOLOSTAR) 300 UNIT/ML SOPN Inject 34 units every morning 2 pen 0    Insulin Pen Needle 32G X 4 MM MISC 1 each by Does not apply route daily 100 each 3    gabapentin (NEURONTIN) 300 MG capsule TAKE 2 CAPSULES BY MOUTH 3  TIMES DAILY 180 capsule 0    blood glucose monitor kit and supplies Dispense sufficient amount for indicated testing frequency plus additional to accommodate PRN testing needs. Dispense all needed supplies to include: monitor, strips, lancing device, lancets, control solutions, alcohol swabs. 1 kit 0    fluconazole (DIFLUCAN) 100 MG tablet Take 1 tablet by mouth daily as needed (yeast infection) Indications: Patient self diagnoses and takes fluconaole for yeast infections. 4 tablet 0    metoprolol succinate (TOPROL XL) 50 MG extended release tablet Take 1 tablet by mouth daily 90 tablet 1    DULoxetine (CYMBALTA) 30 MG extended release capsule Take 1 capsule by mouth daily 90 capsule 1    hydroCHLOROthiazide (HYDRODIURIL) 25 MG tablet TAKE 1 TABLET BY MOUTH  DAILY 90 tablet 1    omeprazole (PRILOSEC) 40 MG delayed release capsule TAKE 1 CAPSULE BY MOUTH  EVERY DAY 90 capsule 1    apixaban (ELIQUIS) 5 MG TABS tablet TAKE 1 TABLET BY MOUTH TWO  TIMES DAILY 180 tablet 1    glyBURIDE (DIABETA) 5 MG tablet TAKE 1 TABLET BY MOUTH  TWICE DAILY WITH MEALS 180 tablet 1    methocarbamol (ROBAXIN) 750 MG tablet Twice daily prn for pain 180 tablet 0    glucose monitoring kit (FREESTYLE) monitoring kit Please fill with what is covered by ins Patient test once a day DM E11.9 1 kit 0    acetaminophen (TYLENOL) 500 MG tablet Take 500 mg by mouth every 6 hours as needed for Pain. Pt only takes 1-2 times per week      Glucose Blood (BLOOD GLUCOSE TEST STRIPS) STRP by Does not apply route 3 times daily. Freestyle [place] Test Strips       No current facility-administered medications for this visit. Allergies: Allergies   Allergen Reactions    Erythromycin Hives    Lisinopril Itching        Medical History:     Past Medical History:   Diagnosis Date    Back pain 6/22/2012    Cellulitis 10/9/2015    Lt.  Lower Leg    DM (diabetes mellitus) ([facility] [address] 06/22/2012    DVT (deep venous thrombosis) ([facility] 2012    GERD (gastroesophageal reflux disease) 2012    MVP (mitral valve prolapse) 2012       Past Surgical History:   Procedure Laterality Date    HYSTERECTOMY      TONSILLECTOMY         Family History   Problem Relation Age of Onset    Heart Disease Mother         Social History:     Social History     Socioeconomic History    Marital status:      Spouse name: Not on file    Number of children: Not on file    Years of education: Not on file    Highest education level: Not on file   Occupational History    Not on file   Tobacco Use    Smoking status: Former Smoker     Packs/day: 1.00     Years: 30.00     Pack years: 30.00     Types: Cigarettes     Quit date: 1983     Years since quittin.3    Smokeless tobacco: Never Used   Vaping Use    Vaping Use: Never used   Substance and Sexual Activity    Alcohol use: No    Drug use: No    Sexual activity: Not on file   Other Topics Concern    Not on file   Social History Narrative    Not on file     Social Determinants of Health     Financial Resource Strain: Low Risk     Difficulty of Paying Living Expenses: Not hard at all   Food Insecurity: No Food Insecurity    Worried About Running Out of Food in the Last Year: Never true    Yanni of Food in the Last Year: Never true   Transportation Needs: No Transportation Needs    Lack of Transportation (Medical): No    Lack of Transportation (Non-Medical):  No   Physical Activity:     Days of Exercise per Week:     Minutes of Exercise per Session:    Stress:     Feeling of Stress :    Social Connections:     Frequency of Communication with Friends and Family:     Frequency of Social Gatherings with Friends and Family:     Attends [employer] Services:     Active Member of Clubs or Organizations:     Attends Club or Organization Meetings:     Marital Status:    Intimate Partner Violence:     Fear of Current or Ex-Partner:     Emotionally Abused:  Physically Abused:     Sexually Abused:         ROS:     Constitutional: No fevers, chills, fatigue. ENT: No nasal congestion or sore throat  Respiratory: No difficulty in breathing or cough. Cardiovascular: No chest pain, palpitations or shortness of breath  Gastrointestinal: No abdominal pain or change in bowel movements. Genitourinary: No change in urinary frequency or dysuria. Skin: No rashes or skin lesions. Neurological: No weakness. No headaches. Last Filed Vitals:  /74   Pulse 80   Wt (!) 382 lb (173.3 kg)   SpO2 97%   BMI 51.81 kg/m²      Physical Examination:     GENERAL APPEARANCE: in no acute distress, obese in wheel chair. HEAD: normocephalic, atraumatic. EYES: extraocular movement intact (EOMI), pupils equal, round, reactive to light and accommodation. EARS: normal, tympanic membrane intact, clear, auditory canal clear. NOSE: nares patent, no erythema, sinuses nontender bilaterally, no rhinorrhea. ORAL CAVITY: mucosa moist, no lesions. THROAT: clear, no mass, no exudate. NECK/THYROID: neck supple, full range of motion, no thyromegaly. HEART: no murmurs, regular rate and rhythm, S1, S2 normal.   LUNGS: clear to auscultation bilaterally, no wheezes, rales, rhonchi.    ABDOMEN: normal, bowel sounds present, soft, nontender, nondistended, no rebound guarding or rigidity    Recent Labs/ In Office Testing/ Radiograph review:     Orders Only on 10/21/2021   Component Date Value Ref Range Status    Cholesterol, Fasting 10/16/2021 173   Final    Triglyceride, Fasting 10/16/2021 269   Final    HDL 10/16/2021 34* 35 - 70 mg/dL Final    LDL Calculated 10/16/2021 85  0 - 160 mg/dL Final    Magnesium 10/16/2021 1.3  mg/dL Final    Glucose 10/16/2021 157   Final    BUN 10/16/2021 16  mg/dL Final    CREATININE 10/16/2021 1.12   Final    Sodium 10/16/2021 138  mmol/L Final    Potassium 10/16/2021 3.8  mmol/L Final    Chloride 10/16/2021 103  mmol/L Final    CO2 10/16/2021 25  mmol/L Final    Calcium 10/16/2021 8.9  mg/dL Final    Phosphorus 10/16/2021 3.4  mg/dL Final    Albumin 10/16/2021 3.3   Final    Hemoglobin 10/16/2021 12.0  12.0 - 16.0 g/dL Final    Hematocrit 10/16/2021 35.7* 36 - 46 % Final       No results found for this visit on 11/01/21. Assessment/Plan:     Homer Brady was seen today for diabetes, hyperlipidemia and hypertension. Diagnoses and all orders for this visit:    Type 2 diabetes mellitus with hyperglycemia, with long-term current use of insulin ([facility])  -     Hemoglobin A1C; Future  -     Microalbumin, Ur; Future  -     Discontinue: Insulin Glargine, 1 Unit Dial, (TOUJEO SOLOSTAR) 300 UNIT/ML SOPN; Inject 34 units every morning  -     Dulaglutide (TRULICITY) 1.5 YT/8.2VN SOPN; Inject 1.5 mg into the skin once a week  -     Insulin Glargine, 1 Unit Dial, (TOUJEO SOLOSTAR) 300 UNIT/ML SOPN; Inject 34 units every morning  -     Dulaglutide 0.75 MG/0.5ML SOPN; Inject 1.5 mg into the skin once a week  -     Insulin Glargine, 1 Unit Dial, (TOUJEO SOLOSTAR) 300 UNIT/ML SOPN; Inject 34 units every morning    Dyslipidemia  -     ALT; Future  -     AST; Future  -     CBC Auto Differential; Future  -     Lipid Panel; Future  -     TSH with Reflex; Future    HTN, goal below 130/80  -     Magnesium; Future  -     Renal Function Panel; Future    Severe obesity (BMI >= 40) ([facility])    Dietary counseling  -      BMI ABOVE NORMAL F/U    Cellulitis of right lower extremity  -     HYDROcodone-acetaminophen (NORCO) 5-325 MG per tablet; Take 1 tablet by mouth 2 times daily for 14 days. Intended supply: 7 days. Take lowest dose possible to manage pain    Samples provided. 34 units of toujeo every morning and 1.5 mg of trulicity weekly. Follow up on labs. Norco for pain. All questions answered and addressed to patient satisfaction. Patient understands and agrees to the plan.      The patient was evaluated and treated today based on the osteopathic principle that each person is a unit of body, mind, and spirit, the body is capable of self-regulation, self-healing, and health maintenance and that structure and function are reciprocally interrelated. Follow-up:   Return in about 1 month (around 12/1/2021) for dm; 20 min appt. Kaley Hunter D.O.    BMI was elevated today, and weight loss plan recommended is : conventional weight loss.

## 2021-11-02 ENCOUNTER — HOSPITAL ENCOUNTER (OUTPATIENT)
Dept: WOUND CARE | Age: 66
Discharge: HOME | End: 2021-11-02
Payer: COMMERCIAL

## 2021-11-02 VITALS — HEART RATE: 72 BPM

## 2021-11-02 DIAGNOSIS — B35.1 ONYCHOMYCOSIS: Primary | ICD-10-CM

## 2021-11-02 DIAGNOSIS — L02.91 ABSCESS: ICD-10-CM

## 2021-11-02 DIAGNOSIS — L97.813 SKIN ULCER OF PRETIBIAL REGION OF RIGHT LOWER EXTREMITY, WITH NECROSIS OF MUSCLE (HCC): ICD-10-CM

## 2021-11-02 DIAGNOSIS — E11.42 DIABETIC POLYNEUROPATHY ASSOCIATED WITH TYPE 2 DIABETES MELLITUS (HCC): ICD-10-CM

## 2021-11-02 PROCEDURE — 11042 DBRDMT SUBQ TIS 1ST 20SQCM/<: CPT

## 2021-11-02 RX ORDER — LIDOCAINE HYDROCHLORIDE 40 MG/ML
SOLUTION TOPICAL ONCE
Status: CANCELLED | OUTPATIENT
Start: 2021-11-02 | End: 2021-11-02

## 2021-11-02 RX ORDER — LIDOCAINE HYDROCHLORIDE 20 MG/ML
JELLY TOPICAL ONCE
Status: COMPLETED | OUTPATIENT
Start: 2021-11-02 | End: 2021-11-02

## 2021-11-02 RX ORDER — LIDOCAINE HYDROCHLORIDE 20 MG/ML
JELLY TOPICAL ONCE
Status: CANCELLED | OUTPATIENT
Start: 2021-11-02 | End: 2021-11-02

## 2021-11-02 RX ADMIN — LIDOCAINE HYDROCHLORIDE 6 ML: 20 JELLY TOPICAL at 12:55

## 2021-11-02 NOTE — PROGRESS NOTES
[address]   Progress Note and Procedure Note      Ashok Kendall  MEDICAL RECORD NUMBER:  [id number]  AGE: 74 y.o. GENDER: female  : 1955  EPISODE DATE:  2021    Subjective:     No chief complaint on file. HISTORY of PRESENT ILLNESS HPI     Ashok Kendall is a 74 y.o. female who presents today for wound/ulcer evaluation. History of Wound Context: here to follow up on right leg wound. Doing well with wound vac - HOME CARE COMES ON SAT  TO CHANGE IT. Wound/Ulcer Pain Timing/Severity: none  Quality of pain: N/A  Severity:  0 / 10   Modifying Factors: None  Associated Signs/Symptoms: none    Ulcer Identification:  Ulcer Type: non-healing surgical  Contributing Factors: edema, lymphedema, diabetes, decreased mobility and obesity    Wound: N/A        PAST MEDICAL HISTORY        Diagnosis Date    Back pain 2012    Cellulitis 10/9/2015    Lt.  Lower Leg    DM (diabetes mellitus) ([facility] [address] 2012    DVT (deep venous thrombosis) ([facility] [address] 2012    GERD (gastroesophageal reflux disease) 2012    MVP (mitral valve prolapse) 2012       PAST SURGICAL HISTORY    Past Surgical History:   Procedure Laterality Date    HYSTERECTOMY      TONSILLECTOMY         FAMILY HISTORY    Family History   Problem Relation Age of Onset    Heart Disease Mother        SOCIAL HISTORY    Social History     Tobacco Use    Smoking status: Former Smoker     Packs/day: 1.00     Years: 30.00     Pack years: 30.00     Types: Cigarettes     Quit date: 1983     Years since quittin.3    Smokeless tobacco: Never Used   Vaping Use    Vaping Use: Never used   Substance Use Topics    Alcohol use: No    Drug use: No       ALLERGIES    Allergies   Allergen Reactions    Erythromycin Hives    Lisinopril Itching       MEDICATIONS    Current Outpatient Medications on File Prior to Encounter   Medication Sig Dispense Refill    Dulaglutide (TRULICITY) 1.5 EY/6.9YY SOPN Inject 1.5 mg into the skin once a week 12 pen 0    Insulin Glargine, 1 Unit Dial, (TOUJEO SOLOSTAR) 300 UNIT/ML SOPN Inject 34 units every morning 6 pen 0    HYDROcodone-acetaminophen (NORCO) 5-325 MG per tablet Take 1 tablet by mouth 2 times daily for 14 days. Intended supply: 7 days. Take lowest dose possible to manage pain 28 tablet 0    Dulaglutide 0.75 MG/0.5ML SOPN Inject 1.5 mg into the skin once a week 4 pen 0    Insulin Glargine, 1 Unit Dial, (TOUJEO SOLOSTAR) 300 UNIT/ML SOPN Inject 34 units every morning 2 pen 0    Insulin Pen Needle 32G X 4 MM MISC 1 each by Does not apply route daily 100 each 3    gabapentin (NEURONTIN) 300 MG capsule TAKE 2 CAPSULES BY MOUTH 3  TIMES DAILY 180 capsule 0    blood glucose monitor kit and supplies Dispense sufficient amount for indicated testing frequency plus additional to accommodate PRN testing needs. Dispense all needed supplies to include: monitor, strips, lancing device, lancets, control solutions, alcohol swabs. 1 kit 0    fluconazole (DIFLUCAN) 100 MG tablet Take 1 tablet by mouth daily as needed (yeast infection) Indications: Patient self diagnoses and takes fluconaole for yeast infections.  4 tablet 0    metoprolol succinate (TOPROL XL) 50 MG extended release tablet Take 1 tablet by mouth daily 90 tablet 1    DULoxetine (CYMBALTA) 30 MG extended release capsule Take 1 capsule by mouth daily 90 capsule 1    hydroCHLOROthiazide (HYDRODIURIL) 25 MG tablet TAKE 1 TABLET BY MOUTH  DAILY 90 tablet 1    omeprazole (PRILOSEC) 40 MG delayed release capsule TAKE 1 CAPSULE BY MOUTH  EVERY DAY 90 capsule 1    apixaban (ELIQUIS) 5 MG TABS tablet TAKE 1 TABLET BY MOUTH TWO  TIMES DAILY 180 tablet 1    glyBURIDE (DIABETA) 5 MG tablet TAKE 1 TABLET BY MOUTH  TWICE DAILY WITH MEALS 180 tablet 1    methocarbamol (ROBAXIN) 750 MG tablet Twice daily prn for pain 180 tablet 0    glucose monitoring kit (FREESTYLE) monitoring kit Please fill with what is covered by ins Patient test once a day DM E11.9 1 kit 0    acetaminophen (TYLENOL) 500 MG tablet Take 500 mg by mouth every 6 hours as needed for Pain. Pt only takes 1-2 times per week      Glucose Blood (BLOOD GLUCOSE TEST STRIPS) STRP by Does not apply route 3 times daily. Freestyle [place] Test Strips       No current facility-administered medications on file prior to encounter. REVIEW OF SYSTEMS    Constitutional: negative  Eyes: negative  Ears, nose, mouth, throat, and face: negative  Respiratory: negative  Cardiovascular: negative except for lower extremity edema  Gastrointestinal: negative  Genitourinary:negative  Integument/breast: negative except for right leg wound  Hematologic/lymphatic: negative  Musculoskeletal:negative  Neurological: negative except for paresthesia  Behavioral/Psych: negative  Endocrine: negative  Allergic/Immunologic: negative    Objective: There were no vitals taken for this visit. Wt Readings from Last 3 Encounters:   11/01/21 (!) 382 lb (173.3 kg)   10/26/21 (!) 377 lb (171 kg)   10/14/21 (!) 377 lb (171 kg)       PHYSICAL EXAM    General Appearance: alert and oriented to person, place and time, well-developed and well-nourished, in no acute distress  Skin: warm and dry, no rash or erythema  Head: normocephalic and atraumatic  Eyes: pupils equal, round, extraocular eye movements intact, and conjunctivae normal  Pulmonary/Chest: normal air movement, no respiratory distress  Extremities: no cyanosis and no clubbing or edema   Musculoskeletal: no joint swelling, deformity or tenderness  Neurologic: gait, coordination normal and speech normal       Wound 09/07/21 Leg Right; Lower; Lateral #1  (Active)   Wound Image   10/07/21 1429   Wound Etiology Non-Healing Surgical 11/02/21 1308   Dressing Status New drainage noted; Old drainage noted 11/02/21 1308   Wound Cleansed Cleansed with saline 10/19/21 1453   Dressing/Treatment Negative pressure wound therapy 09/24/21 1441   Wound Length (cm) 0.4 cm 11/02/21 1308   Wound Width (cm) 1.5 cm 11/02/21 1308   Wound Depth (cm) 0.8 cm 11/02/21 1308   Wound Surface Area (cm^2) 0.6 cm^2 11/02/21 1308   Change in Wound Size % (l*w) 83.78 11/02/21 1308   Wound Volume (cm^3) 0.48 cm^3 11/02/21 1308   Wound Healing % 97 11/02/21 1308   Post-Procedure Length (cm) 0.3 cm 11/02/21 1308   Post-Procedure Width (cm) 1.6 cm 11/02/21 1308   Post-Procedure Depth (cm) 1 cm 11/02/21 1308   Post-Procedure Surface Area (cm^2) 0.48 cm^2 11/02/21 1308   Post-Procedure Volume (cm^3) 0.48 cm^3 11/02/21 1308   Distance Tunneling (cm) 3 cm 09/24/21 1337   Tunneling Position ___ O'Clock 12 09/24/21 1337   Undermining Starts ___ O'Clock 12 09/30/21 1326   Undermining Ends___ O'Clock 3 09/30/21 1326   Undermining Maxium Distance (cm) 1.8cm @3 09/30/21 1326   Wound Assessment Bleeding;Pink/red 11/02/21 1308   Drainage Amount Moderate 11/02/21 1308   Drainage Description Serosanguinous 11/02/21 1308   Odor None 11/02/21 1308   Kaylin-wound Assessment Blanchable erythema 11/02/21 1308   Margins Defined edges 11/02/21 1308   Wound Thickness Description not for Pressure Injury Full thickness 11/02/21 1308   Number of days: 55          Assessment:     PRETIBIAL WOUND RIGHT   DM WITH NEUROPATHY       Procedure Note  Indications:  Based on my examination of this patient's wound(s)/ulcer(s) today, debridement is required to promote healing and evaluate the wound base. Performed by:  Dieter Richards DPM    Consent obtained:  Yes    Time out taken:  Yes    Pain Control:       Debridement:Excisional Debridement    Using curette the wound(s)/ulcer(s) was/were sharply debrided down through and including the removal of subcutaneous tissue - NO DEEP ABSCESS UPON PRODDING AND MASSAGE        Devitalized Tissue Debrided:  FIBRIN, BIOFILM - NO SIG ODOR    Pre Debridement Measurements:  Are located in the [place]  Documentation Flow Sheet    Wound/Ulcer #: 1    Post Debridement Measurements:  Wound/Ulcer Descriptions are Pre Debridement except measurements:  Percent of Wound(s)/Ulcer(s) Debrided: 100%    Total Surface Area Debrided:  AS ABOVE    Estimated Blood Loss:  Minimal    Hemostasis Achieved:  by pressure    Procedural Pain:  0  / 10     Post Procedural Pain:  0 / 10     Response to treatment:  Well tolerated by patient. Plan:   EXCISIONAL DEBRIDEMENT PERFORMED - ARMAAN AND SALINE DRESSING - CHANGE QOD    [address] 2 WEEKS    Treatment Note please see Discharge Instructions    Written patient dismissal instructions given to patient and signed by patient or POA.            Electronically signed by Dieter Richards DPM on 11/2/2021 at 12:54 PM

## 2021-11-02 NOTE — PROGRESS NOTES
1.5 cm 11/02/21 1308   Wound Depth (cm) 0.8 cm 11/02/21 1308   Wound Surface Area (cm^2) 0.6 cm^2 11/02/21 1308   Change in Wound Size % (l*w) 83.78 11/02/21 1308   Wound Volume (cm^3) 0.48 cm^3 11/02/21 1308   Wound Healing % 97 11/02/21 1308   Post-Procedure Length (cm) 0.3 cm 11/02/21 1308   Post-Procedure Width (cm) 1.6 cm 11/02/21 1308   Post-Procedure Depth (cm) 1 cm 11/02/21 1308   Post-Procedure Surface Area (cm^2) 0.48 cm^2 11/02/21 1308   Post-Procedure Volume (cm^3) 0.48 cm^3 11/02/21 1308   Distance Tunneling (cm) 3 cm 09/24/21 1337   Tunneling Position ___ O'Clock 12 09/24/21 1337   Undermining Starts ___ O'Clock 12 09/30/21 1326   Undermining Ends___ O'Clock 3 09/30/21 1326   Undermining Maxium Distance (cm) 1.8cm @3 09/30/21 1326   Wound Assessment Bleeding;Pink/red 11/02/21 1308   Drainage Amount Moderate 11/02/21 1308   Drainage Description Serosanguinous 11/02/21 1308   Odor None 11/02/21 1308   Kaylin-wound Assessment Blanchable erythema 11/02/21 1308   Margins Defined edges 11/02/21 1308   Wound Thickness Description not for Pressure Injury Full thickness 11/02/21 1308   Number of days: 55          Supplies Requested :      WOUND #: 1   PRIMARY DRESSING:  Collagen with silver-Desirae   Cover and Secure with:  Other Silicon Border Gauze (Gentac) 2X2     FREQUENCY OF DRESSING CHANGES:  Daily     ADDITIONAL ITEMS:  [] Gloves Small  [] Gloves Medium [x] Gloves Large [] Gloves XLarge  [] Tape 1\" [] Tape 2\" [] Tape 3\"  [] Medipore Tape  [x] Saline  [] Skin Prep   [] Adhesive Remover   [] Cotton Tip Applicators   [] Other:    Patient Wound(s) Debrided: [x] Yes   [] No    Debribement Type: subcutaneous tissue    Debridement Date: 11/2/2021    Patient currently being seen by Home Health: [x] Yes   [] No    Duration for needed supplies:  []15  [x]30  []60  []90 Days    Provider Information:      PROVIDER: Dr. Marcus Rader DPM  NPI: [id number]

## 2021-11-04 LAB
ALBUMIN SERPL-MCNC: 3.6 G/DL
ALT SERPL-CCNC: 10 U/L
AST SERPL-CCNC: 15 U/L
AVERAGE GLUCOSE: 200
BASOPHILS ABSOLUTE: 0 /ΜL
BASOPHILS RELATIVE PERCENT: 0.8 %
BUN / CREAT RATIO: NORMAL
BUN BLDV-MCNC: 17 MG/DL
CALCIUM SERPL-MCNC: 8.6 MG/DL
CHLORIDE BLD-SCNC: 103 MMOL/L
CHOLESTEROL, TOTAL: 182 MG/DL
CHOLESTEROL/HDL RATIO: NORMAL
CO2: 24 MMOL/L
CREAT SERPL-MCNC: 0.94 MG/DL
CREATININE URINE: 96.58 MG/DL
EOSINOPHILS ABSOLUTE: 0.1 /ΜL
EOSINOPHILS RELATIVE PERCENT: 2.5 %
GLUCOSE: 260
HBA1C MFR BLD: 8.6 %
HCT VFR BLD CALC: 36.9 % (ref 36–46)
HDLC SERPL-MCNC: 36 MG/DL (ref 35–70)
HEMOGLOBIN: 12.4 G/DL (ref 12–16)
LDL CHOLESTEROL CALCULATED: 82 MG/DL (ref 0–160)
LYMPHOCYTES ABSOLUTE: 1 /ΜL
LYMPHOCYTES RELATIVE PERCENT: 18.2 %
MAGNESIUM: 1.4 MG/DL
MCH RBC QN AUTO: 30.2 PG
MCHC RBC AUTO-ENTMCNC: 33.7 G/DL
MCV RBC AUTO: 90 FL
MICROALBUMIN/CREAT 24H UR: 39 MG/G{CREAT}
MONOCYTES ABSOLUTE: 0.4 /ΜL
MONOCYTES RELATIVE PERCENT: 7 %
NEUTROPHILS ABSOLUTE: 4 /ΜL
NEUTROPHILS RELATIVE PERCENT: 71.5 %
NONHDLC SERPL-MCNC: NORMAL MG/DL
PDW BLD-RTO: 14.5 %
PHOSPHORUS: 3.8 MG/DL
PLATELET # BLD: 226 K/ΜL
PMV BLD AUTO: 8 FL
POTASSIUM SERPL-SCNC: 4.6 MMOL/L
RBC # BLD: 4.11 10^6/ΜL
SODIUM BLD-SCNC: 134 MMOL/L
TRIGL SERPL-MCNC: 319 MG/DL
TSH SERPL DL<=0.05 MIU/L-ACNC: 1.69 UIU/ML
VLDLC SERPL CALC-MCNC: NORMAL MG/DL
WBC # BLD: 5.6 10^3/ML

## 2021-11-05 DIAGNOSIS — E78.5 DYSLIPIDEMIA: ICD-10-CM

## 2021-11-05 DIAGNOSIS — Z79.4 TYPE 2 DIABETES MELLITUS WITH HYPERGLYCEMIA, WITH LONG-TERM CURRENT USE OF INSULIN (HCC): ICD-10-CM

## 2021-11-05 DIAGNOSIS — E11.65 TYPE 2 DIABETES MELLITUS WITH HYPERGLYCEMIA, WITH LONG-TERM CURRENT USE OF INSULIN (HCC): ICD-10-CM

## 2021-11-05 DIAGNOSIS — I10 HTN, GOAL BELOW 130/80: ICD-10-CM

## 2021-11-16 ENCOUNTER — HOSPITAL ENCOUNTER (OUTPATIENT)
Dept: WOUND CARE | Age: 66
Discharge: HOME | End: 2021-11-16
Payer: COMMERCIAL

## 2021-11-16 VITALS
RESPIRATION RATE: 18 BRPM | TEMPERATURE: 98.5 F | HEART RATE: 40 BPM | SYSTOLIC BLOOD PRESSURE: 172 MMHG | BODY MASS INDEX: 39.68 KG/M2 | DIASTOLIC BLOOD PRESSURE: 81 MMHG | HEIGHT: 72 IN | WEIGHT: 293 LBS

## 2021-11-16 DIAGNOSIS — L97.813 SKIN ULCER OF PRETIBIAL REGION OF RIGHT LOWER EXTREMITY, WITH NECROSIS OF MUSCLE (HCC): ICD-10-CM

## 2021-11-16 DIAGNOSIS — E11.42 DIABETIC POLYNEUROPATHY ASSOCIATED WITH TYPE 2 DIABETES MELLITUS (HCC): ICD-10-CM

## 2021-11-16 DIAGNOSIS — L02.91 ABSCESS: ICD-10-CM

## 2021-11-16 DIAGNOSIS — B35.1 ONYCHOMYCOSIS: Primary | ICD-10-CM

## 2021-11-16 PROCEDURE — 11042 DBRDMT SUBQ TIS 1ST 20SQCM/<: CPT

## 2021-11-16 RX ORDER — LIDOCAINE HYDROCHLORIDE 20 MG/ML
JELLY TOPICAL ONCE
Status: CANCELLED | OUTPATIENT
Start: 2021-11-16 | End: 2021-11-16

## 2021-11-16 RX ORDER — LIDOCAINE HYDROCHLORIDE 40 MG/ML
SOLUTION TOPICAL ONCE
OUTPATIENT
Start: 2021-11-16 | End: 2021-11-16

## 2021-11-16 RX ORDER — LIDOCAINE HYDROCHLORIDE 20 MG/ML
JELLY TOPICAL ONCE
Status: COMPLETED | OUTPATIENT
Start: 2021-11-16 | End: 2021-11-16

## 2021-11-16 RX ADMIN — LIDOCAINE HYDROCHLORIDE 6 ML: 20 JELLY TOPICAL at 12:49

## 2021-11-16 NOTE — PROGRESS NOTES
[address]   Progress Note and Procedure Note      Cesario Vazquez  MEDICAL RECORD NUMBER:  [id number]  AGE: 74 y.o. GENDER: female  : 1955  EPISODE DATE:  2021    Subjective:     Chief Complaint   Patient presents with    Wound Check     right leg         HISTORY of PRESENT ILLNESS HPI     Cesario Vazquez is a 74 y.o. female who presents today for wound/ulcer evaluation. History of Wound Context: here to follow up on right leg wound. APPLIES COLLAGEN AND SSD QD    Wound/Ulcer Pain Timing/Severity: none  Quality of pain: N/A  Severity:  0 / 10   Modifying Factors: None  Associated Signs/Symptoms: none    Ulcer Identification:  Ulcer Type: non-healing surgical  Contributing Factors: edema, lymphedema, diabetes, decreased mobility and obesity    Wound: N/A        PAST MEDICAL HISTORY        Diagnosis Date    Back pain 2012    Cellulitis 10/9/2015    Lt.  Lower Leg    DM [facility]di[facility] mellitus) [facility] [address] 2012    DVT (deep venous thrombosis) ([facility] [address] 2012    GERD (gastroesophageal reflux disease) 2012    MVP (mitral valve prolapse) 2012       PAST SURGICAL HISTORY    Past Surgical History:   Procedure Laterality Date    HYSTERECTOMY      TONSILLECTOMY         FAMILY HISTORY    Family History   Problem Relation Age of Onset    Heart Disease Mother        SOCIAL HISTORY    Social History     Tobacco Use    Smoking status: Former Smoker     Packs/day: 1.00     Years: 30.00     Pack years: 30.00     Types: Cigarettes     Quit date: 1983     Years since quittin.4    Smokeless tobacco: Never Used   Vaping Use    Vaping Use: Never used   Substance Use Topics    Alcohol use: No    Drug use: No       ALLERGIES    Allergies   Allergen Reactions    Erythromycin Hives    Lisinopril Itching       MEDICATIONS    Current Outpatient Medications on File Prior to Encounter   Medication Sig Dispense Refill    Dulaglutide (TRULICITY) 1.5 AQ/0.1OL SOPN Inject 1.5 mg into the skin once a week 12 pen 0    Insulin Glargine, 1 Unit Dial, (TOUJEO SOLOSTAR) 300 UNIT/ML SOPN Inject 34 units every morning 6 pen 0    Dulaglutide 0.75 MG/0.5ML SOPN Inject 1.5 mg into the skin once a week 4 pen 0    Insulin Glargine, 1 Unit Dial, (TOUJEO SOLOSTAR) 300 UNIT/ML SOPN Inject 34 units every morning 2 pen 0    Insulin Pen Needle 32G X 4 MM MISC 1 each by Does not apply route daily 100 each 3    gabapentin (NEURONTIN) 300 MG capsule TAKE 2 CAPSULES BY MOUTH 3  TIMES DAILY 180 capsule 0    blood glucose monitor kit and supplies Dispense sufficient amount for indicated testing frequency plus additional to accommodate PRN testing needs. Dispense all needed supplies to include: monitor, strips, lancing device, lancets, control solutions, alcohol swabs. 1 kit 0    fluconazole (DIFLUCAN) 100 MG tablet Take 1 tablet by mouth daily as needed (yeast infection) Indications: Patient self diagnoses and takes fluconaole for yeast infections. 4 tablet 0    metoprolol succinate (TOPROL XL) 50 MG extended release tablet Take 1 tablet by mouth daily 90 tablet 1    DULoxetine (CYMBALTA) 30 MG extended release capsule Take 1 capsule by mouth daily 90 capsule 1    hydroCHLOROthiazide (HYDRODIURIL) 25 MG tablet TAKE 1 TABLET BY MOUTH  DAILY 90 tablet 1    omeprazole (PRILOSEC) 40 MG delayed release capsule TAKE 1 CAPSULE BY MOUTH  EVERY DAY 90 capsule 1    apixaban (ELIQUIS) 5 MG TABS tablet TAKE 1 TABLET BY MOUTH TWO  TIMES DAILY 180 tablet 1    glyBURIDE (DIABETA) 5 MG tablet TAKE 1 TABLET BY MOUTH  TWICE DAILY WITH MEALS 180 tablet 1    methocarbamol (ROBAXIN) 750 MG tablet Twice daily prn for pain 180 tablet 0    glucose monitoring kit (FREESTYLE) monitoring kit Please fill with what is covered by ins Patient test once a day DM E11.9 1 kit 0    acetaminophen (TYLENOL) 500 MG tablet Take 500 mg by mouth every 6 hours as needed for Pain.  Pt only takes 1-2 times per week      Glucose Blood (BLOOD GLUCOSE TEST STRIPS) STRP by Does not apply route 3 times daily. Freestyle [place] Test Strips       No current facility-administered medications on file prior to encounter. REVIEW OF SYSTEMS    Constitutional: negative  Eyes: negative  Ears, nose, mouth, throat, and face: negative  Respiratory: negative  Cardiovascular: negative except for lower extremity edema  Gastrointestinal: negative  Genitourinary:negative  Integument/breast: negative except for right leg wound  Hematologic/lymphatic: negative  Musculoskeletal:negative  Neurological: negative except for paresthesia  Behavioral/Psych: negative  Endocrine: negative  Allergic/Immunologic: negative    Objective:      BP (!) 172/81   Pulse (!) 40   Temp 98.5 °F (36.9 °C) (Tympanic)   Resp 18   Ht 6' (1.829 m)   Wt (!) 382 lb (173.3 kg)   BMI 51.81 kg/m²     Wt Readings from Last 3 Encounters:   11/16/21 (!) 382 lb (173.3 kg)   11/01/21 (!) 382 lb (173.3 kg)   10/26/21 (!) 377 lb (171 kg)       PHYSICAL EXAM    General Appearance: alert and oriented to person, place and time, well-developed and well-nourished, in no acute distress  Skin: warm and dry, no rash or erythema  Head: normocephalic and atraumatic  Eyes: pupils equal, round, extraocular eye movements intact, and conjunctivae normal  Pulmonary/Chest: normal air movement, no respiratory distress  Extremities: no cyanosis and no clubbing or edema   Musculoskeletal: no joint swelling, deformity or tenderness  Neurologic: gait, coordination normal and speech normal    Wound 09/07/21 Leg Right; Lower; Lateral #1  (Active)   Wound Image   10/07/21 1429   Wound Etiology Non-Healing Surgical 11/16/21 1246   Dressing Status New drainage noted;  Old drainage noted 11/16/21 1246   Wound Cleansed Cleansed with saline 10/19/21 1453   Dressing/Treatment Negative pressure wound therapy 09/24/21 1441   Wound Length (cm) 0.2 cm 11/16/21 1246   Wound Width (cm) 0.8 cm 11/16/21 1246   Wound Depth (cm) 0.5 cm 11/16/21 1246   Wound Surface Area (cm^2) 0.16 cm^2 11/16/21 1246   Change in Wound Size % (l*w) 95.68 11/16/21 1246   Wound Volume (cm^3) 0.08 cm^3 11/16/21 1246   Wound Healing % 100 11/16/21 1246   Post-Procedure Length (cm) 0.3 cm 11/16/21 1246   Post-Procedure Width (cm) 1.5 cm 11/16/21 1246   Post-Procedure Depth (cm) 0.4 cm 11/16/21 1246   Post-Procedure Surface Area (cm^2) 0.45 cm^2 11/16/21 1246   Post-Procedure Volume (cm^3) 0.18 cm^3 11/16/21 1246   Distance Tunneling (cm) 3 cm 09/24/21 1337   Tunneling Position ___ O'Clock 12 09/24/21 1337   Undermining Starts ___ O'Clock 12 09/30/21 1326   Undermining Ends___ O'Clock 3 09/30/21 1326   Undermining Maxium Distance (cm) 1.8cm @3 09/30/21 1326   Wound Assessment Bleeding; Pink/red 11/16/21 1246   Drainage Amount Moderate 11/16/21 1246   Drainage Description Serosanguinous 11/16/21 1246   Odor None 11/16/21 1246   Kaylin-wound Assessment Blanchable erythema 11/16/21 1246   Margins Defined edges 11/16/21 1246   Wound Thickness Description not for Pressure Injury Full thickness 11/16/21 1246   Number of days: 70        Assessment:     PRETIBIAL WOUND RIGHT   DM WITH NEUROPATHY       Procedure Note  Indications:  Based on my examination of this patient's wound(s)/ulcer(s) today, debridement is required to promote healing and evaluate the wound base. Performed by:  Dieter Richards DPM    Consent obtained:  Yes    Time out taken:  Yes    Pain Control: Anesthetic  Anesthetic: 2% Lidocaine Gel Topical     Debridement:Excisional Debridement    Using curette the wound(s)/ulcer(s) was/were sharply debrided down through and including the removal of subcutaneous tissue - NO DEEP ABSCESS UPON PRODDING AND MASSAGE        Devitalized Tissue Debrided:  FIBRIN- NO SIG ODOR    Pre Debridement Measurements:  Are located in the [place]  Documentation Flow Sheet    Wound/Ulcer #: 1    Post Debridement Measurements:  Wound/Ulcer Descriptions are Pre Debridement except measurements:  Percent of Wound(s)/Ulcer(s) Debrided: 100%    Total Surface Area Debrided:  AS ABOVE    Estimated Blood Loss:  Minimal    Hemostasis Achieved:  by pressure    Procedural Pain:  0  / 10     Post Procedural Pain:  0 / 10     Response to treatment:  Well tolerated by patient. Plan:   EXCISIONAL DEBRIDEMENT PERFORMED - ARMAAN AND SALINE DRESSING - CHANGE QD - D/C HOME CARE BECAUSE PT DOES HER OWN WOUND CARE    Zain Kathi 426 1 WEEK    Treatment Note please see Discharge Instructions    Written patient dismissal instructions given to patient and signed by patient or POA.            Electronically signed by Jasper Guan DPM on 11/16/2021 at 1:13 PM

## 2021-11-18 DIAGNOSIS — Z79.4 TYPE 2 DIABETES MELLITUS WITH HYPERGLYCEMIA, WITH LONG-TERM CURRENT USE OF INSULIN (HCC): ICD-10-CM

## 2021-11-18 DIAGNOSIS — E11.65 TYPE 2 DIABETES MELLITUS WITH HYPERGLYCEMIA, WITH LONG-TERM CURRENT USE OF INSULIN (HCC): ICD-10-CM

## 2021-11-18 RX ORDER — INSULIN GLARGINE 300 U/ML
INJECTION, SOLUTION SUBCUTANEOUS
Qty: 6 PEN | Refills: 0 | Status: SHIPPED | OUTPATIENT
Start: 2021-11-18 | End: 2021-11-19 | Stop reason: SDUPTHER

## 2021-11-19 DIAGNOSIS — E11.65 TYPE 2 DIABETES MELLITUS WITH HYPERGLYCEMIA, WITH LONG-TERM CURRENT USE OF INSULIN (HCC): ICD-10-CM

## 2021-11-19 DIAGNOSIS — Z79.4 TYPE 2 DIABETES MELLITUS WITH HYPERGLYCEMIA, WITH LONG-TERM CURRENT USE OF INSULIN (HCC): ICD-10-CM

## 2021-11-19 RX ORDER — INSULIN GLARGINE 300 U/ML
INJECTION, SOLUTION SUBCUTANEOUS
Qty: 6 PEN | Refills: 0 | Status: SHIPPED | OUTPATIENT
Start: 2021-11-19 | End: 2022-07-18

## 2021-11-23 ENCOUNTER — HOSPITAL ENCOUNTER (OUTPATIENT)
Dept: WOUND CARE | Age: 66
Discharge: HOME | End: 2021-11-23

## 2021-11-23 ENCOUNTER — TELEPHONE (OUTPATIENT)
Dept: WOUND CARE | Age: 66
End: 2021-11-23

## 2021-11-30 ENCOUNTER — HOSPITAL ENCOUNTER (OUTPATIENT)
Dept: WOUND CARE | Age: 66
Discharge: HOME | End: 2021-11-30
Payer: COMMERCIAL

## 2021-11-30 VITALS
WEIGHT: 293 LBS | DIASTOLIC BLOOD PRESSURE: 86 MMHG | RESPIRATION RATE: 20 BRPM | HEIGHT: 72 IN | TEMPERATURE: 98.9 F | SYSTOLIC BLOOD PRESSURE: 179 MMHG | HEART RATE: 88 BPM | BODY MASS INDEX: 39.68 KG/M2

## 2021-11-30 DIAGNOSIS — E11.42 DIABETIC POLYNEUROPATHY ASSOCIATED WITH TYPE 2 DIABETES MELLITUS (HCC): ICD-10-CM

## 2021-11-30 DIAGNOSIS — L02.91 ABSCESS: ICD-10-CM

## 2021-11-30 DIAGNOSIS — B35.1 ONYCHOMYCOSIS: Primary | ICD-10-CM

## 2021-11-30 DIAGNOSIS — L97.813 SKIN ULCER OF PRETIBIAL REGION OF RIGHT LOWER EXTREMITY, WITH NECROSIS OF MUSCLE (HCC): ICD-10-CM

## 2021-11-30 PROBLEM — L97.812 ULCER OF RIGHT PRETIBIAL REGION, WITH FAT LAYER EXPOSED (HCC): Status: ACTIVE | Noted: 2021-09-07

## 2021-11-30 PROBLEM — L97.811 ULCER OF RIGHT PRETIBIAL REGION, LIMITED TO BREAKDOWN OF SKIN (HCC): Status: ACTIVE | Noted: 2021-09-07

## 2021-11-30 PROCEDURE — 99212 OFFICE O/P EST SF 10 MIN: CPT

## 2021-11-30 RX ORDER — LIDOCAINE HYDROCHLORIDE 40 MG/ML
SOLUTION TOPICAL ONCE
OUTPATIENT
Start: 2021-11-30 | End: 2021-11-30

## 2021-11-30 RX ORDER — LIDOCAINE HYDROCHLORIDE 20 MG/ML
JELLY TOPICAL ONCE
Status: COMPLETED | OUTPATIENT
Start: 2021-11-30 | End: 2021-11-30

## 2021-11-30 RX ORDER — LIDOCAINE HYDROCHLORIDE 20 MG/ML
JELLY TOPICAL ONCE
OUTPATIENT
Start: 2021-11-30 | End: 2021-11-30

## 2021-11-30 RX ADMIN — LIDOCAINE HYDROCHLORIDE 6 ML: 20 JELLY TOPICAL at 12:50

## 2021-11-30 NOTE — PROGRESS NOTES
[address]   Progress Note and Procedure Note      Brionna Grant  MEDICAL RECORD NUMBER:  [id number]  AGE: 74 y.o. GENDER: female  : 1955  EPISODE DATE:  2021    Subjective:     Chief Complaint   Patient presents with    Wound Check     right lower extremity         HISTORY of PRESENT ILLNESS HPI     Brionna Grant is a 74 y.o. female who presents today for wound/ulcer evaluation. History of Wound Context: here to follow up on right leg wound. APPLIES  SSD QD    Wound/Ulcer Pain Timing/Severity: none  Quality of pain: N/A  Severity:  0 / 10   Modifying Factors: None  Associated Signs/Symptoms: none    Ulcer Identification:  Ulcer Type: non-healing surgical  Contributing Factors: edema, lymphedema, diabetes, decreased mobility and obesity    Wound: N/A        PAST MEDICAL HISTORY        Diagnosis Date    Back pain 2012    Cellulitis 10/9/2015    Lt.  Lower Leg    DM (diabetes mellitus) ([facility] [address] 2012    DVT (deep venous thrombosis) ([facility] 2012    GERD (gastroesophageal reflux disease) 2012    MVP (mitral valve prolapse) 2012       PAST SURGICAL HISTORY    Past Surgical History:   Procedure Laterality Date    HYSTERECTOMY      TONSILLECTOMY         FAMILY HISTORY    Family History   Problem Relation Age of Onset    Heart Disease Mother        SOCIAL HISTORY    Social History     Tobacco Use    Smoking status: Former Smoker     Packs/day: 1.00     Years: 30.00     Pack years: 30.00     Types: Cigarettes     Quit date: 1983     Years since quittin.4    Smokeless tobacco: Never Used   Vaping Use    Vaping Use: Never used   Substance Use Topics    Alcohol use: No    Drug use: No       ALLERGIES    Allergies   Allergen Reactions    Erythromycin Hives    Lisinopril Itching       MEDICATIONS    Current Outpatient Medications on File Prior to Encounter   Medication Sig Dispense Refill    Insulin Glargine, 1 Unit Dial, (TOUJEO SOLOSTAR) 300 UNIT/ML SOPN Inject 34 units every morning 6 pen 0    Dulaglutide (TRULICITY) 1.5 AT/3.6UY SOPN Inject 1.5 mg into the skin once a week 12 pen 0    Dulaglutide 0.75 MG/0.5ML SOPN Inject 1.5 mg into the skin once a week 4 pen 0    Insulin Glargine, 1 Unit Dial, (TOUJEO SOLOSTAR) 300 UNIT/ML SOPN Inject 34 units every morning 2 pen 0    Insulin Pen Needle 32G X 4 MM MISC 1 each by Does not apply route daily 100 each 3    gabapentin (NEURONTIN) 300 MG capsule TAKE 2 CAPSULES BY MOUTH 3  TIMES DAILY 180 capsule 0    blood glucose monitor kit and supplies Dispense sufficient amount for indicated testing frequency plus additional to accommodate PRN testing needs. Dispense all needed supplies to include: monitor, strips, lancing device, lancets, control solutions, alcohol swabs. 1 kit 0    fluconazole (DIFLUCAN) 100 MG tablet Take 1 tablet by mouth daily as needed (yeast infection) Indications: Patient self diagnoses and takes fluconaole for yeast infections. 4 tablet 0    metoprolol succinate (TOPROL XL) 50 MG extended release tablet Take 1 tablet by mouth daily 90 tablet 1    DULoxetine (CYMBALTA) 30 MG extended release capsule Take 1 capsule by mouth daily 90 capsule 1    hydroCHLOROthiazide (HYDRODIURIL) 25 MG tablet TAKE 1 TABLET BY MOUTH  DAILY 90 tablet 1    omeprazole (PRILOSEC) 40 MG delayed release capsule TAKE 1 CAPSULE BY MOUTH  EVERY DAY 90 capsule 1    apixaban (ELIQUIS) 5 MG TABS tablet TAKE 1 TABLET BY MOUTH TWO  TIMES DAILY 180 tablet 1    glyBURIDE (DIABETA) 5 MG tablet TAKE 1 TABLET BY MOUTH  TWICE DAILY WITH MEALS 180 tablet 1    methocarbamol (ROBAXIN) 750 MG tablet Twice daily prn for pain 180 tablet 0    glucose monitoring kit (FREESTYLE) monitoring kit Please fill with what is covered by ins Patient test once a day DM E11.9 1 kit 0    acetaminophen (TYLENOL) 500 MG tablet Take 500 mg by mouth every 6 hours as needed for Pain.  Pt only takes 1-2 times per week  Glucose Blood (BLOOD GLUCOSE TEST STRIPS) STRP by Does not apply route 3 times daily. Freestyle [place] Test Strips       No current facility-administered medications on file prior to encounter. REVIEW OF SYSTEMS    Constitutional: negative  Eyes: negative  Ears, nose, mouth, throat, and face: negative  Respiratory: negative  Cardiovascular: negative except for lower extremity edema  Gastrointestinal: negative  Genitourinary:negative  Integument/breast: negative except for right leg wound  Hematologic/lymphatic: negative  Musculoskeletal:negative  Neurological: negative except for paresthesia  Behavioral/Psych: negative  Endocrine: negative  Allergic/Immunologic: negative    Objective:      BP (!) 179/86   Pulse 88   Temp 98.9 °F (37.2 °C) (Tympanic)   Resp 20   Ht 6' (1.829 m)   Wt (!) 382 lb (173.3 kg)   BMI 51.81 kg/m²     Wt Readings from Last 3 Encounters:   11/30/21 (!) 382 lb (173.3 kg)   11/16/21 (!) 382 lb (173.3 kg)   11/01/21 (!) 382 lb (173.3 kg)       PHYSICAL EXAM    General Appearance: alert and oriented to person, place and time, well-developed and well-nourished, in no acute distress  Skin: warm and dry, no rash or erythema  Head: normocephalic and atraumatic  Eyes: pupils equal, round, extraocular eye movements intact, and conjunctivae normal  Pulmonary/Chest: normal air movement, no respiratory distress  Extremities: no cyanosis and no clubbing or edema   Musculoskeletal: no joint swelling, deformity or tenderness  Neurologic: gait, coordination normal and speech normal    HEALED RIGHT PRETIBIAL WOUND    Assessment:     PRETIBIAL WOUND RIGHT   DM WITH NEUROPATHY         Plan:   MONITOR AREA CAREFULLY AND WATCH FOR RECURRENCE.    Zain Armenta 426 PRN    Treatment Note please see Discharge Instructions    Written patient dismissal instructions given to patient and signed by patient or POA.            Electronically signed by Phan Salcedo DPM on 11/30/2021 at 1:00 PM

## 2021-12-02 ENCOUNTER — OFFICE VISIT (OUTPATIENT)
Dept: FAMILY MEDICINE CLINIC | Age: 66
End: 2021-12-02
Payer: COMMERCIAL

## 2021-12-02 VITALS
HEART RATE: 56 BPM | WEIGHT: 293 LBS | BODY MASS INDEX: 52.62 KG/M2 | OXYGEN SATURATION: 97 % | SYSTOLIC BLOOD PRESSURE: 138 MMHG | DIASTOLIC BLOOD PRESSURE: 74 MMHG

## 2021-12-02 DIAGNOSIS — E11.65 TYPE 2 DIABETES MELLITUS WITH HYPERGLYCEMIA, WITH LONG-TERM CURRENT USE OF INSULIN (HCC): Primary | ICD-10-CM

## 2021-12-02 DIAGNOSIS — I10 HTN, GOAL BELOW 130/80: ICD-10-CM

## 2021-12-02 DIAGNOSIS — Z79.4 TYPE 2 DIABETES MELLITUS WITH HYPERGLYCEMIA, WITH LONG-TERM CURRENT USE OF INSULIN (HCC): Primary | ICD-10-CM

## 2021-12-02 DIAGNOSIS — E78.5 DYSLIPIDEMIA: ICD-10-CM

## 2021-12-02 PROCEDURE — 99214 OFFICE O/P EST MOD 30 MIN: CPT | Performed by: FAMILY MEDICINE

## 2021-12-02 PROCEDURE — 3052F HG A1C>EQUAL 8.0%<EQUAL 9.0%: CPT | Performed by: FAMILY MEDICINE

## 2021-12-02 ASSESSMENT — SOCIAL DETERMINANTS OF HEALTH (SDOH): HOW HARD IS IT FOR YOU TO PAY FOR THE VERY BASICS LIKE FOOD, HOUSING, MEDICAL CARE, AND HEATING?: NOT HARD AT ALL

## 2021-12-02 NOTE — PROGRESS NOTES
Progress Note    Abby Palomo is a 74 y.o.  female who presents today alone for evaluation of   Chief Complaint   Patient presents with    Diabetes           HPI:   Patient is here for follow up on DM/HTN/chol. Patient last HbA1c 8.6 11/2021. Patient was supposed to start 34 units of toujeo daily and 1.5 mg of trulicity once weekly. Patient -190. Patient states she did not start the toujeo because her pharmacy did not dispense the insulin to her. Patient is taking her trulicity. Patient today denies cp/sob/le edema/dizziness/lightheadedness/blurry va/ha. Patient denies PND/orthopnea. Health Maintenance Due   Topic Date Due    Hepatitis C screen  Never done    COVID-19 Vaccine (1) Never done    DTaP/Tdap/Td vaccine (1 - Tdap) Never done    Shingles Vaccine (1 of 2) Never done    DEXA (modify frequency per FRAX score)  Never done    Diabetic retinal exam  05/05/2008    Breast cancer screen  02/22/2018    Pneumococcal 65+ years Vaccine (1 of 1 - PPSV23) Never done    Diabetic foot exam  02/19/2021        Current Medications:     Current Outpatient Medications   Medication Sig Dispense Refill    Insulin Glargine, 1 Unit Dial, (TOUJEO SOLOSTAR) 300 UNIT/ML SOPN Inject 34 units every morning 6 pen 0    Dulaglutide (TRULICITY) 1.5 QU/8.4RL SOPN Inject 1.5 mg into the skin once a week 12 pen 0    Dulaglutide 0.75 MG/0.5ML SOPN Inject 1.5 mg into the skin once a week 4 pen 0    Insulin Glargine, 1 Unit Dial, (TOUJEO SOLOSTAR) 300 UNIT/ML SOPN Inject 34 units every morning 2 pen 0    Insulin Pen Needle 32G X 4 MM MISC 1 each by Does not apply route daily 100 each 3    gabapentin (NEURONTIN) 300 MG capsule TAKE 2 CAPSULES BY MOUTH 3  TIMES DAILY 180 capsule 0    blood glucose monitor kit and supplies Dispense sufficient amount for indicated testing frequency plus additional to accommodate PRN testing needs.  Dispense all needed supplies to include: monitor, strips, lancing device, lancets, control solutions, alcohol swabs. 1 kit 0    fluconazole (DIFLUCAN) 100 MG tablet Take 1 tablet by mouth daily as needed (yeast infection) Indications: Patient self diagnoses and takes fluconaole for yeast infections. 4 tablet 0    metoprolol succinate (TOPROL XL) 50 MG extended release tablet Take 1 tablet by mouth daily 90 tablet 1    DULoxetine (CYMBALTA) 30 MG extended release capsule Take 1 capsule by mouth daily 90 capsule 1    hydroCHLOROthiazide (HYDRODIURIL) 25 MG tablet TAKE 1 TABLET BY MOUTH  DAILY 90 tablet 1    omeprazole (PRILOSEC) 40 MG delayed release capsule TAKE 1 CAPSULE BY MOUTH  EVERY DAY 90 capsule 1    apixaban (ELIQUIS) 5 MG TABS tablet TAKE 1 TABLET BY MOUTH TWO  TIMES DAILY 180 tablet 1    glyBURIDE (DIABETA) 5 MG tablet TAKE 1 TABLET BY MOUTH  TWICE DAILY WITH MEALS 180 tablet 1    methocarbamol (ROBAXIN) 750 MG tablet Twice daily prn for pain 180 tablet 0    glucose monitoring kit (FREESTYLE) monitoring kit Please fill with what is covered by ins Patient test once a day DM E11.9 1 kit 0    acetaminophen (TYLENOL) 500 MG tablet Take 500 mg by mouth every 6 hours as needed for Pain. Pt only takes 1-2 times per week      Glucose Blood (BLOOD GLUCOSE TEST STRIPS) STRP by Does not apply route 3 times daily. Freestyle [place] Test Strips       No current facility-administered medications for this visit. Allergies: Allergies   Allergen Reactions    Erythromycin Hives    Lisinopril Itching        Medical History:     Past Medical History:   Diagnosis Date    Back pain 6/22/2012    Cellulitis 10/9/2015    Lt.  Lower Leg    DM [facility]di[facility] mellitus) ([facility] [address] 06/22/2012    DVT (deep venous thrombosis) ([facility] [address] 6/22/2012    GERD (gastroesophageal reflux disease) 6/22/2012    MVP (mitral valve prolapse) 6/22/2012       Past Surgical History:   Procedure Laterality Date    HYSTERECTOMY      TONSILLECTOMY         Family History   Problem Relation Age of Onset    Heart Disease Mother         Social History:     Social History     Socioeconomic History    Marital status:      Spouse name: Not on file    Number of children: Not on file    Years of education: Not on file    Highest education level: Not on file   Occupational History    Not on file   Tobacco Use    Smoking status: Former Smoker     Packs/day: 1.00     Years: 30.00     Pack years: 30.00     Types: Cigarettes     Quit date: 1983     Years since quittin.4    Smokeless tobacco: Never Used   Vaping Use    Vaping Use: Never used   Substance and Sexual Activity    Alcohol use: No    Drug use: No    Sexual activity: Not on file   Other Topics Concern    Not on file   Social History Narrative    Not on file     Social Determinants of Health     Financial Resource Strain: Low Risk     Difficulty of Paying Living Expenses: Not hard at all   Food Insecurity: No Food Insecurity    Worried About [address] in the Last Year: Never true    [facility] in the Last Year: Never true   Transportation Needs:     Lack of Transportation (Medical): Not on file    Lack of Transportation (Non-Medical):  Not on file   Physical Activity:     Days of Exercise per Week: Not on file    Minutes of Exercise per Session: Not on file   Stress:     Feeling of Stress : Not on file   Social Connections:     Frequency of Communication with Friends and Family: Not on file    Frequency of Social Gatherings with Friends and Family: Not on file    Attends Caodaism Services: Not on file    Active Member of Clubs or Organizations: Not on file    Attends Club or Organization Meetings: Not on file    Marital Status: Not on file   Intimate Partner Violence:     Fear of Current or Ex-Partner: Not on file    Emotionally Abused: Not on file    Physically Abused: Not on file    Sexually Abused: Not on file   Housing Stability:     Unable to Pay for Housing in the Last Year: Not on file    Number of Places U/L Final    Hemoglobin A1C 11/04/2021 8.6  % Final    AVERAGE GLUCOSE 11/04/2021 200   Final    Cholesterol, Total 11/04/2021 182  mg/dL Final    HDL 11/04/2021 36  35 - 70 mg/dL Final    LDL Calculated 11/04/2021 82  0 - 160 mg/dL Final    Triglycerides 11/04/2021 319  mg/dL Final    Magnesium 11/04/2021 1.4  mg/dL Final    TSH 11/04/2021 1.69  uIU/mL Corrected    WBC 11/04/2021 5.6  10^3/mL Final    RBC 11/04/2021 4.11  10^6/µL Final    Hemoglobin 11/04/2021 12.4  12.0 - 16.0 g/dL Final    Hematocrit 11/04/2021 36.9  36 - 46 % Final    MCV 11/04/2021 90  fL Final    MCH 11/04/2021 30.2  pg Final    MCHC 11/04/2021 33.7  g/dL Final    Platelets 44/25/8755 226  K/µL Final    RDW 11/04/2021 14.5  % Final    MPV 11/04/2021 8.0  fL Final    Neutrophils % 11/04/2021 71.5  % Final    Lymphocytes % 11/04/2021 18.2  % Final    Monocytes % 11/04/2021 7.0  % Final    Eosinophils % 11/04/2021 2.5  % Final    Basophils % 11/04/2021 0.8  % Final    Neutrophils Absolute 11/04/2021 4.0  /µL Final    Lymphocytes Absolute 11/04/2021 1.0  /µL Final    Monocytes Absolute 11/04/2021 0.4  /µL Final    Eosinophils Absolute 11/04/2021 0.1  /µL Final    Basophils Absolute 11/04/2021 0.0  /µL Final    Microalb, Ur 11/04/2021 39   Final    Creatinine, Ur 11/04/2021 96.58  mg/dL Final       No results found for this visit on 12/02/21. Assessment/Plan:     Sharron Frazier was seen today for diabetes. Diagnoses and all orders for this visit:    Type 2 diabetes mellitus with hyperglycemia, with long-term current use of insulin (HCC)  -     Hemoglobin A1C; Future  -     Microalbumin, Ur; Future    HTN, goal below 130/80  -     Magnesium; Future  -     Renal Function Panel; Future    Dyslipidemia  -     ALT; Future  -     AST; Future  -     CBC Auto Differential; Future  -     Lipid Panel; Future     Follow up on labs. Reiterated need to take medications as directed. FBG improving. HbA1c improving.     All questions answered and addressed to patient satisfaction. Patient understands and agrees to the plan. The patient was evaluated and treated today based on the osteopathic principle that each person is a unit of body, mind, and spirit, the body is capable of self-regulation, self-healing, and health maintenance and that structure and function are reciprocally interrelated. Follow-up:   Return in about 3 months (around 3/2/2022) for dm; 20 min appt.       Freddy Galeana D.O.

## 2021-12-13 ENCOUNTER — TELEPHONE (OUTPATIENT)
Dept: FAMILY MEDICINE CLINIC | Age: 66
End: 2021-12-13

## 2021-12-13 DIAGNOSIS — M54.50 CHRONIC BILATERAL LOW BACK PAIN: ICD-10-CM

## 2021-12-13 DIAGNOSIS — G89.29 CHRONIC BILATERAL LOW BACK PAIN: ICD-10-CM

## 2021-12-13 RX ORDER — DULOXETIN HYDROCHLORIDE 30 MG/1
30 CAPSULE, DELAYED RELEASE ORAL DAILY
Qty: 90 CAPSULE | Refills: 3 | Status: SHIPPED | OUTPATIENT
Start: 2021-12-13 | End: 2022-04-08 | Stop reason: SDUPTHER

## 2021-12-13 RX ORDER — HYDROCHLOROTHIAZIDE 25 MG/1
TABLET ORAL
Qty: 90 TABLET | Refills: 3 | Status: SHIPPED | OUTPATIENT
Start: 2021-12-13 | End: 2022-04-08 | Stop reason: SDUPTHER

## 2021-12-13 NOTE — TELEPHONE ENCOUNTER
Can someone call her pharmacy with the copay card information of if you give me the info I will call myself please let me know.  Can she have samples in the mean time

## 2021-12-30 DIAGNOSIS — E11.65 TYPE 2 DIABETES MELLITUS WITH HYPERGLYCEMIA, WITH LONG-TERM CURRENT USE OF INSULIN (HCC): ICD-10-CM

## 2021-12-30 DIAGNOSIS — Z79.4 TYPE 2 DIABETES MELLITUS WITH HYPERGLYCEMIA, WITH LONG-TERM CURRENT USE OF INSULIN (HCC): ICD-10-CM

## 2021-12-30 RX ORDER — DULAGLUTIDE 1.5 MG/.5ML
INJECTION, SOLUTION SUBCUTANEOUS
Qty: 6 ML | Refills: 3 | Status: SHIPPED | OUTPATIENT
Start: 2021-12-30 | End: 2022-04-12 | Stop reason: SDUPTHER

## 2021-12-30 NOTE — TELEPHONE ENCOUNTER
Tonya Triplett is calling to request a refill on the following medication(s):    Medication Request:  Requested Prescriptions     Pending Prescriptions Disp Refills    Talking Data 1.5 KT/3.0JU SOPN [Pharmacy Med Name: Trulicity 1.5 TY/2.3LP Subcutaneous Solution Pen-injector] 6 mL 3     Sig: INJECT THE CONTENTS OF ONE  PEN SUBCUTANEOUSLY WEEKLY  AS DIRECTED       Last Visit Date (If Applicable):  50/8/8854    Next Visit Date:    3/3/2022

## 2022-01-04 RX ORDER — DOXYCYCLINE HYCLATE 100 MG
100 TABLET ORAL 2 TIMES DAILY
Qty: 20 TABLET | Refills: 0 | Status: SHIPPED | OUTPATIENT
Start: 2022-01-04 | End: 2022-01-14

## 2022-01-10 RX ORDER — OMEPRAZOLE 40 MG/1
CAPSULE, DELAYED RELEASE ORAL
Qty: 90 CAPSULE | Refills: 3 | Status: SHIPPED | OUTPATIENT
Start: 2022-01-10 | End: 2022-04-08 | Stop reason: SDUPTHER

## 2022-02-23 DIAGNOSIS — E11.42 DIABETIC POLYNEUROPATHY ASSOCIATED WITH TYPE 2 DIABETES MELLITUS (HCC): ICD-10-CM

## 2022-02-23 RX ORDER — GABAPENTIN 300 MG/1
300 CAPSULE ORAL 3 TIMES DAILY
Qty: 180 CAPSULE | Refills: 0 | Status: SHIPPED | OUTPATIENT
Start: 2022-02-23 | End: 2022-04-08 | Stop reason: SDUPTHER

## 2022-02-23 NOTE — TELEPHONE ENCOUNTER
Miracle Ordoñez is calling to request a refill on the following medication(s):    Medication Request:  Requested Prescriptions     Pending Prescriptions Disp Refills    gabapentin (NEURONTIN) 300 MG capsule 180 capsule 0     Sig: Take 1 capsule by mouth 3 times daily for 90 days.        Last Visit Date (If Applicable):  32/4/6043    Next Visit Date:    Visit date not found

## 2022-02-25 DIAGNOSIS — E11.65 TYPE 2 DIABETES MELLITUS WITH HYPERGLYCEMIA, WITH LONG-TERM CURRENT USE OF INSULIN (HCC): ICD-10-CM

## 2022-02-25 DIAGNOSIS — Z79.4 TYPE 2 DIABETES MELLITUS WITH HYPERGLYCEMIA, WITH LONG-TERM CURRENT USE OF INSULIN (HCC): ICD-10-CM

## 2022-02-25 RX ORDER — INSULIN GLARGINE 300 U/ML
INJECTION, SOLUTION SUBCUTANEOUS
Qty: 9 ML | Refills: 3 | Status: SHIPPED | OUTPATIENT
Start: 2022-02-25 | End: 2022-07-18

## 2022-02-25 NOTE — TELEPHONE ENCOUNTER
Kimberly Shi is calling to request a refill on the following medication(s):    Medication Request:  Requested Prescriptions     Pending Prescriptions Disp Refills    Insulin Glargine, 1 Unit Dial, (TOUJEO SOLOSTAR) 300 UNIT/ML SOPN [Pharmacy Med Name: Shamar Hurtado 3X1.5 ML] 9 mL 3     Sig: INJECT SUBCUTANEOUSLY 34  UNITS IN THE MORNING       Last Visit Date (If Applicable):  77/1/5047    Next Visit Date:    Visit date not found

## 2022-03-16 ENCOUNTER — TELEPHONE (OUTPATIENT)
Dept: FAMILY MEDICINE CLINIC | Age: 67
End: 2022-03-16

## 2022-03-16 RX ORDER — AZITHROMYCIN 250 MG/1
250 TABLET, FILM COATED ORAL SEE ADMIN INSTRUCTIONS
Qty: 6 TABLET | Refills: 0 | Status: CANCELLED | OUTPATIENT
Start: 2022-03-16 | End: 2022-03-21

## 2022-03-16 NOTE — TELEPHONE ENCOUNTER
Patient is experiencing a sinus infection with pain and pressure she is wanting to know if you can send anything in for this please advise

## 2022-03-16 NOTE — TELEPHONE ENCOUNTER
Pt says this has been going on for about a week, she will try the mucinex DM and will call back if it doesn't help her

## 2022-04-08 DIAGNOSIS — M54.50 CHRONIC BILATERAL LOW BACK PAIN: ICD-10-CM

## 2022-04-08 DIAGNOSIS — Z79.4 TYPE 2 DIABETES MELLITUS WITH HYPERGLYCEMIA, WITH LONG-TERM CURRENT USE OF INSULIN (HCC): ICD-10-CM

## 2022-04-08 DIAGNOSIS — E11.42 DIABETIC POLYNEUROPATHY ASSOCIATED WITH TYPE 2 DIABETES MELLITUS (HCC): ICD-10-CM

## 2022-04-08 DIAGNOSIS — G89.29 CHRONIC BILATERAL LOW BACK PAIN: ICD-10-CM

## 2022-04-08 DIAGNOSIS — E11.65 TYPE 2 DIABETES MELLITUS WITH HYPERGLYCEMIA, WITH LONG-TERM CURRENT USE OF INSULIN (HCC): ICD-10-CM

## 2022-04-08 RX ORDER — HYDROCHLOROTHIAZIDE 25 MG/1
TABLET ORAL
Qty: 90 TABLET | Refills: 1 | Status: SHIPPED | OUTPATIENT
Start: 2022-04-08

## 2022-04-08 RX ORDER — GABAPENTIN 300 MG/1
300 CAPSULE ORAL 3 TIMES DAILY
Qty: 180 CAPSULE | Refills: 1 | Status: SHIPPED | OUTPATIENT
Start: 2022-04-08 | End: 2022-07-07

## 2022-04-08 RX ORDER — OMEPRAZOLE 40 MG/1
CAPSULE, DELAYED RELEASE ORAL
Qty: 90 CAPSULE | Refills: 1 | Status: SHIPPED | OUTPATIENT
Start: 2022-04-08 | End: 2022-10-18 | Stop reason: SDUPTHER

## 2022-04-08 RX ORDER — GLYBURIDE 5 MG/1
TABLET ORAL
Qty: 180 TABLET | Refills: 1 | Status: SHIPPED | OUTPATIENT
Start: 2022-04-08

## 2022-04-08 RX ORDER — DULOXETIN HYDROCHLORIDE 30 MG/1
30 CAPSULE, DELAYED RELEASE ORAL DAILY
Qty: 90 CAPSULE | Refills: 1 | Status: SHIPPED | OUTPATIENT
Start: 2022-04-08

## 2022-04-08 RX ORDER — INSULIN GLARGINE 300 U/ML
INJECTION, SOLUTION SUBCUTANEOUS
Qty: 6 PEN | Refills: 1 | Status: SHIPPED | OUTPATIENT
Start: 2022-04-08 | End: 2022-07-18

## 2022-04-08 RX ORDER — METOPROLOL SUCCINATE 50 MG/1
50 TABLET, EXTENDED RELEASE ORAL DAILY
Qty: 90 TABLET | Refills: 1 | Status: SHIPPED | OUTPATIENT
Start: 2022-04-08

## 2022-04-08 NOTE — TELEPHONE ENCOUNTER
Awilda Nair is calling to request a refill on the following medication(s):    Medication Request:  Requested Prescriptions     Pending Prescriptions Disp Refills    DULoxetine (CYMBALTA) 30 MG extended release capsule 90 capsule 1     Sig: Take 1 capsule by mouth daily    hydroCHLOROthiazide (HYDRODIURIL) 25 MG tablet 90 tablet 1     Sig: TAKE 1 TABLET BY MOUTH  DAILY    omeprazole (PRILOSEC) 40 MG delayed release capsule 90 capsule 3     Sig: TAKE 1 CAPSULE BY MOUTH  DAILY    metoprolol succinate (TOPROL XL) 50 MG extended release tablet 90 tablet 1     Sig: Take 1 tablet by mouth daily    gabapentin (NEURONTIN) 300 MG capsule 180 capsule 0     Sig: Take 1 capsule by mouth 3 times daily for 90 days.     glyBURIDE (DIABETA) 5 MG tablet 180 tablet 1     Sig: TAKE 1 TABLET BY MOUTH  TWICE DAILY WITH MEALS    Dulaglutide 0.75 MG/0.5ML SOPN 4 pen 0     Sig: Inject 1.5 mg into the skin once a week    Insulin Glargine, 1 Unit Dial, (TOUJEO SOLOSTAR) 300 UNIT/ML SOPN 2 pen 0     Sig: Inject 34 units every morning       Last Visit Date (If Applicable):  57/0/3752    Next Visit Date:    Visit date not found

## 2022-04-12 DIAGNOSIS — E11.65 TYPE 2 DIABETES MELLITUS WITH HYPERGLYCEMIA, WITH LONG-TERM CURRENT USE OF INSULIN (HCC): ICD-10-CM

## 2022-04-12 DIAGNOSIS — Z79.4 TYPE 2 DIABETES MELLITUS WITH HYPERGLYCEMIA, WITH LONG-TERM CURRENT USE OF INSULIN (HCC): ICD-10-CM

## 2022-04-12 RX ORDER — DULAGLUTIDE 1.5 MG/.5ML
INJECTION, SOLUTION SUBCUTANEOUS
Qty: 6 ML | Refills: 3 | Status: SHIPPED | OUTPATIENT
Start: 2022-04-12 | End: 2022-07-18

## 2022-04-12 RX ORDER — FLUCONAZOLE 100 MG/1
100 TABLET ORAL DAILY
Qty: 3 TABLET | Refills: 1 | Status: SHIPPED | OUTPATIENT
Start: 2022-04-12 | End: 2022-04-19

## 2022-04-12 NOTE — TELEPHONE ENCOUNTER
[facility] called rx sent for fluconazole daily for 7 days quantity of 3 with one refill     Do you want 3 days  With refill  Or seven days?

## 2022-04-13 ENCOUNTER — TELEPHONE (OUTPATIENT)
Dept: FAMILY MEDICINE CLINIC | Age: 67
End: 2022-04-13

## 2022-04-13 DIAGNOSIS — E11.65 TYPE 2 DIABETES MELLITUS WITH HYPERGLYCEMIA, WITH LONG-TERM CURRENT USE OF INSULIN (HCC): ICD-10-CM

## 2022-04-13 DIAGNOSIS — Z79.4 TYPE 2 DIABETES MELLITUS WITH HYPERGLYCEMIA, WITH LONG-TERM CURRENT USE OF INSULIN (HCC): ICD-10-CM

## 2022-04-13 NOTE — TELEPHONE ENCOUNTER
----- Message from Dina Whitlock sent at 4/12/2022  5:04 PM EDT -----  Subject: Medication Problem    QUESTIONS  Name of Medication? Dulaglutide (TRULICITY) 1.5 NM/2.6OQ SOPN  Patient-reported dosage and instructions? 1.5 mg once a week  What question or problem do you have with the medication? Marcia Mullen with [facility] calling in regards to the script received on   this medication. They need strength and directions clarified. On the   script it states 1.5 mg once a week but on the medication it states 0.75   once a week. Preferred Pharmacy? [facility] [address], [address] [phone] [place] [phone]  Pharmacy phone number (if available)? [phone]  Additional Information for Provider? Please call back at [phone]   option 2 with clarification. Ref #[id number]  ---------------------------------------------------------------------------  --------------  Nikki Grass INFO  What is the best way for the office to contact you? OK to leave message on   voicemail  Preferred Call Back Phone Number? [phone]  ---------------------------------------------------------------------------  --------------  SCRIPT ANSWERS  Relationship to Patient? Third Party  Third Party Type? Pharmacy? Representative Name?  Marcia Mullen

## 2022-04-14 ENCOUNTER — HOSPITAL ENCOUNTER (OUTPATIENT)
Age: 67
Setting detail: SPECIMEN
Discharge: HOME | End: 2022-04-14

## 2022-04-14 DIAGNOSIS — Z79.4 TYPE 2 DIABETES MELLITUS WITH HYPERGLYCEMIA, WITH LONG-TERM CURRENT USE OF INSULIN (HCC): ICD-10-CM

## 2022-04-14 DIAGNOSIS — E11.65 TYPE 2 DIABETES MELLITUS WITH HYPERGLYCEMIA, WITH LONG-TERM CURRENT USE OF INSULIN (HCC): ICD-10-CM

## 2022-04-14 DIAGNOSIS — E78.5 DYSLIPIDEMIA: ICD-10-CM

## 2022-04-14 DIAGNOSIS — I10 HTN, GOAL BELOW 130/80: ICD-10-CM

## 2022-04-14 LAB
ABSOLUTE EOS #: 0.22 K/UL (ref 0–0.44)
ABSOLUTE IMMATURE GRANULOCYTE: 0.1 K/UL (ref 0–0.3)
ABSOLUTE LYMPH #: 1.77 K/UL (ref 1.1–3.7)
ABSOLUTE MONO #: 0.55 K/UL (ref 0.1–1.2)
ALBUMIN SERPL-MCNC: 3.7 G/DL (ref 3.5–5.2)
ALT SERPL-CCNC: 16 U/L (ref 5–33)
ANION GAP SERPL CALCULATED.3IONS-SCNC: 14 MMOL/L (ref 9–17)
AST SERPL-CCNC: 20 U/L
BASOPHILS # BLD: 1 % (ref 0–2)
BASOPHILS ABSOLUTE: 0.06 K/UL (ref 0–0.2)
BUN BLDV-MCNC: 18 MG/DL (ref 8–23)
CALCIUM SERPL-MCNC: 9.1 MG/DL (ref 8.6–10.4)
CHLORIDE BLD-SCNC: 100 MMOL/L (ref 98–107)
CHOLESTEROL/HDL RATIO: 4.2
CHOLESTEROL: 157 MG/DL
CO2: 20 MMOL/L (ref 20–31)
CREAT SERPL-MCNC: 1.08 MG/DL (ref 0.5–0.9)
EOSINOPHILS RELATIVE PERCENT: 3 % (ref 1–4)
ESTIMATED AVERAGE GLUCOSE: 249 MG/DL
GFR AFRICAN AMERICAN: >60 ML/MIN
GFR NON-AFRICAN AMERICAN: 50 ML/MIN
GFR SERPL CREATININE-BSD FRML MDRD: ABNORMAL ML/MIN/{1.73_M2}
GLUCOSE BLD-MCNC: 315 MG/DL (ref 70–99)
HBA1C MFR BLD: 10.3 % (ref 4–6)
HCT VFR BLD CALC: 41.6 % (ref 36.3–47.1)
HDLC SERPL-MCNC: 37 MG/DL
HEMOGLOBIN: 13 G/DL (ref 11.9–15.1)
IMMATURE GRANULOCYTES: 1 %
LDL CHOLESTEROL: 75 MG/DL (ref 0–130)
LYMPHOCYTES # BLD: 20 % (ref 24–43)
MAGNESIUM: 1.4 MG/DL (ref 1.6–2.6)
MCH RBC QN AUTO: 28.9 PG (ref 25.2–33.5)
MCHC RBC AUTO-ENTMCNC: 31.3 G/DL (ref 28.4–34.8)
MCV RBC AUTO: 92.4 FL (ref 82.6–102.9)
MONOCYTES # BLD: 6 % (ref 3–12)
NRBC AUTOMATED: 0 PER 100 WBC
PDW BLD-RTO: 13.5 % (ref 11.8–14.4)
PHOSPHORUS: 3.9 MG/DL (ref 2.6–4.5)
PLATELET # BLD: 298 K/UL (ref 138–453)
PMV BLD AUTO: 9.6 FL (ref 8.1–13.5)
POTASSIUM SERPL-SCNC: 4.8 MMOL/L (ref 3.7–5.3)
RBC # BLD: 4.5 M/UL (ref 3.95–5.11)
SEG NEUTROPHILS: 69 % (ref 36–65)
SEGMENTED NEUTROPHILS ABSOLUTE COUNT: 6 K/UL (ref 1.5–8.1)
SODIUM BLD-SCNC: 134 MMOL/L (ref 135–144)
TRIGL SERPL-MCNC: 225 MG/DL
WBC # BLD: 8.7 K/UL (ref 3.5–11.3)

## 2022-04-18 ENCOUNTER — TELEPHONE (OUTPATIENT)
Dept: FAMILY MEDICINE CLINIC | Age: 67
End: 2022-04-18

## 2022-04-18 NOTE — TELEPHONE ENCOUNTER
Patient called and said her tracy was cancelled and she can only come this week because no ride. Was not sure where you would like me to put her?

## 2022-04-25 ENCOUNTER — SCHEDULED TELEPHONE ENCOUNTER (OUTPATIENT)
Dept: FAMILY MEDICINE CLINIC | Age: 67
End: 2022-04-25
Payer: COMMERCIAL

## 2022-04-25 DIAGNOSIS — E78.5 DYSLIPIDEMIA: ICD-10-CM

## 2022-04-25 DIAGNOSIS — L08.9 SKIN INFECTION: ICD-10-CM

## 2022-04-25 DIAGNOSIS — Z79.4 TYPE 2 DIABETES MELLITUS WITH HYPERGLYCEMIA, WITH LONG-TERM CURRENT USE OF INSULIN (HCC): ICD-10-CM

## 2022-04-25 DIAGNOSIS — I10 HTN, GOAL BELOW 130/80: ICD-10-CM

## 2022-04-25 DIAGNOSIS — R22.41 LOCALIZED SWELLING OF RIGHT LOWER EXTREMITY: Primary | ICD-10-CM

## 2022-04-25 DIAGNOSIS — E11.65 TYPE 2 DIABETES MELLITUS WITH HYPERGLYCEMIA, WITH LONG-TERM CURRENT USE OF INSULIN (HCC): ICD-10-CM

## 2022-04-25 PROCEDURE — 99442 PR PHYS/QHP TELEPHONE EVALUATION 11-20 MIN: CPT | Performed by: FAMILY MEDICINE

## 2022-04-25 RX ORDER — DOXYCYCLINE HYCLATE 100 MG
100 TABLET ORAL 2 TIMES DAILY
Qty: 20 TABLET | Refills: 0 | Status: SHIPPED | OUTPATIENT
Start: 2022-04-25 | End: 2022-05-05

## 2022-04-25 NOTE — PROGRESS NOTES
eClso Barnoe is a 69 y.o. female evaluated via telephone on 4/25/2022 for Hypertension, Hyperglycemia, Diabetes, and Skin Problem  . Chief Complaint   Patient presents with    Hypertension    Hyperglycemia    Diabetes    Skin Problem         Documentation:  I communicated with the patient and/or health care decision maker about skin infection, DM, dyslipidemia, & HTN. Details of this discussion including any medical advice provided: Patient is here today to request abx for a likely reported skin infection. Patient does have a history of cellulitis on her RLE. Patient last HbA1c ~10. Patient states she has not been compliant with her ADA diet and medications. Patient is requesting an abx today. Patient has no other questions today. Atilio Chambers was seen today for hypertension, hyperglycemia, diabetes and skin problem. Diagnoses and all orders for this visit:    Localized swelling of right lower extremity  -     doxycycline hyclate (VIBRA-TABS) 100 MG tablet; Take 1 tablet by mouth 2 times daily for 10 days    Skin infection  -     doxycycline hyclate (VIBRA-TABS) 100 MG tablet; Take 1 tablet by mouth 2 times daily for 10 days    Type 2 diabetes mellitus with hyperglycemia, with long-term current use of insulin (HCC)  -     Hemoglobin A1C; Future  -     Microalbumin, Ur; Future    Dyslipidemia  -     ALT; Future  -     AST; Future  -     CBC with Auto Differential; Future  -     Lipid Panel; Future  -     TSH with Reflex; Future    HTN, goal below 130/80  -     Magnesium; Future  -     Renal Function Panel; Future      Follow up on labs as above. Abx as above. UC precautions provided. Encouraged compliance. Discussed increasing trulicty if she could not get tighter glycemic control through dietary modification. Total Time: minutes: 11-20 minutes    Celso Barone was evaluated through a synchronous (real-time) audio encounter. Patient identification was verified at the start of the visit.  She (or guardian if applicable) is aware that this is a billable service, which includes applicable co-pays. This visit was conducted with the patient's (and/or legal guardian's) verbal consent. She has not had a related appointment within my department in the past 7 days or scheduled within the next 24 hours. The patient was located in a state where the provider was licensed to provide care.     Note: not billable if this call serves to triage the patient into an appointment for the relevant concern    Vee Barragan DO

## 2022-04-26 ENCOUNTER — TELEPHONE (OUTPATIENT)
Dept: FAMILY MEDICINE CLINIC | Age: 67
End: 2022-04-26

## 2022-04-26 DIAGNOSIS — B37.9 YEAST INFECTION: Primary | ICD-10-CM

## 2022-04-26 RX ORDER — FLUCONAZOLE 150 MG/1
150 TABLET ORAL
Qty: 2 TABLET | Refills: 0 | Status: SHIPPED | OUTPATIENT
Start: 2022-04-26 | End: 2022-05-02

## 2022-04-26 RX ORDER — FLUCONAZOLE 100 MG/1
100 TABLET ORAL DAILY PRN
Qty: 4 TABLET | Refills: 0 | Status: CANCELLED | OUTPATIENT
Start: 2022-04-26

## 2022-04-26 NOTE — TELEPHONE ENCOUNTER
Patient forgot to ask for some ambien for sleep she is having trouble sleeping and diflucan please advise

## 2022-06-08 ENCOUNTER — TELEPHONE (OUTPATIENT)
Dept: FAMILY MEDICINE CLINIC | Age: 67
End: 2022-06-08

## 2022-06-08 DIAGNOSIS — Z12.11 SCREENING FOR COLON CANCER: Primary | ICD-10-CM

## 2022-06-08 NOTE — TELEPHONE ENCOUNTER
Outreach call for colorectal cancer screening. Client agreeable to cologuard kit. Instructed if does not receive in 1-2 weeks call the office.

## 2022-06-28 DIAGNOSIS — I82.5Y9 CHRONIC DEEP VEIN THROMBOSIS (DVT) OF PROXIMAL VEIN OF LOWER EXTREMITY, UNSPECIFIED LATERALITY (HCC): Primary | ICD-10-CM

## 2022-06-28 NOTE — PROGRESS NOTES
Switched from [facility] to [facility] due to insurance reasons.  Dx: chronic VTE and hypercoagulable state

## 2022-07-18 ENCOUNTER — HOSPITAL ENCOUNTER (OUTPATIENT)
Age: 67
Setting detail: SPECIMEN
Discharge: HOME | End: 2022-07-18

## 2022-07-18 ENCOUNTER — OFFICE VISIT (OUTPATIENT)
Dept: FAMILY MEDICINE CLINIC | Age: 67
End: 2022-07-18
Payer: COMMERCIAL

## 2022-07-18 VITALS
DIASTOLIC BLOOD PRESSURE: 88 MMHG | OXYGEN SATURATION: 95 % | BODY MASS INDEX: 55.88 KG/M2 | SYSTOLIC BLOOD PRESSURE: 138 MMHG | WEIGHT: 293 LBS | HEART RATE: 68 BPM

## 2022-07-18 DIAGNOSIS — I10 HTN, GOAL BELOW 130/80: ICD-10-CM

## 2022-07-18 DIAGNOSIS — E11.65 TYPE 2 DIABETES MELLITUS WITH HYPERGLYCEMIA, WITH LONG-TERM CURRENT USE OF INSULIN (HCC): Primary | ICD-10-CM

## 2022-07-18 DIAGNOSIS — M15.9 PRIMARY OSTEOARTHRITIS INVOLVING MULTIPLE JOINTS: ICD-10-CM

## 2022-07-18 DIAGNOSIS — E78.5 DYSLIPIDEMIA: ICD-10-CM

## 2022-07-18 DIAGNOSIS — E11.65 TYPE 2 DIABETES MELLITUS WITH HYPERGLYCEMIA, WITH LONG-TERM CURRENT USE OF INSULIN (HCC): ICD-10-CM

## 2022-07-18 DIAGNOSIS — Z79.4 TYPE 2 DIABETES MELLITUS WITH HYPERGLYCEMIA, WITH LONG-TERM CURRENT USE OF INSULIN (HCC): Primary | ICD-10-CM

## 2022-07-18 DIAGNOSIS — N30.01 ACUTE CYSTITIS WITH HEMATURIA: Primary | ICD-10-CM

## 2022-07-18 DIAGNOSIS — R35.0 URINARY FREQUENCY: ICD-10-CM

## 2022-07-18 DIAGNOSIS — L08.9 SKIN INFECTION: ICD-10-CM

## 2022-07-18 DIAGNOSIS — Z79.4 TYPE 2 DIABETES MELLITUS WITH HYPERGLYCEMIA, WITH LONG-TERM CURRENT USE OF INSULIN (HCC): ICD-10-CM

## 2022-07-18 LAB
-: ABNORMAL
ABSOLUTE EOS #: 0.27 K/UL (ref 0–0.44)
ABSOLUTE IMMATURE GRANULOCYTE: 0.06 K/UL (ref 0–0.3)
ABSOLUTE LYMPH #: 1.28 K/UL (ref 1.1–3.7)
ABSOLUTE MONO #: 0.5 K/UL (ref 0.1–1.2)
ALBUMIN SERPL-MCNC: 3.8 G/DL (ref 3.5–5.2)
ALT SERPL-CCNC: 19 U/L (ref 5–33)
ANION GAP SERPL CALCULATED.3IONS-SCNC: 11 MMOL/L (ref 9–17)
AST SERPL-CCNC: 21 U/L
BACTERIA: ABNORMAL
BASOPHILS # BLD: 1 % (ref 0–2)
BASOPHILS ABSOLUTE: 0.06 K/UL (ref 0–0.2)
BILIRUBIN URINE: NEGATIVE
BUN BLDV-MCNC: 21 MG/DL (ref 8–23)
CALCIUM SERPL-MCNC: 9.2 MG/DL (ref 8.6–10.4)
CASTS UA: ABNORMAL /LPF (ref 0–8)
CHLORIDE BLD-SCNC: 100 MMOL/L (ref 98–107)
CHOLESTEROL/HDL RATIO: 4.6
CHOLESTEROL: 160 MG/DL
CO2: 26 MMOL/L (ref 20–31)
COLOR: YELLOW
CREAT SERPL-MCNC: 1.06 MG/DL (ref 0.5–0.9)
EOSINOPHILS RELATIVE PERCENT: 4 % (ref 1–4)
EPITHELIAL CELLS UA: ABNORMAL /HPF (ref 0–5)
ESTIMATED AVERAGE GLUCOSE: 252 MG/DL
GFR AFRICAN AMERICAN: >60 ML/MIN
GFR NON-AFRICAN AMERICAN: 51 ML/MIN
GFR SERPL CREATININE-BSD FRML MDRD: ABNORMAL ML/MIN/{1.73_M2}
GLUCOSE BLD-MCNC: 272 MG/DL (ref 70–99)
GLUCOSE URINE: NEGATIVE
HBA1C MFR BLD: 10.4 % (ref 4–6)
HCT VFR BLD CALC: 38.4 % (ref 36.3–47.1)
HDLC SERPL-MCNC: 35 MG/DL
HEMOGLOBIN: 12.6 G/DL (ref 11.9–15.1)
IMMATURE GRANULOCYTES: 1 %
KETONES, URINE: NEGATIVE
LDL CHOLESTEROL: 82 MG/DL (ref 0–130)
LEUKOCYTE ESTERASE, URINE: ABNORMAL
LYMPHOCYTES # BLD: 19 % (ref 24–43)
MAGNESIUM: 1.4 MG/DL (ref 1.6–2.6)
MCH RBC QN AUTO: 30.3 PG (ref 25.2–33.5)
MCHC RBC AUTO-ENTMCNC: 32.8 G/DL (ref 28.4–34.8)
MCV RBC AUTO: 92.3 FL (ref 82.6–102.9)
MONOCYTES # BLD: 8 % (ref 3–12)
NITRITE, URINE: POSITIVE
NRBC AUTOMATED: 0 PER 100 WBC
PDW BLD-RTO: 13.2 % (ref 11.8–14.4)
PH UA: 5.5 (ref 5–8)
PHOSPHORUS: 3.6 MG/DL (ref 2.6–4.5)
PLATELET # BLD: 202 K/UL (ref 138–453)
PMV BLD AUTO: 10 FL (ref 8.1–13.5)
POTASSIUM SERPL-SCNC: 4.4 MMOL/L (ref 3.7–5.3)
PROTEIN UA: ABNORMAL
RBC # BLD: 4.16 M/UL (ref 3.95–5.11)
RBC UA: ABNORMAL /HPF (ref 0–4)
SEG NEUTROPHILS: 67 % (ref 36–65)
SEGMENTED NEUTROPHILS ABSOLUTE COUNT: 4.44 K/UL (ref 1.5–8.1)
SODIUM BLD-SCNC: 137 MMOL/L (ref 135–144)
SPECIFIC GRAVITY UA: 1.02 (ref 1–1.03)
TRIGL SERPL-MCNC: 217 MG/DL
TSH SERPL DL<=0.05 MIU/L-ACNC: 2.61 UIU/ML (ref 0.3–5)
TURBIDITY: ABNORMAL
URINE HGB: ABNORMAL
UROBILINOGEN, URINE: NORMAL
WBC # BLD: 6.6 K/UL (ref 3.5–11.3)
WBC UA: ABNORMAL /HPF (ref 0–5)

## 2022-07-18 PROCEDURE — 1124F ACP DISCUSS-NO DSCNMKR DOCD: CPT | Performed by: FAMILY MEDICINE

## 2022-07-18 PROCEDURE — 3046F HEMOGLOBIN A1C LEVEL >9.0%: CPT | Performed by: FAMILY MEDICINE

## 2022-07-18 PROCEDURE — 99214 OFFICE O/P EST MOD 30 MIN: CPT | Performed by: FAMILY MEDICINE

## 2022-07-18 RX ORDER — FLUCONAZOLE 150 MG/1
150 TABLET ORAL
Qty: 2 TABLET | Refills: 0 | Status: SHIPPED | OUTPATIENT
Start: 2022-07-18 | End: 2022-07-24

## 2022-07-18 RX ORDER — INSULIN GLARGINE 300 U/ML
INJECTION, SOLUTION SUBCUTANEOUS
Qty: 2 PEN | Refills: 0 | COMMUNITY
Start: 2022-07-18

## 2022-07-18 RX ORDER — CIPROFLOXACIN 500 MG/1
500 TABLET, FILM COATED ORAL 2 TIMES DAILY
Qty: 14 TABLET | Refills: 0 | Status: SHIPPED | OUTPATIENT
Start: 2022-07-18 | End: 2022-07-25

## 2022-07-18 RX ORDER — DOXYCYCLINE HYCLATE 100 MG
100 TABLET ORAL 2 TIMES DAILY
Qty: 20 TABLET | Refills: 0 | Status: SHIPPED | OUTPATIENT
Start: 2022-07-18 | End: 2022-07-28

## 2022-07-18 SDOH — ECONOMIC STABILITY: FOOD INSECURITY: WITHIN THE PAST 12 MONTHS, THE FOOD YOU BOUGHT JUST DIDN'T LAST AND YOU DIDN'T HAVE MONEY TO GET MORE.: NEVER TRUE

## 2022-07-18 SDOH — ECONOMIC STABILITY: FOOD INSECURITY: WITHIN THE PAST 12 MONTHS, YOU WORRIED THAT YOUR FOOD WOULD RUN OUT BEFORE YOU GOT MONEY TO BUY MORE.: NEVER TRUE

## 2022-07-18 ASSESSMENT — PATIENT HEALTH QUESTIONNAIRE - PHQ9
2. FEELING DOWN, DEPRESSED OR HOPELESS: 0
SUM OF ALL RESPONSES TO PHQ QUESTIONS 1-9: 0
1. LITTLE INTEREST OR PLEASURE IN DOING THINGS: 0
SUM OF ALL RESPONSES TO PHQ QUESTIONS 1-9: 0
SUM OF ALL RESPONSES TO PHQ9 QUESTIONS 1 & 2: 0

## 2022-07-18 ASSESSMENT — SOCIAL DETERMINANTS OF HEALTH (SDOH): HOW HARD IS IT FOR YOU TO PAY FOR THE VERY BASICS LIKE FOOD, HOUSING, MEDICAL CARE, AND HEATING?: NOT HARD AT ALL

## 2022-07-18 NOTE — PROGRESS NOTES
Progress Note    Midge Lesch is a 69 y.o.  female who presents today alone for evaluation of   Chief Complaint   Patient presents with    Diabetes     3 month check up     Urinary Frequency           HPI:   Patient is here for follow up on DM/HTN/chol. Patient last HbA1c 10.3 4/2022. Patient is taking 34 units of toujeo daily and 1.5 mg of trulicity once weekly. Patient states she is not checking her FBG currently. Patient today denies cp/sob/le edema/dizziness/lightheadedness/blurry va/ha. Patient denies PND/orthopnea. Patient needs refill of voltaren today. Patient requesting home abx when she gets a skin infection on her legs from her lymphedema. Patient states she also needs diflucan due to yeast infection after abx. Patient reports chronic urinary frequency. Health Maintenance Due   Topic Date Due    COVID-19 Vaccine (1) Never done    Pneumococcal 65+ years Vaccine (1 - PCV) Never done    Hepatitis C screen  Never done    DTaP/Tdap/Td vaccine (1 - Tdap) Never done    Shingles vaccine (1 of 2) Never done    DEXA (modify frequency per FRAX score)  Never done    Diabetic retinal exam  05/05/2008    Breast cancer screen  02/22/2018    Diabetic foot exam  02/19/2021    A1C test (Diabetic or Prediabetic)  07/14/2022        Current Medications:     Current Outpatient Medications   Medication Sig Dispense Refill    diclofenac sodium (VOLTAREN) 1 % GEL Apply 4 g topically in the morning and 4 g at noon and 4 g in the evening and 4 g before bedtime. 350 g 5    mirabegron (MYRBETRIQ) 25 MG TB24 Take 1 tablet by mouth in the morning for 28 days. 28 tablet 0    Insulin Glargine, 2 Unit Dial, (TOUJEO MAX SOLOSTAR) 300 UNIT/ML SOPN Inject 34 units daily 2 pen 0    Dulaglutide 0.75 MG/0.5ML SOPN Inject 1.5 mg into the skin once a week 4 pen 0    doxycycline hyclate (VIBRA-TABS) 100 MG tablet Take 1 tablet by mouth in the morning and 1 tablet before bedtime. Do all this for 10 days.  20 tablet 0 fluconazole (DIFLUCAN) 150 MG tablet Take 1 tablet by mouth every 72 hours for 6 days 2 tablet 0    rivaroxaban (XARELTO) 20 MG TABS tablet Take 1 tablet by mouth daily (with breakfast) 90 tablet 1    DULoxetine (CYMBALTA) 30 MG extended release capsule Take 1 capsule by mouth daily 90 capsule 1    hydroCHLOROthiazide (HYDRODIURIL) 25 MG tablet TAKE 1 TABLET BY MOUTH  DAILY 90 tablet 1    omeprazole (PRILOSEC) 40 MG delayed release capsule TAKE 1 CAPSULE BY MOUTH  DAILY 90 capsule 1    metoprolol succinate (TOPROL XL) 50 MG extended release tablet Take 1 tablet by mouth daily 90 tablet 1    glyBURIDE (DIABETA) 5 MG tablet TAKE 1 TABLET BY MOUTH  TWICE DAILY WITH MEALS 180 tablet 1    Insulin Pen Needle 32G X 4 MM MISC 1 each by Does not apply route daily 100 each 3    blood glucose monitor kit and supplies Dispense sufficient amount for indicated testing frequency plus additional to accommodate PRN testing needs. Dispense all needed supplies to include: monitor, strips, lancing device, lancets, control solutions, alcohol swabs. 1 kit 0    fluconazole (DIFLUCAN) 100 MG tablet Take 1 tablet by mouth daily as needed (yeast infection) Indications: Patient self diagnoses and takes fluconaole for yeast infections. 4 tablet 0    methocarbamol (ROBAXIN) 750 MG tablet Twice daily prn for pain 180 tablet 0    glucose monitoring kit (FREESTYLE) monitoring kit Please fill with what is covered by ins Patient test once a day DM E11.9 1 kit 0    acetaminophen (TYLENOL) 500 MG tablet Take 500 mg by mouth every 6 hours as needed for Pain. Pt only takes 1-2 times per week      Glucose Blood (BLOOD GLUCOSE TEST STRIPS) STRP by Does not apply route 3 times daily. Freestyle [place] Test Strips      gabapentin (NEURONTIN) 300 MG capsule Take 1 capsule by mouth 3 times daily for 90 days. 180 capsule 1     No current facility-administered medications for this visit. Allergies:      Allergies   Allergen Reactions    Erythromycin Hives Lisinopril Itching        Medical History:     Past Medical History:   Diagnosis Date    Back pain 2012    Cellulitis 10/9/2015    Lt. Lower Leg    DM (diabetes mellitus) ([facility] [address] 2012    DVT (deep venous thrombosis) ([facility]) 2012    GERD (gastroesophageal reflux disease) 2012    MVP (mitral valve prolapse) 2012       Past Surgical History:   Procedure Laterality Date    HYSTERECTOMY (CERVIX STATUS UNKNOWN)      TONSILLECTOMY         Family History   Problem Relation Age of Onset    Heart Disease Mother         Social History:     Social History     Socioeconomic History    Marital status:      Spouse name: Not on file    Number of children: Not on file    Years of education: Not on file    Highest education level: Not on file   Occupational History    Not on file   Tobacco Use    Smoking status: Former     Packs/day: 1.00     Years: 30.00     Pack years: 30.00     Types: Cigarettes     Quit date: 1983     Years since quittin.0    Smokeless tobacco: Never   Vaping Use    Vaping Use: Never used   Substance and Sexual Activity    Alcohol use: No    Drug use: No    Sexual activity: Not on file   Other Topics Concern    Not on file   Social History Narrative    Not on file     Social Determinants of Health     Financial Resource Strain: Low Risk     Difficulty of Paying Living Expenses: Not hard at all   Food Insecurity: No Food Insecurity    Worried About Running Out of Food in the Last Year: Never true    Ran Out of Food in the Last Year: Never true   Transportation Needs: Not on file   Physical Activity: Not on file   Stress: Not on file   Social Connections: Not on file   Intimate Partner Violence: Not on file   Housing Stability: Not on file        ROS:     Constitutional: No fevers, chills, fatigue. ENT: No nasal congestion or sore throat  Respiratory: No difficulty in breathing or cough.    Cardiovascular: No chest pain, palpitations or shortness of for 61-76 years old:   80 mL/min/1.73sq m  Chronic Kidney Disease:   <60 mL/min/1.73sq m  Kidney failure:   <15 mL/min/1.73sq m              eGFR calculated using average adult body mass. Additional eGFR calculator available at:        [email or web address]            Magnesium 04/14/2022 1.4 (A) 1.6 - 2.6 mg/dL Final    Cholesterol 04/14/2022 157  <200 mg/dL Final    Comment:    Cholesterol Guidelines:      <200  Desirable   200-240  Borderline      >240  Undesirable         HDL 04/14/2022 37 (A) >40 mg/dL Final    Comment:    HDL Guidelines:    <40     Undesirable   40-59    Borderline    >59     Desirable         LDL Cholesterol 04/14/2022 75  0 - 130 mg/dL Final    Comment:    LDL Guidelines:     <100    Desirable   100-129   Near to/above Desirable   130-159   Borderline      >159   Undesirable     Direct (measured) LDL and calculated LDL are not interchangeable tests. Chol/HDL Ratio 04/14/2022 4.2  <5 Final            Triglycerides 04/14/2022 225 (A) <150 mg/dL Final    Comment:    Triglyceride Guidelines:     <150   Desirable   150-199  Borderline   200-499  High     >499   Very high   Based on AHA Guidelines for fasting triglyceride, October 2012. Hemoglobin A1C 04/14/2022 10.3 (A) 4.0 - 6.0 % Final    Estimated Avg Glucose 04/14/2022 249  mg/dL Final    Comment: The ADA and AACC recommend providing the estimated average glucose result to permit better   patient understanding of their HBA1c result.       WBC 04/14/2022 8.7  3.5 - 11.3 k/uL Final    RBC 04/14/2022 4.50  3.95 - 5.11 m/uL Final    Hemoglobin 04/14/2022 13.0  11.9 - 15.1 g/dL Final    Hematocrit 04/14/2022 41.6  36.3 - 47.1 % Final    MCV 04/14/2022 92.4  82.6 - 102.9 fL Final    MCH 04/14/2022 28.9  25.2 - 33.5 pg Final    MCHC 04/14/2022 31.3  28.4 - 34.8 g/dL Final    RDW 04/14/2022 13.5  11.8 - 14.4 % Final    Platelets 20/90/7584 298  138 - 453 k/uL Final    MPV 04/14/2022 9.6  8.1 - 13.5 fL Final NRBC Automated 04/14/2022 0.0  0.0 per 100 WBC Final    Seg Neutrophils 04/14/2022 69 (A) 36 - 65 % Final    Lymphocytes 04/14/2022 20 (A) 24 - 43 % Final    Monocytes 04/14/2022 6  3 - 12 % Final    Eosinophils % 04/14/2022 3  1 - 4 % Final    Basophils 04/14/2022 1  0 - 2 % Final    Immature Granulocytes 04/14/2022 1 (A) 0 % Final    Segs Absolute 04/14/2022 6.00  1.50 - 8.10 k/uL Final    Absolute Lymph # 04/14/2022 1.77  1.10 - 3.70 k/uL Final    Absolute Mono # 04/14/2022 0.55  0.10 - 1.20 k/uL Final    Absolute Eos # 04/14/2022 0.22  0.00 - 0.44 k/uL Final    Basophils Absolute 04/14/2022 0.06  0.00 - 0.20 k/uL Final    Absolute Immature Granulocyte 04/14/2022 0.10  0.00 - 0.30 k/uL Final    AST 04/14/2022 20  <32 U/L Final    ALT 04/14/2022 16  5 - 33 U/L Final       No results found for this visit on 07/18/22. Assessment/Plan:     Willy Quarles was seen today for diabetes and urinary frequency. Diagnoses and all orders for this visit:    Type 2 diabetes mellitus with hyperglycemia, with long-term current use of insulin (HCC)  -     Hemoglobin A1C; Future  -     Microalbumin, Ur; Future  -     Insulin Glargine, 2 Unit Dial, (TOUJEO MAX SOLOSTAR) 300 UNIT/ML SOPN; Inject 34 units daily  -     Dulaglutide 0.75 MG/0.5ML SOPN; Inject 1.5 mg into the skin once a week    Dyslipidemia  -     ALT; Future  -     AST; Future  -     CBC with Auto Differential; Future  -     Lipid Panel; Future  -     TSH with Reflex; Future    HTN, goal below 130/80  -     Magnesium; Future  -     Renal Function Panel; Future    Primary osteoarthritis involving multiple joints  -     diclofenac sodium (VOLTAREN) 1 % GEL; Apply 4 g topically in the morning and 4 g at noon and 4 g in the evening and 4 g before bedtime. Urinary frequency  -     mirabegron (MYRBETRIQ) 25 MG TB24; Take 1 tablet by mouth in the morning for 28 days. -     Urinalysis with Microscopic; Future  -     Culture, Urine;  Future    Skin infection  -

## 2022-07-19 LAB
CREATININE URINE: 143.5 MG/DL (ref 28–217)
MICROALBUMIN/CREAT 24H UR: 502 MG/L
MICROALBUMIN/CREAT UR-RTO: 350 MCG/MG CREAT

## 2022-07-20 LAB
CULTURE: ABNORMAL
SPECIMEN DESCRIPTION: ABNORMAL

## 2022-07-29 ENCOUNTER — TELEPHONE (OUTPATIENT)
Dept: FAMILY MEDICINE CLINIC | Age: 67
End: 2022-07-29

## 2022-07-29 DIAGNOSIS — R35.0 URINARY FREQUENCY: ICD-10-CM

## 2022-07-29 NOTE — TELEPHONE ENCOUNTER
Stephy Sanabria is calling to request a refill on the following medication(s):    Medication Request:  Requested Prescriptions     Pending Prescriptions Disp Refills    mirabegron (MYRBETRIQ) 25 MG TB24 28 tablet 0     Sig: Take 1 tablet by mouth in the morning for 28 days.        Last Visit Date (If Applicable):  Visit date not found    Next Visit Date:    10/25/2022

## 2022-08-04 ENCOUNTER — TELEPHONE (OUTPATIENT)
Dept: FAMILY MEDICINE CLINIC | Age: 67
End: 2022-08-04

## 2022-08-04 NOTE — TELEPHONE ENCOUNTER
I would recommend an appointment since this is a new patient to me or she can wait until NTP appointment

## 2022-08-04 NOTE — TELEPHONE ENCOUNTER
Her insurance called her and they mybetriq is not covered because there is not a generic so it will need to be changed to something else please advise

## 2022-08-16 DIAGNOSIS — R35.0 URINARY FREQUENCY: ICD-10-CM

## 2022-10-18 ENCOUNTER — OFFICE VISIT (OUTPATIENT)
Dept: FAMILY MEDICINE CLINIC | Age: 67
End: 2022-10-18
Payer: COMMERCIAL

## 2022-10-18 VITALS
DIASTOLIC BLOOD PRESSURE: 80 MMHG | HEART RATE: 79 BPM | OXYGEN SATURATION: 96 % | BODY MASS INDEX: 57.23 KG/M2 | WEIGHT: 293 LBS | SYSTOLIC BLOOD PRESSURE: 139 MMHG | TEMPERATURE: 97.1 F

## 2022-10-18 DIAGNOSIS — E78.5 DYSLIPIDEMIA: ICD-10-CM

## 2022-10-18 DIAGNOSIS — Z53.20 BREAST SCREENING DECLINED: ICD-10-CM

## 2022-10-18 DIAGNOSIS — I10 ESSENTIAL HYPERTENSION: ICD-10-CM

## 2022-10-18 DIAGNOSIS — E83.42 HYPOMAGNESEMIA: ICD-10-CM

## 2022-10-18 DIAGNOSIS — K21.9 GASTROESOPHAGEAL REFLUX DISEASE WITHOUT ESOPHAGITIS: ICD-10-CM

## 2022-10-18 DIAGNOSIS — Z76.89 ENCOUNTER TO ESTABLISH CARE: Primary | ICD-10-CM

## 2022-10-18 DIAGNOSIS — I48.91 ATRIAL FIBRILLATION, UNSPECIFIED TYPE (HCC): ICD-10-CM

## 2022-10-18 DIAGNOSIS — E11.65 TYPE 2 DIABETES MELLITUS WITH HYPERGLYCEMIA, WITHOUT LONG-TERM CURRENT USE OF INSULIN (HCC): ICD-10-CM

## 2022-10-18 DIAGNOSIS — N28.9 RENAL DYSFUNCTION: ICD-10-CM

## 2022-10-18 PROBLEM — E43 SEVERE MALNUTRITION (HCC): Status: RESOLVED | Noted: 2021-09-05 | Resolved: 2022-10-18

## 2022-10-18 PROBLEM — D72.829 LEUKOCYTOSIS: Status: RESOLVED | Noted: 2019-01-01 | Resolved: 2022-10-18

## 2022-10-18 PROBLEM — N30.00 ACUTE CYSTITIS: Status: RESOLVED | Noted: 2017-07-02 | Resolved: 2022-10-18

## 2022-10-18 PROBLEM — R11.2 NAUSEA AND VOMITING: Status: RESOLVED | Noted: 2017-07-02 | Resolved: 2022-10-18

## 2022-10-18 PROCEDURE — G8400 PT W/DXA NO RESULTS DOC: HCPCS | Performed by: NURSE PRACTITIONER

## 2022-10-18 PROCEDURE — 1036F TOBACCO NON-USER: CPT | Performed by: NURSE PRACTITIONER

## 2022-10-18 PROCEDURE — 99214 OFFICE O/P EST MOD 30 MIN: CPT | Performed by: NURSE PRACTITIONER

## 2022-10-18 PROCEDURE — 3017F COLORECTAL CA SCREEN DOC REV: CPT | Performed by: NURSE PRACTITIONER

## 2022-10-18 PROCEDURE — 3046F HEMOGLOBIN A1C LEVEL >9.0%: CPT | Performed by: NURSE PRACTITIONER

## 2022-10-18 PROCEDURE — G8417 CALC BMI ABV UP PARAM F/U: HCPCS | Performed by: NURSE PRACTITIONER

## 2022-10-18 PROCEDURE — 1124F ACP DISCUSS-NO DSCNMKR DOCD: CPT | Performed by: NURSE PRACTITIONER

## 2022-10-18 PROCEDURE — G8484 FLU IMMUNIZE NO ADMIN: HCPCS | Performed by: NURSE PRACTITIONER

## 2022-10-18 PROCEDURE — 1090F PRES/ABSN URINE INCON ASSESS: CPT | Performed by: NURSE PRACTITIONER

## 2022-10-18 PROCEDURE — G8427 DOCREV CUR MEDS BY ELIG CLIN: HCPCS | Performed by: NURSE PRACTITIONER

## 2022-10-18 PROCEDURE — 2022F DILAT RTA XM EVC RTNOPTHY: CPT | Performed by: NURSE PRACTITIONER

## 2022-10-18 RX ORDER — LOSARTAN POTASSIUM 50 MG/1
50 TABLET ORAL DAILY
Qty: 90 TABLET | Refills: 0 | Status: SHIPPED | OUTPATIENT
Start: 2022-10-18

## 2022-10-18 RX ORDER — MAGNESIUM OXIDE 400 MG/1
400 TABLET ORAL DAILY
Qty: 30 TABLET | Refills: 0 | COMMUNITY
Start: 2022-10-18

## 2022-10-18 RX ORDER — OMEPRAZOLE 40 MG/1
CAPSULE, DELAYED RELEASE ORAL
Qty: 90 CAPSULE | Refills: 1 | Status: SHIPPED | OUTPATIENT
Start: 2022-10-18

## 2022-10-18 RX ORDER — FESOTERODINE FUMARATE 8 MG/1
TABLET, EXTENDED RELEASE ORAL
COMMUNITY

## 2022-10-18 ASSESSMENT — ENCOUNTER SYMPTOMS
DIARRHEA: 0
COUGH: 0
VOMITING: 0
EYES NEGATIVE: 1
GASTROINTESTINAL NEGATIVE: 1
NAUSEA: 0
ALLERGIC/IMMUNOLOGIC NEGATIVE: 1
CHEST TIGHTNESS: 0
RESPIRATORY NEGATIVE: 1
COLOR CHANGE: 0
SHORTNESS OF BREATH: 0

## 2022-10-18 ASSESSMENT — PATIENT HEALTH QUESTIONNAIRE - PHQ9
2. FEELING DOWN, DEPRESSED OR HOPELESS: 0
SUM OF ALL RESPONSES TO PHQ QUESTIONS 1-9: 0
SUM OF ALL RESPONSES TO PHQ9 QUESTIONS 1 & 2: 0
SUM OF ALL RESPONSES TO PHQ QUESTIONS 1-9: 0
1. LITTLE INTEREST OR PLEASURE IN DOING THINGS: 0

## 2022-10-18 NOTE — PROGRESS NOTES
[facility]  [facility]  Dept: [phone]    Vanesa Pham is a 69 y.o. female who presents today for her medical conditions/complaintsas noted below. Vanesa Pham is here today c/o Establish Care    Past Medical History:   Diagnosis Date    DM (diabetes mellitus) ([facility] [address] 2012    DVT (deep venous thrombosis) ([facility] [address] 06/22/2012    x1 provoked s/p long travel    GERD (gastroesophageal reflux disease) 2012    Hypertension     MVP (mitral valve prolapse) 2012    KATHI (obstructive sleep apnea)       Past Surgical History:   Procedure Laterality Date    HYSTERECTOMY (CERVIX STATUS UNKNOWN)      TONSILLECTOMY         Family History   Problem Relation Age of Onset    Heart Disease Mother        Social History     Tobacco Use    Smoking status: Former     Packs/day: 0.25     Years: 20.00     Pack years: 5.00     Types: Cigarettes     Quit date: 1983     Years since quittin.3    Smokeless tobacco: Never    Tobacco comments:     Quit 30 years ago   Substance Use Topics    Alcohol use: No      Current Outpatient Medications   Medication Sig Dispense Refill    Fesoterodine Fumarate ER 8 MG TB24 Take by mouth      diclofenac sodium (VOLTAREN) 1 % GEL Apply 4 g topically in the morning and 4 g at noon and 4 g in the evening and 4 g before bedtime.  350 g 5    Insulin Glargine, 2 Unit Dial, (TOUJEO MAX SOLOSTAR) 300 UNIT/ML SOPN Inject 34 units daily 2 pen 0    Dulaglutide 0.75 MG/0.5ML SOPN Inject 1.5 mg into the skin once a week 4 pen 0    rivaroxaban (XARELTO) 20 MG TABS tablet Take 1 tablet by mouth daily (with breakfast) 90 tablet 1    DULoxetine (CYMBALTA) 30 MG extended release capsule Take 1 capsule by mouth daily 90 capsule 1    hydroCHLOROthiazide (HYDRODIURIL) 25 MG tablet TAKE 1 TABLET BY MOUTH  DAILY 90 tablet 1    omeprazole (PRILOSEC) 40 MG delayed release capsule TAKE 1 CAPSULE BY MOUTH  DAILY 90 capsule 1    metoprolol succinate (TOPROL XL) 50 MG extended release tablet Take 1 tablet by mouth daily 90 tablet 1    glyBURIDE (DIABETA) 5 MG tablet TAKE 1 TABLET BY MOUTH  TWICE DAILY WITH MEALS 180 tablet 1    Insulin Pen Needle 32G X 4 MM MISC 1 each by Does not apply route daily 100 each 3    blood glucose monitor kit and supplies Dispense sufficient amount for indicated testing frequency plus additional to accommodate PRN testing needs. Dispense all needed supplies to include: monitor, strips, lancing device, lancets, control solutions, alcohol swabs. 1 kit 0    glucose monitoring kit (FREESTYLE) monitoring kit Please fill with what is covered by ins Patient test once a day DM E11.9 1 kit 0    acetaminophen (TYLENOL) 500 MG tablet Take 500 mg by mouth every 6 hours as needed for Pain. Pt only takes 1-2 times per week      Glucose Blood (BLOOD GLUCOSE TEST STRIPS) STRP by Does not apply route 3 times daily. Freestyle [place] Test Strips      gabapentin (NEURONTIN) 300 MG capsule Take 1 capsule by mouth 3 times daily for 90 days. 180 capsule 1     No current facility-administered medications for this visit. Allergies   Allergen Reactions    Erythromycin Hives    Lisinopril Itching         HPI:     HPI    Presents today c/o NTP , last PCP     Specialists     Urology - Dr. Cherie Whitley h/o kidney stones h/o OAB [fesoterodine]     H/o atrial fib  Patient doesn't follow w/ Cardiology   Currently taking Xarelto & metoprolol   Declines palpitation symptoms     H/o DM   Taking Trulicity, Toujeo 34 units qd, glyburide   Reports intolerance to Metformin due to diarrhea  Not monitoring blood sugar at home, discussed    Complications include neuropathy     It is unclear why patient is taking gabapentin ?  She declines any pain     H/o HTN   Taking hctz & metoprolol  Doesn't monitor BP at home     H/o GERD  Taking omeprazole    H/o back pain  Taking Cymbalta , declines anxiety & depression       Health Maintenance:      Subjective:     Review of Systems Constitutional: Negative. Negative for appetite change, chills, diaphoresis and fever. HENT: Negative. Eyes: Negative. Respiratory: Negative. Negative for cough, chest tightness and shortness of breath. Cardiovascular: Negative. Negative for chest pain and palpitations. Gastrointestinal: Negative. Negative for diarrhea, nausea and vomiting. Endocrine: Positive for polyuria. Genitourinary:  Positive for frequency and urgency. Negative for difficulty urinating. Musculoskeletal:  Positive for gait problem. Skin: Negative. Negative for color change, pallor, rash and wound. Allergic/Immunologic: Negative. Neurological:  Positive for numbness (diabetic neuropathy). Negative for seizures, syncope and facial asymmetry. Hematological: Negative. Psychiatric/Behavioral: Negative. Objective:     Vitals:    10/18/22 1504   BP: (!) 152/83   Pulse: 79   Temp: 97.1 °F (36.2 °C)   SpO2: 96%     Vitals:    10/18/22 1523   BP: 139/80   Pulse:    Temp:    SpO2:         Body mass index is 57.23 kg/m². Physical Exam  Constitutional:       General: She is not in acute distress. Appearance: Normal appearance. She is well-developed. She is obese. She is not ill-appearing, toxic-appearing or diaphoretic. HENT:      Head: Normocephalic and atraumatic. Right Ear: External ear normal.      Left Ear: External ear normal.      Nose: Nose normal.   Eyes:      General: No scleral icterus. Right eye: No discharge. Left eye: No discharge. Conjunctiva/sclera: Conjunctivae normal.      Pupils: Pupils are equal, round, and reactive to light. Neck:      Trachea: No tracheal deviation. Cardiovascular:      Rate and Rhythm: Normal rate. Rhythm regularly irregular. Heart sounds: Normal heart sounds. No murmur heard. No friction rub. No gallop. Pulmonary:      Effort: Pulmonary effort is normal. No tachypnea, accessory muscle usage or respiratory distress. Breath sounds: Normal breath sounds. No stridor. No decreased breath sounds, wheezing, rhonchi or rales. Abdominal:      Palpations: Abdomen is soft. Musculoskeletal:         General: No tenderness or deformity. Normal range of motion. Cervical back: Normal range of motion and neck supple. Skin:     General: Skin is warm and dry. Coloration: Skin is not pale. Findings: No erythema or rash. Neurological:      Mental Status: She is alert and oriented to person, place, and time. GCS: GCS eye subscore is 4. GCS verbal subscore is 5. GCS motor subscore is 6. Gait: Gait abnormal.   Psychiatric:         Speech: Speech normal.         Behavior: Behavior normal.         Thought Content: Thought content normal.         Judgment: Judgment normal.         Assessment:         1. Encounter to establish care    2. Essential hypertension    3. Type 2 diabetes mellitus with hyperglycemia, without long-term current use of insulin ([facility]    4. Dyslipidemia    5. Renal dysfunction    6. Atrial fibrillation, unspecified type ([facility] [address]    7. Hypomagnesemia    8. Breast screening declined    9. Gastroesophageal reflux disease without esophagitis        Plan:     1. Encounter to establish care      2. Essential hypertension    - CBC with Auto Differential; Future  - Comprehensive Metabolic Panel; Future  - losartan (COZAAR) 50 MG tablet; Take 1 tablet by mouth daily  Dispense: 90 tablet; Refill: 0    BP borderline above goal  Start losartan, side effects discussed  F/u 2-4 weeks  Lifestyle modifications encouraged     3. Type 2 diabetes mellitus with hyperglycemia, without long-term current use of insulin ([facility])    - CBC with Auto Differential; Future  - Comprehensive Metabolic Panel; Future  - Hemoglobin A1C; Future  - Microalbumin, Ur; Future  - Insulin Pen Needle 32G X 4 MM MISC; 1 each by Does not apply route daily  Dispense: 100 each;  Refill: 3    Check labs, plan pending results  Consider d/c glyburide due to insulin & risk for hypoglycemia  Discussed importance of daily glucose monitoring    Instructed goal A1C 6.5-7 %   Advised medication benefits and side effects   Advised dietary recommendations including avoidance of carbs and concentrated sweets  Importance of daily physical activity and weight loss in disease management  Discussed importance of statin and ASCVD benefits  Advised yearly diabetic eye examinations  Advised routine monitoring of feet  Importance to notify if hypoglycemia sx and directions  Importance of regular meals with insulin/medications   Adverse effects of uncontrolled diabetes including heart disease, CVA, kidney disease, neuropathy, retinopathy, PVD etc.  Importance of routine follow-up every 3 months is key      4. Dyslipidemia    Likely need statin s/p labs    The 10-year ASCVD risk score (Candice PORTER, et al., 2019) is: 24.7%    Values used to calculate the score:      Age: Erik 89 years      Sex: Female      Is Non- : No      Diabetic: Yes      Tobacco smoker: No      Systolic Blood Pressure: 293 mmHg      Is BP treated: Yes      HDL Cholesterol: 35 mg/dL      Total Cholesterol: 160 mg/dL     5. Renal dysfunction    - CBC with Auto Differential; Future  - Comprehensive Metabolic Panel; Future    NO NSAID's  Recheck labs     6. Atrial fibrillation, unspecified type [facility])    - External Referral To Cardiology    Continue rate control & anticoagulation   Encouraged Cardio f/u     7. Hypomagnesemia    - Magnesium; Future      8. Breast screening declined      9. Gastroesophageal reflux disease without esophagitis    - omeprazole (PRILOSEC) 40 MG delayed release capsule; TAKE 1 CAPSULE BY MOUTH  DAILY  Dispense: 90 capsule; Refill: 1        Discussed use, benefit, and side effects of prescribed medications. All patient questions answered. Pt voiced understanding. Reviewed health maintenance. Instructed to continue current medications, diet and exercise.   Patient agreedwith treatment plan. Follow up as directed.      Electronically signed by ARTURO Dong CNP on 10/18/2022

## 2022-10-20 RX ORDER — GLUCOSAMINE HCL/CHONDROITIN SU 500-400 MG
CAPSULE ORAL
Qty: 200 STRIP | Refills: 11 | Status: SHIPPED | OUTPATIENT
Start: 2022-10-20

## 2022-10-20 RX ORDER — LANCETS 30 GAUGE
1 EACH MISCELLANEOUS DAILY
Qty: 100 EACH | Refills: 11 | Status: SHIPPED | OUTPATIENT
Start: 2022-10-20

## 2022-10-20 RX ORDER — BLOOD-GLUCOSE METER
1 KIT MISCELLANEOUS DAILY
Qty: 1 KIT | Refills: 0 | Status: SHIPPED | OUTPATIENT
Start: 2022-10-20

## 2022-10-25 ENCOUNTER — TELEPHONE (OUTPATIENT)
Dept: FAMILY MEDICINE CLINIC | Age: 67
End: 2022-10-25

## 2022-10-25 DIAGNOSIS — L03.90 RECURRENT CELLULITIS: Primary | ICD-10-CM

## 2022-10-25 RX ORDER — DOXYCYCLINE 100 MG/1
100 CAPSULE ORAL 2 TIMES DAILY WITH MEALS
Qty: 14 CAPSULE | Refills: 0 | Status: ON HOLD | OUTPATIENT
Start: 2022-10-25 | End: 2022-11-18 | Stop reason: HOSPADM

## 2022-10-25 NOTE — TELEPHONE ENCOUNTER
Patient reports recurrent cellulitis of lower extremity which has caused her to be hospitalized the past. 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 Patient does not follow with ID. RX sent with referral to ID. Appointment if sx fail to improve / resolve .

## 2022-11-15 ENCOUNTER — APPOINTMENT (OUTPATIENT)
Dept: GENERAL RADIOLOGY | Age: 67
DRG: 638 | End: 2022-11-15
Payer: COMMERCIAL

## 2022-11-15 ENCOUNTER — HOSPITAL ENCOUNTER (INPATIENT)
Age: 67
LOS: 6 days | Discharge: HOME HEALTH | DRG: 638 | End: 2022-11-21
Attending: EMERGENCY MEDICINE | Admitting: INTERNAL MEDICINE
Payer: COMMERCIAL

## 2022-11-15 DIAGNOSIS — E11.65 TYPE 2 DIABETES MELLITUS WITH HYPERGLYCEMIA, WITH LONG-TERM CURRENT USE OF INSULIN (HCC): ICD-10-CM

## 2022-11-15 DIAGNOSIS — Z79.4 TYPE 2 DIABETES MELLITUS WITH HYPERGLYCEMIA, WITH LONG-TERM CURRENT USE OF INSULIN (HCC): ICD-10-CM

## 2022-11-15 DIAGNOSIS — M10.071 ACUTE IDIOPATHIC GOUT OF RIGHT ANKLE: ICD-10-CM

## 2022-11-15 DIAGNOSIS — L03.116 CELLULITIS OF LEFT HEEL: Primary | ICD-10-CM

## 2022-11-15 DIAGNOSIS — L03.115 CELLULITIS OF RIGHT HEEL: ICD-10-CM

## 2022-11-15 DIAGNOSIS — E83.42 HYPOMAGNESEMIA: ICD-10-CM

## 2022-11-15 PROBLEM — L03.90 CELLULITIS: Status: ACTIVE | Noted: 2022-11-15

## 2022-11-15 LAB
ABSOLUTE EOS #: 0.03 K/UL (ref 0–0.44)
ABSOLUTE IMMATURE GRANULOCYTE: 0.05 K/UL (ref 0–0.3)
ABSOLUTE LYMPH #: 0.79 K/UL (ref 1.1–3.7)
ABSOLUTE MONO #: 1.05 K/UL (ref 0.1–1.2)
ALBUMIN SERPL-MCNC: 3.2 G/DL (ref 3.5–5.2)
ALP BLD-CCNC: 71 U/L (ref 35–104)
ALT SERPL-CCNC: 8 U/L (ref 5–33)
ANION GAP SERPL CALCULATED.3IONS-SCNC: 10 MMOL/L (ref 9–17)
ANION GAP SERPL CALCULATED.3IONS-SCNC: 12 MMOL/L (ref 9–17)
AST SERPL-CCNC: 12 U/L
BASOPHILS # BLD: 0 % (ref 0–2)
BASOPHILS ABSOLUTE: 0.03 K/UL (ref 0–0.2)
BILIRUB SERPL-MCNC: 0.5 MG/DL (ref 0.3–1.2)
BUN BLDV-MCNC: 15 MG/DL (ref 8–23)
BUN BLDV-MCNC: 17 MG/DL (ref 8–23)
BUN/CREAT BLD: 13 (ref 9–20)
BUN/CREAT BLD: 14 (ref 9–20)
CALCIUM SERPL-MCNC: 8.3 MG/DL (ref 8.6–10.4)
CALCIUM SERPL-MCNC: 8.7 MG/DL (ref 8.6–10.4)
CHLORIDE BLD-SCNC: 97 MMOL/L (ref 98–107)
CHLORIDE BLD-SCNC: 99 MMOL/L (ref 98–107)
CO2: 24 MMOL/L (ref 20–31)
CO2: 24 MMOL/L (ref 20–31)
CREAT SERPL-MCNC: 1.12 MG/DL (ref 0.5–0.9)
CREAT SERPL-MCNC: 1.18 MG/DL (ref 0.5–0.9)
EOSINOPHILS RELATIVE PERCENT: 0 % (ref 1–4)
GFR SERPL CREATININE-BSD FRML MDRD: 50 ML/MIN/1.73M2
GFR SERPL CREATININE-BSD FRML MDRD: 53 ML/MIN/1.73M2
GLUCOSE BLD-MCNC: 296 MG/DL (ref 65–105)
GLUCOSE BLD-MCNC: 310 MG/DL (ref 65–105)
GLUCOSE BLD-MCNC: 342 MG/DL (ref 65–105)
GLUCOSE BLD-MCNC: 355 MG/DL (ref 65–105)
GLUCOSE BLD-MCNC: 377 MG/DL (ref 70–99)
GLUCOSE BLD-MCNC: 383 MG/DL (ref 70–99)
HCT VFR BLD CALC: 35.6 % (ref 36.3–47.1)
HEMOGLOBIN: 11.2 G/DL (ref 11.9–15.1)
IMMATURE GRANULOCYTES: 0 %
LACTIC ACID: 1.8 MMOL/L (ref 0.5–2.2)
LYMPHOCYTES # BLD: 7 % (ref 24–43)
MAGNESIUM: 1.1 MG/DL (ref 1.6–2.6)
MAGNESIUM: 1.6 MG/DL (ref 1.6–2.6)
MCH RBC QN AUTO: 29.2 PG (ref 25.2–33.5)
MCHC RBC AUTO-ENTMCNC: 31.5 G/DL (ref 28.4–34.8)
MCV RBC AUTO: 93 FL (ref 82.6–102.9)
MONOCYTES # BLD: 9 % (ref 3–12)
NRBC AUTOMATED: 0 PER 100 WBC
PDW BLD-RTO: 13.1 % (ref 11.8–14.4)
PLATELET # BLD: 183 K/UL (ref 138–453)
PMV BLD AUTO: 10 FL (ref 8.1–13.5)
POTASSIUM SERPL-SCNC: 3.8 MMOL/L (ref 3.7–5.3)
POTASSIUM SERPL-SCNC: 4.4 MMOL/L (ref 3.7–5.3)
RBC # BLD: 3.83 M/UL (ref 3.95–5.11)
SEG NEUTROPHILS: 84 % (ref 36–65)
SEGMENTED NEUTROPHILS ABSOLUTE COUNT: 10.24 K/UL (ref 1.5–8.1)
SODIUM BLD-SCNC: 133 MMOL/L (ref 135–144)
SODIUM BLD-SCNC: 133 MMOL/L (ref 135–144)
TOTAL PROTEIN: 7.6 G/DL (ref 6.4–8.3)
WBC # BLD: 12.2 K/UL (ref 3.5–11.3)

## 2022-11-15 PROCEDURE — 80048 BASIC METABOLIC PNL TOTAL CA: CPT

## 2022-11-15 PROCEDURE — 83735 ASSAY OF MAGNESIUM: CPT

## 2022-11-15 PROCEDURE — 6370000000 HC RX 637 (ALT 250 FOR IP): Performed by: NURSE PRACTITIONER

## 2022-11-15 PROCEDURE — 6370000000 HC RX 637 (ALT 250 FOR IP): Performed by: HOSPITALIST

## 2022-11-15 PROCEDURE — 36415 COLL VENOUS BLD VENIPUNCTURE: CPT

## 2022-11-15 PROCEDURE — 96365 THER/PROPH/DIAG IV INF INIT: CPT

## 2022-11-15 PROCEDURE — 73630 X-RAY EXAM OF FOOT: CPT

## 2022-11-15 PROCEDURE — 83605 ASSAY OF LACTIC ACID: CPT

## 2022-11-15 PROCEDURE — 2580000003 HC RX 258: Performed by: EMERGENCY MEDICINE

## 2022-11-15 PROCEDURE — 87040 BLOOD CULTURE FOR BACTERIA: CPT

## 2022-11-15 PROCEDURE — 6360000002 HC RX W HCPCS: Performed by: EMERGENCY MEDICINE

## 2022-11-15 PROCEDURE — 6360000002 HC RX W HCPCS: Performed by: NURSE PRACTITIONER

## 2022-11-15 PROCEDURE — 87205 SMEAR GRAM STAIN: CPT

## 2022-11-15 PROCEDURE — 2580000003 HC RX 258: Performed by: NURSE PRACTITIONER

## 2022-11-15 PROCEDURE — 6370000000 HC RX 637 (ALT 250 FOR IP): Performed by: EMERGENCY MEDICINE

## 2022-11-15 PROCEDURE — 6360000002 HC RX W HCPCS: Performed by: HOSPITALIST

## 2022-11-15 PROCEDURE — 80053 COMPREHEN METABOLIC PANEL: CPT

## 2022-11-15 PROCEDURE — 2580000003 HC RX 258: Performed by: HOSPITALIST

## 2022-11-15 PROCEDURE — 99285 EMERGENCY DEPT VISIT HI MDM: CPT

## 2022-11-15 PROCEDURE — 85025 COMPLETE CBC W/AUTO DIFF WBC: CPT

## 2022-11-15 PROCEDURE — 1200000000 HC SEMI PRIVATE

## 2022-11-15 PROCEDURE — 87154 CUL TYP ID BLD PTHGN 6+ TRGT: CPT

## 2022-11-15 PROCEDURE — 82947 ASSAY GLUCOSE BLOOD QUANT: CPT

## 2022-11-15 PROCEDURE — 99222 1ST HOSP IP/OBS MODERATE 55: CPT | Performed by: HOSPITALIST

## 2022-11-15 RX ORDER — GLIPIZIDE 5 MG/1
5 TABLET ORAL 2 TIMES DAILY WITH MEALS
Status: DISCONTINUED | OUTPATIENT
Start: 2022-11-15 | End: 2022-11-21 | Stop reason: HOSPADM

## 2022-11-15 RX ORDER — OXYCODONE HYDROCHLORIDE AND ACETAMINOPHEN 5; 325 MG/1; MG/1
1 TABLET ORAL ONCE
Status: COMPLETED | OUTPATIENT
Start: 2022-11-15 | End: 2022-11-15

## 2022-11-15 RX ORDER — GLYBURIDE 5 MG/1
5 TABLET ORAL 2 TIMES DAILY WITH MEALS
Status: DISCONTINUED | OUTPATIENT
Start: 2022-11-15 | End: 2022-11-15 | Stop reason: CLARIF

## 2022-11-15 RX ORDER — DEXTROSE MONOHYDRATE 100 MG/ML
INJECTION, SOLUTION INTRAVENOUS CONTINUOUS PRN
Status: DISCONTINUED | OUTPATIENT
Start: 2022-11-15 | End: 2022-11-21 | Stop reason: HOSPADM

## 2022-11-15 RX ORDER — ONDANSETRON 4 MG/1
4 TABLET, ORALLY DISINTEGRATING ORAL EVERY 8 HOURS PRN
Status: DISCONTINUED | OUTPATIENT
Start: 2022-11-15 | End: 2022-11-21 | Stop reason: HOSPADM

## 2022-11-15 RX ORDER — ACETAMINOPHEN 325 MG/1
650 TABLET ORAL EVERY 6 HOURS PRN
Status: DISCONTINUED | OUTPATIENT
Start: 2022-11-15 | End: 2022-11-21 | Stop reason: HOSPADM

## 2022-11-15 RX ORDER — INSULIN LISPRO 100 [IU]/ML
0-16 INJECTION, SOLUTION INTRAVENOUS; SUBCUTANEOUS
Status: DISCONTINUED | OUTPATIENT
Start: 2022-11-15 | End: 2022-11-18

## 2022-11-15 RX ORDER — DULOXETIN HYDROCHLORIDE 30 MG/1
30 CAPSULE, DELAYED RELEASE ORAL DAILY
Status: DISCONTINUED | OUTPATIENT
Start: 2022-11-15 | End: 2022-11-21 | Stop reason: HOSPADM

## 2022-11-15 RX ORDER — POTASSIUM CHLORIDE 20 MEQ/1
40 TABLET, EXTENDED RELEASE ORAL PRN
Status: DISCONTINUED | OUTPATIENT
Start: 2022-11-15 | End: 2022-11-21 | Stop reason: HOSPADM

## 2022-11-15 RX ORDER — INSULIN LISPRO 100 [IU]/ML
0-4 INJECTION, SOLUTION INTRAVENOUS; SUBCUTANEOUS NIGHTLY
Status: DISCONTINUED | OUTPATIENT
Start: 2022-11-15 | End: 2022-11-18

## 2022-11-15 RX ORDER — MAGNESIUM SULFATE IN WATER 40 MG/ML
2000 INJECTION, SOLUTION INTRAVENOUS ONCE
Status: COMPLETED | OUTPATIENT
Start: 2022-11-15 | End: 2022-11-15

## 2022-11-15 RX ORDER — MAGNESIUM SULFATE 1 G/100ML
1000 INJECTION INTRAVENOUS PRN
Status: DISCONTINUED | OUTPATIENT
Start: 2022-11-15 | End: 2022-11-21 | Stop reason: HOSPADM

## 2022-11-15 RX ORDER — SODIUM CHLORIDE 0.9 % (FLUSH) 0.9 %
5-40 SYRINGE (ML) INJECTION EVERY 12 HOURS SCHEDULED
Status: DISCONTINUED | OUTPATIENT
Start: 2022-11-15 | End: 2022-11-21 | Stop reason: HOSPADM

## 2022-11-15 RX ORDER — INSULIN LISPRO 100 [IU]/ML
0-4 INJECTION, SOLUTION INTRAVENOUS; SUBCUTANEOUS NIGHTLY
Status: DISCONTINUED | OUTPATIENT
Start: 2022-11-15 | End: 2022-11-15

## 2022-11-15 RX ORDER — METOPROLOL SUCCINATE 50 MG/1
50 TABLET, EXTENDED RELEASE ORAL DAILY
Status: DISCONTINUED | OUTPATIENT
Start: 2022-11-15 | End: 2022-11-21 | Stop reason: HOSPADM

## 2022-11-15 RX ORDER — LOSARTAN POTASSIUM 25 MG/1
25 TABLET ORAL DAILY
Status: DISCONTINUED | OUTPATIENT
Start: 2022-11-15 | End: 2022-11-15

## 2022-11-15 RX ORDER — ONDANSETRON 2 MG/ML
4 INJECTION INTRAMUSCULAR; INTRAVENOUS EVERY 6 HOURS PRN
Status: DISCONTINUED | OUTPATIENT
Start: 2022-11-15 | End: 2022-11-21 | Stop reason: HOSPADM

## 2022-11-15 RX ORDER — PANTOPRAZOLE SODIUM 40 MG/1
40 TABLET, DELAYED RELEASE ORAL
Status: DISCONTINUED | OUTPATIENT
Start: 2022-11-15 | End: 2022-11-21 | Stop reason: HOSPADM

## 2022-11-15 RX ORDER — SODIUM CHLORIDE 9 MG/ML
INJECTION, SOLUTION INTRAVENOUS PRN
Status: DISCONTINUED | OUTPATIENT
Start: 2022-11-15 | End: 2022-11-21 | Stop reason: HOSPADM

## 2022-11-15 RX ORDER — ACETAMINOPHEN 650 MG/1
650 SUPPOSITORY RECTAL EVERY 6 HOURS PRN
Status: DISCONTINUED | OUTPATIENT
Start: 2022-11-15 | End: 2022-11-21 | Stop reason: HOSPADM

## 2022-11-15 RX ORDER — DOXYCYCLINE HYCLATE 100 MG/1
CAPSULE ORAL
COMMUNITY
Start: 2022-11-13 | End: 2022-11-15

## 2022-11-15 RX ORDER — LOSARTAN POTASSIUM 50 MG/1
50 TABLET ORAL DAILY
Status: DISCONTINUED | OUTPATIENT
Start: 2022-11-15 | End: 2022-11-21 | Stop reason: HOSPADM

## 2022-11-15 RX ORDER — ACETAMINOPHEN 500 MG
1000 TABLET ORAL ONCE
Status: COMPLETED | OUTPATIENT
Start: 2022-11-15 | End: 2022-11-15

## 2022-11-15 RX ORDER — POTASSIUM CHLORIDE 7.45 MG/ML
10 INJECTION INTRAVENOUS PRN
Status: DISCONTINUED | OUTPATIENT
Start: 2022-11-15 | End: 2022-11-21 | Stop reason: HOSPADM

## 2022-11-15 RX ORDER — HYDROCHLOROTHIAZIDE 25 MG/1
25 TABLET ORAL DAILY
Status: DISCONTINUED | OUTPATIENT
Start: 2022-11-15 | End: 2022-11-15

## 2022-11-15 RX ORDER — INSULIN LISPRO 100 [IU]/ML
0-8 INJECTION, SOLUTION INTRAVENOUS; SUBCUTANEOUS
Status: DISCONTINUED | OUTPATIENT
Start: 2022-11-15 | End: 2022-11-15

## 2022-11-15 RX ORDER — INSULIN GLARGINE 100 [IU]/ML
25 INJECTION, SOLUTION SUBCUTANEOUS 2 TIMES DAILY
Status: DISCONTINUED | OUTPATIENT
Start: 2022-11-15 | End: 2022-11-17

## 2022-11-15 RX ORDER — SODIUM CHLORIDE 0.9 % (FLUSH) 0.9 %
10 SYRINGE (ML) INJECTION PRN
Status: DISCONTINUED | OUTPATIENT
Start: 2022-11-15 | End: 2022-11-21 | Stop reason: HOSPADM

## 2022-11-15 RX ADMIN — RIVAROXABAN 20 MG: 20 TABLET, FILM COATED ORAL at 09:11

## 2022-11-15 RX ADMIN — CEFTRIAXONE SODIUM 1000 MG: 1 INJECTION, POWDER, FOR SOLUTION INTRAMUSCULAR; INTRAVENOUS at 09:13

## 2022-11-15 RX ADMIN — DICLOFENAC SODIUM 4 G: 10 GEL TOPICAL at 21:40

## 2022-11-15 RX ADMIN — DICLOFENAC SODIUM 4 G: 10 GEL TOPICAL at 12:17

## 2022-11-15 RX ADMIN — LOSARTAN POTASSIUM 50 MG: 50 TABLET, FILM COATED ORAL at 12:17

## 2022-11-15 RX ADMIN — ACETAMINOPHEN 650 MG: 325 TABLET, FILM COATED ORAL at 22:39

## 2022-11-15 RX ADMIN — VANCOMYCIN HYDROCHLORIDE 2500 MG: 5 INJECTION, POWDER, LYOPHILIZED, FOR SOLUTION INTRAVENOUS at 05:16

## 2022-11-15 RX ADMIN — ACETAMINOPHEN 1000 MG: 500 TABLET ORAL at 03:41

## 2022-11-15 RX ADMIN — INSULIN LISPRO 8 UNITS: 100 INJECTION, SOLUTION INTRAVENOUS; SUBCUTANEOUS at 08:00

## 2022-11-15 RX ADMIN — DULOXETINE HYDROCHLORIDE 30 MG: 30 CAPSULE, DELAYED RELEASE ORAL at 09:11

## 2022-11-15 RX ADMIN — GLIPIZIDE 5 MG: 5 TABLET ORAL at 09:11

## 2022-11-15 RX ADMIN — PANTOPRAZOLE SODIUM 40 MG: 40 TABLET, DELAYED RELEASE ORAL at 06:12

## 2022-11-15 RX ADMIN — MAGNESIUM SULFATE HEPTAHYDRATE 1000 MG: 1 INJECTION, SOLUTION INTRAVENOUS at 06:13

## 2022-11-15 RX ADMIN — INSULIN GLARGINE 25 UNITS: 100 INJECTION, SOLUTION SUBCUTANEOUS at 21:38

## 2022-11-15 RX ADMIN — MAGNESIUM SULFATE HEPTAHYDRATE 1000 MG: 1 INJECTION, SOLUTION INTRAVENOUS at 09:15

## 2022-11-15 RX ADMIN — MAGNESIUM SULFATE HEPTAHYDRATE 2000 MG: 40 INJECTION, SOLUTION INTRAVENOUS at 04:33

## 2022-11-15 RX ADMIN — INSULIN LISPRO 12 UNITS: 100 INJECTION, SOLUTION INTRAVENOUS; SUBCUTANEOUS at 12:17

## 2022-11-15 RX ADMIN — GLIPIZIDE 5 MG: 5 TABLET ORAL at 16:54

## 2022-11-15 RX ADMIN — INSULIN GLARGINE 25 UNITS: 100 INJECTION, SOLUTION SUBCUTANEOUS at 12:17

## 2022-11-15 RX ADMIN — INSULIN LISPRO 12 UNITS: 100 INJECTION, SOLUTION INTRAVENOUS; SUBCUTANEOUS at 17:08

## 2022-11-15 RX ADMIN — OXYCODONE AND ACETAMINOPHEN 1 TABLET: 325; 5 TABLET ORAL at 05:12

## 2022-11-15 RX ADMIN — METOPROLOL SUCCINATE 50 MG: 50 TABLET, EXTENDED RELEASE ORAL at 09:11

## 2022-11-15 RX ADMIN — VANCOMYCIN HYDROCHLORIDE 750 MG: 750 INJECTION, POWDER, LYOPHILIZED, FOR SOLUTION INTRAVENOUS at 16:56

## 2022-11-15 RX ADMIN — INSULIN LISPRO 4 UNITS: 100 INJECTION, SOLUTION INTRAVENOUS; SUBCUTANEOUS at 21:39

## 2022-11-15 RX ADMIN — DICLOFENAC SODIUM 4 G: 10 GEL TOPICAL at 16:54

## 2022-11-15 RX ADMIN — CEFEPIME 2000 MG: 2 INJECTION, POWDER, FOR SOLUTION INTRAVENOUS at 12:16

## 2022-11-15 RX ADMIN — OXYCODONE AND ACETAMINOPHEN 1 TABLET: 325; 5 TABLET ORAL at 02:31

## 2022-11-15 RX ADMIN — CEFEPIME 2000 MG: 2 INJECTION, POWDER, FOR SOLUTION INTRAVENOUS at 22:46

## 2022-11-15 ASSESSMENT — PAIN - FUNCTIONAL ASSESSMENT: PAIN_FUNCTIONAL_ASSESSMENT: 0-10

## 2022-11-15 ASSESSMENT — PAIN SCALES - GENERAL
PAINLEVEL_OUTOF10: 10
PAINLEVEL_OUTOF10: 10
PAINLEVEL_OUTOF10: 8
PAINLEVEL_OUTOF10: 10
PAINLEVEL_OUTOF10: 10

## 2022-11-15 ASSESSMENT — PAIN DESCRIPTION - ONSET: ONSET: ON-GOING

## 2022-11-15 ASSESSMENT — PAIN DESCRIPTION - FREQUENCY: FREQUENCY: CONTINUOUS

## 2022-11-15 ASSESSMENT — PAIN DESCRIPTION - PAIN TYPE: TYPE: ACUTE PAIN

## 2022-11-15 ASSESSMENT — PAIN DESCRIPTION - LOCATION: LOCATION: LEG

## 2022-11-15 ASSESSMENT — PAIN DESCRIPTION - DESCRIPTORS: DESCRIPTORS: SHARP;PINS AND NEEDLES

## 2022-11-15 NOTE — ACP (ADVANCE CARE PLANNING)
Advance Care Planning     Advance Care Planning Activator (Inpatient)  Conversation Note      Date of ACP Conversation: 11/15/2022     Conversation Conducted with:     ACP Activator: Radhames Car RN    {When Decision Maker makes decisions on behalf of the incapacitated patient: Decision Maker is asked to consider and make decisions based on patient values, known preferences, or best interests. Health Care Decision Maker:     Current Designated Health Care Decision Maker:     Primary Decision Maker: Lisa borne - [phone]  Click here to complete Healthcare Decision Makers including section of the Healthcare Decision Maker Relationship (ie \"Primary\")      Care Preferences    Ventilation: \"If you were in your present state of health and suddenly became very ill and were unable to breathe on your own, what would your preference be about the use of a ventilator (breathing machine) if it were available to you? \"      Would the patient desire the use of ventilator (breathing machine)?: yes    \"If your health worsens and it becomes clear that your chance of recovery is unlikely, what would your preference be about the use of a ventilator (breathing machine) if it were available to you? \"     Would the patient desire the use of ventilator (breathing machine)?: No      Resuscitation  \"CPR works best to restart the heart when there is a sudden event, like a heart attack, in someone who is otherwise healthy. Unfortunately, CPR does not typically restart the heart for people who have serious health conditions or who are very sick. \"    \"In the event your heart stopped as a result of an underlying serious health condition, would you want attempts to be made to restart your heart (answer \"yes\" for attempt to resuscitate) or would you prefer a natural death (answer \"no\" for do not attempt to resuscitate)? \" yes       [] Yes   [x] No   Educated Patient / Iliana Tavarez regarding differences between Advance Directives and portable DNR orders.     Length of ACP Conversation in minutes:  5    Conversation Outcomes:  [x] ACP discussion completed  [] Existing advance directive reviewed with patient; no changes to patient's previously recorded wishes  [] New Advance Directive completed  [] Portable Do Not Rescitate prepared for Provider review and signature  [] POLST/POST/MOLST/MOST prepared for Provider review and signature      Follow-up plan:    [] Schedule follow-up conversation to continue planning  [] Referred individual to Provider for additional questions/concerns   [] Advised patient/agent/surrogate to review completed ACP document and update if needed with changes in condition, patient preferences or care setting    [] This note routed to one or more involved healthcare providers

## 2022-11-15 NOTE — PROGRESS NOTES
Pharmacy dosing of initial vancomycin ordered by ED provider. 2500 mg ordered x 1. Pharmacy is waiting to see if vancomycin therapy is continued or stopped/changed by the admitting physician. Eric Rdz.  Mukesh Lozano  11/15/2022  4:47 AM

## 2022-11-15 NOTE — PROGRESS NOTES
Physical Therapy    Patient declined therapy evaluation. Attempted PT eval for ~ 5 minutes asking how patient was going to mobilize in the home if she can't tolerate weight on her foot. Pt reporting she has a w/c and a r.walker but the w/c will not fit into the bathroom. Pt states she has a  and son who both work but she would be alone during the day with just her cats. Pt was pleasant refusing but persistent that she could figure it all out and didn't need any extra equipment or help at d/c.  Pt given hosp therapy number to contact therapy if she changes her mind. Recliner placed in room in case patient willing to get OOB.     Sherrill Brewer, PT

## 2022-11-15 NOTE — ED PROVIDER NOTES
EMERGENCY DEPARTMENT ENCOUNTER    Pt Name: Mohan Levin  MRN: [id number]  [place] 1955  Date of evaluation: 11/15/22  CHIEF COMPLAINT       Chief Complaint   Patient presents with    Foot Pain     Dx with cellulitis on L foot on Friday. Started on doxy. Has not been taking it regularly d/t nausea. Pt now having R foot pain. HISTORY OF PRESENT ILLNESS   Patient is a 75-year-old female who presents to the ED via EMS with foot pain. Patient reports longstanding history of cellulitis to left foot. She also reports 2-day history of pain in right heel. She is prescribed doxycycline however reported to EMS that she has been unable to swallow her medications because of too much \"phlegm \". Pain is severe causing her the inability to walk. She has urinary incontinence at baseline and is requesting admission, a Shi catheter and vancomycin to \"help clear her symptoms\". No reports of fever, cough, shortness of breath, chest pain. Reports she saw her podiatrist last Friday, 3 days ago, who clipped her nails and placed bandage over oozing skin on back of left calf. REVIEW OF SYSTEMS     Review of Systems   All other systems reviewed and are negative. PASTMEDICAL HISTORY     Past Medical History:   Diagnosis Date    DM (diabetes mellitus) ([facility] [address] 06/22/2012    DVT (deep venous thrombosis) ([facility] 06/22/2012    x1 provoked s/p long travel    GERD (gastroesophageal reflux disease) 06/22/2012    Hypertension     MVP (mitral valve prolapse) 06/22/2012    KATHI (obstructive sleep apnea)      SURGICAL HISTORY       Past Surgical History:   Procedure Laterality Date    HYSTERECTOMY (CERVIX STATUS UNKNOWN)      TONSILLECTOMY       CURRENT MEDICATIONS       Previous Medications    ACETAMINOPHEN (TYLENOL) 500 MG TABLET    Take 500 mg by mouth every 6 hours as needed for Pain.  Pt only takes 1-2 times per week    BLOOD GLUCOSE MONITOR KIT AND SUPPLIES    Dispense sufficient amount for indicated testing frequency plus additional to accommodate PRN testing needs. Dispense all needed supplies to include: monitor, strips, lancing device, lancets, control solutions, alcohol swabs. BLOOD GLUCOSE MONITOR STRIPS    Test daily times a day & as needed for symptoms of irregular blood glucose. Dispense sufficient amount for indicated testing frequency plus additional to accommodate PRN testing needs. DICLOFENAC SODIUM (VOLTAREN) 1 % GEL    Apply 4 g topically in the morning and 4 g at noon and 4 g in the evening and 4 g before bedtime. DOXYCYCLINE MONOHYDRATE (MONODOX) 100 MG CAPSULE    Take 1 capsule by mouth 2 times daily (with meals) Avoid calcium, mtv's and dairy 2 hours before and after    DULAGLUTIDE 0.75 MG/0.5ML SOPN    Inject 1.5 mg into the skin once a week    DULOXETINE (CYMBALTA) 30 MG EXTENDED RELEASE CAPSULE    Take 1 capsule by mouth daily    FESOTERODINE FUMARATE ER 8 MG TB24    Take by mouth    GABAPENTIN (NEURONTIN) 300 MG CAPSULE    Take 1 capsule by mouth 3 times daily for 90 days. GLUCOSE BLOOD (BLOOD GLUCOSE TEST STRIPS) STRP    by Does not apply route 3 times daily.  Freestyle [place] Test Strips    GLUCOSE MONITORING (FREESTYLE [place]) KIT    1 kit by Does not apply route daily    GLYBURIDE (DIABETA) 5 MG TABLET    TAKE 1 TABLET BY MOUTH  TWICE DAILY WITH MEALS    HYDROCHLOROTHIAZIDE (HYDRODIURIL) 25 MG TABLET    TAKE 1 TABLET BY MOUTH  DAILY    INSULIN GLARGINE, 2 UNIT DIAL, (TOUJEO MAX SOLOSTAR) 300 UNIT/ML SOPN    Inject 34 units daily    INSULIN PEN NEEDLE 32G X 4 MM MISC    1 each by Does not apply route daily    LANCETS MISC    1 each by Does not apply route daily    LOSARTAN (COZAAR) 50 MG TABLET    Take 1 tablet by mouth daily    MAGNESIUM OXIDE (MAG-OX) 400 MG TABLET    Take 1 tablet by mouth daily    METOPROLOL SUCCINATE (TOPROL XL) 50 MG EXTENDED RELEASE TABLET    Take 1 tablet by mouth daily    OMEPRAZOLE (PRILOSEC) 40 MG DELAYED RELEASE CAPSULE    TAKE 1 CAPSULE BY MOUTH  DAILY    RIVAROXABAN (XARELTO) 20 MG TABS TABLET    Take 1 tablet by mouth daily (with breakfast)     ALLERGIES     is allergic to erythromycin and lisinopril. FAMILY HISTORY     She indicated that her mother is . She indicated that her father is . SOCIAL HISTORY       Social History     Tobacco Use    Smoking status: Former     Packs/day: 0.25     Years: 20.00     Pack years: 5.00     Types: Cigarettes     Quit date: 1983     Years since quittin.4    Smokeless tobacco: Never    Tobacco comments:     Quit 30 years ago   Vaping Use    Vaping Use: Never used   Substance Use Topics    Alcohol use: No    Drug use: No     PHYSICAL EXAM     INITIAL VITALS: BP (!) 152/98   Pulse 94   Temp 100.4 °F (38 °C) (Oral)   Resp 20   Ht 6' (1.829 m)   Wt (!) 385 lb (174.6 kg)   SpO2 95%   BMI 52.22 kg/m²    Physical Exam  Constitutional:       Appearance: Normal appearance. HENT:      Head: Normocephalic. Right Ear: External ear normal.      Left Ear: External ear normal.      Nose: Nose normal.   Eyes:      Conjunctiva/sclera: Conjunctivae normal.   Cardiovascular:      Rate and Rhythm: Regular rhythm. Abdominal:      General: There is no distension. Skin:     General: Skin is dry. Neurological:      Mental Status: She is alert. Mental status is at baseline. MEDICAL DECISION MAKING:   Temperature was 100.4, pulse was 94, and blood pressure was 152/98. Pulse oximetry on room air was 95%. Exam notable for severe tenderness, erythema right heel. Also chronic appearing left lower leg edema with hyperpigmented skin and mild oozing from posterior calf. Likely baseline cellulitis left leg now developing cellulitis and right leg status post noncompliance with doxycycline. Plan for basic labs, x-rays, antibiotics, likely admission. ED Course as of 11/15/22 0451   Tue Nov 15, 2022   0046 Updated patient of lab results and x-ray results.  [CHRISTY]      ED Course User Index  [CHRISTY] Anne Marie Ying MD CRITICAL CARE:     The 30 ml/kg fluid bolus is not ordered due to concern for fluid overload and/or heart failure. PROCEDURES:    Procedures    DIAGNOSTIC RESULTS   EKG:All EKG's are interpreted by the Emergency Department Physician who either signs or Co-signs this chart in the absence of a cardiologist.        RADIOLOGY:All plain film, CT, MRI, and formal ultrasound images (except ED bedside ultrasound) are read by the radiologist, see reports below, unless otherwisenoted in MDM or here. XR FOOT RIGHT (MIN 3 VIEWS)   Final Result   1. Advanced left mid and hindfoot degenerative changes, with calcaneal   spurring though no acute fracture seen throughout the foot. 2. Advanced right mid and hindfoot degenerative changes, with calcaneal   spurring though no acute fracture. 3. Peripheral arterial disease. 4. Bilateral pes planus deformities. XR FOOT LEFT (MIN 3 VIEWS)   Final Result   1. Advanced left mid and hindfoot degenerative changes, with calcaneal   spurring though no acute fracture seen throughout the foot. 2. Advanced right mid and hindfoot degenerative changes, with calcaneal   spurring though no acute fracture. 3. Peripheral arterial disease. 4. Bilateral pes planus deformities. LABS: All lab results were reviewed by myself, and all abnormals are listed below.   Labs Reviewed   CBC WITH AUTO DIFFERENTIAL - Abnormal; Notable for the following components:       Result Value    WBC 12.2 (*)     RBC 3.83 (*)     Hemoglobin 11.2 (*)     Hematocrit 35.6 (*)     Seg Neutrophils 84 (*)     Lymphocytes 7 (*)     Eosinophils % 0 (*)     Segs Absolute 10.24 (*)     Absolute Lymph # 0.79 (*)     All other components within normal limits   COMPREHENSIVE METABOLIC PANEL - Abnormal; Notable for the following components:    Glucose 377 (*)     Creatinine 1.18 (*)     Est, Glom Filt Rate 50 (*)     Sodium 133 (*)     Chloride 97 (*)     Albumin 3.2 (*)     All other components within normal limits   MAGNESIUM - Abnormal; Notable for the following components:    Magnesium 1.1 (*)     All other components within normal limits   CULTURE, BLOOD 1   CULTURE, BLOOD 2   LACTIC ACID       EMERGENCY DEPARTMENTCOURSE:   Patient remained stable in the ED. Pain improved with Percocet x2. Tylenol 1 g given for fever. Labs remarkable for magnesium 1.1, repleted in the ED. Glucose 377. WBC 12.2. Lactic acid 1.8. Foot x-ray shows degenerative changes, no evidence of osteomyelitis. Initial plan was to treat with Dalvance and discharged home with pain control however magnesium levels too low to correct in the ED. She states that she ran out of magnesium tablet a few weeks ago and was unable to fill them. Ordered vancomycin. Will admit for cellulitis lower extremities and hypomagnesemia. Discussed case with Ying Hussein, who accepts patient for admission to hospital.        Vitals:    Vitals:    11/15/22 0110   BP: (!) 152/98   Pulse: 94   Resp: 20   Temp: 100.4 °F (38 °C)   TempSrc: Oral   SpO2: 95%   Weight: (!) 385 lb (174.6 kg)   Height: 6' (1.829 m)       The patient was given the following medications while in the emergency department:  Orders Placed This Encounter   Medications    oxyCODONE-acetaminophen (PERCOCET) 5-325 MG per tablet 1 tablet    acetaminophen (TYLENOL) tablet 1,000 mg    magnesium sulfate 2000 mg in 50 mL IVPB premix    oxyCODONE-acetaminophen (PERCOCET) 5-325 MG per tablet 1 tablet    DISCONTD: vancomycin (VANCOCIN) 1,750 mg in dextrose 5 % 500 mL IVPB     Order Specific Question:   Antimicrobial Indications     Answer:   Skin and Soft Tissue Infection    vancomycin (VANCOCIN) 2,500 mg in dextrose 5 % 500 mL IVPB     Order Specific Question:   Antimicrobial Indications     Answer:   Skin and Soft Tissue Infection     CONSULTS:  IP CONSULT TO INTERNAL MEDICINE    FINAL IMPRESSION      1. Cellulitis of left heel    2. Cellulitis of right heel    3.  Hypomagnesemia DISPOSITION/PLAN   DISPOSITION Decision To Admit 11/15/2022 04:41:49 AM      PATIENT REFERRED TO:  No follow-up provider specified.   DISCHARGE MEDICATIONS:  New Prescriptions    No medications on file     Roxanna Nielson MD  Attending Emergency Physician                    Mariaa Goyal MD  11/15/22 7207

## 2022-11-15 NOTE — PLAN OF CARE
Problem: Discharge Planning  Goal: Discharge to home or other facility with appropriate resources  Outcome: Progressing  Flowsheets (Taken 11/15/2022 0609)  Discharge to home or other facility with appropriate resources:   Identify barriers to discharge with patient and caregiver   Arrange for needed discharge resources and transportation as appropriate   Identify discharge learning needs (meds, wound care, etc)   Refer to discharge planning if patient needs post-hospital services based on physician order or complex needs related to functional status, cognitive ability or social support system     Problem: Pain  Goal: Verbalizes/displays adequate comfort level or baseline comfort level  Outcome: Progressing     Problem: Neurosensory - Adult  Goal: Achieves stable or improved neurological status  Outcome: Progressing  Flowsheets (Taken 11/15/2022 0609)  Achieves stable or improved neurological status: Assess for and report changes in neurological status     Problem: Skin/Tissue Integrity - Adult  Goal: Skin integrity remains intact  Outcome: Progressing  Flowsheets (Taken 11/15/2022 0609)  Skin Integrity Remains Intact:   Monitor for areas of redness and/or skin breakdown   Assess vascular access sites hourly  Goal: Incisions, wounds, or drain sites healing without S/S of infection  Outcome: Progressing  Flowsheets (Taken 11/15/2022 0609)  Incisions, Wounds, or Drain Sites Healing Without Sign and Symptoms of Infection:   ADMISSION and DAILY: Assess and document risk factors for pressure ulcer development   TWICE DAILY: Assess and document skin integrity   TWICE DAILY: Assess and document dressing/incision, wound bed, drain sites and surrounding tissue   Initiate isolation precautions as appropriate     Problem: Musculoskeletal - Adult  Goal: Return mobility to safest level of function  Outcome: Progressing  Flowsheets (Taken 11/15/2022 0609)  Return Mobility to Safest Level of Function:   Assess patient stability and activity tolerance for standing, transferring and ambulating with or without assistive devices   Assist with transfers and ambulation using safe patient handling equipment as needed   Ensure adequate protection for wounds/incisions during mobilization   Obtain physical therapy/occupational therapy consults as needed   Apply continuous passive motion per provider or physical therapy orders to increase flexion toward goal   Instruct patient/family in ordered activity level  Goal: Return ADL status to a safe level of function  Outcome: Progressing  Flowsheets (Taken 11/15/2022 0609)  Return ADL Status to a Safe Level of Function:   Administer medication as ordered   Assess activities of daily living deficits and provide assistive devices as needed   Obtain physical therapy/occupational therapy consults as needed   Assist and instruct patient to increase activity and self care as tolerated     Problem: Gastrointestinal - Adult  Goal: Minimal or absence of nausea and vomiting  Outcome: Progressing  Flowsheets (Taken 11/15/2022 0609)  Minimal or absence of nausea and vomiting:   Administer ordered antiemetic medications as needed   Provide nonpharmacologic comfort measures as appropriate   Advance diet as tolerated, if ordered   Nutrition consult to assist patient with adequate nutrition and appropriate food choices  Goal: Maintains or returns to baseline bowel function  Outcome: Progressing  Flowsheets (Taken 11/15/2022 0609)  Maintains or returns to baseline bowel function:   Assess bowel function   Encourage oral fluids to ensure adequate hydration   Administer ordered medications as needed   Encourage mobilization and activity   Nutrition consult to assist patient with appropriate food choices  Goal: Maintains adequate nutritional intake  Outcome: Progressing  Flowsheets (Taken 11/15/2022 0609)  Maintains adequate nutritional intake:   Monitor percentage of each meal consumed   Identify factors contributing to decreased intake, treat as appropriate   Monitor intake and output, weight and lab values     Problem: Genitourinary - Adult  Goal: Absence of urinary retention  Outcome: Progressing  Flowsheets (Taken 11/15/2022 0609)  Absence of urinary retention:   Assess patients ability to void and empty bladder   Monitor intake/output and perform bladder scan as needed     Problem: Infection - Adult  Goal: Absence of infection at discharge  Outcome: Progressing  Flowsheets (Taken 11/15/2022 0609)  Absence of infection at discharge:   Assess and monitor for signs and symptoms of infection   Monitor lab/diagnostic results   Monitor all insertion sites i.e., indwelling lines, tubes and drains   Administer medications as ordered   Instruct and encourage patient and family to use good hand hygiene technique   Identify and instruct in appropriate isolation precautions for identified infection/condition  Goal: Absence of infection during hospitalization  Outcome: Progressing  Flowsheets (Taken 11/15/2022 0609)  Absence of infection during hospitalization:   Assess and monitor for signs and symptoms of infection   Monitor lab/diagnostic results   Monitor all insertion sites i.e., indwelling lines, tubes and drains   Administer medications as ordered   Instruct and encourage patient and family to use good hand hygiene technique   Identify and instruct in appropriate isolation precautions for identified infection/condition     Problem: Metabolic/Fluid and Electrolytes - Adult  Goal: Electrolytes maintained within normal limits  Outcome: Progressing  Flowsheets (Taken 11/15/2022 0609)  Electrolytes maintained within normal limits:   Monitor labs and assess patient for signs and symptoms of electrolyte imbalances   Administer electrolyte replacement as ordered   Monitor response to electrolyte replacements, including repeat lab results as appropriate  Goal: Hemodynamic stability and optimal renal function maintained  Outcome: Progressing  Flowsheets (Taken 11/15/2022 0609)  Hemodynamic stability and optimal renal function maintained:   Monitor labs and assess for signs and symptoms of volume excess or deficit   Monitor intake, output and patient weight   Monitor response to interventions for patient's volume status, including labs, urine output, blood pressure (other measures as available)   Encourage oral intake as appropriate  Goal: Glucose maintained within prescribed range  Outcome: Progressing  Flowsheets (Taken 11/15/2022 0609)  Glucose maintained within prescribed range:   Monitor blood glucose as ordered   Assess for signs and symptoms of hyperglycemia and hypoglycemia   Administer ordered medications to maintain glucose within target range   Assess barriers to adequate nutritional intake and initiate nutrition consult as needed   Instruct patient on self management of diabetes and initiate consult as needed     Problem: Skin/Tissue Integrity  Goal: Absence of new skin breakdown  Description: 1. Monitor for areas of redness and/or skin breakdown  2. Assess vascular access sites hourly  3. Every 4-6 hours minimum:  Change oxygen saturation probe site  4. Every 4-6 hours:  If on nasal continuous positive airway pressure, respiratory therapy assess nares and determine need for appliance change or resting period.   Outcome: Progressing     Problem: Safety - Adult  Goal: Free from fall injury  Outcome: Progressing     Problem: ABCDS Injury Assessment  Goal: Absence of physical injury  Outcome: Progressing

## 2022-11-15 NOTE — PROGRESS NOTES
Patient receives [facility] .    [facility] will follow as needed. (writer charting for [facility].)   PT: was anointed for the sacrament of the sick by Fr. Megan Huff      11/15/22 1113   Encounter Summary   Encounter Overview/Reason  Shikha Scott Encounter   Service Provided For: Patient   Referral/Consult From: Shiva   Last Encounter  11/15/22   Encounter    Type Initial Screen/Assessment   Rituals, Rites and Sacraments   Type Sacrament of Sick; Anointing

## 2022-11-15 NOTE — H&P
[facility]  Office: [address] [facility], , Randee Aki, DO, Yvan Bales, DO, Evgeny Guy, DO, Danis Farnsworth MD, Emma Parra MD, River Fontenot MD, Maxx Dalton MD,  Davy Torres MD, Miriam Foley MD, Kaykay Mello, DO, David Herrera MD,  Nayan Angel MD, Gladis Carver MD, Lary Tafoya DO, Uzma Dodson MD, Lindsay Reyna MD, Anna Lagunas DO, Kisha Pace MD, Lavonne Pierson MD, Marissa Branham MD, Steve Rush MD, Master Rivas DO, Xiomara Chen MD, Yunior Zaragoza MD, Amy Morales, CNP,  Romi Oates, CNP, Jesús Tyler, CNP, Osiris Ferris, CNP,  Tamy Mandujano, DNP, López Chappell, CNP, Juan Frost, CNP, Davian Lundy, CNP, Yolie Moreno, CNP, Edwina Azul, CNP, Joaquina Caruso PAMARKELL, Dino Faith, CNS, [place], [facility], Venkatesh Parra, CNP, Ashley Lechuga, CNP, Dane Graft, [facility]   [address] [facility]    HISTORY AND PHYSICAL EXAMINATION            Date:   11/15/2022  Patient name:  Dottie Jones  Date of admission:  11/15/2022  1:00 AM  MRN:   [id number]  Account:  [de-identified]  YOB: 1955  PCP:    ARTURO Parham CNP  Room:   2004/2004-02  Code Status:    Full Code    Chief Complaint:     Chief Complaint   Patient presents with    Foot Pain     Dx with cellulitis on L foot on Friday. Started on doxy. Has not been taking it regularly d/t nausea. Pt now having R foot pain. Patient presents to the hospital with left lower extremity cellulitis, failure of outpatient oral antibiotics. Patient states \"my leg hurts\"    History Obtained From:     patient, electronic medical record    History of Present Illness:     Dottie Jones is a 69 y.o. Non- / non  female who presents with Foot Pain (Dx with cellulitis on L foot on Friday. Started on doxy. Has not been taking it regularly d/t nausea.  Pt now having R foot pain.)   and is admitted to the hospital for the management of Cellulitis. This very pleasant 61-year-old female was recently started on doxycycline by her podiatrist due to a left lower extremity cellulitis. The patient was unable to tolerate her doxycycline due to nausea. She states that she took it intermittently and ultimately presented to the hospital due to worsening cellulitic changes. At this point in time the patient has diffuse cellulitic changes involving the left lower extremity along with flaking of her skin. She does have chronic lymphedematous changes secondary to her morbid obesity and BMI 52. She has developed some slight skin changes along the right posterior aspect of her calf. Reviewing her laboratory data shows that her A1c has been over 10 for well over 2 years now. I had a long discussion with her regarding the fact that her glycemic control is playing a direct role in her risk of lower extremity cellulitis. Her blood sugars are obviously not controlled which increases her risk of infection immensely. I am going to titrate her long-acting insulin from 34 units daily to 25 twice daily with plans to aggressively titrate it further. We will place her on a high intensity sliding scale. I strongly encouraged her to follow-up with her outpatient physician for better glycemic control. She voices understanding at this point in time and denies any questions or concerns.     Past Medical History:     Past Medical History:   Diagnosis Date    [facility]/22/2012    DVT (deep venous thrombosis) ([facility] 06/22/2012    x1 provoked s/p long travel    GERD (gastroesophageal reflux disease) 06/22/2012    Hypertension     MVP (mitral valve prolapse) 06/22/2012    KATHI (obstructive sleep apnea)         Past Surgical History:     Past Surgical History:   Procedure Laterality Date    HYSTERECTOMY (CERVIX STATUS UNKNOWN)      TONSILLECTOMY          Medications Prior to Admission:     Prior to Admission medications    Medication Sig Start Date End Date Taking? Authorizing Provider   doxycycline monohydrate (MONODOX) 100 MG capsule Take 1 capsule by mouth 2 times daily (with meals) Avoid calcium, mtv's and dairy 2 hours before and after 10/25/22   Lindaann Dakin, APRN - CNP   glucose monitoring (FREESTYLE FREEDOM) kit 1 kit by Does not apply route daily 10/20/22   Lindaann Dakin, APRN - CNP   blood glucose monitor strips Test daily times a day & as needed for symptoms of irregular blood glucose. Dispense sufficient amount for indicated testing frequency plus additional to accommodate PRN testing needs. 10/20/22   Lindaann Dakin, APRN - CNP   Lancets MISC 1 each by Does not apply route daily 10/20/22   Lindaann Dakin, APRN - CNP   Fesoterodine Fumarate ER 8 MG TB24 Take 8 mg by mouth daily    Historical Provider, MD   magnesium oxide (MAG-OX) 400 MG tablet Take 1 tablet by mouth daily 10/18/22   Lindaann Dakin, APRN - CNP   losartan (COZAAR) 50 MG tablet Take 1 tablet by mouth daily 10/18/22   Lindaann Dakin, APRN - CNP   omeprazole (PRILOSEC) 40 MG delayed release capsule TAKE 1 CAPSULE BY MOUTH  DAILY 10/18/22   Lindaann Dakin, APRN - CNP   Insulin Pen Needle 32G X 4 MM MISC 1 each by Does not apply route daily 10/18/22   Lindaann Dakin, APRN - CNP   diclofenac sodium (VOLTAREN) 1 % GEL Apply 4 g topically in the morning and 4 g at noon and 4 g in the evening and 4 g before bedtime.   Patient taking differently: Apply 4 g topically 4 times daily as needed for Pain Uses on feet 7/18/22   Tati Love, DO   Insulin Glargine, 2 Unit Dial, (TOUJEO MAX SOLOSTAR) 300 UNIT/ML SOPN Inject 34 units daily  Patient taking differently: Inject 34 Units into the skin daily (with breakfast) Inject 34 units daily 7/18/22   Tati Love DO   Dulaglutide 0.75 MG/0.5ML SOPN Inject 1.5 mg into the skin once a week 7/18/22   Tati Loev DO   rivaroxaban (XARELTO) 20 MG TABS tablet Take 1 tablet by mouth daily (with breakfast) 6/28/22   Briana Farrell, DO   DULoxetine (CYMBALTA) 30 MG extended release capsule Take 1 capsule by mouth daily 4/8/22   Briana Farrell, DO   hydroCHLOROthiazide (HYDRODIURIL) 25 MG tablet TAKE 1 TABLET BY MOUTH  DAILY 4/8/22   Briana Farrell, DO   metoprolol succinate (TOPROL XL) 50 MG extended release tablet Take 1 tablet by mouth daily 4/8/22   Briana Farrell, DO   gabapentin (NEURONTIN) 300 MG capsule Take 1 capsule by mouth 3 times daily for 90 days. 4/8/22 11/15/22  Briana Farrell, DO   glyBURIDE (DIABETA) 5 MG tablet TAKE 1 TABLET BY MOUTH  TWICE DAILY WITH MEALS  Patient taking differently: Take 5 mg by mouth 2 times daily (with meals) TAKE 1 TABLET BY MOUTH  TWICE DAILY WITH MEALS 4/8/22   Briana Farrell DO   blood glucose monitor kit and supplies Dispense sufficient amount for indicated testing frequency plus additional to accommodate PRN testing needs. Dispense all needed supplies to include: monitor, strips, lancing device, lancets, control solutions, alcohol swabs. 9/10/21   Briana Farrell DO   acetaminophen (TYLENOL) 500 MG tablet Take 500 mg by mouth every 6 hours as needed for Pain. Pt only takes 1-2 times per week    Historical Provider, MD   Glucose Blood (BLOOD GLUCOSE TEST STRIPS) STRP by Does not apply route 3 times daily. Freestyle Freedom Test Strips    Historical Provider, MD        Allergies:     Erythromycin and Lisinopril    Social History:     Tobacco:    reports that she quit smoking about 39 years ago. Her smoking use included cigarettes. She has a 5.00 pack-year smoking history. She has never used smokeless tobacco.  Alcohol:      reports no history of alcohol use. Drug Use:  reports no history of drug use. Family History:     Family History   Problem Relation Age of Onset    Heart Disease Mother        Review of Systems:     Positive and Negative as described in HPI.     CONSTITUTIONAL:  negative for fevers, chills, sweats, fatigue, weight loss  HEENT:  negative for vision, hearing changes, runny nose, throat pain  RESPIRATORY:  negative for shortness of breath, cough, congestion, wheezing  CARDIOVASCULAR:  negative for chest pain, palpitations  GASTROINTESTINAL: Patient reports nausea with doxycycline that has presently resolved with discontinuation of the medication. She denies any vomiting, diarrhea, constipation, change in bowel habits, abdominal pain   GENITOURINARY:  negative for difficulty of urination, burning with urination, frequency   INTEGUMENT: Positive for left leg redness along with flaking of the skin  HEMATOLOGIC/LYMPHATIC: Positive for chronic lower extremity swelling  ALLERGIC/IMMUNOLOGIC:  negative for urticaria , itching  ENDOCRINE:  negative increase in drinking, increase in urination, hot or cold intolerance  MUSCULOSKELETAL:  negative joint pains, muscle aches, swelling of joints  NEUROLOGICAL:  negative for headaches, dizziness, lightheadedness, numbness, pain, tingling extremities  BEHAVIOR/PSYCH:  negative for depression, anxiety    Physical Exam:   BP (!) 154/68   Pulse 90   Temp 99.3 °F (37.4 °C) (Oral)   Resp 16   Ht 6' (1.829 m)   Wt (!) 385 lb (174.6 kg)   SpO2 91%   BMI 52.22 kg/m²   Temp (24hrs), Av.2 °F (37.3 °C), Min:98.6 °F (37 °C), Max:100.4 °F (38 °C)    No results for input(s): POCGLU in the last 72 hours.   No intake or output data in the 24 hours ending 11/15/22 1028    General Appearance:  alert, well appearing, and in no acute distress  Mental status: oriented to person, place, and time  Head:  normocephalic, atraumatic  Eye: no icterus, redness, pupils equal and reactive, extraocular eye movements intact, conjunctiva clear  Ear: normal external ear, no discharge, hearing intact  Nose:  no drainage noted  Mouth: mucous membranes moist  Neck: supple, no carotid bruits, thyroid not palpable  Lungs: Bilateral equal air entry, clear to ausculation, no wheezing, rales or rhonchi, normal effort  Cardiovascular: normal rate, regular rhythm, no murmur, gallop, rub  Abdomen: Soft, nontender, nondistended, normal bowel sounds, no hepatomegaly or splenomegaly  Neurologic: There are no new focal motor or sensory deficits, normal muscle tone and bulk, no abnormal sensation, normal speech, cranial nerves II through XII grossly intact  Skin: Extensive cellulitic changes involving the left lower extremity below the knee, right posterior calf demonstrates cellulitic changes. Extremities:  peripheral pulses palpable, chronic lymphedematous changes bilaterally  Psych: normal affect     Investigations:      Laboratory Testing:  Recent Results (from the past 24 hour(s))   Blood Culture 1    Collection Time: 11/15/22  2:25 AM    Specimen: Blood   Result Value Ref Range    Specimen Description . BLOOD     Special Requests 10ML LT AC     Culture NO GROWTH <24 HRS    CBC with Auto Differential    Collection Time: 11/15/22  2:25 AM   Result Value Ref Range    WBC 12.2 (H) 3.5 - 11.3 k/uL    RBC 3.83 (L) 3.95 - 5.11 m/uL    Hemoglobin 11.2 (L) 11.9 - 15.1 g/dL    Hematocrit 35.6 (L) 36.3 - 47.1 %    MCV 93.0 82.6 - 102.9 fL    MCH 29.2 25.2 - 33.5 pg    MCHC 31.5 28.4 - 34.8 g/dL    RDW 13.1 11.8 - 14.4 %    Platelets 333 008 - 645 k/uL    MPV 10.0 8.1 - 13.5 fL    NRBC Automated 0.0 0.0 per 100 WBC    Seg Neutrophils 84 (H) 36 - 65 %    Lymphocytes 7 (L) 24 - 43 %    Monocytes 9 3 - 12 %    Eosinophils % 0 (L) 1 - 4 %    Basophils 0 0 - 2 %    Immature Granulocytes 0 0 %    Segs Absolute 10.24 (H) 1.50 - 8.10 k/uL    Absolute Lymph # 0.79 (L) 1.10 - 3.70 k/uL    Absolute Mono # 1.05 0.10 - 1.20 k/uL    Absolute Eos # 0.03 0.00 - 0.44 k/uL    Basophils Absolute 0.03 0.00 - 0.20 k/uL    Absolute Immature Granulocyte 0.05 0.00 - 0.30 k/uL   CMP    Collection Time: 11/15/22  2:25 AM   Result Value Ref Range    Glucose 377 (H) 70 - 99 mg/dL    BUN 17 8 - 23 mg/dL    Creatinine 1.18 (H) 0.50 - 0.90 mg/dL    Est, Glom Filt Rate 50 (L) >60 mL/min/1.73m2    Bun/Cre Ratio 14 9 - 20    Calcium 8.7 8.6 - 10.4 mg/dL    Sodium 133 (L) 135 - 144 mmol/L    Potassium 4.4 3.7 - 5.3 mmol/L    Chloride 97 (L) 98 - 107 mmol/L    CO2 24 20 - 31 mmol/L    Anion Gap 12 9 - 17 mmol/L    Alkaline Phosphatase 71 35 - 104 U/L    ALT 8 5 - 33 U/L    AST 12 <32 U/L    Total Bilirubin 0.5 0.3 - 1.2 mg/dL    Total Protein 7.6 6.4 - 8.3 g/dL    Albumin 3.2 (L) 3.5 - 5.2 g/dL   Lactic Acid    Collection Time: 11/15/22  2:25 AM   Result Value Ref Range    Lactic Acid 1.8 0.5 - 2.2 mmol/L   Magnesium    Collection Time: 11/15/22  2:25 AM   Result Value Ref Range    Magnesium 1.1 (L) 1.6 - 2.6 mg/dL   Culture, Blood 2    Collection Time: 11/15/22  3:06 AM    Specimen: Blood   Result Value Ref Range    Specimen Description . BLOOD     Special Requests 20ML RT AC     Culture NO GROWTH <24 HRS    Basic Metabolic Panel w/ Reflex to MG    Collection Time: 11/15/22  7:40 AM   Result Value Ref Range    Glucose 383 (H) 70 - 99 mg/dL    BUN 15 8 - 23 mg/dL    Creatinine 1.12 (H) 0.50 - 0.90 mg/dL    Est, Glom Filt Rate 53 (L) >60 mL/min/1.73m2    Bun/Cre Ratio 13 9 - 20    Calcium 8.3 (L) 8.6 - 10.4 mg/dL    Sodium 133 (L) 135 - 144 mmol/L    Potassium 3.8 3.7 - 5.3 mmol/L    Chloride 99 98 - 107 mmol/L    CO2 24 20 - 31 mmol/L    Anion Gap 10 9 - 17 mmol/L   Magnesium    Collection Time: 11/15/22  7:40 AM   Result Value Ref Range    Magnesium 1.6 1.6 - 2.6 mg/dL       Imaging/Diagnostics:  XR FOOT LEFT (MIN 3 VIEWS)    Result Date: 11/15/2022  1. Advanced left mid and hindfoot degenerative changes, with calcaneal spurring though no acute fracture seen throughout the foot. 2. Advanced right mid and hindfoot degenerative changes, with calcaneal spurring though no acute fracture. 3. Peripheral arterial disease. 4. Bilateral pes planus deformities. XR FOOT RIGHT (MIN 3 VIEWS)    Result Date: 11/15/2022  1.  Advanced left mid and hindfoot degenerative changes, with calcaneal spurring though no acute fracture seen throughout the foot. 2. Advanced right mid and hindfoot degenerative changes, with calcaneal spurring though no acute fracture. 3. Peripheral arterial disease. 4. Bilateral pes planus deformities. Assessment :      Hospital Problems             Last Modified POA    * (Principal) Cellulitis 11/15/2022 Yes       Plan:     Patient status inpatient in the Med/Surge    Bilateral lower extremity cellulitis left greater than right  Initiate vancomycin/cefepime  Patient has failed outpatient treatment with doxycycline  Monitor clinical response  Anticipate patient will require 48 hours of IV antibiotics with transition to oral medications with subsequent discharge   Poorly controlled diabetes  Had a long discussion with the patient regarding the importance of glycemic control. I reviewed her hemoglobin A1c available within the system and her A1c has been chronically over 10 since 2019. I explained to her that her long-term prognosis is poor and that her risk of infection is high. At this point in time we will increase her long-acting insulin to 25 mg twice daily, institute high correction insulin sliding scale. Essential hypertension  Continue Cozaar and Toprol  Monitor vitals trend and titrate accordingly  Hyponatremia  Discontinue hydrochlorothiazide due to hyponatremia  Check morning labs  History of DVT  Continue Xarelto    Consultations:   IP CONSULT TO INTERNAL MEDICINE  PHARMACY TO DOSE VANCOMYCIN    Patient is admitted as inpatient status because of co-morbidities listed above, severity of signs and symptoms as outlined, requirement for current medical therapies and most importantly because of direct risk to patient if care not provided in a hospital setting. Expected length of stay > 48 hours.     Kitty Richardson DO  11/15/2022  10:28 AM    Copy sent to Dr. Alpa Dinero, APRN - CNP

## 2022-11-15 NOTE — CARE COORDINATION
Case Management Initial Discharge Plan  Danyn Smith,             Met with:patient to discuss discharge plans. Information verified: address, contacts, phone number, , insurance Yes  PCP: ARTURO Garcia CNP  Date of last visit: 10-    Insurance Provider: [facility] [place], Medicare part A    Discharge Planning    Living Arrangements:  Spouse/Significant Other, Children   Support Systems:  Spouse/Significant Other, Children    Home has 2 stories  0 stairs to climb to get into front door - has ramp, 0 stairs to climb to reach second floor  Location of bedroom/bathroom in home  - main floor    Patient able to perform ADL's:Independent    Current Services (outpatient & in home) none  DME equipment: walker, cane, glucometer  DME provider: N/A    Pharmacy: [facility] in [facility] Needed:  N/A    Patient agreeable to home care: No  [place] of choice provided:  n/a    Prior SNF/Rehab Placement and Facility: No  Agreeable to SNF/Rehab: No  [place] of choice provided: n/a   Evaluation: n/a    Expected Discharge date:     Patient expects to be discharged to: Follow Up Appointment: Best Day/ Time:      Transportation provider:   Transportation arrangements needed for discharge: No    Readmission Risk              Risk of Unplanned Readmission:  12             Does patient have a readmission risk score greater than 14?: No  If yes, follow-up appointment must be made within 7 days of discharge. Goal of Care:       Discharge Plan: Met with patient at bedside. Lives with  and son and states is very independent. Pt states has lt leg cellulitis that started last Friday and started having pain rt foot that started  and was unable to walk on rt foot due to pain. Has walker and cane at home. Currently on IV Vanco.    Has Medicare part A only since  still works and is covered under his insurance.     Therapy requested and ordered per Dr. Jatinder Kruse. Amy with therapy informed of new eval.    Pt declines HHC at this time but continue to follow recommendations and re discuss with pt.                    Electronically signed by Radhames Car RN on 11/15/22 at 9:15 AM EST

## 2022-11-15 NOTE — ED NOTES
ED to inpatient nurses report     Chief Complaint   Patient presents with    Foot Pain     Dx with cellulitis on L foot on Friday. Started on doxy. Has not been taking it regularly d/t nausea. Pt now having R foot pain. Present to ED from home. Pt c/o R foot pain. Pt stated she is unable to bear wear on foot. Pt being treated for cellulitis on L foot with doxy. Pt is seeing podiatrist Dr. Klever Patel.     LOC: alert and orientated to name, place, date  Vital signs   Vitals:    11/15/22 0110   BP: (!) 152/98   Pulse: 94   Resp: 20   Temp: 100.4 °F (38 °C)   TempSrc: Oral   SpO2: 95%   Weight: (!) 385 lb (174.6 kg)   Height: 6' (1.829 m)      Oxygen Baseline: none    Current needs required: none    SEPSIS: no  [no] Lactate X 2 ordered (Yes or No)  [yes] Antibiotics given (Yes or No)  [no] IV Fluids ordered (Yes or No)             [yes] IV completed (Yes or No)  [] Hourly Vital Signs (Validated)  [] Outstanding Orders:     LDAs:   Peripheral IV 11/15/22 Left Antecubital (Active)   Site Assessment Clean, dry & intact 11/15/22 0233   Line Status Brisk blood return;Flushed;Specimen collected 11/15/22 0233     Mobility: Requires assistance * 2  Fall Risk: HIGH  Pending ED orders: none  Present condition: Stable  Code Status: Full    Consults: IP CONSULT TO INTERNAL MEDICINE  [x]  Hospitalist  Completed  [x] yes [] no Who: [place]  []  Medicine  Completed  [] yes [] No Who:   []  Cardiology  Completed  [] yes [] No Who:   []  GI   Completed  [] yes [] No Who:   []  Neurology  Completed  [] yes [] No Who:   []  Nephrology Completed  [] yes [] No Who:    []  Vascular  Completed  [] yes [] No Who:   []  Ortho  Completed  [] yes [] No Who:     []  Surgery  Completed  [] yes [] No Who:    []  Urology  Completed  [] yes [] No Who:    []  CT Surgery Completed  [] yes [] No Who:   []  Podiatry  Completed  [] yes [] No Who:    []  Other    Completed  [] yes [] No Who:    Interventions: percocet, tylenol, 2g mag, vanco    Important Events:      XR FOOT  1. Advanced left mid and hindfoot degenerative changes, with calcaneal   spurring though no acute fracture seen throughout the foot. 2. Advanced right mid and hindfoot degenerative changes, with calcaneal   spurring though no acute fracture. 3. Peripheral arterial disease. 4. Bilateral pes planus deformities.        Electronically signed by Samuel Donis RN on 11/15/2022 at 5:08 AM           Samuel Donis [facility]  11/15/22 5855

## 2022-11-15 NOTE — PROGRESS NOTES
Occupational Therapy  DATE: 11/15/2022    NAME: Danny Smith  MRN: [id number]   : 1955    Patient not seen this date for Occupational Therapy due to:      [] Cancel by RN or physician due to:    [] Hemodialysis    [] Critical Lab Value Level     [] Blood transfusion in progress    [] Acute or unstable cardiovascular status   _MAP < 55 or more than >115  _HR < 40 or > 130    [] Acute or unstable pulmonary status   -FiO2 > 60%   _RR < 5 or >40    _O2 sats < 85%    [] Strict Bedrest    [] Off Unit for surgery or procedure    [] Off Unit for testing       [] Pending imaging to R/O fracture    [x] Refusal by Patient: Attempted to see pt this morning (11:10a-11:20a), with pt adamantly declining participation in therapy evaluation at this time, stating she \"has been through this before and I don't need therapy\". Educated pt on her current health status, purpose of acute care therapy evaluation and need to assess pt's mobility/self care status in order to assist with safe discharge/discharge planning, with pt continuing to decline participation. OT to continue to follow/ck back today as able. [] Other      [] OT being discontinued at this time. Patient independent. No further needs. [] OT being discontinued at this time as the patient has been transferred to hospice care. No further needs.       Yang Bueno, OT never

## 2022-11-15 NOTE — CONSULTS
[facility] Pharmacokinetic Monitoring Service - Vancomycin     Vaughn Funk is a 69 y.o. female starting on vancomycin therapy for SSTI. Pharmacy consulted by Dr. Manuel Adams  for monitoring and adjustment. Target Concentration: Goal trough of 10-15 mg/L and AUC/RONEL <500 mg*hr/L    Additional Antimicrobials: cefepime     Pertinent Laboratory Values: Wt Readings from Last 1 Encounters:   11/15/22 (!) 385 lb (174.6 kg)     Temp Readings from Last 1 Encounters:   11/15/22 99.3 °F (37.4 °C) (Oral)     Estimated Creatinine Clearance: 85 mL/min (A) (based on SCr of 1.12 mg/dL (H)). Recent Labs     11/15/22  0225 11/15/22  0740   CREATININE 1.18* 1.12*   WBC 12.2*  --      Procalcitonin: c    Pertinent Cultures:  Culture Date Source Results   11/15 blood Pending    MRSA Nasal Swab: N/A. Non-respiratory infection.     Plan:  Dosing recommendations based on Bayesian software  Start vancomycin 2500mg x1,then 750mg ivpb q12h   Anticipated AUC of 472 and trough concentration of 14.6 at steady state  Renal labs as indicated   Vancomycin concentration ordered for 11/16 @ 1100   Pharmacy will continue to monitor patient and adjust therapy as indicated    Thank you for the consult,  HEATHER Pimentel [facility]  11/15/2022 11:17 AM

## 2022-11-15 NOTE — PROGRESS NOTES
Transitions of Care Pharmacy Service   Medication Review    The patient's list of current home medications has been reviewed. Source(s) of information: Patient/ Surescripts    Based on information provided by the above source(s), no changes to the patient's home medication list were necessary. Please review the ACTION REQUESTED section of this note below for any discrepancies on current hospital orders. PROVIDER ACTION REQUESTED  Medications that need to be addressed by a physician/nurse practitioner:    Medication Action Requested        none         Please feel free to call me with any questions about this encounter. Thank you. Tay Ngo [facility]   Transitions of Care Pharmacy Service  Phone:  [phone]  Fax: [phone]      Electronically signed by Tay Ngo [facility] on 11/15/2022 at 10:27 AM           Medications Prior to Admission: doxycycline monohydrate (MONODOX) 100 MG capsule, Take 1 capsule by mouth 2 times daily (with meals) Avoid calcium, mtv's and dairy 2 hours before and after  glucose monitoring (FREESTYLE FREEDOM) kit, 1 kit by Does not apply route daily  blood glucose monitor strips, Test daily times a day & as needed for symptoms of irregular blood glucose. Dispense sufficient amount for indicated testing frequency plus additional to accommodate PRN testing needs. Lancets MISC, 1 each by Does not apply route daily  Fesoterodine Fumarate ER 8 MG TB24, Take 8 mg by mouth daily  magnesium oxide (MAG-OX) 400 MG tablet, Take 1 tablet by mouth daily  losartan (COZAAR) 50 MG tablet, Take 1 tablet by mouth daily  omeprazole (PRILOSEC) 40 MG delayed release capsule, TAKE 1 CAPSULE BY MOUTH  DAILY  Insulin Pen Needle 32G X 4 MM MISC, 1 each by Does not apply route daily  diclofenac sodium (VOLTAREN) 1 % GEL, Apply 4 g topically in the morning and 4 g at noon and 4 g in the evening and 4 g before bedtime.  (Patient taking differently: Apply 4 g topically 4 times daily as needed for Pain Uses on feet)  Insulin Glargine, 2 Unit Dial, (TOUJEO MAX SOLOSTAR) 300 UNIT/ML SOPN, Inject 34 units daily (Patient taking differently: Inject 34 Units into the skin daily (with breakfast) Inject 34 units daily)  Dulaglutide 0.75 MG/0.5ML SOPN, Inject 1.5 mg into the skin once a week  rivaroxaban (XARELTO) 20 MG TABS tablet, Take 1 tablet by mouth daily (with breakfast)  DULoxetine (CYMBALTA) 30 MG extended release capsule, Take 1 capsule by mouth daily  hydroCHLOROthiazide (HYDRODIURIL) 25 MG tablet, TAKE 1 TABLET BY MOUTH  DAILY  metoprolol succinate (TOPROL XL) 50 MG extended release tablet, Take 1 tablet by mouth daily  gabapentin (NEURONTIN) 300 MG capsule, Take 1 capsule by mouth 3 times daily for 90 days. glyBURIDE (DIABETA) 5 MG tablet, TAKE 1 TABLET BY MOUTH  TWICE DAILY WITH MEALS (Patient taking differently: Take 5 mg by mouth 2 times daily (with meals) TAKE 1 TABLET BY MOUTH  TWICE DAILY WITH MEALS)  blood glucose monitor kit and supplies, Dispense sufficient amount for indicated testing frequency plus additional to accommodate PRN testing needs. Dispense all needed supplies to include: monitor, strips, lancing device, lancets, control solutions, alcohol swabs. acetaminophen (TYLENOL) 500 MG tablet, Take 500 mg by mouth every 6 hours as needed for Pain. Pt only takes 1-2 times per week  Glucose Blood (BLOOD GLUCOSE TEST STRIPS) STRP, by Does not apply route 3 times daily.  Freestyle [place] Test Strips

## 2022-11-15 NOTE — ED NOTES
Pt presents to ED with c/o bilateral foot pain. Pt went to see podiatry on Friday for foot pain and redness. Pt was given doxycycline. Pt only has taken 1 dose of antibiotics d/t being nauseated from increased phlegm production. Pt stated her left foot has been red and swollen. Pt is now c/o R foot pain and unable to bear weight on foot. Pt reports she has not taken any of her medications d/t being nauseated. Pt reports she lives at home with her  and son.         Ana De Leon RN  11/15/22 0831

## 2022-11-15 NOTE — PROGRESS NOTES
Pt admitted to room 2004 from ED. Oriented to room, call light and bed mechanics. Side rails up x2. Call light within reach. Orders reviewed.

## 2022-11-16 PROBLEM — M72.2 PLANTAR FASCIITIS OF RIGHT FOOT: Status: ACTIVE | Noted: 2017-10-04

## 2022-11-16 PROBLEM — I87.2 VENOUS INSUFFICIENCY OF BOTH LOWER EXTREMITIES: Status: ACTIVE | Noted: 2017-08-09

## 2022-11-16 LAB
ANION GAP SERPL CALCULATED.3IONS-SCNC: 10 MMOL/L (ref 9–17)
BUN BLDV-MCNC: 14 MG/DL (ref 8–23)
BUN/CREAT BLD: 12 (ref 9–20)
C-REACTIVE PROTEIN: 340 MG/L (ref 0–5)
CALCIUM SERPL-MCNC: 8 MG/DL (ref 8.6–10.4)
CHLORIDE BLD-SCNC: 98 MMOL/L (ref 98–107)
CO2: 23 MMOL/L (ref 20–31)
CREAT SERPL-MCNC: 1.14 MG/DL (ref 0.5–0.9)
GFR SERPL CREATININE-BSD FRML MDRD: 52 ML/MIN/1.73M2
GLUCOSE BLD-MCNC: 240 MG/DL (ref 65–105)
GLUCOSE BLD-MCNC: 276 MG/DL (ref 65–105)
GLUCOSE BLD-MCNC: 289 MG/DL (ref 65–105)
GLUCOSE BLD-MCNC: 296 MG/DL (ref 70–99)
GLUCOSE BLD-MCNC: 306 MG/DL (ref 65–105)
POTASSIUM SERPL-SCNC: 4.1 MMOL/L (ref 3.7–5.3)
SEDIMENTATION RATE, ERYTHROCYTE: 81 MM/HR (ref 0–30)
SODIUM BLD-SCNC: 131 MMOL/L (ref 135–144)
URIC ACID: 5.7 MG/DL (ref 2.4–5.7)
VANCOMYCIN RANDOM: 17.9 UG/ML
VANCOMYCIN RANDOM: 48.9 UG/ML

## 2022-11-16 PROCEDURE — 99232 SBSQ HOSP IP/OBS MODERATE 35: CPT | Performed by: NURSE PRACTITIONER

## 2022-11-16 PROCEDURE — 86140 C-REACTIVE PROTEIN: CPT

## 2022-11-16 PROCEDURE — 6370000000 HC RX 637 (ALT 250 FOR IP): Performed by: NURSE PRACTITIONER

## 2022-11-16 PROCEDURE — 85652 RBC SED RATE AUTOMATED: CPT

## 2022-11-16 PROCEDURE — 1200000000 HC SEMI PRIVATE

## 2022-11-16 PROCEDURE — 6360000002 HC RX W HCPCS: Performed by: HOSPITALIST

## 2022-11-16 PROCEDURE — 36415 COLL VENOUS BLD VENIPUNCTURE: CPT

## 2022-11-16 PROCEDURE — 6370000000 HC RX 637 (ALT 250 FOR IP): Performed by: HOSPITALIST

## 2022-11-16 PROCEDURE — 82947 ASSAY GLUCOSE BLOOD QUANT: CPT

## 2022-11-16 PROCEDURE — 80048 BASIC METABOLIC PNL TOTAL CA: CPT

## 2022-11-16 PROCEDURE — 84550 ASSAY OF BLOOD/URIC ACID: CPT

## 2022-11-16 PROCEDURE — 2580000003 HC RX 258: Performed by: HOSPITALIST

## 2022-11-16 PROCEDURE — 80202 ASSAY OF VANCOMYCIN: CPT

## 2022-11-16 RX ORDER — TRAMADOL HYDROCHLORIDE 50 MG/1
50 TABLET ORAL ONCE
Status: COMPLETED | OUTPATIENT
Start: 2022-11-16 | End: 2022-11-16

## 2022-11-16 RX ADMIN — INSULIN LISPRO 12 UNITS: 100 INJECTION, SOLUTION INTRAVENOUS; SUBCUTANEOUS at 08:21

## 2022-11-16 RX ADMIN — RIVAROXABAN 20 MG: 20 TABLET, FILM COATED ORAL at 08:20

## 2022-11-16 RX ADMIN — INSULIN GLARGINE 25 UNITS: 100 INJECTION, SOLUTION SUBCUTANEOUS at 08:20

## 2022-11-16 RX ADMIN — CEFEPIME 2000 MG: 2 INJECTION, POWDER, FOR SOLUTION INTRAVENOUS at 12:31

## 2022-11-16 RX ADMIN — DICLOFENAC SODIUM 4 G: 10 GEL TOPICAL at 18:36

## 2022-11-16 RX ADMIN — METOPROLOL SUCCINATE 50 MG: 50 TABLET, EXTENDED RELEASE ORAL at 08:20

## 2022-11-16 RX ADMIN — DICLOFENAC SODIUM 4 G: 10 GEL TOPICAL at 12:54

## 2022-11-16 RX ADMIN — ACETAMINOPHEN 650 MG: 325 TABLET, FILM COATED ORAL at 18:21

## 2022-11-16 RX ADMIN — GLIPIZIDE 5 MG: 5 TABLET ORAL at 08:20

## 2022-11-16 RX ADMIN — DULOXETINE HYDROCHLORIDE 30 MG: 30 CAPSULE, DELAYED RELEASE ORAL at 08:20

## 2022-11-16 RX ADMIN — GLIPIZIDE 5 MG: 5 TABLET ORAL at 18:21

## 2022-11-16 RX ADMIN — LOSARTAN POTASSIUM 50 MG: 50 TABLET, FILM COATED ORAL at 08:20

## 2022-11-16 RX ADMIN — PANTOPRAZOLE SODIUM 40 MG: 40 TABLET, DELAYED RELEASE ORAL at 06:02

## 2022-11-16 RX ADMIN — ACETAMINOPHEN 650 MG: 325 TABLET, FILM COATED ORAL at 06:02

## 2022-11-16 RX ADMIN — ACETAMINOPHEN 650 MG: 325 TABLET, FILM COATED ORAL at 12:53

## 2022-11-16 RX ADMIN — VANCOMYCIN HYDROCHLORIDE 750 MG: 750 INJECTION, POWDER, LYOPHILIZED, FOR SOLUTION INTRAVENOUS at 06:04

## 2022-11-16 RX ADMIN — DICLOFENAC SODIUM 4 G: 10 GEL TOPICAL at 08:20

## 2022-11-16 RX ADMIN — INSULIN GLARGINE 25 UNITS: 100 INJECTION, SOLUTION SUBCUTANEOUS at 21:51

## 2022-11-16 RX ADMIN — DICLOFENAC SODIUM 4 G: 10 GEL TOPICAL at 21:52

## 2022-11-16 RX ADMIN — TRAMADOL HYDROCHLORIDE 50 MG: 50 TABLET ORAL at 23:05

## 2022-11-16 RX ADMIN — CEFEPIME 2000 MG: 2 INJECTION, POWDER, FOR SOLUTION INTRAVENOUS at 23:02

## 2022-11-16 RX ADMIN — INSULIN LISPRO 8 UNITS: 100 INJECTION, SOLUTION INTRAVENOUS; SUBCUTANEOUS at 12:32

## 2022-11-16 RX ADMIN — INSULIN LISPRO 8 UNITS: 100 INJECTION, SOLUTION INTRAVENOUS; SUBCUTANEOUS at 18:21

## 2022-11-16 ASSESSMENT — PAIN DESCRIPTION - LOCATION
LOCATION: LEG
LOCATION: FOOT
LOCATION: FOOT

## 2022-11-16 ASSESSMENT — PAIN DESCRIPTION - DESCRIPTORS: DESCRIPTORS: PINS AND NEEDLES

## 2022-11-16 ASSESSMENT — PAIN SCALES - GENERAL
PAINLEVEL_OUTOF10: 3
PAINLEVEL_OUTOF10: 3
PAINLEVEL_OUTOF10: 10

## 2022-11-16 ASSESSMENT — PAIN DESCRIPTION - ORIENTATION: ORIENTATION: RIGHT

## 2022-11-16 NOTE — PROGRESS NOTES
Physical Therapy  DATE: 2022    NAME: Dottie Jones  MRN: [id number]   : 1955    Patient not seen this date for Physical Therapy due to:      [] Cancel by RN or physician due to:    [] Hemodialysis    [] Critical Lab Value Level     [] Blood transfusion in progress    [] Acute or unstable cardiovascular status   _MAP < 55 or more than >115  _HR < 40 or > 130    [] Acute or unstable pulmonary status   -FiO2 > 60%   _RR < 5 or >40    _O2 sats < 85%    [] Strict Bedrest    [] Off Unit for surgery or procedure    [] Off Unit for testing       [] Pending imaging to R/O fracture    [x] Refusal by Patient: Patient refusing this morning, reports my be willing in the afternoon. Will check back if time allows. Did leave bariatric RW in room. [] Other      [] PT being discontinued at this time. Patient independent. No further needs. [] PT being discontinued at this time as the patient has been transferred to hospice care. No further needs.       Rodger Astorga, PT

## 2022-11-16 NOTE — PROGRESS NOTES
[facility]  Office: [address], DO, Didier Patella, DO, Kiarra Prey, DO, Temi Rojo Blood, DO, Stiven Rene MD, Liss Frazier MD, Nadine Anna MD, Ruby Lopez MD,  Vernell Martin MD, Jonah Leonard MD, Dylon Castrejon DO, Elizabeth Stapleton MD,  Tatyana Sunshine MD, Leida Aponte MD, Quique Reyes, DO, Luis Alfredo Kramer MD, Raj Pickard MD, Valerie Munroe DO, Sweetie Angelo MD, Eliazar Barton MD, Dennis Duong MD, Catalina Foley MD, Azale Kearney DO, Akosua Russell MD, Annette Weller MD, Ana Pearson, Johnny Speaker, CNP, Aurora Richard, CNP, Merwyn Cooks, CNP,  Keri Simpson, DNP, Dulce Hopson, CNP, Eligio Mckeon, CNP, Jonathan Elizabeth, CNP, Kristopher Milian, CNP, Antwan Urban, CNP, Vick Irwin PAAreliC, Trena Paul, CNS, Marixa Frederick, DNP, Danielle Ralph, CNP, Liz Bowman, CNP, Sarthak Lawler, CNP         [facility]    Progress Note    11/16/2022    4:10 PM    Name:   Hernan Chavez  MRN:     [id number]     [place]:      [de-identified]   Room:   2004/2004-02   Day:  1  Admit Date:  11/15/2022  1:00 AM    PCP:   ARTURO Barlow CNP  Code Status:  Full Code    Subjective:     C/C:   Chief Complaint   Patient presents with    Foot Pain     Dx with cellulitis on L foot on Friday. Started on doxy. Has not been taking it regularly d/t nausea. Pt now having R foot pain. Interval History Status: not changed. No change in condition. Patient reports no change in her pain to her left lower extremity and redness and induration remains persistent. Patient continues to endorse severe right ankle pain. Medial aspect of right ankle is red and incredibly painful to the touch rated 10/10. We will initiate gout work-up. Patient remains on IV vancomycin for left lower extremity cellulitis.   X-rays are reviewed and patient has significantly deteriorated joints in the lower extremities likely from her extreme morbid obesity. Patient is already established with podiatry. Patient may benefit from systemic steroid therapy for joint inflammation and a follow-up as an outpatient with orthopedics to discuss intra-articular treatment options. Brief History:     11/15 - This very pleasant 68-year-old female was recently started on doxycycline by her podiatrist due to a left lower extremity cellulitis. The patient was unable to tolerate her doxycycline due to nausea. She states that she took it intermittently and ultimately presented to the hospital due to worsening cellulitic changes. At this point in time the patient has diffuse cellulitic changes involving the left lower extremity along with flaking of her skin. She does have chronic lymphedematous changes secondary to her morbid obesity and BMI 52. She has developed some slight skin changes along the right posterior aspect of her calf. Reviewing her laboratory data shows that her A1c has been over 10 for well over 2 years now. I had a long discussion with her regarding the fact that her glycemic control is playing a direct role in her risk of lower extremity cellulitis. Her blood sugars are obviously not controlled which increases her risk of infection immensely. I am going to titrate her long-acting insulin from 34 units daily to 25 twice daily with plans to aggressively titrate it further. We will place her on a high intensity sliding scale. I strongly encouraged her to follow-up with her outpatient physician for better glycemic control    11/16 -no change in condition. Patient reports no change in her pain to her left lower extremity and redness and induration remains persistent. Patient continues to endorse severe right ankle pain. Medial aspect of right ankle is red and incredibly painful to the touch rated 10/10.   We will initiate gout work-up    Review of Systems:     Constitutional:  negative for chills, fevers, sweats  Respiratory: negative for cough, dyspnea on exertion, shortness of breath, wheezing  Cardiovascular:  negative for chest pain, chest pressure/discomfort, lower extremity edema, palpitations  Gastrointestinal:  negative for abdominal pain, constipation, diarrhea, nausea, vomiting  Neurological:  negative for dizziness, headache    Medications: Allergies: Allergies   Allergen Reactions    Erythromycin Hives    Lisinopril Itching       Current Meds:   Scheduled Meds:    [START ON 11/17/2022] vancomycin  750 mg IntraVENous Q24H    rivaroxaban  20 mg Oral Daily with breakfast    pantoprazole  40 mg Oral QAM AC    metoprolol succinate  50 mg Oral Daily    DULoxetine  30 mg Oral Daily    sodium chloride flush  5-40 mL IntraVENous 2 times per day    glipiZIDE  5 mg Oral BID WC    insulin lispro  0-16 Units SubCUTAneous TID WC    insulin lispro  0-4 Units SubCUTAneous Nightly    diclofenac sodium  4 g Topical 4x Daily    losartan  50 mg Oral Daily    insulin glargine  25 Units SubCUTAneous BID    cefepime  2,000 mg IntraVENous Q12H    vancomycin (VANCOCIN) intermittent dosing (placeholder)   Other RX Placeholder     Continuous Infusions:    sodium chloride      dextrose       PRN Meds: sodium chloride flush, sodium chloride, potassium chloride **OR** potassium alternative oral replacement **OR** potassium chloride, magnesium sulfate, ondansetron **OR** ondansetron, acetaminophen **OR** acetaminophen, glucose, dextrose bolus **OR** dextrose bolus, glucagon (rDNA), dextrose    Data:     Past Medical History:   has a past medical history of DM (diabetes mellitus) ([facility] [address] DVT (deep venous thrombosis) ([facility] GERD (gastroesophageal reflux disease), Hypertension, MVP (mitral valve prolapse), and KATHI (obstructive sleep apnea). Social History:   reports that she quit smoking about 39 years ago. Her smoking use included cigarettes. She has a 5.00 pack-year smoking history.  She has never used smokeless tobacco. She reports that she does not drink alcohol and does not use drugs. Family History:   Family History   Problem Relation Age of Onset    Heart Disease Mother        Vitals:  BP (!) 156/70   Pulse 84   Temp 99.3 °F (37.4 °C) (Oral)   Resp 16   Ht 6' (1.829 m)   Wt (!) 413 lb 14.4 oz (187.7 kg)   SpO2 92%   BMI 56.13 kg/m²   Temp (24hrs), Av.1 °F (37.3 °C), Min:98.2 °F (36.8 °C), Max:99.9 °F (37.7 °C)    Recent Labs     11/15/22  2043 11/15/22  2121 11/16/22  0626 11/16/22  1206   POCGLU 296* 310* 306* 289*       I/O (24Hr):     Intake/Output Summary (Last 24 hours) at 2022 1610  Last data filed at 2022 0612  Gross per 24 hour   Intake --   Output 300 ml   Net -300 ml       Labs:  Hematology:  Recent Labs     11/15/22  0225   WBC 12.2*   RBC 3.83*   HGB 11.2*   HCT 35.6*   MCV 93.0   MCH 29.2   MCHC 31.5   RDW 13.1      MPV 10.0     Chemistry:  Recent Labs     11/15/22  0225 11/15/22  0740 22  0627   * 133* 131*   K 4.4 3.8 4.1   CL 97* 99 98   CO2 24 24 23   GLUCOSE 377* 383* 296*   BUN 17 15 14   CREATININE 1.18* 1.12* 1.14*   MG 1.1* 1.6  --    ANIONGAP 12 10 10   LABGLOM 50* 53* 52*   CALCIUM 8.7 8.3* 8.0*     Recent Labs     11/15/22  0225 11/15/22  1107 11/15/22  1645 11/15/22  2043 11/15/22  2121 11/16/22  0626 11/16/22  1206   PROT 7.6  --   --   --   --   --   --    LABALBU 3.2*  --   --   --   --   --   --    AST 12  --   --   --   --   --   --    ALT 8  --   --   --   --   --   --    ALKPHOS 71  --   --   --   --   --   --    BILITOT 0.5  --   --   --   --   --   --    POCGLU  --  355* 342* 296* 310* 306* 289*     ABG:No results found for: POCPH, PHART, PH, POCPCO2, FBL0ASO, PCO2, POCPO2, PO2ART, PO2, POCHCO3, DFB0OZM, HCO3, NBEA, PBEA, BEART, BE, THGBART, THB, TJX7YIK, CZOY4ISF, G8CVZKYJ, O2SAT, FIO2  Lab Results   Component Value Date/Time    SPECIAL 20ML RT [facility] 11/15/2022 03:06 AM     Lab Results   Component Value Date/Time    CULTURE NO GROWTH 1 DAY 11/15/2022 03:06 AM Radiology:  XR FOOT LEFT (MIN 3 VIEWS)    Result Date: 11/15/2022  1. Advanced left mid and hindfoot degenerative changes, with calcaneal spurring though no acute fracture seen throughout the foot. 2. Advanced right mid and hindfoot degenerative changes, with calcaneal spurring though no acute fracture. 3. Peripheral arterial disease. 4. Bilateral pes planus deformities. XR FOOT RIGHT (MIN 3 VIEWS)    Result Date: 11/15/2022  1. Advanced left mid and hindfoot degenerative changes, with calcaneal spurring though no acute fracture seen throughout the foot. 2. Advanced right mid and hindfoot degenerative changes, with calcaneal spurring though no acute fracture. 3. Peripheral arterial disease. 4. Bilateral pes planus deformities.        Physical Examination:        General appearance:  alert, cooperative and no distress  Mental Status:  oriented to person, place and time and normal affect  Lungs:  clear to auscultation bilaterally, normal effort  Heart:  regular rate and rhythm, no murmur  Abdomen:  soft, nontender, nondistended, normal bowel sounds, no masses, hepatomegaly, splenomegaly  Extremities:  no edema, redness, tenderness in the calves  Skin:  no gross lesions, rashes, induration    Assessment:        Hospital Problems             Last Modified POA    * (Principal) Cellulitis 11/15/2022 Yes    Atrial fibrillation ([address] 11/16/2022 Yes    Venous insufficiency of both lower extremities 11/16/2022 Yes    Plantar fasciitis of right foot 11/16/2022 Yes    Type 2 diabetes mellitus with hyperglycemia, without long-term current use of insulin ([address] 11/16/2022 Yes    GERD (gastroesophageal reflux disease) (Chronic) 11/16/2022 Yes    Essential hypertension 11/16/2022 Yes    Morbid obesity ([address] 11/16/2022 Yes       Plan:        Cellulitis left lower extremity  Continue IV vancomycin  Can likely be transitioned to oral doxycycline at discharge  Right ankle pain with a personal history of plantar fasciitis and a strong family history for gout with social behaviors as well as body habitus making gout more likely  Check uric acid, CRP, sed  Recommend outpatient follow-up with orthopedics to discuss intra-articular treatment options versus fluid studies  Venous insufficiency both lower extremities  Chronic, lower extremity elevation  Encourage weight loss  Essential hypertension with chronic atrial fibrillation  Home medication regiment  GERD with morbid obesity  Continue PPI  Education on the need for diet lifestyle modifications  Encourage weight loss    ARTURO Rider NP  11/16/2022  4:10 PM

## 2022-11-17 LAB
ANION GAP SERPL CALCULATED.3IONS-SCNC: 11 MMOL/L (ref 9–17)
BUN BLDV-MCNC: 17 MG/DL (ref 8–23)
BUN/CREAT BLD: 15 (ref 9–20)
CALCIUM SERPL-MCNC: 8.4 MG/DL (ref 8.6–10.4)
CHLORIDE BLD-SCNC: 99 MMOL/L (ref 98–107)
CO2: 23 MMOL/L (ref 20–31)
CREAT SERPL-MCNC: 1.11 MG/DL (ref 0.5–0.9)
CULTURE: ABNORMAL
GFR SERPL CREATININE-BSD FRML MDRD: 54 ML/MIN/1.73M2
GLUCOSE BLD-MCNC: 250 MG/DL (ref 65–105)
GLUCOSE BLD-MCNC: 256 MG/DL (ref 70–99)
GLUCOSE BLD-MCNC: 319 MG/DL (ref 65–105)
GLUCOSE BLD-MCNC: 445 MG/DL (ref 65–105)
GLUCOSE BLD-MCNC: 476 MG/DL (ref 65–105)
Lab: ABNORMAL
POTASSIUM SERPL-SCNC: 4.3 MMOL/L (ref 3.7–5.3)
SODIUM BLD-SCNC: 133 MMOL/L (ref 135–144)
SPECIMEN DESCRIPTION: ABNORMAL

## 2022-11-17 PROCEDURE — 1200000000 HC SEMI PRIVATE

## 2022-11-17 PROCEDURE — 36415 COLL VENOUS BLD VENIPUNCTURE: CPT

## 2022-11-17 PROCEDURE — 6370000000 HC RX 637 (ALT 250 FOR IP): Performed by: NURSE PRACTITIONER

## 2022-11-17 PROCEDURE — 6370000000 HC RX 637 (ALT 250 FOR IP): Performed by: HOSPITALIST

## 2022-11-17 PROCEDURE — 6360000002 HC RX W HCPCS: Performed by: HOSPITALIST

## 2022-11-17 PROCEDURE — 2580000003 HC RX 258: Performed by: NURSE PRACTITIONER

## 2022-11-17 PROCEDURE — 99232 SBSQ HOSP IP/OBS MODERATE 35: CPT | Performed by: NURSE PRACTITIONER

## 2022-11-17 PROCEDURE — 97535 SELF CARE MNGMENT TRAINING: CPT

## 2022-11-17 PROCEDURE — APPSS45 APP SPLIT SHARED TIME 31-45 MINUTES: Performed by: NURSE PRACTITIONER

## 2022-11-17 PROCEDURE — 80048 BASIC METABOLIC PNL TOTAL CA: CPT

## 2022-11-17 PROCEDURE — 97530 THERAPEUTIC ACTIVITIES: CPT

## 2022-11-17 PROCEDURE — 97167 OT EVAL HIGH COMPLEX 60 MIN: CPT

## 2022-11-17 PROCEDURE — 82947 ASSAY GLUCOSE BLOOD QUANT: CPT

## 2022-11-17 PROCEDURE — 2580000003 HC RX 258: Performed by: HOSPITALIST

## 2022-11-17 PROCEDURE — 97163 PT EVAL HIGH COMPLEX 45 MIN: CPT

## 2022-11-17 RX ORDER — COLCHICINE 0.6 MG/1
1.2 TABLET ORAL ONCE
Status: COMPLETED | OUTPATIENT
Start: 2022-11-17 | End: 2022-11-17

## 2022-11-17 RX ORDER — INSULIN GLARGINE 100 [IU]/ML
40 INJECTION, SOLUTION SUBCUTANEOUS NIGHTLY
Status: DISCONTINUED | OUTPATIENT
Start: 2022-11-17 | End: 2022-11-21 | Stop reason: HOSPADM

## 2022-11-17 RX ORDER — TRAMADOL HYDROCHLORIDE 50 MG/1
50 TABLET ORAL EVERY 4 HOURS PRN
Status: DISCONTINUED | OUTPATIENT
Start: 2022-11-17 | End: 2022-11-21 | Stop reason: HOSPADM

## 2022-11-17 RX ORDER — COLCHICINE 0.6 MG/1
0.6 TABLET ORAL DAILY
Status: DISCONTINUED | OUTPATIENT
Start: 2022-11-17 | End: 2022-11-21 | Stop reason: HOSPADM

## 2022-11-17 RX ORDER — INDOMETHACIN 25 MG/1
25 CAPSULE ORAL
Status: DISCONTINUED | OUTPATIENT
Start: 2022-11-17 | End: 2022-11-19

## 2022-11-17 RX ORDER — INSULIN GLARGINE 100 [IU]/ML
25 INJECTION, SOLUTION SUBCUTANEOUS DAILY
Status: DISCONTINUED | OUTPATIENT
Start: 2022-11-18 | End: 2022-11-21 | Stop reason: HOSPADM

## 2022-11-17 RX ORDER — PREDNISONE 20 MG/1
20 TABLET ORAL 2 TIMES DAILY
Status: DISCONTINUED | OUTPATIENT
Start: 2022-11-17 | End: 2022-11-19

## 2022-11-17 RX ORDER — INSULIN LISPRO 100 [IU]/ML
4 INJECTION, SOLUTION INTRAVENOUS; SUBCUTANEOUS ONCE
Status: COMPLETED | OUTPATIENT
Start: 2022-11-17 | End: 2022-11-17

## 2022-11-17 RX ADMIN — GLIPIZIDE 5 MG: 5 TABLET ORAL at 10:15

## 2022-11-17 RX ADMIN — DICLOFENAC SODIUM 4 G: 10 GEL TOPICAL at 18:03

## 2022-11-17 RX ADMIN — GLIPIZIDE 5 MG: 5 TABLET ORAL at 18:03

## 2022-11-17 RX ADMIN — VANCOMYCIN HYDROCHLORIDE 750 MG: 750 INJECTION, POWDER, LYOPHILIZED, FOR SOLUTION INTRAVENOUS at 05:57

## 2022-11-17 RX ADMIN — LOSARTAN POTASSIUM 50 MG: 50 TABLET, FILM COATED ORAL at 10:15

## 2022-11-17 RX ADMIN — INSULIN GLARGINE 25 UNITS: 100 INJECTION, SOLUTION SUBCUTANEOUS at 10:15

## 2022-11-17 RX ADMIN — SODIUM CHLORIDE, PRESERVATIVE FREE 10 ML: 5 INJECTION INTRAVENOUS at 10:19

## 2022-11-17 RX ADMIN — COLCHICINE 0.6 MG: 0.6 TABLET ORAL at 10:20

## 2022-11-17 RX ADMIN — INSULIN GLARGINE 40 UNITS: 100 INJECTION, SOLUTION SUBCUTANEOUS at 21:50

## 2022-11-17 RX ADMIN — CEFEPIME 2000 MG: 2 INJECTION, POWDER, FOR SOLUTION INTRAVENOUS at 23:13

## 2022-11-17 RX ADMIN — COLCHICINE 0.6 MG: 0.6 TABLET ORAL at 21:51

## 2022-11-17 RX ADMIN — RIVAROXABAN 20 MG: 20 TABLET, FILM COATED ORAL at 10:15

## 2022-11-17 RX ADMIN — CEFEPIME 2000 MG: 2 INJECTION, POWDER, FOR SOLUTION INTRAVENOUS at 10:28

## 2022-11-17 RX ADMIN — ACETAMINOPHEN 650 MG: 325 TABLET, FILM COATED ORAL at 05:58

## 2022-11-17 RX ADMIN — PREDNISONE 20 MG: 20 TABLET ORAL at 10:15

## 2022-11-17 RX ADMIN — INSULIN LISPRO 12 UNITS: 100 INJECTION, SOLUTION INTRAVENOUS; SUBCUTANEOUS at 18:03

## 2022-11-17 RX ADMIN — INSULIN LISPRO 8 UNITS: 100 INJECTION, SOLUTION INTRAVENOUS; SUBCUTANEOUS at 10:16

## 2022-11-17 RX ADMIN — INDOMETHACIN 25 MG: 25 CAPSULE ORAL at 18:03

## 2022-11-17 RX ADMIN — INSULIN LISPRO 4 UNITS: 100 INJECTION, SOLUTION INTRAVENOUS; SUBCUTANEOUS at 21:50

## 2022-11-17 RX ADMIN — COLCHICINE 1.2 MG: 0.6 TABLET ORAL at 10:14

## 2022-11-17 RX ADMIN — DICLOFENAC SODIUM 4 G: 10 GEL TOPICAL at 14:24

## 2022-11-17 RX ADMIN — INDOMETHACIN 25 MG: 25 CAPSULE ORAL at 10:27

## 2022-11-17 RX ADMIN — METOPROLOL SUCCINATE 50 MG: 50 TABLET, EXTENDED RELEASE ORAL at 10:15

## 2022-11-17 RX ADMIN — PREDNISONE 20 MG: 20 TABLET ORAL at 21:51

## 2022-11-17 RX ADMIN — DULOXETINE HYDROCHLORIDE 30 MG: 30 CAPSULE, DELAYED RELEASE ORAL at 10:14

## 2022-11-17 RX ADMIN — INDOMETHACIN 25 MG: 25 CAPSULE ORAL at 14:24

## 2022-11-17 RX ADMIN — DICLOFENAC SODIUM 4 G: 10 GEL TOPICAL at 10:21

## 2022-11-17 RX ADMIN — PANTOPRAZOLE SODIUM 40 MG: 40 TABLET, DELAYED RELEASE ORAL at 05:58

## 2022-11-17 RX ADMIN — INSULIN LISPRO 4 UNITS: 100 INJECTION, SOLUTION INTRAVENOUS; SUBCUTANEOUS at 21:57

## 2022-11-17 RX ADMIN — DICLOFENAC SODIUM 4 G: 10 GEL TOPICAL at 21:57

## 2022-11-17 RX ADMIN — TRAMADOL HYDROCHLORIDE 50 MG: 50 TABLET ORAL at 10:15

## 2022-11-17 RX ADMIN — INSULIN LISPRO 12 UNITS: 100 INJECTION, SOLUTION INTRAVENOUS; SUBCUTANEOUS at 14:23

## 2022-11-17 ASSESSMENT — PAIN DESCRIPTION - LOCATION
LOCATION: FOOT

## 2022-11-17 ASSESSMENT — PAIN SCALES - GENERAL
PAINLEVEL_OUTOF10: 7
PAINLEVEL_OUTOF10: 6
PAINLEVEL_OUTOF10: 7
PAINLEVEL_OUTOF10: 8
PAINLEVEL_OUTOF10: 9

## 2022-11-17 NOTE — PROGRESS NOTES
Physical Therapy  Facility/Department: [facility] MED SURG  Physical Therapy Initial Assessment    Name: Bonita Monroe  : 1955  MRN: [id number]  Date of Service: 2022    Discharge Recommendations:  Patient would benefit from continued therapy after discharge. Pt currently functioning below baseline. Recommend daily inpatient skilled therapy at time of discharge to maximize long term outcomes and prevent re-admission. Please refer to AM-PAC score for current level of function. HPI (per chart): 11/15 - This very pleasant 64-year-old female was recently started on doxycycline by her podiatrist due to a left lower extremity cellulitis. The patient was unable to tolerate her doxycycline due to nausea. She states that she took it intermittently and ultimately presented to the hospital due to worsening cellulitic changes. At this point in time the patient has diffuse cellulitic changes involving the left lower extremity along with flaking of her skin. She does have chronic lymphedematous changes secondary to her morbid obesity and BMI 52. She has developed some slight skin changes along the right posterior aspect of her calf. Reviewing her laboratory data shows that her A1c has been over 10 for well over 2 years now. I had a long discussion with her regarding the fact that her glycemic control is playing a direct role in her risk of lower extremity cellulitis. Her blood sugars are obviously not controlled which increases her risk of infection immensely. I am going to titrate her long-acting insulin from 34 units daily to 25 twice daily with plans to aggressively titrate it further. We will place her on a high intensity sliding scale. I strongly encouraged her to follow-up with her outpatient physician for better glycemic control      Patient Diagnosis(es): The primary encounter diagnosis was Cellulitis of left heel.  Diagnoses of Cellulitis of right heel and Hypomagnesemia were also pertinent to up.  Subjective  Subjective: Patient agreeable to PT with encouragement. Patient reports she cannot stand because she cannot put weight on right heel due to pain, but did agree to getting up to recliner with susan lift. Social/Functional History  Social/Functional History  Lives With: Spouse, Son (son works 4pm to [employer]. Son works until 11 pm (pt home alone 2:30 pm-11 pm))  Type of Home: [facility] [address]: Two level, Able to Live on Main level with bedroom/bathroom  Home Access: Ramped entrance  Bathroom Shower/Tub: Walk-in shower  Bathroom Toilet: Handicap height  Bathroom Equipment: Built-in shower seat, Grab bars in [address]: Jillian Adorno, rolling, Wheelchair-manual, Oxygen (2L at night)  Has the patient had two or more falls in the past year or any fall with injury in the past year?: No  ADL Assistance: [address]: Independent (no AD typically. W/C in community)  Transfer Assistance: Independent  Active : No  Patient's  Info:  drives  Occupation: Retired  Type of Occupation:   Leisure & Hobbies: read and TV  Vision/Hearing       Cognition   Orientation  Overall Orientation Status: Within Functional Limits  Cognition  Overall Cognitive Status: WFL  Arousal/Alertness: Appropriate responses to stimuli  Following Commands: Follows all commands without difficulty  Attention Span: Appears intact  Memory: Appears intact  Safety Judgement: Decreased awareness of need for assistance  Insights: Decreased awareness of deficits  Initiation: Does not require cues  Sequencing: Does not require cues     Objective      Observation/Palpation  Posture: Fair  Observation: BMI 56, readness in right heel, darkening of skin of left lower leg. AROM RLE (degrees)  RLE General AROM: hip and knee 0-90, ankle DF to neutral  AROM LLE (degrees)  LLE General AROM: hip and knee 0-90, ankle not assessed due to pain.   Strength RLE  Comment: hip 3-/5, knee 3-/5, ankle not assessed due to pain  Strength LLE  Comment: 3/5        Balance  Sitting: High guard (SBA to sit upright in recliner once maxi susan used for transfer. Pt unable to tolerate sitting EOB this session.)  Gait  Overall Level of Assistance: Other (comment) (unable to stand or mobilize this date)  Bed mobility  Rolling to Left: Maximum assistance;2 Person assistance  Rolling to Right: Maximum assistance;2 Person assistance  Supine to Sit: Unable to assess  Sit to Supine: Unable to assess  Bed Mobility Comments: maxi susan for transfers. Patient reported she does not have any propblem rolling and is indep with rolling, but the required max assist to complete: assist with upper body to initiate rolling and assist with lower body to complete rolling. Transfers  Comment: Patient reports she is in too much pain to attempt sit to stand        Balance  Comments: Patient with good seated balance sitting in recliner           OutComes Score    AM-PAC Score  AM-PAC Inpatient Mobility Raw Score : 6 (11/17/22 1334)  -PAC Inpatient T-Scale Score : 23.55 (11/17/22 1334)  Mobility Inpatient CMS 0-100% Score: 100 (11/17/22 1334)  Mobility Inpatient CMS G-Code Modifier : CN (11/17/22 1334)       Goals  Short Term Goals  Time Frame for Short Term Goals: 12 visits  Short Term Goal 1: Patient will be indep with bed mobility. Short Term Goal 2: Patient hayde be assessed for transfers and gait as appropriate. Short Term Goal 3: Patient will tolerate 30 minutes of ther-ex and ther-act. Short Term Goal 4: Patient will be indep with HEP. Patient Goals   Patient Goals : Pain control       Education  Patient Education  Education Given To: Patient; Family  Education Provided: Role of Therapy;Plan of Care  Education Provided Comments: Benefits of being OOB  Education Method: Verbal  Barriers to Learning: None  Education Outcome: Continued education needed;Verbalized understanding  Co-treatment with OT warranted secondary to decreased safety and independence requiring 2 skilled therapy professionals to address individual discipline's goals. PT addressing postural control in sitting.      Therapy Time   Individual Concurrent Group Co-treatment   Time In 1133         Time Out 1214         Minutes 41         Timed Code Treatment Minutes: [address], PT

## 2022-11-17 NOTE — PLAN OF CARE
Problem: Neurosensory - Adult  Goal: Achieves stable or improved neurological status  Outcome: Progressing     Problem: Gastrointestinal - Adult  Goal: Maintains adequate nutritional intake  Outcome: Progressing     Problem: Genitourinary - Adult  Goal: Absence of urinary retention  Outcome: Progressing

## 2022-11-17 NOTE — PROGRESS NOTES
[facility]  Office: [address], DO, Abdi Andrade, DO, David Monae, DO, Edin Pringle Blood, DO, Candi Morin MD, Stephen Holden MD, Fly Chino MD, Diana Hernandez MD,  Ann Marie Iverson MD, Nick Hooker MD, Jose Brooke, DO, Roxanna Arellano MD,  Marc Nelson MD, Dameon Duncan MD, Deepthi Rubin DO, Barrett Espino MD, Chandler Lombard, MD, Cecilia Roberts DO, Bekah Randolph MD, Anand Faith MD, Arti Guadalupe MD, Kraig De Guzman MD, Africa Batista DO, Petra Smart MD, Ashkan Haynes MD, Ashley Freitas, Fernand Nyhan, CNP, Chip Morley, CNP, Lindsey Lozano, CNP,  Tony Peña, DNP, Zakiya Monteiro, CNP, Reji Mccauley, CNP, Carlos Solis, CNP, Grover Diaz, CNP, Zen Sol, CNP, LARISSA TerrazasC, Juan F Bond, CNS, Pedro Cook, DNP, Sara Elliott, CNP, Vasyl Melchor, CNP, Katia Vargas, CNP         [facility]    Progress Note    2022    12:50 PM    Name:   Kathe Contreras  MRN:     [id number]     [place]:      [de-identified]   Room:      Day:  2  Admit Date:  11/15/2022  1:00 AM    PCP:   ARTURO Chavez CNP  Code Status:  Full Code    Subjective:     C/C:   Chief Complaint   Patient presents with    Foot Pain     Dx with cellulitis on L foot on Friday. Started on doxy. Has not been taking it regularly d/t nausea. Pt now having R foot pain. Interval History Status: not changed. Mild improvement in cellulitic areas to the left lower extremity. Clinical assessment consistent with gout/pseudogout. Will initiate treatment as such and recommend outpatient follow-up with podiatry. Brief History:     11/15 - This very pleasant 78-year-old female was recently started on doxycycline by her podiatrist due to a left lower extremity cellulitis. The patient was unable to tolerate her doxycycline due to nausea.   She states that she took it intermittently and ultimately presented to the hospital due to worsening cellulitic changes. At this point in time the patient has diffuse cellulitic changes involving the left lower extremity along with flaking of her skin. She does have chronic lymphedematous changes secondary to her morbid obesity and BMI 52. She has developed some slight skin changes along the right posterior aspect of her calf. Reviewing her laboratory data shows that her A1c has been over 10 for well over 2 years now. I had a long discussion with her regarding the fact that her glycemic control is playing a direct role in her risk of lower extremity cellulitis. Her blood sugars are obviously not controlled which increases her risk of infection immensely. I am going to titrate her long-acting insulin from 34 units daily to 25 twice daily with plans to aggressively titrate it further. We will place her on a high intensity sliding scale. I strongly encouraged her to follow-up with her outpatient physician for better glycemic control    11/16 -no change in condition. Patient reports no change in her pain to her left lower extremity and redness and induration remains persistent. Patient continues to endorse severe right ankle pain. Medial aspect of right ankle is red and incredibly painful to the touch rated 10/10. We will initiate gout work-up    11/17 -mild improvement in cellulitic areas to the left lower extremity. Clinical assessment consistent with gout/pseudogout. Will initiate treatment as such and recommend outpatient follow-up with podiatry.      Review of Systems:     Constitutional:  negative for chills, fevers, sweats  Respiratory:  negative for cough, dyspnea on exertion, shortness of breath, wheezing  Cardiovascular:  negative for chest pain, chest pressure/discomfort, lower extremity edema, palpitations  Gastrointestinal:  negative for abdominal pain, constipation, diarrhea, nausea, vomiting  Neurological:  negative for dizziness, headache    Medications: Allergies: Allergies   Allergen Reactions    Erythromycin Hives    Lisinopril Itching       Current Meds:   Scheduled Meds:    indomethacin  25 mg Oral TID WC    colchicine  0.6 mg Oral Daily    predniSONE  20 mg Oral BID    vancomycin  750 mg IntraVENous Q24H    rivaroxaban  20 mg Oral Daily with breakfast    pantoprazole  40 mg Oral QAM AC    metoprolol succinate  50 mg Oral Daily    DULoxetine  30 mg Oral Daily    sodium chloride flush  5-40 mL IntraVENous 2 times per day    glipiZIDE  5 mg Oral BID WC    insulin lispro  0-16 Units SubCUTAneous TID WC    insulin lispro  0-4 Units SubCUTAneous Nightly    diclofenac sodium  4 g Topical 4x Daily    losartan  50 mg Oral Daily    insulin glargine  25 Units SubCUTAneous BID    cefepime  2,000 mg IntraVENous Q12H    vancomycin (VANCOCIN) intermittent dosing (placeholder)   Other RX Placeholder     Continuous Infusions:    sodium chloride      dextrose       PRN Meds: traMADol, sodium chloride flush, sodium chloride, potassium chloride **OR** potassium alternative oral replacement **OR** potassium chloride, magnesium sulfate, ondansetron **OR** ondansetron, acetaminophen **OR** acetaminophen, glucose, dextrose bolus **OR** dextrose bolus, glucagon (rDNA), dextrose    Data:     Past Medical History:   has a past medical history of DM (diabetes mellitus) ([facility] [address] DVT (deep venous thrombosis) ([facility] [address] GERD (gastroesophageal reflux disease), Hypertension, MVP (mitral valve prolapse), and KATHI (obstructive sleep apnea). Social History:   reports that she quit smoking about 39 years ago. Her smoking use included cigarettes. She has a 5.00 pack-year smoking history. She has never used smokeless tobacco. She reports that she does not drink alcohol and does not use drugs.      Family History:   Family History   Problem Relation Age of Onset    Heart Disease Mother        Vitals:  /76   Pulse 82   Temp 98.4 °F (36.9 °C) (Oral)   Resp 18   Ht 6' (1.829 m)   Wt (!) 413 lb (187.3 kg)   SpO2 92%   BMI 56.01 kg/m²   Temp (24hrs), Av.6 °F (37 °C), Min:98.4 °F (36.9 °C), Max:99 °F (37.2 °C)    Recent Labs     22  1631 22  0602 22  1116   POCGLU 276* 240* 250* 319*       I/O (24Hr): Intake/Output Summary (Last 24 hours) at 2022 1250  Last data filed at 2022 0646  Gross per 24 hour   Intake 1022.43 ml   Output 550 ml   Net 472.43 ml       Labs:  Hematology:  Recent Labs     11/15/22  0225 11/16/22  1632   WBC 12.2*  --    RBC 3.83*  --    HGB 11.2*  --    HCT 35.6*  --    MCV 93.0  --    MCH 29.2  --    MCHC 31.5  --    RDW 13.1  --      --    MPV 10.0  --    SEDRATE  --  81*   CRP  --  340.0*     Chemistry:  Recent Labs     11/15/22  0225 11/15/22  0740 22  0627 22  0540   * 133* 131* 133*   K 4.4 3.8 4.1 4.3   CL 97* 99 98 99   CO2 24 24 23 23   GLUCOSE 377* 383* 296* 256*   BUN 17 15 14 17   CREATININE 1.18* 1.12* 1.14* 1.11*   MG 1.1* 1.6  --   --    ANIONGAP 12 10 10 11   LABGLOM 50* 53* 52* 54*   CALCIUM 8.7 8.3* 8.0* 8.4*     Recent Labs     11/15/22  0225 11/15/22  1107 22  0626 22  1206 22  1631 22  1632 226 22  0602 22  1116   PROT 7.6  --   --   --   --   --   --   --   --    LABALBU 3.2*  --   --   --   --   --   --   --   --    AST 12  --   --   --   --   --   --   --   --    ALT 8  --   --   --   --   --   --   --   --    ALKPHOS 71  --   --   --   --   --   --   --   --    BILITOT 0.5  --   --   --   --   --   --   --   --    URICACID  --   --   --   --   --  5.7  --   --   --    POCGLU  --    < > 306* 289* 276*  --  240* 250* 319*    < > = values in this interval not displayed.      ABG:No results found for: POCPH, PHART, PH, POCPCO2, RQQ3QWX, PCO2, POCPO2, PO2ART, PO2, POCHCO3, VUY3VED, HCO3, NBEA, PBEA, BEART, BE, THGBART, THB, LVU7TIZ, NGMH0PQS, V7SDTOSB, O2SAT, FIO2  Lab Results   Component Value Date/Time    SPECIAL 20ML RT [facility] 11/15/2022 03:06 AM     Lab Results   Component Value Date/Time    CULTURE NO GROWTH 2 DAYS 11/15/2022 03:06 AM       Radiology:  XR FOOT LEFT (MIN 3 VIEWS)    Result Date: 11/15/2022  1. Advanced left mid and hindfoot degenerative changes, with calcaneal spurring though no acute fracture seen throughout the foot. 2. Advanced right mid and hindfoot degenerative changes, with calcaneal spurring though no acute fracture. 3. Peripheral arterial disease. 4. Bilateral pes planus deformities. XR FOOT RIGHT (MIN 3 VIEWS)    Result Date: 11/15/2022  1. Advanced left mid and hindfoot degenerative changes, with calcaneal spurring though no acute fracture seen throughout the foot. 2. Advanced right mid and hindfoot degenerative changes, with calcaneal spurring though no acute fracture. 3. Peripheral arterial disease. 4. Bilateral pes planus deformities.        Physical Examination:        General appearance:  alert, cooperative and no distress  Mental Status:  oriented to person, place and time and normal affect  Lungs:  clear to auscultation bilaterally, normal effort  Heart:  regular rate and rhythm, no murmur  Abdomen:  soft, nontender, nondistended, normal bowel sounds, no masses, hepatomegaly, splenomegaly  Extremities:  no edema, redness, tenderness in the calves  Skin:  no gross lesions, rashes, induration    Assessment:        Hospital Problems             Last Modified POA    * (Principal) Cellulitis of left lower extremity 11/16/2022 Yes    Atrial fibrillation ([address] 11/16/2022 Yes    Venous insufficiency of both lower extremities 11/16/2022 Yes    Plantar fasciitis of right foot 11/16/2022 Yes    Type 2 diabetes mellitus with hyperglycemia, without long-term current use of insulin ([address] 11/16/2022 Yes    GERD (gastroesophageal reflux disease) (Chronic) 11/16/2022 Yes    Essential hypertension 11/16/2022 Yes    Morbid obesity ([address] 11/16/2022 Yes       Plan:        Cellulitis left lower extremity  Continue IV vancomycin  Can likely be transitioned to oral doxycycline at discharge  Right ankle pain, clinical assessment consistent with gout/pseudogout  CRP and sed rate significantly elevated, uric acid borderline upper limits of normal  Recommend outpatient follow-up with orthopedics to discuss intra-articular treatment options versus fluid studies  Will initiate colchicine, steroid, Indocin  Venous insufficiency both lower extremities  Chronic, lower extremity elevation  Encourage weight loss  Essential hypertension with chronic atrial fibrillation  Home medication regiment  GERD with morbid obesity  Continue PPI  Education on the need for diet lifestyle modifications  Encourage weight loss    ARTURO Vang NP  11/17/2022  12:50 PM

## 2022-11-17 NOTE — PROGRESS NOTES
[address] [facility] Pharmacokinetic Monitoring Service - Vancomycin    Consulting Provider: Dr. Kaylie Najera   Indication: SSTI  Target Concentration: Goal trough of 10-15 mg/L and AUC/RONEL <500 mg*hr/L  Day of Therapy: 3  Additional Antimicrobials: Cefepime    Pertinent Laboratory Values: Wt Readings from Last 1 Encounters:   11/16/22 (!) 413 lb 14.4 oz (187.7 kg)     Temp Readings from Last 1 Encounters:   11/16/22 98.2 °F (36.8 °C) (Oral)     Estimated Creatinine Clearance: 87 mL/min (A) (based on SCr of 1.14 mg/dL (H)). Recent Labs     11/15/22  0225 11/15/22  0740 11/16/22  0627 11/17/22  0540   CREATININE 1.18*   < > 1.14* 1.11*   WBC 12.2*  --   --   --     < > = values in this interval not displayed. Procalcitonin: --    Pertinent Cultures:  Culture Date Source Results   11/15/22 Blood x 2 No growth x 2 days   Gram pos cocci in chains    MRSA Nasal Swab: N/A. Non-respiratory infection. Recent vancomycin administrations                     vancomycin (VANCOCIN) 750 mg in dextrose 5 % 250 mL IVPB (mg) 750 mg New Bag 11/16/22 0604     750 mg New Bag 11/15/22 1656    vancomycin (VANCOCIN) 2,500 mg in dextrose 5 % 500 mL IVPB (mg) 2,500 mg New Bag 11/15/22 0516                    Assessment:  Date/Time Current Dose Concentration Timing of Concentration (h) AUC   11/16/22  11:05 750 mg q12h 48.9 mg/L 5 hr 1 min 957   Note: Serum concentrations collected for AUC dosing may appear elevated if collected in close proximity to the dose administered, this is not necessarily an indication of toxicity    Plan:  Current dosing regimen is supra-therapeutic  \"Decrease dose to 750 mg q24h  and draw another level to verify correctness  Repeat vancomycin concentration ordered for 11/18 @ 1300.   Pharmacy will continue to monitor patient and adjust therapy as indicated    Thank you for the consult,  Nancy Camp, [facility]  11/17/2022 10:57 AM

## 2022-11-17 NOTE — PROGRESS NOTES
Occupational Therapy  Facility/Department: [facility] MED SURG  Occupational Therapy Initial Assessment    Name: Nydia Beckett  : 1955  MRN: [id number]  Date of Service: 2022    Discharge Recommendations:  Patient would benefit from continued therapy after discharge   ,Pt currently functioning below baseline. Recommend daily inpatient skilled therapy at time of discharge to maximize long term outcomes and prevent re-admission. Please refer to AM-PAC score for current level of function. RN reports patient is medically stable for therapy treatment this date. Chart reviewed prior to treatment and patient is agreeable for therapy. All lines intact and patient positioned comfortably at end of treatment. All patient needs addressed prior to ending therapy session. Patient Diagnosis(es): The primary encounter diagnosis was Cellulitis of left heel. Diagnoses of Cellulitis of right heel and Hypomagnesemia were also pertinent to this visit. Past Medical History:  has a past medical history of DM (diabetes mellitus) ([facility] [address] DVT (deep venous thrombosis) ([facility] [address] GERD (gastroesophageal reflux disease), Hypertension, MVP (mitral valve prolapse), and KATHI (obstructive sleep apnea). Past Surgical History:  has a past surgical history that includes Hysterectomy and Tonsillectomy. Patient is a 77-year-old female who presents to the ED via EMS with foot pain. Patient reports longstanding history of cellulitis to left foot. She also reports 2-day history of pain in right heel. She is prescribed doxycycline however reported to EMS that she has been unable to swallow her medications because of too much \"phlegm \". Pain is severe causing her the inability to walk. She has urinary incontinence at baseline and is requesting admission, a Shi catheter and vancomycin to \"help clear her symptoms\". No reports of fever, cough, shortness of breath, chest pain.   Reports she saw her podiatrist last Friday, 3 days ago, who clipped her nails and placed bandage over oozing skin on back of left calf. Assessment   Performance deficits / Impairments: Decreased functional mobility ; Decreased ADL status; Decreased strength;Decreased safe awareness;Decreased endurance;Decreased posture;Decreased balance  Assessment: pt is unable to stand this session and is needing maxi kuldip for transfers bed to recliner. Pt currently needing 2 Assist for all bed mob, hygiene, ADLs, and pressure relief.   Prognosis: Fair  Decision Making: High Complexity  REQUIRES OT FOLLOW-UP: Yes  Activity Tolerance  Activity Tolerance: Patient limited by fatigue;Patient limited by pain        Plan   Occupational Therapy Plan  Times Per Week: 4-5x/week, 1-2x/day  Current Treatment Recommendations: Strengthening, Balance training, Functional mobility training, Self-Care / ADL, Safety education & training, Patient/Caregiver education & training, Endurance training, Equipment evaluation, education, & procurement, Positioning, Neuromuscular re-education     Restrictions  Restrictions/Precautions  Restrictions/Precautions: Fall Risk, General Precautions  Position Activity Restriction  Other position/activity restrictions: Maxi Kuldip    Subjective   General  Chart Reviewed: Yes  Patient assessed for rehabilitation services?: Yes  Family / Caregiver Present: No  Subjective  Subjective: no pain in leg at rest. 7-8/10 with movement     Social/Functional History  Social/Functional History  Lives With: Spouse, Son (son works 4pm to [employer]. Son works until 11 pm (pt home alone 2:30 pm-11 pm))  Type of Home: [address] [place] [address],[address]: Two level, Able to Live on Main level with bedroom/bathroom  Home Access: Ramped entrance  Bathroom Shower/Tub: Walk-in shower  Bathroom Toilet: Handicap height  Bathroom Equipment: Built-in shower seat, Grab bars in [facility]: Amita Roberto, rolling, Wheelchair-manual, Oxygen (2L at night)  Has the patient had two or more falls in the past year or any fall with injury in the past year?: No  ADL Assistance: Independent  Homemaking Assistance: Independent  Ambulation Assistance: Independent (no AD typically. W/C in community)  Transfer Assistance: Independent  Active : No  Patient's  Info:  drives  Occupation: Retired  Type of Occupation:   Leisure & Hobbies: read and TV       Objective   Heart Rate: 82  [address]: Monitor  BP: 130/76  BP Location: Right lower arm  BP Method: Automatic  Patient Position: Semi fowlers  MAP (Calculated): 94  Resp: 18  SpO2: 92 %  O2 Device: None (Room air)          Observation/Palpation  Posture: Fair  Observation: pt lying in bed, agreeable to working with therapy. Moved to room with maxi susan  Safety Devices  Type of Devices: Call light within reach;Nurse notified; Left in chair (maxi susan pad)  Balance  Sitting: High guard (SBA to sit upright in recliner once maxi susan used for transfer. Pt unable to tolerate sitting EOB this session.)  Gait  Overall Level of Assistance: Other (comment) (unable to stand or mobilize this date)     AROM: Within functional limits  Strength: Within functional limits (4/5 B UE's)  Coordination: Within functional limits  Tone: Normal  Sensation: Intact (denies N/T)  ADL  Feeding: Independent;Setup  Grooming: Stand by assistance;Setup;Minimal assistance  UE Bathing: Moderate assistance  LE Bathing: Maximum assistance  UE Dressing: Moderate assistance  LE Dressing: Maximum assistance;Dependent/Total  Toileting: Dependent/Total  Additional Comments: pt is limited by dec. mobility, unable to stand, inc difficulty rolling in bed. Pt fatigues easily and has pain with light touch to R leg. Pt needing maxi susan to transfer bed to chair.         Bed mobility  Rolling to Left: Maximum assistance;2 Person assistance  Rolling to Right: Maximum assistance;2 Person assistance  Supine to Sit: Unable to assess  Sit to Supine: Unable to assess  Scooting: Dependent/Total  Bed Mobility Comments: maxi susan for transfers  Transfers  Sit to stand: Unable to assess  Stand to sit: Unable to assess  Vision  Vision: Impaired  Vision Exceptions: Wears glasses at all times  Hearing  Hearing: Within functional limits  Cognition  Overall Cognitive Status: WFL (pt appears to not be fully realistic about deficits and their impact with immobility/inability to fully care for self at this time at home)  Orientation  Overall Orientation Status: Within Functional Limits  Perception  Overall Perceptual Status: [facility]               Education Given To: Patient  Education Provided: Role of Therapy;Plan of Care;Home Exercise Program;Family Education;Precautions; Equipment;ADL Adaptive Strategies;Transfer Training; Fall Prevention Strategies; Energy Conservation  Education Method: Verbal  Education Outcome: Continued education needed; Unable to demonstrate understanding                  AM-PAC Inpatient Daily Activity Raw Score: 11 (11/17/22 1244)  AM-PAC Inpatient ADL T-Scale Score : 29.04 (11/17/22 1244)  ADL Inpatient CMS 0-100% Score: 70.42 (11/17/22 1244)  ADL Inpatient CMS G-Code Modifier : CL (11/17/22 1244)    Tinneti Score       Goals  Short Term Goals  Time Frame for Short Term Goals: by discharge, pt will  Short Term Goal 1: tolerate reassessment of standing/ADL transfers as able  Short Term Goal 2: demo mod Ax 2 with bed mob with rails/controls  Short Term Goal 3: demo min A with UB ADLs and mod A with LB ADLs with AE/DME as needed with good safety/pacing  Short Term Goal 4: demo mod A with toileting routine with approp DME as needed  Short Term Goal 5: demo and verb good understanding of fall prevention techs, EC/WS techs, equip needs, pressure relievieng techs, B UE HEP, and d/c recommendations  Patient Goals   Patient goals : to go home       Therapy Time   Individual Concurrent Group Co-treatment   Time In 1132         Time Out 1214         Minutes 42          Treatment min: 30  Co-treatment with PT warranted secondary to decreased safety and independence requiring 2 skilled therapy professionals to address individual discipline's goals. OT addressing preparation for ADL transfer, sitting balance for increased ADL performance, sitting/activity tolerance, functional reaching, environmental safety/scanning, fall prevention, ability to sequence and follow directions, bed mobility tech, and functional UE strength.         Meredith Barragan, OT

## 2022-11-17 NOTE — PROGRESS NOTES
Physician Progress Note      PATIENT:               Marimar Rosas  CSN #:                  [id number]  :                       1955  ADMIT DATE:       11/15/2022 1:00 AM  DISCH DATE:  RESPONDING  PROVIDER #:        Candice Singleton NP          QUERY TEXT:    Pt admitted with LLE cellulitis. Pt noted to have DM 2. If possible, please   document in progress notes and discharge summary the relationship, if any,   between cellulitis and DM. The medical record reflects the following:  Risk Factors: Cellulitis, DM2  Clinical Indicators: Glucose 377->383->296->256, A1C 2022 10.4  Treatment: glucose monitoring, insulin  Options provided:  -- LLE cellulitis associated with Diabetes  -- LLE cellulitis unrelated to Diabetes  -- Other - I will add my own diagnosis  -- Disagree - Not applicable / Not valid  -- Disagree - Clinically unable to determine / Unknown  -- Refer to Clinical Documentation Reviewer    PROVIDER RESPONSE TEXT:    LLE cellulitis associated with Diabetes.     Query created by: Fritz Corona on 2022 11:08 AM      Electronically signed by:  Candice Singleton NP 2022 12:39 PM

## 2022-11-18 LAB
BUN BLDV-MCNC: 31 MG/DL (ref 8–23)
CREAT SERPL-MCNC: 1.41 MG/DL (ref 0.5–0.9)
GFR SERPL CREATININE-BSD FRML MDRD: 40 ML/MIN/1.73M2
GLUCOSE BLD-MCNC: 358 MG/DL (ref 65–105)
GLUCOSE BLD-MCNC: 384 MG/DL (ref 65–105)
GLUCOSE BLD-MCNC: 386 MG/DL (ref 65–105)
GLUCOSE BLD-MCNC: 415 MG/DL (ref 65–105)
VANCOMYCIN RANDOM: 16.8 UG/ML
VANCOMYCIN RANDOM: 20.4 UG/ML

## 2022-11-18 PROCEDURE — 99232 SBSQ HOSP IP/OBS MODERATE 35: CPT | Performed by: NURSE PRACTITIONER

## 2022-11-18 PROCEDURE — 97530 THERAPEUTIC ACTIVITIES: CPT

## 2022-11-18 PROCEDURE — 84520 ASSAY OF UREA NITROGEN: CPT

## 2022-11-18 PROCEDURE — 6360000002 HC RX W HCPCS: Performed by: HOSPITALIST

## 2022-11-18 PROCEDURE — 97164 PT RE-EVAL EST PLAN CARE: CPT

## 2022-11-18 PROCEDURE — 1200000000 HC SEMI PRIVATE

## 2022-11-18 PROCEDURE — 97535 SELF CARE MNGMENT TRAINING: CPT

## 2022-11-18 PROCEDURE — 97168 OT RE-EVAL EST PLAN CARE: CPT

## 2022-11-18 PROCEDURE — 6370000000 HC RX 637 (ALT 250 FOR IP): Performed by: NURSE PRACTITIONER

## 2022-11-18 PROCEDURE — 6370000000 HC RX 637 (ALT 250 FOR IP): Performed by: HOSPITALIST

## 2022-11-18 PROCEDURE — 82565 ASSAY OF CREATININE: CPT

## 2022-11-18 PROCEDURE — 2580000003 HC RX 258: Performed by: HOSPITALIST

## 2022-11-18 PROCEDURE — 80202 ASSAY OF VANCOMYCIN: CPT

## 2022-11-18 PROCEDURE — APPSS45 APP SPLIT SHARED TIME 31-45 MINUTES: Performed by: NURSE PRACTITIONER

## 2022-11-18 PROCEDURE — 36415 COLL VENOUS BLD VENIPUNCTURE: CPT

## 2022-11-18 PROCEDURE — 82947 ASSAY GLUCOSE BLOOD QUANT: CPT

## 2022-11-18 RX ORDER — COLCHICINE 0.6 MG/1
0.6 TABLET ORAL DAILY
Qty: 30 TABLET | Refills: 1 | Status: SHIPPED | OUTPATIENT
Start: 2022-11-18

## 2022-11-18 RX ORDER — INSULIN GLARGINE 300 U/ML
INJECTION, SOLUTION SUBCUTANEOUS
Qty: 1 ADJUSTABLE DOSE PRE-FILLED PEN SYRINGE | Refills: 1 | Status: SHIPPED | OUTPATIENT
Start: 2022-11-18

## 2022-11-18 RX ORDER — TRAMADOL HYDROCHLORIDE 50 MG/1
50 TABLET ORAL EVERY 4 HOURS PRN
Qty: 15 TABLET | Refills: 0 | Status: SHIPPED | OUTPATIENT
Start: 2022-11-18 | End: 2022-11-23

## 2022-11-18 RX ORDER — CEFUROXIME AXETIL 500 MG/1
500 TABLET ORAL 2 TIMES DAILY
Qty: 24 TABLET | Refills: 0 | Status: SHIPPED | OUTPATIENT
Start: 2022-11-18 | End: 2022-11-20 | Stop reason: HOSPADM

## 2022-11-18 RX ORDER — PREDNISONE 10 MG/1
30 TABLET ORAL DAILY
Qty: 7 TABLET | Refills: 0 | Status: SHIPPED | OUTPATIENT
Start: 2022-11-18 | End: 2022-11-20 | Stop reason: HOSPADM

## 2022-11-18 RX ORDER — INSULIN LISPRO 100 [IU]/ML
0-16 INJECTION, SOLUTION INTRAVENOUS; SUBCUTANEOUS
Status: DISCONTINUED | OUTPATIENT
Start: 2022-11-19 | End: 2022-11-18

## 2022-11-18 RX ORDER — PREDNISONE 10 MG/1
20 TABLET ORAL DAILY
Qty: 4 TABLET | Refills: 0 | Status: SHIPPED | OUTPATIENT
Start: 2022-11-22 | End: 2022-11-20 | Stop reason: HOSPADM

## 2022-11-18 RX ORDER — PREDNISONE 10 MG/1
10 TABLET ORAL DAILY
Qty: 2 TABLET | Refills: 0 | Status: SHIPPED | OUTPATIENT
Start: 2022-11-24 | End: 2022-11-20 | Stop reason: HOSPADM

## 2022-11-18 RX ORDER — INSULIN LISPRO 100 [IU]/ML
0-16 INJECTION, SOLUTION INTRAVENOUS; SUBCUTANEOUS
Status: DISCONTINUED | OUTPATIENT
Start: 2022-11-18 | End: 2022-11-21 | Stop reason: HOSPADM

## 2022-11-18 RX ADMIN — RIVAROXABAN 20 MG: 20 TABLET, FILM COATED ORAL at 09:04

## 2022-11-18 RX ADMIN — GLIPIZIDE 5 MG: 5 TABLET ORAL at 09:04

## 2022-11-18 RX ADMIN — INSULIN GLARGINE 25 UNITS: 100 INJECTION, SOLUTION SUBCUTANEOUS at 09:04

## 2022-11-18 RX ADMIN — VANCOMYCIN HYDROCHLORIDE 750 MG: 750 INJECTION, POWDER, LYOPHILIZED, FOR SOLUTION INTRAVENOUS at 05:57

## 2022-11-18 RX ADMIN — DICLOFENAC SODIUM 4 G: 10 GEL TOPICAL at 22:11

## 2022-11-18 RX ADMIN — METOPROLOL SUCCINATE 50 MG: 50 TABLET, EXTENDED RELEASE ORAL at 09:04

## 2022-11-18 RX ADMIN — DULOXETINE HYDROCHLORIDE 30 MG: 30 CAPSULE, DELAYED RELEASE ORAL at 09:04

## 2022-11-18 RX ADMIN — DICLOFENAC SODIUM 4 G: 10 GEL TOPICAL at 09:04

## 2022-11-18 RX ADMIN — CEFEPIME 2000 MG: 2 INJECTION, POWDER, FOR SOLUTION INTRAVENOUS at 11:00

## 2022-11-18 RX ADMIN — INSULIN LISPRO 16 UNITS: 100 INJECTION, SOLUTION INTRAVENOUS; SUBCUTANEOUS at 09:05

## 2022-11-18 RX ADMIN — INSULIN GLARGINE 40 UNITS: 100 INJECTION, SOLUTION SUBCUTANEOUS at 22:12

## 2022-11-18 RX ADMIN — PREDNISONE 20 MG: 20 TABLET ORAL at 09:04

## 2022-11-18 RX ADMIN — INSULIN LISPRO 12 UNITS: 100 INJECTION, SOLUTION INTRAVENOUS; SUBCUTANEOUS at 23:23

## 2022-11-18 RX ADMIN — COLCHICINE 0.6 MG: 0.6 TABLET ORAL at 22:11

## 2022-11-18 RX ADMIN — INSULIN LISPRO 4 UNITS: 100 INJECTION, SOLUTION INTRAVENOUS; SUBCUTANEOUS at 22:11

## 2022-11-18 RX ADMIN — INDOMETHACIN 25 MG: 25 CAPSULE ORAL at 09:03

## 2022-11-18 RX ADMIN — INDOMETHACIN 25 MG: 25 CAPSULE ORAL at 13:04

## 2022-11-18 RX ADMIN — PANTOPRAZOLE SODIUM 40 MG: 40 TABLET, DELAYED RELEASE ORAL at 05:55

## 2022-11-18 RX ADMIN — PREDNISONE 20 MG: 20 TABLET ORAL at 22:11

## 2022-11-18 RX ADMIN — GLIPIZIDE 5 MG: 5 TABLET ORAL at 17:17

## 2022-11-18 RX ADMIN — INDOMETHACIN 25 MG: 25 CAPSULE ORAL at 17:17

## 2022-11-18 RX ADMIN — INSULIN LISPRO 16 UNITS: 100 INJECTION, SOLUTION INTRAVENOUS; SUBCUTANEOUS at 17:17

## 2022-11-18 RX ADMIN — LOSARTAN POTASSIUM 50 MG: 50 TABLET, FILM COATED ORAL at 09:04

## 2022-11-18 RX ADMIN — INSULIN LISPRO 16 UNITS: 100 INJECTION, SOLUTION INTRAVENOUS; SUBCUTANEOUS at 13:05

## 2022-11-18 NOTE — PROGRESS NOTES
CLINICAL PHARMACY NOTE: MEDS TO BEDS    Total # of Prescriptions Filled: 4   The following medications were delivered to the patient:  Prednisone 10mg  Colchine 0.6mg  Tramadol 50mg  Cefuroxime axe 500mg    Additional Documentation:

## 2022-11-18 NOTE — PROGRESS NOTES
[facility] [facility] Pharmacokinetic Monitoring Service - Vancomycin    Consulting Provider: Dr. Pradeep Jarrett   Indication: SSTI  Target Concentration: Goal trough of 10-15 mg/L and AUC/RONEL <500 mg*hr/L  Day of Therapy: 4  Additional Antimicrobials: Cefepime    Pertinent Laboratory Values: Wt Readings from Last 1 Encounters:   11/16/22 (!) 413 lb 14.4 oz (187.7 kg)     Temp Readings from Last 1 Encounters:   11/16/22 98.2 °F (36.8 °C) (Oral)     Estimated Creatinine Clearance: 87 mL/min (A) (based on SCr of 1.14 mg/dL (H)). Recent Labs     11/17/22  0540 11/18/22  0633   CREATININE 1.11* 1.41*     Procalcitonin: --    Pertinent Cultures:  Culture Date Source Results   11/15/22 Blood x 2 No growth x 2 days   Gram pos cocci in chains    MRSA Nasal Swab: N/A. Non-respiratory infection. Recent vancomycin administrations                     vancomycin (VANCOCIN) 750 mg in dextrose 5 % 250 mL IVPB (mg) 750 mg New Bag 11/16/22 0604     750 mg New Bag 11/15/22 1656    vancomycin (VANCOCIN) 2,500 mg in dextrose 5 % 500 mL IVPB (mg) 2,500 mg New Bag 11/15/22 0516                    Assessment:  Date/Time Current Dose Concentration Timing of Concentration (h) AUC   11/16/22  11:05 750 mg q24 48.9 mg/L 6h52m 401   Note: Serum concentrations collected for AUC dosing may appear elevated if collected in close proximity to the dose administered, this is not necessarily an indication of toxicity    Plan:  Current dosing regimen is therapeutic   Continue vancomycin 750mg ivpb q24h .     Pharmacy will continue to monitor patient and adjust therapy as indicated    Thank you for the consult,  HEATHER Dunne [facility]  11/18/2022 1:55 PM

## 2022-11-18 NOTE — PROGRESS NOTES
Occupational Therapy  Facility/Department: [facility] MED SURG  Occupational Therapy Re-Assessment    Name: Mylene Puentes  : 1955  MRN: [id number]  Date of Service: 2022    Discharge Recommendations:  Patient would benefit from continued therapy after discharge   Pt currently functioning below baseline. Recommend daily inpatient skilled therapy at time of discharge to maximize long term outcomes and prevent re-admission. Please refer to AM-PAC score for current level of function. RN reports patient is medically stable for therapy treatment this date. Chart reviewed prior to treatment and patient is agreeable for therapy. All lines intact and patient positioned comfortably at end of treatment. All patient needs addressed prior to ending therapy session. Patient Diagnosis(es): The primary encounter diagnosis was Cellulitis of left heel. Diagnoses of Cellulitis of right heel, Hypomagnesemia, Acute idiopathic gout of right ankle, and Type 2 diabetes mellitus with hyperglycemia, with long-term current use of insulin ([address] were also pertinent to this visit. Past Medical History:  has a past medical history of DM (diabetes mellitus) ([address] DVT (deep venous thrombosis) ([address] GERD (gastroesophageal reflux disease), Hypertension, MVP (mitral valve prolapse), and KATHI (obstructive sleep apnea). Past Surgical History:  has a past surgical history that includes Hysterectomy and Tonsillectomy. Assessment   Performance deficits / Impairments: Decreased functional mobility ; Decreased ADL status; Decreased strength;Decreased safe awareness;Decreased endurance;Decreased posture;Decreased balance  Assessment: pt progressed this date to being able to complete sup to sit and sit to stand, however continuing to require 2 Assist  to stand this session and is needing maxi susan for transfers bed to recliner. Pt currently needing 2 Assist for all bed mob, hygiene, ADLs, and pressure relief.  Despite progress from yesterday, pt remains unable to walk or complete functional transfers that she would need to do at home. Prognosis: Fair  Decision Making: High Complexity  REQUIRES OT FOLLOW-UP: Yes  Activity Tolerance  Activity Tolerance: Patient limited by fatigue;Patient limited by pain  Activity Tolerance Comments: pt with significant weakness, significant functional limitations        Plan   Occupational Therapy Plan  Times Per Week: 4-5x/week, 1-2x/day  Current Treatment Recommendations: Strengthening, Balance training, Functional mobility training, Self-Care / ADL, Safety education & training, Patient/Caregiver education & training, Endurance training, Equipment evaluation, education, & procurement, Positioning, Neuromuscular re-education     Restrictions  Restrictions/Precautions  Restrictions/Precautions: Fall Risk, General Precautions (Simultaneous filing. User may not have seen previous data.)  Position Activity Restriction  Other position/activity restrictions: Maxi Kuldip, up with assist, IV right UE, Pure wick (Simultaneous filing. User may not have seen previous data.)    Subjective   General  Chart Reviewed: Yes  Patient assessed for rehabilitation services?: Yes (Simultaneous filing.  User may not have seen previous data.)  Family / Caregiver Present: No  Subjective  Subjective: no pain in leg at rest. 7-8/10 with movement     Social/Functional History  Social/Functional History  Lives With: Spouse, Son (son works 4pm to [employer]. Son works until 11 pm (pt home alone 2:30 pm-11 pm))  Type of Home: [address] [place] [address],[address]: Two level, Able to Live on Main level with bedroom/bathroom  Home Access: Ramped entrance  Bathroom Shower/Tub: Walk-in shower  Bathroom Toilet: Handicap height  Bathroom Equipment: Built-in shower seat, Grab bars in [address] [place], [place] [address]: Haider Star, rolling, Wheelchair-manual, Oxygen (2L at night)  Has the patient had two or more falls in the past year or any fall with injury in the past year?: No  ADL Assistance: Independent  Homemaking Assistance: Independent  Ambulation Assistance: Independent (no AD typically. W/C in community)  Transfer Assistance: Independent  Active : No  Patient's  Info:  drives  Occupation: Retired  Type of Occupation:   Leisure & Hobbies: read and TV, cats       Objective   Heart Rate: 66  Heart Rate Source: Apical  BP: (!) 151/115  BP Location: Left lower arm  BP Method: Automatic  Patient Position: High fowlers  MAP (Calculated): 127  Resp: 18  SpO2: 92 %  O2 Device: None (Room air)          Observation/Palpation  Posture: Fair  Observation: BMI 56, redness in right heel, darkening of skin of left lower leg. Safety Devices  Type of Devices: Call light within reach;Nurse notified; Left in chair  Balance  Sitting: High guard (SBA at EOB x 20+ min. Pt needing a lot of time to attempt stand. Pt stalling and clearly unsure of own abilities.)  Standing: With support (mod A x 2 to stand at [facility]; only able to stand - NOT able to walk. Pt has too much pain with wt bearing on R foot and also becoming nauseous.)  Gait  Overall Level of Assistance: Other (comment) (Pt UNABLE to walk to chair. Kuldip lift bed to chair)  Toilet Transfers  Toilet Transfers Comments: pt with purewick. Unable to transfer to toilet  AROM: Within functional limits  Strength: Within functional limits (4/5 B UE's)  Coordination: Within functional limits  Tone: Normal  Sensation: Intact (denies N/T)  ADL  Feeding: Independent;Setup  Grooming: Stand by assistance;Setup  UE Bathing: Moderate assistance  LE Bathing: Maximum assistance  UE Dressing: Moderate assistance  LE Dressing: Maximum assistance;Dependent/Total  Toileting: Dependent/Total  Additional Comments: pt is remains limited by dec. mobility, Able to stand this date with significant difficulty this date, but unable to take steps. UNABLE to walk to bathroom or take any step from bed.   Pt fatigues easily and has pain with wt bearing to R leg. Pt needing maxi susan to transfer bed to chair. Bed mobility  Supine to Sit: Stand by assistance (Increased time to attempt moving to EOB.)  Sit to Supine: Maximum assistance;2 Person assistance  Scooting: Dependent/Total  Transfers  Sit to stand: Moderate assistance;2 Person assistance  Stand to sit: Moderate assistance;2 Person assistance  Transfer Comments: with increased time and bed raised. Pt needing max verbal cues for tech, multiple attempts to clear hips from bed. pt would be unable to stand from any lower of a surface (ie toilet or recliner)  Vision  Vision: Impaired  Vision Exceptions: Wears glasses at all times  Hearing  Hearing: Within functional limits  Cognition  Overall Cognitive Status: WFL  Arousal/Alertness: Appropriate responses to stimuli  Following Commands: Follows all commands without difficulty  Attention Span: Appears intact  Memory: Appears intact  Safety Judgement: Decreased awareness of need for assistance  Insights: Decreased awareness of deficits  Initiation: Does not require cues  Sequencing: Does not require cues  Orientation  Overall Orientation Status: Within Functional Limits  Perception  Overall Perceptual Status: WFL               Education Given To: Patient  Education Provided: Role of Therapy;Plan of Care;Home Exercise Program;Family Education;Precautions; Equipment;ADL Adaptive Strategies;Transfer Training; Fall Prevention Strategies; Energy Conservation  Education Method: Verbal  Education Outcome: Continued education needed; Unable to demonstrate understanding               AM-[facility] Inpatient Daily Activity Raw Score: 13 (11/18/22 1247)  AM-PAC Inpatient ADL T-Scale Score : 32.03 (11/18/22 1247)  ADL Inpatient CMS 0-100% Score: 63.03 (11/18/22 1247)  ADL Inpatient CMS G-Code Modifier : CL (11/18/22 1247)    Tinneti Score       Goals  Short Term Goals  Time Frame for Short Term Goals: by discharge, pt will  Short Term Goal 1: demo min A with ADL transfers with approp AD/DME  Short Term Goal 2: demo min A with bed mob with rails/controls  Short Term Goal 3: demo min A with UB ADLs and mod A with LB ADLs with AE/DME as needed with good safety/pacing  Short Term Goal 4: demo mod A with toileting routine with approp DME as needed  Short Term Goal 5: demo and verb good understanding of fall prevention techs, EC/WS techs, equip needs, pressure relievieng techs, B UE HEP, and d/c recommendations  Patient Goals   Patient goals : to go home       Therapy Time   Individual Concurrent Group Co-treatment   Time In 1119         Time Out 1216         Minutes 57             Treatment min: 36  Co-treatment with PT warranted secondary to decreased safety and independence requiring 2 skilled therapy professionals to address individual discipline's goals. OT addressing preparation for ADL transfer, sitting balance for increased ADL performance, sitting/activity tolerance, functional reaching, environmental safety/scanning, fall prevention, ability to sequence and follow directions, bed mobility tech, and functional UE strength.      Ntaalya Joe, OT

## 2022-11-18 NOTE — PROGRESS NOTES
Patients blood sugars has been high 386 for  for noon, primary has been updated. Writer will follow sliding scale orders.

## 2022-11-18 NOTE — CARE COORDINATION
Social work pt refused snf. May consider home care if therapy convinces her she will benefit per pt.  following.  Katarina norton

## 2022-11-18 NOTE — PROGRESS NOTES
[facility] NOTE    Room # 2021/2021-01   Name: Nydia Beckett            Sabianism: [address]     Reason for visit: Follow up    I visited the patient. Admit Date & Time: 11/15/2022  1:00 AM    Assessment:  Nydia Beckett is a 69 y.o. female in the hospital because she has cellulitis. Upon entering the room pt. Is found attempting to adjust her pillows in bed to get more comfortable. Intervention:  I introduced myself and my title as spiritual care provider I offered space for patient  to express feelings, needs, and concerns and provided a ministry presence. Patient is open to emotional support as she shares events that brought her to inpatient care. Pt. Is hopeful but anxious and appreciates support. Outcome:  Pt. Calm and coping. Plan:  Chaplains will remain available to offer spiritual and emotional support as needed. Electronically signed by Steff Malagon on 11/17/2022 at 8:27 PM.  Julio Cesar      11/17/22 2025   Encounter Summary   Service Provided For: Patient   Referral/Consult From: [facility]   Last Encounter  11/17/22   Complexity of Encounter Low   Begin Time 1905   End Time  1920   Total Time Calculated 15 min   Encounter    Type Follow up   Spiritual/Emotional needs   Type Spiritual Support   Assessment/Intervention/Outcome   Assessment Anxious; Impaired resilience; Interrupted family processes   Intervention Active listening;Explored/Affirmed feelings, thoughts, concerns;Nurtured Hope;Sustaining Presence/Ministry of presence   Outcome Comfort; Connection/Belonging;Expressed feelings, needs, and concerns;Receptive

## 2022-11-18 NOTE — PROGRESS NOTES
Physical Therapy  Facility/Department: [facility] MED SURG  Physical Therapy ReAssessment    Name: Carly Virgen  : 1955  MRN: [id number]  Date of Service: 2022    Discharge Recommendations:  Patient would benefit from continued therapy after discharge. Pt currently functioning below baseline. Recommend daily inpatient skilled therapy at time of discharge to maximize long term outcomes and prevent re-admission. Please refer to AM-PAC score for current level of function. PT Equipment Recommendations  Equipment Needed: Yes  Mobility Devices: Omega Slim: Rolling  Other: Bariatric      Patient Diagnosis(es): The primary encounter diagnosis was Cellulitis of left heel. Diagnoses of Cellulitis of right heel, Hypomagnesemia, Acute idiopathic gout of right ankle, and Type 2 diabetes mellitus with hyperglycemia, with long-term current use of insulin ([facility] [address] were also pertinent to this visit. Past Medical History:  has a past medical history of DM (diabetes mellitus) ([address] DVT (deep venous thrombosis) ([facility] [address] GERD (gastroesophageal reflux disease), Hypertension, MVP (mitral valve prolapse), and KATHI (obstructive sleep apnea). Past Surgical History:  has a past surgical history that includes Hysterectomy and Tonsillectomy. Assessment   Body Structures, Functions, Activity Limitations Requiring Skilled Therapeutic Intervention: Decreased functional mobility ; Decreased ROM; Decreased strength;Decreased endurance;Decreased safe awareness;Decreased balance; Increased pain  Assessment: Patient demo improved ability for supine to sit, but requires max x 2 for sit to supine. Patient not safe to transfer or ambulate. Required max x 2 to stand from significantly elevated surface after 5 attempts with max cues. Not able to transfer from standard height surface. Recommend staff use maxi susan lift.   Therapy Prognosis: Good  Decision Making: High Complexity  Activity Tolerance  Activity Tolerance: Patient limited by pain Plan   Physcial Therapy Plan  General Plan: 5-7 times per week  Current Treatment Recommendations: Strengthening, Balance training, Endurance training, Safety education & training, Patient/Caregiver education & training, Therapeutic activities, Home exercise program, Transfer training, Gait training, Neuromuscular re-education  Safety Devices  Type of Devices: Call light within reach, Nurse notified, Left in chair, Gait belt     Restrictions  Restrictions/Precautions  Restrictions/Precautions: Fall Risk, General Precautions (Simultaneous filing. User may not have seen previous data.)  Position Activity Restriction  Other position/activity restrictions: Maxi Kuldip, up with assist, IV right UE, Pure wick (Simultaneous filing. User may not have seen previous data.)     Subjective   General  Chart Reviewed: Yes  Patient assessed for rehabilitation services?: Yes (Simultaneous filing. User may not have seen previous data.)  Additional Pertinent Hx: Gout, Venous insufficiency,  Response To Previous Treatment: Not applicable  Family / Caregiver Present: No  Diagnosis: Cellulitis bilateral heels, hypomagnesemia  Follows Commands: Within Functional Limits  General Comment  Comments: ANTONIO Howe reports patient medically appropriate for PT. Subjective  Subjective: Patient reports doctor told her she could discharge home today if she is able to walk.          Social/Functional History  Social/Functional History  Lives With: Spouse, Son (son works 4pm to [employer]. Son works until 11 pm (pt home alone 2:30 pm-11 pm))  Type of Home: [facility] [employer] Layout: Two level, Able to Live on Main level with bedroom/bathroom  Home Access: Ramped entrance  Bathroom Shower/Tub: Walk-in shower  Bathroom Toilet: Handicap height  Bathroom Equipment: Built-in shower seat, Grab bars in [facility] [place]: Walker, rolling, Wheelchair-manual, Oxygen (2L at night)  Has the patient had two or more falls in the past year or any fall with injury in the past year?: No  ADL Assistance: Independent  Homemaking Assistance: Independent  Ambulation Assistance: Independent (no AD typically. W/C in community)  Transfer Assistance: Independent  Active : No  Patient's  Info:  drives  Occupation: Retired  Type of Occupation:   Leisure & Hobbies: read and TV, cats  Vision/Hearing  Vision  Vision: Impaired  Vision Exceptions: Wears glasses at all times  Hearing  Hearing: Within functional limits    Cognition   Orientation  Overall Orientation Status: Within Functional Limits  Cognition  Overall Cognitive Status: WFL  Arousal/Alertness: Appropriate responses to stimuli  Following Commands: Follows all commands without difficulty  Attention Span: Appears intact  Memory: Appears intact  Safety Judgement: Decreased awareness of need for assistance;Decreased awareness of need for safety  Problem Solving: Decreased awareness of errors;Assistance required to identify errors made;Assistance required to generate solutions;Assistance required to implement solutions;Assistance required to correct errors made  Insights: Not aware of deficits  Initiation: Does not require cues  Sequencing: Does not require cues  Cognition Comment: Poor insight: after patient was unable to take any steps and unable to even weight shift in standing, patient asked if she could walk to bathroom with her . Objective   Observation/Palpation  Posture: Fair  Observation: BMI 56, redness in right heel, darkening of skin of left lower leg. Bed mobility  Supine to Sit: Stand by assistance  Sit to Supine: Maximum assistance;2 Person assistance  Bed Mobility Comments: Patient requires extended time and effort to complete bed mobility. Performed supine to sit by long sitting in bed from semi reclined position, then walking legs over to edge of bed. Patient required max x 2 to get BLE back into bed.   Transfers  Sit to Stand: Unable to assess  Stand to Sit: Unable to assess  Bed to Chair: Unable to assess  Stand Pivot Transfers: Unable to assess  Comment: Patient requires max verbal cues and at least 5 attempts at sit to stand before finally achieving stand with mod x 2 with bed significantly raise. Patient not able to pivot to transfer to chair. Patient would not be able to stand from any lower surface, even with max x 2. Ambulation  WB Status:  (Patient not able to take any steps due to Right foot pain and LE weakness, not safe to transfer to chair except with maxi susan lift.)     Balance  Posture: Fair  Sitting - Static: Good  Sitting - Dynamic: Good;-  Standing - Static: Fair (RW)  Comments: Patient stood 1 minute with bariatric RW           OutComes Score  AM-PAC Score  AM-PAC Inpatient Mobility Raw Score : 8 (11/18/22 1259)  AM-PAC Inpatient T-Scale Score : 28.52 (11/18/22 1259)  Mobility Inpatient CMS 0-100% Score: 86.62 (11/18/22 1259)  Mobility Inpatient CMS G-Code Modifier : CM (11/18/22 1259)        Goals  Short Term Goals  Time Frame for Short Term Goals: 12 visits  Short Term Goal 1: Patient will be indep with bed mobility. Short Term Goal 2: Patient will transfer with min assist.  Short Term Goal 3: Patient will tolerate 30 minutes of ther-ex and ther-act. Short Term Goal 4: Patient will be indep with HEP. Short Term Goal 5: Patient will amb 5 feet with bariatric RW and min assist.  Patient Goals   Patient Goals : Pain control       Education  Patient Education  Education Given To: Patient  Education Provided Comments: Transfer techniques, realistic expectiations of functional mobility  Education Method: Verbal  Barriers to Learning: None  Education Outcome: Continued education needed    Co-treatment with OT warranted secondary to decreased safety and independence requiring 2 skilled therapy professionals to address individual discipline's goals.  PT addressing pre gait trunk strengthening, weight shifting prior to transfers, transfer training, and postural control in sitting.    Therapy Time   Individual Concurrent Group Co-treatment   Time In 1120         Time Out 1214         Minutes 54         Timed Code Treatment Minutes: 150 [facility], PT

## 2022-11-18 NOTE — PLAN OF CARE
Problem: Neurosensory - Adult  Goal: Achieves stable or improved neurological status  Outcome: Progressing     Problem: Skin/Tissue Integrity - Adult  Goal: Skin integrity remains intact  Outcome: Progressing     Problem: Musculoskeletal - Adult  Goal: Return mobility to safest level of function  Outcome: Progressing

## 2022-11-18 NOTE — PROGRESS NOTES
[facility]  Office: [address], DO, Leslee Westbrook, DO, Hay Stover, DO, Kristopherrithania Lala Blood, DO, Guille Boston MD, Evi Martin MD, Anjelica Kelsey MD, Shyanne Lyles MD,  Gene Haji MD, Jose Knox MD, Calvin Hendrix, DO, William Andrew MD,  Collins Gordillo MD, Steve Rick MD, Jayne Castillo, DO, Em Campos MD, Rosalie Peralta MD, Venkatesh Riley, DO, Wanda Gonzalez MD, Chris Ayon MD, Marleen Harada, MD, Kath Hussein MD, Johnny Holstein, DO, Hasmukh Mcmahon MD, Megan Rowan MD, Jose Francisco Rowley, CNP,  Beatrice Horta, CNP, Uriel Trammell, CNP, Ying Slade, CNP,  Druscilla Severe, [facility], Alice Chow, [facility], Moe Grant, [facility], Hong Panchal CNP, Lanita Cogan, CNP, Minerva Domínguez CNP, Robert Pierson PA-C, Heri Barnard, CNS, Jerel Mcpherson, [facility], Kathleen Marshall, CNP, Selina Huddleston CNP, Catalina Barron, [facility]    Progress Note    11/18/2022    11:33 AM    Name:   Mohan Levin  MRN:     [id number]     [place]:      [de-identified]   Room:   2021/2021-01  IP Day:  3  Admit Date:  11/15/2022  1:00 AM    PCP:   ARTURO Azar CNP  Code Status:  Full Code    Subjective:     C/C:   Chief Complaint   Patient presents with    Foot Pain     Dx with cellulitis on L foot on Friday. Started on doxy. Has not been taking it regularly d/t nausea. Pt now having R foot pain. Interval History Status: Significant improvement    Dramatic improvement overnight. Patient reports that she is near complete resolution of her right lower extremity pain. Her cellulitic area of the left lower extremity is also dramatically improved. Patient is yet to work with therapy however she is willing to attempt ambulation today. Patient be transition to oral regimen and if she is safe to discharge according to therapy she can go home later today.     Renal function has deteriorated, will discontinue NSAIDs    Brief History:     11/15 - This very pleasant 49-year-old female was recently started on doxycycline by her podiatrist due to a left lower extremity cellulitis. The patient was unable to tolerate her doxycycline due to nausea. She states that she took it intermittently and ultimately presented to the hospital due to worsening cellulitic changes. At this point in time the patient has diffuse cellulitic changes involving the left lower extremity along with flaking of her skin. She does have chronic lymphedematous changes secondary to her morbid obesity and BMI 52. She has developed some slight skin changes along the right posterior aspect of her calf. Reviewing her laboratory data shows that her A1c has been over 10 for well over 2 years now. I had a long discussion with her regarding the fact that her glycemic control is playing a direct role in her risk of lower extremity cellulitis. Her blood sugars are obviously not controlled which increases her risk of infection immensely. I am going to titrate her long-acting insulin from 34 units daily to 25 twice daily with plans to aggressively titrate it further. We will place her on a high intensity sliding scale. I strongly encouraged her to follow-up with her outpatient physician for better glycemic control    11/16 -no change in condition. Patient reports no change in her pain to her left lower extremity and redness and induration remains persistent. Patient continues to endorse severe right ankle pain. Medial aspect of right ankle is red and incredibly painful to the touch rated 10/10. We will initiate gout work-up    11/17 -mild improvement in cellulitic areas to the left lower extremity. Clinical assessment consistent with gout/pseudogout. Will initiate treatment as such and recommend outpatient follow-up with podiatry. 11/18 - Dramatic improvement overnight.   Patient reports that she is near complete resolution of her right lower extremity pain. Her cellulitic area of the left lower extremity is also dramatically improved. Patient is yet to work with therapy however she is willing to attempt ambulation today. Patient be transition to oral regimen and if she is safe to discharge according to therapy she can go home later today. Review of Systems:     Constitutional:  negative for chills, fevers, sweats  Respiratory:  negative for cough, dyspnea on exertion, shortness of breath, wheezing  Cardiovascular:  negative for chest pain, chest pressure/discomfort, lower extremity edema, palpitations  Gastrointestinal:  negative for abdominal pain, constipation, diarrhea, nausea, vomiting  Neurological:  negative for dizziness, headache    Medications: Allergies:     Allergies   Allergen Reactions    Erythromycin Hives    Lisinopril Itching       Current Meds:   Scheduled Meds:    indomethacin  25 mg Oral TID WC    colchicine  0.6 mg Oral Daily    predniSONE  20 mg Oral BID    insulin glargine  40 Units SubCUTAneous Nightly    insulin glargine  25 Units SubCUTAneous Daily    vancomycin  750 mg IntraVENous Q24H    rivaroxaban  20 mg Oral Daily with breakfast    pantoprazole  40 mg Oral QAM AC    metoprolol succinate  50 mg Oral Daily    DULoxetine  30 mg Oral Daily    sodium chloride flush  5-40 mL IntraVENous 2 times per day    glipiZIDE  5 mg Oral BID WC    insulin lispro  0-16 Units SubCUTAneous TID WC    insulin lispro  0-4 Units SubCUTAneous Nightly    diclofenac sodium  4 g Topical 4x Daily    losartan  50 mg Oral Daily    cefepime  2,000 mg IntraVENous Q12H    vancomycin (VANCOCIN) intermittent dosing (placeholder)   Other RX Placeholder     Continuous Infusions:    sodium chloride      dextrose       PRN Meds: traMADol, sodium chloride flush, sodium chloride, potassium chloride **OR** potassium alternative oral replacement **OR** potassium chloride, magnesium sulfate, ondansetron **OR** ondansetron, acetaminophen **OR** acetaminophen, glucose, dextrose bolus **OR** dextrose bolus, glucagon (rDNA), dextrose    Data:     Past Medical History:   has a past medical history of DM (diabetes mellitus) ([facility] [address] DVT (deep venous thrombosis) ([facility] [address] GERD (gastroesophageal reflux disease), Hypertension, MVP (mitral valve prolapse), and KATHI (obstructive sleep apnea). Social History:   reports that she quit smoking about 39 years ago. Her smoking use included cigarettes. She has a 5.00 pack-year smoking history. She has never used smokeless tobacco. She reports that she does not drink alcohol and does not use drugs. Family History:   Family History   Problem Relation Age of Onset    Heart Disease Mother        Vitals:  BP (!) 151/115   Pulse 66   Temp 97.7 °F (36.5 °C) (Oral)   Resp 18   Ht 6' (1.829 m)   Wt (!) 413 lb (187.3 kg)   SpO2 92%   BMI 56.01 kg/m²   Temp (24hrs), Av.2 °F (36.8 °C), Min:97.7 °F (36.5 °C), Max:98.5 °F (36.9 °C)    Recent Labs     22  1651 22  1948 22  0636 22  1059   POCGLU 445* 476* 386* 384*       I/O (24Hr): Intake/Output Summary (Last 24 hours) at 2022 1133  Last data filed at 2022 0900  Gross per 24 hour   Intake 271.48 ml   Output 1250 ml   Net -978.52 ml       Labs:  Hematology:  Recent Labs     22  1632   SEDRATE 81*   .0*     Chemistry:  Recent Labs     22  0627 22  0540 22  0633   * 133*  --    K 4.1 4.3  --    CL 98 99  --    CO2 23 23  --    GLUCOSE 296* 256*  --    BUN 14 17 31*   CREATININE 1.14* 1.11* 1.41*   ANIONGAP 10 11  --    LABGLOM 52* 54* 40*   CALCIUM 8.0* 8.4*  --      Recent Labs     22  1632 22  2126 22  0602 22  1116 22  1651 22  1948 22  0636 22  1059   URICACID 5.7  --   --   --   --   --   --   --    POCGLU  --    < > 250* 319* 445* 476* 386* 384*    < > = values in this interval not displayed.      ABG:No results found for: POCPH, PHART, PH, POCPCO2, GWP3URS, PCO2, POCPO2, PO2ART, PO2, POCHCO3, AHF6UFD, HCO3, NBEA, PBEA, BEART, BE, THGBART, THB, JHA9GMR, PFVG6MJM, C8NKTTGY, O2SAT, FIO2  Lab Results   Component Value Date/Time    [facility] 11/15/2022 03:06 AM     Lab Results   Component Value Date/Time    CULTURE NO GROWTH 3 DAYS 11/15/2022 03:06 AM       Radiology:  XR FOOT LEFT (MIN 3 VIEWS)    Result Date: 11/15/2022  1. Advanced left mid and hindfoot degenerative changes, with calcaneal spurring though no acute fracture seen throughout the foot. 2. Advanced right mid and hindfoot degenerative changes, with calcaneal spurring though no acute fracture. 3. Peripheral arterial disease. 4. Bilateral pes planus deformities. XR FOOT RIGHT (MIN 3 VIEWS)    Result Date: 11/15/2022  1. Advanced left mid and hindfoot degenerative changes, with calcaneal spurring though no acute fracture seen throughout the foot. 2. Advanced right mid and hindfoot degenerative changes, with calcaneal spurring though no acute fracture. 3. Peripheral arterial disease. 4. Bilateral pes planus deformities.        Physical Examination:        General appearance:  alert, cooperative and no distress  Mental Status:  oriented to person, place and time and normal affect  Lungs:  clear to auscultation bilaterally, normal effort  Heart:  regular rate and rhythm, no murmur  Abdomen:  soft, nontender, nondistended, normal bowel sounds, no masses, hepatomegaly, splenomegaly  Extremities:  no edema, redness, tenderness in the calves  Skin:  no gross lesions, rashes, induration    Assessment:        Hospital Problems             Last Modified POA    * (Principal) Cellulitis of left lower extremity 11/16/2022 Yes    Atrial fibrillation ([address] 11/16/2022 Yes    Venous insufficiency of both lower extremities 11/16/2022 Yes    Plantar fasciitis of right foot 11/16/2022 Yes    Type 2 diabetes mellitus with hyperglycemia, without long-term current use of insulin ([address] 11/16/2022 Yes    GERD (gastroesophageal reflux disease) (Chronic) 11/16/2022 Yes    Essential hypertension 11/16/2022 Yes    Morbid obesity ([address] 11/16/2022 Yes       Plan:        Cellulitis left lower extremity with blood culture positive x 1 for Group B Strep  Continue IV vancomycin  Can likely be transitioned to oral Ceftin at discharge  Right ankle pain, clinical assessment consistent with gout/pseudogout  Recommend outpatient follow-up with orthopedics/podiatry to discuss intra-articular treatment options and fluid studies  Continue colchicine, steroid - treatment successful in relieving pain and inflammation  Discontinue Malgorzata Buckner, ARTURO - DIANELYS  11/18/2022  11:33 AM

## 2022-11-19 PROBLEM — I48.0 PAROXYSMAL ATRIAL FIBRILLATION (HCC): Status: ACTIVE | Noted: 2022-10-18

## 2022-11-19 PROBLEM — R78.81 BACTEREMIA DUE TO GROUP B STREPTOCOCCUS: Status: ACTIVE | Noted: 2022-11-19

## 2022-11-19 PROBLEM — B95.1 BACTEREMIA DUE TO GROUP B STREPTOCOCCUS: Status: ACTIVE | Noted: 2022-11-19

## 2022-11-19 LAB
ABSOLUTE EOS #: 0 K/UL (ref 0–0.44)
ABSOLUTE IMMATURE GRANULOCYTE: 0.94 K/UL (ref 0–0.3)
ABSOLUTE LYMPH #: 0.94 K/UL (ref 1.1–3.7)
ABSOLUTE MONO #: 1.09 K/UL (ref 0.1–1.2)
ANION GAP SERPL CALCULATED.3IONS-SCNC: 14 MMOL/L (ref 9–17)
BASOPHILS # BLD: 0 % (ref 0–2)
BASOPHILS ABSOLUTE: 0 K/UL (ref 0–0.2)
BUN BLDV-MCNC: 41 MG/DL (ref 8–23)
BUN/CREAT BLD: 25 (ref 9–20)
CALCIUM SERPL-MCNC: 8.5 MG/DL (ref 8.6–10.4)
CHLORIDE BLD-SCNC: 98 MMOL/L (ref 98–107)
CO2: 19 MMOL/L (ref 20–31)
CREAT SERPL-MCNC: 1.62 MG/DL (ref 0.5–0.9)
EOSINOPHILS RELATIVE PERCENT: 0 % (ref 1–4)
GFR SERPL CREATININE-BSD FRML MDRD: 34 ML/MIN/1.73M2
GLUCOSE BLD-MCNC: 330 MG/DL (ref 70–99)
GLUCOSE BLD-MCNC: 354 MG/DL (ref 65–105)
GLUCOSE BLD-MCNC: 363 MG/DL (ref 65–105)
GLUCOSE BLD-MCNC: 363 MG/DL (ref 65–105)
GLUCOSE BLD-MCNC: 364 MG/DL (ref 65–105)
HCT VFR BLD CALC: 37.6 % (ref 36.3–47.1)
HEMOGLOBIN: 11.7 G/DL (ref 11.9–15.1)
IMMATURE GRANULOCYTES: 6 %
LYMPHOCYTES # BLD: 6 % (ref 24–43)
MCH RBC QN AUTO: 29 PG (ref 25.2–33.5)
MCHC RBC AUTO-ENTMCNC: 31.1 G/DL (ref 28.4–34.8)
MCV RBC AUTO: 93.3 FL (ref 82.6–102.9)
MONOCYTES # BLD: 7 % (ref 3–12)
MORPHOLOGY: ABNORMAL
NRBC AUTOMATED: 0 PER 100 WBC
PDW BLD-RTO: 13 % (ref 11.8–14.4)
PLATELET # BLD: 365 K/UL (ref 138–453)
PMV BLD AUTO: 10 FL (ref 8.1–13.5)
POTASSIUM SERPL-SCNC: 4.2 MMOL/L (ref 3.7–5.3)
RBC # BLD: 4.03 M/UL (ref 3.95–5.11)
SEG NEUTROPHILS: 81 % (ref 36–65)
SEGMENTED NEUTROPHILS ABSOLUTE COUNT: 12.63 K/UL (ref 1.5–8.1)
SODIUM BLD-SCNC: 131 MMOL/L (ref 135–144)
WBC # BLD: 15.6 K/UL (ref 3.5–11.3)

## 2022-11-19 PROCEDURE — 6370000000 HC RX 637 (ALT 250 FOR IP): Performed by: NURSE PRACTITIONER

## 2022-11-19 PROCEDURE — 6360000002 HC RX W HCPCS: Performed by: HOSPITALIST

## 2022-11-19 PROCEDURE — 2580000003 HC RX 258: Performed by: HOSPITALIST

## 2022-11-19 PROCEDURE — 36415 COLL VENOUS BLD VENIPUNCTURE: CPT

## 2022-11-19 PROCEDURE — 05HC33Z INSERTION OF INFUSION DEVICE INTO LEFT BASILIC VEIN, PERCUTANEOUS APPROACH: ICD-10-PCS | Performed by: INTERNAL MEDICINE

## 2022-11-19 PROCEDURE — 82947 ASSAY GLUCOSE BLOOD QUANT: CPT

## 2022-11-19 PROCEDURE — 1200000000 HC SEMI PRIVATE

## 2022-11-19 PROCEDURE — 85025 COMPLETE CBC W/AUTO DIFF WBC: CPT

## 2022-11-19 PROCEDURE — 80048 BASIC METABOLIC PNL TOTAL CA: CPT

## 2022-11-19 PROCEDURE — 6370000000 HC RX 637 (ALT 250 FOR IP): Performed by: HOSPITALIST

## 2022-11-19 RX ADMIN — DULOXETINE HYDROCHLORIDE 30 MG: 30 CAPSULE, DELAYED RELEASE ORAL at 09:38

## 2022-11-19 RX ADMIN — DICLOFENAC SODIUM 4 G: 10 GEL TOPICAL at 09:39

## 2022-11-19 RX ADMIN — DICLOFENAC SODIUM 4 G: 10 GEL TOPICAL at 20:54

## 2022-11-19 RX ADMIN — DICLOFENAC SODIUM 4 G: 10 GEL TOPICAL at 12:03

## 2022-11-19 RX ADMIN — INDOMETHACIN 25 MG: 25 CAPSULE ORAL at 12:01

## 2022-11-19 RX ADMIN — INSULIN LISPRO 16 UNITS: 100 INJECTION, SOLUTION INTRAVENOUS; SUBCUTANEOUS at 12:01

## 2022-11-19 RX ADMIN — INSULIN LISPRO 16 UNITS: 100 INJECTION, SOLUTION INTRAVENOUS; SUBCUTANEOUS at 20:53

## 2022-11-19 RX ADMIN — PREDNISONE 20 MG: 20 TABLET ORAL at 09:38

## 2022-11-19 RX ADMIN — PANTOPRAZOLE SODIUM 40 MG: 40 TABLET, DELAYED RELEASE ORAL at 06:46

## 2022-11-19 RX ADMIN — VANCOMYCIN HYDROCHLORIDE 750 MG: 750 INJECTION, POWDER, LYOPHILIZED, FOR SOLUTION INTRAVENOUS at 06:35

## 2022-11-19 RX ADMIN — LOSARTAN POTASSIUM 50 MG: 50 TABLET, FILM COATED ORAL at 09:38

## 2022-11-19 RX ADMIN — DICLOFENAC SODIUM 4 G: 10 GEL TOPICAL at 17:30

## 2022-11-19 RX ADMIN — INSULIN GLARGINE 25 UNITS: 100 INJECTION, SOLUTION SUBCUTANEOUS at 09:40

## 2022-11-19 RX ADMIN — INSULIN GLARGINE 40 UNITS: 100 INJECTION, SOLUTION SUBCUTANEOUS at 20:53

## 2022-11-19 RX ADMIN — COLCHICINE 0.6 MG: 0.6 TABLET ORAL at 20:52

## 2022-11-19 RX ADMIN — METOPROLOL SUCCINATE 50 MG: 50 TABLET, EXTENDED RELEASE ORAL at 09:38

## 2022-11-19 RX ADMIN — GLIPIZIDE 5 MG: 5 TABLET ORAL at 09:39

## 2022-11-19 RX ADMIN — INSULIN LISPRO 8 UNITS: 100 INJECTION, SOLUTION INTRAVENOUS; SUBCUTANEOUS at 17:30

## 2022-11-19 RX ADMIN — RIVAROXABAN 20 MG: 20 TABLET, FILM COATED ORAL at 09:38

## 2022-11-19 RX ADMIN — INSULIN LISPRO 16 UNITS: 100 INJECTION, SOLUTION INTRAVENOUS; SUBCUTANEOUS at 06:45

## 2022-11-19 RX ADMIN — INDOMETHACIN 25 MG: 25 CAPSULE ORAL at 09:38

## 2022-11-19 RX ADMIN — GLIPIZIDE 5 MG: 5 TABLET ORAL at 17:30

## 2022-11-19 ASSESSMENT — PAIN DESCRIPTION - ORIENTATION: ORIENTATION: RIGHT;LEFT

## 2022-11-19 ASSESSMENT — PAIN DESCRIPTION - LOCATION: LOCATION: LEG

## 2022-11-19 ASSESSMENT — PAIN DESCRIPTION - DESCRIPTORS: DESCRIPTORS: ACHING;BURNING;DISCOMFORT;SHARP

## 2022-11-19 ASSESSMENT — PAIN SCALES - GENERAL
PAINLEVEL_OUTOF10: 1
PAINLEVEL_OUTOF10: 0

## 2022-11-19 NOTE — PROGRESS NOTES
[facility]  Office: [address], DO, Erick Chaves, DO, Mary Ann Madrigal, DO, Joseph [place] Blood, DO, Sundeep Kathleen MD, Juju Contreras MD, Yvone Phalen, MD, Michael Reynoso MD,  Homer Hashimoto, MD, Tae Canales MD, Shell Chua DO, Sophie Macias MD,  Mikala Gagnon MD, Avril Stallworth MD, Abhijit Alonso DO, Emma Witt MD, Shelbi Cardenas MD, Pat Dorman, DO, Mahesh Lorenzo MD, Marisa Dykes MD, Rabia Monroy MD, Wanda Murray MD, Reema Carrasquillo DO, Tiesha Townsend MD, Adalberto Beaulieu MD, Jennifer Khoury, CNP,  Verito Farnsworth, CNP, Brandon Dai, CNP, Akanksha Sethi, CNP,  Malissa Gandhi [facility], Yumiko Martinez, CNP, Bossman Cardenas, CNP, Oracio Manzo, CNP, Chantel Celis, CNP, Eric Funes, CNP, Lucia Jamison PA-C, Miriam Reynolds, CNS, [facility], [facility], Ignacio Estrella, ISAURO, Al Solomon CNP, Jose Eduardo Garza, ISAURO         [facility]    Progress Note    11/19/2022    10:20 AM    Name:   Bonita Monroe  MRN:     [id number]     Acct:      [de-identified]   Room:   2021/2021-01   Day:  4  Admit Date:  11/15/2022  1:00 AM    PCP:   ARTURO Martinez CNP  Code Status:  Full Code    Subjective:     C/C:   Chief Complaint   Patient presents with    Foot Pain     Dx with cellulitis on L foot on Friday. Started on doxy. Has not been taking it regularly d/t nausea. Pt now having R foot pain. Interval History Status: improved. Feels fine today  Denies cp/sob/n/v  Less redness and swelling of leg    Brief History:     Per my partner:  Juanita Richter is a 69 y.o. Non- / non  female who presents with Foot Pain (Dx with cellulitis on L foot on Friday. Started on doxy. Has not been taking it regularly d/t nausea. Pt now having R foot pain.)   and is admitted to the hospital for the management of Cellulitis.      This very pleasant 64-year-old female was recently started on doxycycline by her podiatrist due to a left lower extremity cellulitis. The patient was unable to tolerate her doxycycline due to nausea. She states that she took it intermittently and ultimately presented to the hospital due to worsening cellulitic changes. At this point in time the patient has diffuse cellulitic changes involving the left lower extremity along with flaking of her skin. She does have chronic lymphedematous changes secondary to her morbid obesity and BMI 52.\"    She also has uncontrolled dm2 for quite some time now-generally a1c>10 for over a year    Review of Systems:     Constitutional:  negative for chills, fevers, sweats  Respiratory:  negative for cough, dyspnea on exertion, shortness of breath, wheezing  Cardiovascular:  negative for chest pain, chest pressure/discomfort, palpitations  Gastrointestinal:  negative for abdominal pain, constipation, diarrhea, nausea, vomiting  Neurological:  negative for dizziness, headache    Medications: Allergies:     Allergies   Allergen Reactions    Erythromycin Hives    Lisinopril Itching       Current Meds:   Scheduled Meds:    insulin lispro  0-16 Units SubCUTAneous 4x Daily AC & HS    indomethacin  25 mg Oral TID WC    colchicine  0.6 mg Oral Daily    predniSONE  20 mg Oral BID    insulin glargine  40 Units SubCUTAneous Nightly    insulin glargine  25 Units SubCUTAneous Daily    vancomycin  750 mg IntraVENous Q24H    rivaroxaban  20 mg Oral Daily with breakfast    pantoprazole  40 mg Oral QAM AC    metoprolol succinate  50 mg Oral Daily    DULoxetine  30 mg Oral Daily    sodium chloride flush  5-40 mL IntraVENous 2 times per day    glipiZIDE  5 mg Oral BID WC    diclofenac sodium  4 g Topical 4x Daily    losartan  50 mg Oral Daily    vancomycin (VANCOCIN) intermittent dosing (placeholder)   Other RX Placeholder     Continuous Infusions:    sodium chloride      dextrose       PRN Meds: traMADol, sodium chloride flush, sodium chloride, potassium chloride **OR** potassium alternative oral replacement **OR** potassium chloride, magnesium sulfate, ondansetron **OR** ondansetron, acetaminophen **OR** acetaminophen, glucose, dextrose bolus **OR** dextrose bolus, glucagon (rDNA), dextrose    Data:     Past Medical History:   has a past medical history of DM (diabetes mellitus) ([facility] [address] [facility] GERD (gastroesophageal reflux disease), Hypertension, MVP (mitral valve prolapse), and KATHI (obstructive sleep apnea). Social History:   reports that she quit smoking about 39 years ago. Her smoking use included cigarettes. She has a 5.00 pack-year smoking history. She has never used smokeless tobacco. She reports that she does not drink alcohol and does not use drugs. Family History:   Family History   Problem Relation Age of Onset    Heart Disease Mother        Vitals:  BP (!) 142/68   Pulse 67   Temp 97.5 °F (36.4 °C) (Oral)   Resp 16   Ht 6' (1.829 m)   Wt (!) 415 lb 14.4 oz (188.7 kg)   SpO2 95%   BMI 56.41 kg/m²   Temp (24hrs), Av.5 °F (36.4 °C), Min:97.2 °F (36.2 °C), Max:97.7 °F (36.5 °C)    Recent Labs     22  1059 22  1606 22  2103 22  0626   POCGLU 384* 415* 358* 364*       I/O (24Hr):     Intake/Output Summary (Last 24 hours) at 2022 1020  Last data filed at 2022 0630  Gross per 24 hour   Intake --   Output 1200 ml   Net -1200 ml       Labs:  Hematology:  Recent Labs     22  1632   SEDRATE 81*   .0*     Chemistry:  Recent Labs     22  0540 22  0633   *  --    K 4.3  --    CL 99  --    CO2 23  --    GLUCOSE 256*  --    BUN 17 31*   CREATININE 1.11* 1.41*   ANIONGAP 11  --    LABGLOM 54* 40*   CALCIUM 8.4*  --      Recent Labs     22  1632 226 22  1948 22  0636 22  1059 22  1606 22  2103 22  0626   URICACID 5.7  --   --   --   --   --   --   --    POCGLU  --    < > 476* 386* 384* 415* 358* 364*    < > = values in this interval not displayed. ABG:No results found for: POCPH, PHART, PH, POCPCO2, FBN8MSE, PCO2, POCPO2, PO2ART, PO2, POCHCO3, RZF7ZXW, HCO3, NBEA, PBEA, BEART, BE, THGBART, THB, ITQ6TYZ, YKTI3DTZ, H5QIBVPK, O2SAT, FIO2  Lab Results   Component Value Date/Time    [facility] 11/15/2022 03:06 AM     Lab Results   Component Value Date/Time    CULTURE NO GROWTH 4 DAYS 11/15/2022 03:06 AM       Radiology:  XR FOOT LEFT (MIN 3 VIEWS)    Result Date: 11/15/2022  1. Advanced left mid and hindfoot degenerative changes, with calcaneal spurring though no acute fracture seen throughout the foot. 2. Advanced right mid and hindfoot degenerative changes, with calcaneal spurring though no acute fracture. 3. Peripheral arterial disease. 4. Bilateral pes planus deformities. XR FOOT RIGHT (MIN 3 VIEWS)    Result Date: 11/15/2022  1. Advanced left mid and hindfoot degenerative changes, with calcaneal spurring though no acute fracture seen throughout the foot. 2. Advanced right mid and hindfoot degenerative changes, with calcaneal spurring though no acute fracture. 3. Peripheral arterial disease. 4. Bilateral pes planus deformities.        Physical Examination:        General appearance:  alert, cooperative and no distress  Mental Status:  oriented to person, place and time and normal affect  Lungs:  clear to auscultation bilaterally, normal effort  Heart:  regular rate and rhythm, no murmur  Abdomen:  soft, nontender, nondistended, normal bowel sounds, no masses, hepatomegaly, splenomegaly; obese  Extremities:  no  tenderness in the calves; ble lymphedema, left leg slightly more erythematous than right      Assessment:        Hospital Problems             Last Modified POA    * (Principal) Cellulitis of left lower extremity 11/16/2022 Yes    Bacteremia due to group B Streptococcus 11/19/2022 Yes    Lymphedema of both lower extremities (Chronic) 11/19/2022 Yes    Paroxysmal atrial fibrillation ([address] 11/19/2022 Yes    Venous insufficiency of both lower extremities 11/16/2022 Yes    Plantar fasciitis of right foot 11/16/2022 Yes    GERD (gastroesophageal reflux disease) (Chronic) 11/19/2022 Yes    Type 2 diabetes mellitus with hyperglycemia, without long-term current use of insulin ([facility] [facility] 11/16/2022 Yes    Essential hypertension 11/16/2022 Yes    Morbid obesity with BMI of 50.0-59.9, adult ([facility] [address] 11/19/2022 Yes       Plan:        Hold discharge-she needs iv antibiotics for the strep bacteremia  get bmp now to check cr  Switch antibiotics to high dose rocephin, will need this for total iv treatment (vanco plus rocephin) of 14 days  Get picc  May need to stop indocin depending on renal function  Stop prednisone and monitor glucose levels  Probably needs further increase of lantus but will see what happens after stopping steroids    Harman Guy, DO  11/19/2022  10:20 AM

## 2022-11-19 NOTE — PLAN OF CARE
Problem: Discharge Planning  Goal: Discharge to home or other facility with appropriate resources  Outcome: Progressing     Problem: Pain  Goal: Verbalizes/displays adequate comfort level or baseline comfort level  Outcome: Progressing     Problem: Neurosensory - Adult  Goal: Achieves stable or improved neurological status  Outcome: Progressing       Problem: Genitourinary - Adult  Goal: Absence of urinary retention  Outcome: Progressing

## 2022-11-19 NOTE — PLAN OF CARE
Problem: Discharge Planning  Goal: Discharge to home or other facility with appropriate resources  11/19/2022 1443 by Leonila Farrell RN  Outcome: Progressing  Flowsheets (Taken 11/19/2022 5863)  Discharge to home or other facility with appropriate resources:   Identify barriers to discharge with patient and caregiver   Arrange for needed discharge resources and transportation as appropriate   Identify discharge learning needs (meds, wound care, etc)   Refer to discharge planning if patient needs post-hospital services based on physician order or complex needs related to functional status, cognitive ability or social support system     Problem: Pain  Goal: Verbalizes/displays adequate comfort level or baseline comfort level  11/19/2022 1443 by Leonila Farrell RN  Outcome: Progressing  Flowsheets (Taken 11/19/2022 0716)  Verbalizes/displays adequate comfort level or baseline comfort level:   Encourage patient to monitor pain and request assistance   Assess pain using appropriate pain scale   Administer analgesics based on type and severity of pain and evaluate response   Implement non-pharmacological measures as appropriate and evaluate response   Notify Licensed Independent Practitioner if interventions unsuccessful or patient reports new pain     Problem: Neurosensory - Adult  Goal: Achieves stable or improved neurological status  11/19/2022 1443 by Leonila Farrell RN  Outcome: Progressing  Flowsheets (Taken 11/19/2022 0814)  Achieves stable or improved neurological status: Assess for and report changes in neurological status     Problem: Skin/Tissue Integrity - Adult  Goal: Skin integrity remains intact  11/19/2022 1443 by Leonila Farrell RN  Outcome: Progressing  Flowsheets (Taken 11/19/2022 0814)  Skin Integrity Remains Intact: Monitor for areas of redness and/or skin breakdown     Problem: Skin/Tissue Integrity - Adult  Goal: Incisions, wounds, or drain sites healing without S/S of infection  Outcome: Progressing  Flowsheets (Taken 11/19/2022 0814)  Incisions, Wounds, or Drain Sites Healing Without Sign and Symptoms of Infection:   TWICE DAILY: Assess and document skin integrity   Initiate isolation precautions as appropriate   Initiate pressure ulcer prevention bundle as indicated   Implement wound care per orders     Problem: Musculoskeletal - Adult  Goal: Return mobility to safest level of function  Outcome: Progressing  Flowsheets (Taken 11/19/2022 0814)  Return Mobility to Safest Level of Function:   Assess patient stability and activity tolerance for standing, transferring and ambulating with or without assistive devices   Assist with transfers and ambulation using safe patient handling equipment as needed   Ensure adequate protection for wounds/incisions during mobilization   Obtain physical therapy/occupational therapy consults as needed   Apply continuous passive motion per provider or physical therapy orders to increase flexion toward goal   Instruct patient/family in ordered activity level     Problem: Musculoskeletal - Adult  Goal: Return ADL status to a safe level of function  Outcome: Progressing  Flowsheets (Taken 11/19/2022 0814)  Return ADL Status to a Safe Level of Function:   Administer medication as ordered   Assess activities of daily living deficits and provide assistive devices as needed   Obtain physical therapy/occupational therapy consults as needed   Assist and instruct patient to increase activity and self care as tolerated     Problem: Gastrointestinal - Adult  Goal: Minimal or absence of nausea and vomiting  Outcome: Progressing  Flowsheets (Taken 11/19/2022 0814)  Minimal or absence of nausea and vomiting: Advance diet as tolerated, if ordered     Problem: Gastrointestinal - Adult  Goal: Maintains or returns to baseline bowel function  Outcome: Progressing  Flowsheets (Taken 11/19/2022 0814)  Maintains or returns to baseline bowel function:   Assess bowel function   Encourage oral fluids to ensure adequate hydration   Encourage mobilization and activity     Problem: Gastrointestinal - Adult  Goal: Maintains adequate nutritional intake  Outcome: Progressing  Flowsheets (Taken 11/19/2022 0814)  Maintains adequate nutritional intake: Identify factors contributing to decreased intake, treat as appropriate     Problem: Genitourinary - Adult  Goal: Absence of urinary retention  11/19/2022 1443 by Aurora Gavin RN  Outcome: Progressing     Problem: Infection - Adult  Goal: Absence of infection at discharge  Outcome: Progressing  Flowsheets (Taken 11/19/2022 0814)  Absence of infection at discharge:   Assess and monitor for signs and symptoms of infection   Monitor lab/diagnostic results   Administer medications as ordered   Instruct and encourage patient and family to use good hand hygiene technique   Identify and instruct in appropriate isolation precautions for identified infection/condition     Problem: Infection - Adult  Goal: Absence of infection during hospitalization  Outcome: Progressing  Flowsheets (Taken 11/19/2022 0814)  Absence of infection during hospitalization:   Administer medications as ordered   Instruct and encourage patient and family to use good hand hygiene technique   Identify and instruct in appropriate isolation precautions for identified infection/condition   Monitor lab/diagnostic results   Assess and monitor for signs and symptoms of infection     Problem: Metabolic/Fluid and Electrolytes - Adult  Goal: Electrolytes maintained within normal limits  Outcome: Progressing  Flowsheets (Taken 11/19/2022 0814)  Electrolytes maintained within normal limits:   Monitor labs and assess patient for signs and symptoms of electrolyte imbalances   Administer electrolyte replacement as ordered   Monitor response to electrolyte replacements, including repeat lab results as appropriate     Problem: Metabolic/Fluid and Electrolytes - Adult  Goal: Hemodynamic stability and optimal renal function maintained  Outcome: Progressing  Flowsheets (Taken 11/19/2022 0814)  Hemodynamic stability and optimal renal function maintained:   Monitor labs and assess for signs and symptoms of volume excess or deficit   Encourage oral intake as appropriate   Monitor intake, output and patient weight     Problem: Metabolic/Fluid and Electrolytes - Adult  Goal: Glucose maintained within prescribed range  Outcome: Progressing  Flowsheets (Taken 11/19/2022 0814)  Glucose maintained within prescribed range:   Assess for signs and symptoms of hyperglycemia and hypoglycemia   Administer ordered medications to maintain glucose within target range   Assess barriers to adequate nutritional intake and initiate nutrition consult as needed   Monitor blood glucose as ordered   Instruct patient on self management of diabetes and initiate consult as needed     Problem: Skin/Tissue Integrity  Goal: Absence of new skin breakdown  Description: 1. Monitor for areas of redness and/or skin breakdown  2. Assess vascular access sites hourly  3. Every 4-6 hours minimum:  Change oxygen saturation probe site  4. Every 4-6 hours:  If on nasal continuous positive airway pressure, respiratory therapy assess nares and determine need for appliance change or resting period.   Outcome: Progressing     Problem: Safety - Adult  Goal: Free from fall injury  Outcome: Progressing  Flowsheets (Taken 11/19/2022 1437)  Free From Fall Injury: Instruct family/caregiver on patient safety     Problem: ABCDS Injury Assessment  Goal: Absence of physical injury  Outcome: Progressing  Flowsheets (Taken 11/19/2022 1437)  Absence of Physical Injury: Implement safety measures based on patient assessment     Problem: Chronic Conditions and Co-morbidities  Goal: Patient's chronic conditions and co-morbidity symptoms are monitored and maintained or improved  Outcome: Progressing  Flowsheets (Taken 11/19/2022 0814)  Care Plan - Patient's Chronic Conditions and Co-Morbidity Symptoms are Monitored and Maintained or Improved:   Monitor and assess patient's chronic conditions and comorbid symptoms for stability, deterioration, or improvement   Collaborate with multidisciplinary team to address chronic and comorbid conditions and prevent exacerbation or deterioration   Update acute care plan with appropriate goals if chronic or comorbid symptoms are exacerbated and prevent overall improvement and discharge

## 2022-11-20 LAB
ANION GAP SERPL CALCULATED.3IONS-SCNC: 11 MMOL/L (ref 9–17)
BUN BLDV-MCNC: 44 MG/DL (ref 8–23)
BUN/CREAT BLD: 29 (ref 9–20)
CALCIUM SERPL-MCNC: 8.4 MG/DL (ref 8.6–10.4)
CHLORIDE BLD-SCNC: 101 MMOL/L (ref 98–107)
CO2: 22 MMOL/L (ref 20–31)
CREAT SERPL-MCNC: 1.5 MG/DL (ref 0.5–0.9)
CULTURE: NORMAL
GFR SERPL CREATININE-BSD FRML MDRD: 37 ML/MIN/1.73M2
GLUCOSE BLD-MCNC: 202 MG/DL (ref 65–105)
GLUCOSE BLD-MCNC: 203 MG/DL (ref 65–105)
GLUCOSE BLD-MCNC: 212 MG/DL (ref 65–105)
GLUCOSE BLD-MCNC: 225 MG/DL (ref 65–105)
GLUCOSE BLD-MCNC: 241 MG/DL (ref 70–99)
Lab: NORMAL
POTASSIUM SERPL-SCNC: 3.8 MMOL/L (ref 3.7–5.3)
SODIUM BLD-SCNC: 134 MMOL/L (ref 135–144)
SPECIMEN DESCRIPTION: NORMAL

## 2022-11-20 PROCEDURE — 80048 BASIC METABOLIC PNL TOTAL CA: CPT

## 2022-11-20 PROCEDURE — 1200000000 HC SEMI PRIVATE

## 2022-11-20 PROCEDURE — 82947 ASSAY GLUCOSE BLOOD QUANT: CPT

## 2022-11-20 PROCEDURE — 2580000003 HC RX 258: Performed by: INTERNAL MEDICINE

## 2022-11-20 PROCEDURE — 6370000000 HC RX 637 (ALT 250 FOR IP): Performed by: NURSE PRACTITIONER

## 2022-11-20 PROCEDURE — 6360000002 HC RX W HCPCS: Performed by: INTERNAL MEDICINE

## 2022-11-20 PROCEDURE — 36415 COLL VENOUS BLD VENIPUNCTURE: CPT

## 2022-11-20 PROCEDURE — 2580000003 HC RX 258: Performed by: NURSE PRACTITIONER

## 2022-11-20 RX ORDER — SODIUM CHLORIDE 9 MG/ML
INJECTION, SOLUTION INTRAVENOUS CONTINUOUS
Status: DISCONTINUED | OUTPATIENT
Start: 2022-11-20 | End: 2022-11-21 | Stop reason: HOSPADM

## 2022-11-20 RX ORDER — TROSPIUM CHLORIDE 20 MG/1
20 TABLET, FILM COATED ORAL
Status: DISCONTINUED | OUTPATIENT
Start: 2022-11-21 | End: 2022-11-21 | Stop reason: HOSPADM

## 2022-11-20 RX ADMIN — COLCHICINE 0.6 MG: 0.6 TABLET ORAL at 20:53

## 2022-11-20 RX ADMIN — ACETAMINOPHEN 650 MG: 325 TABLET, FILM COATED ORAL at 23:29

## 2022-11-20 RX ADMIN — SODIUM CHLORIDE, PRESERVATIVE FREE 10 ML: 5 INJECTION INTRAVENOUS at 14:32

## 2022-11-20 RX ADMIN — GLIPIZIDE 5 MG: 5 TABLET ORAL at 16:52

## 2022-11-20 RX ADMIN — DICLOFENAC SODIUM 4 G: 10 GEL TOPICAL at 08:52

## 2022-11-20 RX ADMIN — DULOXETINE HYDROCHLORIDE 30 MG: 30 CAPSULE, DELAYED RELEASE ORAL at 08:53

## 2022-11-20 RX ADMIN — ACETAMINOPHEN 650 MG: 325 TABLET, FILM COATED ORAL at 05:44

## 2022-11-20 RX ADMIN — GLIPIZIDE 5 MG: 5 TABLET ORAL at 08:53

## 2022-11-20 RX ADMIN — INSULIN LISPRO 4 UNITS: 100 INJECTION, SOLUTION INTRAVENOUS; SUBCUTANEOUS at 16:52

## 2022-11-20 RX ADMIN — SODIUM CHLORIDE: 9 INJECTION, SOLUTION INTRAVENOUS at 16:10

## 2022-11-20 RX ADMIN — DICLOFENAC SODIUM 4 G: 10 GEL TOPICAL at 20:56

## 2022-11-20 RX ADMIN — ONDANSETRON 4 MG: 4 TABLET, ORALLY DISINTEGRATING ORAL at 16:06

## 2022-11-20 RX ADMIN — INSULIN GLARGINE 25 UNITS: 100 INJECTION, SOLUTION SUBCUTANEOUS at 08:47

## 2022-11-20 RX ADMIN — ACETAMINOPHEN 650 MG: 325 TABLET, FILM COATED ORAL at 16:06

## 2022-11-20 RX ADMIN — INSULIN LISPRO 4 UNITS: 100 INJECTION, SOLUTION INTRAVENOUS; SUBCUTANEOUS at 11:58

## 2022-11-20 RX ADMIN — INSULIN LISPRO 4 UNITS: 100 INJECTION, SOLUTION INTRAVENOUS; SUBCUTANEOUS at 20:54

## 2022-11-20 RX ADMIN — RIVAROXABAN 20 MG: 20 TABLET, FILM COATED ORAL at 08:53

## 2022-11-20 RX ADMIN — DICLOFENAC SODIUM 4 G: 10 GEL TOPICAL at 14:32

## 2022-11-20 RX ADMIN — INSULIN GLARGINE 40 UNITS: 100 INJECTION, SOLUTION SUBCUTANEOUS at 20:54

## 2022-11-20 RX ADMIN — CEFTRIAXONE 2000 MG: 2 INJECTION, POWDER, FOR SOLUTION INTRAMUSCULAR; INTRAVENOUS at 05:49

## 2022-11-20 RX ADMIN — INSULIN LISPRO 4 UNITS: 100 INJECTION, SOLUTION INTRAVENOUS; SUBCUTANEOUS at 06:30

## 2022-11-20 RX ADMIN — PANTOPRAZOLE SODIUM 40 MG: 40 TABLET, DELAYED RELEASE ORAL at 05:44

## 2022-11-20 RX ADMIN — METOPROLOL SUCCINATE 50 MG: 50 TABLET, EXTENDED RELEASE ORAL at 08:53

## 2022-11-20 ASSESSMENT — PAIN SCALES - GENERAL
PAINLEVEL_OUTOF10: 5
PAINLEVEL_OUTOF10: 7

## 2022-11-20 ASSESSMENT — PAIN - FUNCTIONAL ASSESSMENT: PAIN_FUNCTIONAL_ASSESSMENT: ACTIVITIES ARE NOT PREVENTED

## 2022-11-20 ASSESSMENT — PAIN DESCRIPTION - ORIENTATION: ORIENTATION: RIGHT;LEFT

## 2022-11-20 ASSESSMENT — PAIN DESCRIPTION - DESCRIPTORS
DESCRIPTORS: ACHING;SORE;DISCOMFORT
DESCRIPTORS: ACHING

## 2022-11-20 ASSESSMENT — PAIN DESCRIPTION - LOCATION
LOCATION: LEG
LOCATION: HEAD

## 2022-11-20 NOTE — PLAN OF CARE
Problem: Discharge Planning  Goal: Discharge to home or other facility with appropriate resources  11/20/2022 1244 by Ashleigh Guerrero RN  Outcome: Progressing  11/20/2022 0129 by Stefan Chapa RN  Outcome: Progressing  Flowsheets (Taken 11/19/2022 1900)  Discharge to home or other facility with appropriate resources: Identify barriers to discharge with patient and caregiver     Problem: Pain  Goal: Verbalizes/displays adequate comfort level or baseline comfort level  11/20/2022 1244 by Ashleigh Guerrero RN  Outcome: Progressing  11/20/2022 0129 by Stefan Chapa RN  Outcome: Progressing     Problem: Neurosensory - Adult  Goal: Achieves stable or improved neurological status  11/20/2022 1244 by Ashleigh Guerrero RN  Outcome: Progressing  11/20/2022 0129 by Stefan Chapa RN  Outcome: Progressing  Flowsheets (Taken 11/19/2022 1900)  Achieves stable or improved neurological status: Assess for and report changes in neurological status     Problem: Skin/Tissue Integrity - Adult  Goal: Skin integrity remains intact  11/20/2022 1244 by Ashleigh Guerrero RN  Outcome: Progressing  Flowsheets (Taken 11/20/2022 0130 by Stefan Chapa RN)  Skin Integrity Remains Intact: Monitor for areas of redness and/or skin breakdown  11/20/2022 0129 by Stefan Chapa RN  Outcome: Progressing  Flowsheets (Taken 11/19/2022 1900)  Skin Integrity Remains Intact: Monitor for areas of redness and/or skin breakdown  Goal: Incisions, wounds, or drain sites healing without S/S of infection  11/20/2022 1244 by Ashleigh Guerrero RN  Outcome: Progressing  Flowsheets (Taken 11/20/2022 0130 by Stefan Chapa RN)  Incisions, Wounds, or Drain Sites Healing Without Sign and Symptoms of Infection: TWICE DAILY: Assess and document skin integrity  11/20/2022 0129 by Stefan Chapa RN  Outcome: Progressing  Flowsheets (Taken 11/19/2022 1900)  Incisions, Wounds, or Drain Sites Healing Without Sign and Symptoms of Infection: TWICE DAILY: Assess and document skin integrity  Goal: Oral mucous membranes remain intact  Outcome: Progressing     Problem: Musculoskeletal - Adult  Goal: Return mobility to safest level of function  11/20/2022 1244 by Harinder Hampton RN  Outcome: Progressing  11/20/2022 0129 by Juan Pablo Alexander RN  Outcome: Progressing  Flowsheets (Taken 11/19/2022 1900)  Return Mobility to Safest Level of Function: Assess patient stability and activity tolerance for standing, transferring and ambulating with or without assistive devices  Goal: Return ADL status to a safe level of function  11/20/2022 1244 by Harinder Hampton RN  Outcome: Progressing  11/20/2022 0129 by Juan Pablo Alexander RN  Outcome: Progressing  Flowsheets (Taken 11/19/2022 1900)  Return ADL Status to a Safe Level of Function: Administer medication as ordered     Problem: Gastrointestinal - Adult  Goal: Minimal or absence of nausea and vomiting  11/20/2022 1244 by Harinder Hampton RN  Outcome: Progressing  11/20/2022 0129 by Juan Pablo Alexander RN  Outcome: Progressing  Flowsheets (Taken 11/19/2022 1900)  Minimal or absence of nausea and vomiting: Advance diet as tolerated, if ordered  Goal: Maintains or returns to baseline bowel function  11/20/2022 1244 by Harinder Hampton RN  Outcome: Progressing  11/20/2022 0129 by Juan Pablo Alexander RN  Outcome: Progressing  Flowsheets (Taken 11/19/2022 1900)  Maintains or returns to baseline bowel function: Assess bowel function  Goal: Maintains adequate nutritional intake  11/20/2022 1244 by Harinder Hampton RN  Outcome: Progressing  11/20/2022 0129 by Juan Pablo Alexander RN  Outcome: Progressing  Flowsheets (Taken 11/19/2022 1900)  Maintains adequate nutritional intake: Identify factors contributing to decreased intake, treat as appropriate     Problem: Genitourinary - Adult  Goal: Absence of urinary retention  11/20/2022 1244 by Harinder Hampton RN  Outcome: Progressing  11/20/2022 0129 by Juan Pablo Alexander RN  Outcome: Progressing  Flowsheets (Taken 11/19/2022 1900)  Absence of urinary retention: Assess patients ability to void and empty bladder     Problem: Infection - Adult  Goal: Absence of infection at discharge  11/20/2022 1244 by Timo Villela RN  Outcome: Progressing  11/20/2022 0129 by Cheo Oh RN  Outcome: Progressing  Flowsheets (Taken 11/19/2022 1900)  Absence of infection at discharge: Assess and monitor for signs and symptoms of infection  Goal: Absence of infection during hospitalization  11/20/2022 1244 by Timo Villela RN  Outcome: Progressing  11/20/2022 0129 by Cheo Oh RN  Outcome: Progressing  Flowsheets (Taken 11/19/2022 1900)  Absence of infection during hospitalization: Administer medications as ordered     Problem: Metabolic/Fluid and Electrolytes - Adult  Goal: Electrolytes maintained within normal limits  11/20/2022 1244 by Timo Villela RN  Outcome: Progressing  11/20/2022 0129 by Cheo Oh RN  Outcome: Progressing  Flowsheets (Taken 11/19/2022 1900)  Electrolytes maintained within normal limits: Monitor labs and assess patient for signs and symptoms of electrolyte imbalances  Goal: Hemodynamic stability and optimal renal function maintained  11/20/2022 1244 by Timo Villela RN  Outcome: Progressing  11/20/2022 0129 by Cheo Oh RN  Outcome: Progressing  Flowsheets (Taken 11/19/2022 1900)  Hemodynamic stability and optimal renal function maintained: Monitor labs and assess for signs and symptoms of volume excess or deficit  Goal: Glucose maintained within prescribed range  11/20/2022 1244 by Timo Villela RN  Outcome: Progressing  11/20/2022 0129 by Cheo Oh RN  Outcome: Progressing  Flowsheets (Taken 11/19/2022 1900)  Glucose maintained within prescribed range: Assess for signs and symptoms of hyperglycemia and hypoglycemia     Problem: Skin/Tissue Integrity  Goal: Absence of new skin breakdown  Description: 1. Monitor for areas of redness and/or skin breakdown  2.   Assess vascular access sites hourly  3. Every 4-6 hours minimum:  Change oxygen saturation probe site  4. Every 4-6 hours:  If on nasal continuous positive airway pressure, respiratory therapy assess nares and determine need for appliance change or resting period.   11/20/2022 1244 by Rina Prader, RN  Outcome: Progressing  11/20/2022 0129 by Karolina Purvis RN  Outcome: Progressing     Problem: Safety - Adult  Goal: Free from fall injury  11/20/2022 1244 by Rina Prader, RN  Outcome: Progressing  11/20/2022 0129 by Karolina Purvis RN  Outcome: Progressing     Problem: ABCDS Injury Assessment  Goal: Absence of physical injury  11/20/2022 1244 by Rina Prader, RN  Outcome: Progressing  11/20/2022 0129 by Karolina Purvis RN  Outcome: Progressing     Problem: Chronic Conditions and Co-morbidities  Goal: Patient's chronic conditions and co-morbidity symptoms are monitored and maintained or improved  11/20/2022 1244 by Rina Prader, RN  Outcome: Progressing  11/20/2022 0129 by Karolina Purvis RN  Outcome: Progressing  Flowsheets (Taken 11/19/2022 1900)  Care Plan - Patient's Chronic Conditions and Co-Morbidity Symptoms are Monitored and Maintained or Improved: Monitor and assess patient's chronic conditions and comorbid symptoms for stability, deterioration, or improvement

## 2022-11-20 NOTE — PROGRESS NOTES
"Subjective   Prashanth Prajapati is a 65 y.o. female.   Chief Complaint   Patient presents with   • Post-op Follow-up     port placement        History of Present Illness   Ms. Prajapati returns to the office today for follow up after undergoing port a cath insertion.  She reports no problems with the port.  She states that she has received chemotherapy without incident.      The following portions of the patient's history were reviewed and updated as appropriate: allergies, current medications, past family history, past medical history, past social history, past surgical history and problem list.    Review of Systems    Objective    /82   Pulse 92   Temp 98.6 °F (37 °C) (Temporal)   Ht 157.5 cm (62.01\")   Wt 59.4 kg (130 lb 15.3 oz)   SpO2 96%   BMI 23.95 kg/m²     Physical Exam   Constitutional: She is oriented to person, place, and time. She appears well-developed and well-nourished.   HENT:   Head: Normocephalic and atraumatic.   Neck: Neck supple.   Pulmonary/Chest:   Chest wall incision well healed.     Neurological: She is alert and oriented to person, place, and time.   Skin: Skin is warm and dry.   Psychiatric: She has a normal mood and affect. Her behavior is normal.       Assessment/Plan   Prashanth was seen today for post-op follow-up.    Diagnoses and all orders for this visit:    Port-A-Cath in place      I had the pleasure of seeing Prashanth Prajapati in follow-up today for the first  postoperative visit following port a cath insertion.  Overall, Prashanth Prajapati  is enjoying uncomplicated recovery.  I do not anticipate any ongoing surgical issues and will return the patient back to the care of their oncologist.  I have released Prashanth Prajapati to unrestricted physical activity beginning immediately.  The patient may follow up in this office as needed.                " [facility]  Office: [address], DO, Kylee Juárez, DO, Harley Goode, DO, Erin Lopes Blood, DO, Ayan Ruiz MD, Jermain Leal MD, Joe Ramos MD, Brant Moy MD,  Cee Corado MD, Jose Garcia MD, Desire Layer, DO, Alejandro Ardon MD,  Carlee Nagel MD, Atilio Crawford MD, Neeru Bhandari, DO, Marti Cotton MD, Sis Vega MD, Berhane Peña, DO, Bk Gomez MD, Gilmar Penny MD, Josh Tan MD, Kristy Daniels MD, Suhas Mitchell, DO, Aleksey Camacho MD, Damir Baker MD, Sury Aguilar CNP,  Gary Espinoza, CNP, Craig Sever, CNP, Sebastian Shine, CNP,  Kobe Manzano, DNP, Yvrose Mayberry, CNP, Lizett Licona, CNP, Joyce Hill, CNP, Lexus Goss, CNP, Omid Orozco, CNP, Joanne Miller, PA-C, Alison Rangel, CNS, Vito Grande, DNP, Haleigh Segovia, CNP, Jairon Hernandez, CNP, Kaleb Pierson, [facility]    Progress Note    11/20/2022    1:54 PM    Name:   Mireya Alexis  MRN:     [id number]     Acct:      [de-identified]   Room:   2021/2021-01   Day:  5  Admit Date:  11/15/2022  1:00 AM    PCP:   ARTURO Hunter CNP  Code Status:  Full Code    Subjective:     C/C:   Chief Complaint   Patient presents with    Foot Pain     Dx with cellulitis on L foot on Friday. Started on doxy. Has not been taking it regularly d/t nausea. Pt now having R foot pain. Interval History Status: improved. Feels fine today  Denies cp/sob/n/v  Less redness and swelling of left leg    She took her own home [facility] Mando this am because it wasn't ordered here    Brief History:     Per my partner:  Keely Cesar is a 69 y.o. Non- / non  female who presents with Foot Pain (Dx with cellulitis on L foot on Friday. Started on doxy. Has not been taking it regularly d/t nausea. Pt now having R foot pain.)   and is admitted to the hospital for the management of Cellulitis.      This very pleasant 68-year-old female was recently started on doxycycline by her podiatrist due to a left lower extremity cellulitis. The patient was unable to tolerate her doxycycline due to nausea. She states that she took it intermittently and ultimately presented to the hospital due to worsening cellulitic changes. At this point in time the patient has diffuse cellulitic changes involving the left lower extremity along with flaking of her skin. She does have chronic lymphedematous changes secondary to her morbid obesity and BMI 52.\"    She also has uncontrolled dm2 for quite some time now-generally a1c>10 for over a year    Review of Systems:     Constitutional:  negative for chills, fevers, sweats  Respiratory:  negative for cough, dyspnea on exertion, shortness of breath, wheezing  Cardiovascular:  negative for chest pain, chest pressure/discomfort, palpitations  Gastrointestinal:  negative for abdominal pain, constipation, diarrhea, nausea, vomiting  Neurological:  negative for dizziness, headache    Medications: Allergies:     Allergies   Allergen Reactions    Erythromycin Hives    Lisinopril Itching       Current Meds:   Scheduled Meds:    [START ON 11/21/2022] trospium  20 mg Oral BID AC    cefTRIAXone (ROCEPHIN) IV  2,000 mg IntraVENous Q24H    insulin lispro  0-16 Units SubCUTAneous 4x Daily AC & HS    colchicine  0.6 mg Oral Daily    insulin glargine  40 Units SubCUTAneous Nightly    insulin glargine  25 Units SubCUTAneous Daily    rivaroxaban  20 mg Oral Daily with breakfast    pantoprazole  40 mg Oral QAM AC    metoprolol succinate  50 mg Oral Daily    DULoxetine  30 mg Oral Daily    sodium chloride flush  5-40 mL IntraVENous 2 times per day    glipiZIDE  5 mg Oral BID WC    diclofenac sodium  4 g Topical 4x Daily    [Held by provider] losartan  50 mg Oral Daily     Continuous Infusions:    sodium chloride      dextrose       PRN Meds: traMADol, sodium chloride flush, sodium chloride, potassium chloride **OR** potassium alternative oral replacement **OR** potassium chloride, magnesium sulfate, ondansetron **OR** ondansetron, acetaminophen **OR** acetaminophen, glucose, dextrose bolus **OR** dextrose bolus, glucagon (rDNA), dextrose    Data:     Past Medical History:   has a past medical history of DM (diabetes mellitus) ([facility] [address] DVT (deep venous thrombosis) ([facility] [address] GERD (gastroesophageal reflux disease), Hypertension, MVP (mitral valve prolapse), and KATHI (obstructive sleep apnea). Social History:   reports that she quit smoking about 39 years ago. Her smoking use included cigarettes. She has a 5.00 pack-year smoking history. She has never used smokeless tobacco. She reports that she does not drink alcohol and does not use drugs. Family History:   Family History   Problem Relation Age of Onset    Heart Disease Mother        Vitals:  BP (!) 168/61   Pulse 79   Temp 98.1 °F (36.7 °C) (Oral)   Resp 16   Ht 6' (1.829 m)   Wt (!) 385 lb (174.6 kg)   SpO2 94%   BMI 52.22 kg/m²   Temp (24hrs), Av.8 °F (36.6 °C), Min:97.5 °F (36.4 °C), Max:98.1 °F (36.7 °C)    Recent Labs     22  1603 226 22  0623 22  1114   POCGLU 354* 363* 202* 203*       I/O (24Hr):     Intake/Output Summary (Last 24 hours) at 2022 1354  Last data filed at 2022 0707  Gross per 24 hour   Intake 776.07 ml   Output --   Net 776.07 ml       Labs:  Hematology:  Recent Labs     22  1105   WBC 15.6*   RBC 4.03   HGB 11.7*   HCT 37.6   MCV 93.3   MCH 29.0   MCHC 31.1   RDW 13.0      MPV 10.0     Chemistry:  Recent Labs     22  0633 22  1105 22  0548   NA  --  131* 134*   K  --  4.2 3.8   CL  --  98 101   CO2  --  * 22   GLUCOSE  --  330* 241*   BUN 31* 41* 44*   CREATININE 1.41* 1.62* 1.50*   ANIONGAP  --  14 11   LABGLOM 40* 34* 37*   CALCIUM  --  8.5* 8.4*     Recent Labs     22  0626 22  1127 22  1603 22  2036 22  0623 22  1114 POCGLU 364* 363* 354* 363* 202* 203*     ABG:No results found for: POCPH, PHART, PH, POCPCO2, EEZ8JWY, PCO2, POCPO2, PO2ART, PO2, POCHCO3, PXN9QCE, HCO3, NBEA, PBEA, BEART, BE, THGBART, THB, TLK7KFJ, PQUF9ERL, U3DRDEMU, O2SAT, FIO2  Lab Results   Component Value Date/Time    SPECIAL 20ML RT [facility] 11/15/2022 03:06 AM     Lab Results   Component Value Date/Time    CULTURE NO GROWTH 5 DAYS 11/15/2022 03:06 AM       Radiology:  XR FOOT LEFT (MIN 3 VIEWS)    Result Date: 11/15/2022  1. Advanced left mid and hindfoot degenerative changes, with calcaneal spurring though no acute fracture seen throughout the foot. 2. Advanced right mid and hindfoot degenerative changes, with calcaneal spurring though no acute fracture. 3. Peripheral arterial disease. 4. Bilateral pes planus deformities. XR FOOT RIGHT (MIN 3 VIEWS)    Result Date: 11/15/2022  1. Advanced left mid and hindfoot degenerative changes, with calcaneal spurring though no acute fracture seen throughout the foot. 2. Advanced right mid and hindfoot degenerative changes, with calcaneal spurring though no acute fracture. 3. Peripheral arterial disease. 4. Bilateral pes planus deformities.        Physical Examination:        General appearance:  alert, cooperative and no distress  Mental Status:  oriented to person, place and time and normal affect  Lungs:  clear to auscultation bilaterally, normal effort  Heart:  regular rate and rhythm, no murmur  Abdomen:  soft, nontender, nondistended, normal bowel sounds, no masses, hepatomegaly, splenomegaly; obese  Extremities:  no  tenderness in the calves; ble lymphedema, left leg slightly more erythematous than right      Assessment:        Hospital Problems             Last Modified POA    * (Principal) Cellulitis of left lower extremity 11/16/2022 Yes    Bacteremia due to group B Streptococcus 11/19/2022 Yes    Lymphedema of both lower extremities (Chronic) 11/19/2022 Yes    Paroxysmal atrial fibrillation ([address] 11/19/2022 Yes    Venous insufficiency of both lower extremities 11/16/2022 Yes    Plantar fasciitis of right foot 11/16/2022 Yes    GERD (gastroesophageal reflux disease) (Chronic) 11/19/2022 Yes    Type 2 diabetes mellitus with hyperglycemia, without long-term current use of insulin ([facility] 11/16/2022 Yes    Essential hypertension 11/16/2022 Yes    Morbid obesity with BMI of 50.0-59.9, adult ([facility] 11/19/2022 Yes       Plan:        Held discharge-she needs iv antibiotics for the strep bacteremia; cannot be set up until tomorrow at the earliest  Follow renal function-holding hctz and losartan; vanco and indocin dc'd  Switched antibiotics to high dose rocephin, will need this for total iv treatment (vanco plus rocephin) of 14 days  Gentle ivf overnight  may need further increase of lantus   Resume toviaz  D/w  and brother in law  Sandra Gunn Blood, DO  11/20/2022  1:54 PM

## 2022-11-20 NOTE — PROGRESS NOTES
Physical Therapy  DATE: 2022    NAME: Catalina Coates  MRN: [id number]   : 1955    Patient not seen this date for Physical Therapy due to:      [] Cancel by RN or physician due to:    [] Hemodialysis    [] Critical Lab Value Level     [] Blood transfusion in progress    [] Acute or unstable cardiovascular status   _MAP < 55 or more than >115  _HR < 40 or > 130    [] Acute or unstable pulmonary status   -FiO2 > 60%   _RR < 5 or >40    _O2 sats < 85%    [] Strict Bedrest    [] Off Unit for surgery or procedure    [] Off Unit for testing       [] Pending imaging to R/O fracture    [x] Refusal by Patient Pt up in chair upon arrival.   States \"I guess no one told you about me, I don't believe in physical therapy and I'm not doing it! \"   Upon  Writer exiting room pt wants to talk about weather ourtside      [] Other      [] PT being discontinued at this time. Patient independent. No further needs. [] PT being discontinued at this time as the patient has been transferred to hospice care. No further needs.       DIGNA SUAREZ, PTA

## 2022-11-20 NOTE — CARE COORDINATION
Discharge planning    Call from Troy Alvarez at [facility]. Patient primary insurance is actually MMO as her spouse still works, medicare secondary. . they are unable to take patient as client. Sent referral to [facility]. Called office and spoke with Liana Cruz. Updated on need for iv atb on Tuesday and that most of home care companies listed dont cross state lines. She will see if her director can make an exception. Liana Cruz will call back     [facility] to web site and printed out directory: [facility] not listed on as infusion company bioscript is but jose clarified that [facility] bought [employer] and [employer]. Sara Coon Awaiting script to send in    Call back from Liana Cruz and she got a one time exception from [facility] to see patient. They can start on Tuesday. Ethan with [facility] and she will need to have DR Guy place number where he can be reached since signing rory and if any labs are needed. If labs are needed where to fax them too. Faxed rx walker to [facility] . Faxed over rx for rocephin and picc line info to [facility].      [address] with [address] Guy and no labs needed and number for where he can be contacted placed in [address] [place] [address] ([phone])

## 2022-11-20 NOTE — PLAN OF CARE
Bonita Monroe was evaluated today and a DME order was entered for a wheeled walker because she requires this to successfully complete daily living tasks of eating, bathing, toileting, personal cares, ambulating, grooming, hygiene, dressing upper body, dressing lower body, meal preparation, and taking own medications. A wheeled walker is necessary due to the patient's unsteady gait, upper body weakness, and inability to  an ambulation device; and she can ambulate only by pushing a walker instead of a lesser assistive device such as a cane, crutch, or standard walker. The need for this equipment was discussed with the patient and she understands and is in agreement.     Harman Guy, DO

## 2022-11-21 VITALS
SYSTOLIC BLOOD PRESSURE: 149 MMHG | BODY MASS INDEX: 39.68 KG/M2 | WEIGHT: 293 LBS | TEMPERATURE: 97.9 F | OXYGEN SATURATION: 93 % | HEART RATE: 95 BPM | RESPIRATION RATE: 18 BRPM | HEIGHT: 72 IN | DIASTOLIC BLOOD PRESSURE: 59 MMHG

## 2022-11-21 LAB
ANION GAP SERPL CALCULATED.3IONS-SCNC: 8 MMOL/L (ref 9–17)
BUN BLDV-MCNC: 32 MG/DL (ref 8–23)
BUN/CREAT BLD: 26 (ref 9–20)
CALCIUM SERPL-MCNC: 8.7 MG/DL (ref 8.6–10.4)
CHLORIDE BLD-SCNC: 102 MMOL/L (ref 98–107)
CO2: 24 MMOL/L (ref 20–31)
CREAT SERPL-MCNC: 1.23 MG/DL (ref 0.5–0.9)
GFR SERPL CREATININE-BSD FRML MDRD: 48 ML/MIN/1.73M2
GLUCOSE BLD-MCNC: 137 MG/DL (ref 65–105)
GLUCOSE BLD-MCNC: 150 MG/DL (ref 70–99)
GLUCOSE BLD-MCNC: 215 MG/DL (ref 65–105)
POTASSIUM SERPL-SCNC: 4.3 MMOL/L (ref 3.7–5.3)
SODIUM BLD-SCNC: 134 MMOL/L (ref 135–144)

## 2022-11-21 PROCEDURE — 80048 BASIC METABOLIC PNL TOTAL CA: CPT

## 2022-11-21 PROCEDURE — 99239 HOSP IP/OBS DSCHRG MGMT >30: CPT | Performed by: NURSE PRACTITIONER

## 2022-11-21 PROCEDURE — 6370000000 HC RX 637 (ALT 250 FOR IP): Performed by: NURSE PRACTITIONER

## 2022-11-21 PROCEDURE — 6360000002 HC RX W HCPCS: Performed by: INTERNAL MEDICINE

## 2022-11-21 PROCEDURE — APPSS45 APP SPLIT SHARED TIME 31-45 MINUTES: Performed by: NURSE PRACTITIONER

## 2022-11-21 PROCEDURE — 82947 ASSAY GLUCOSE BLOOD QUANT: CPT

## 2022-11-21 PROCEDURE — 2580000003 HC RX 258: Performed by: INTERNAL MEDICINE

## 2022-11-21 PROCEDURE — 36415 COLL VENOUS BLD VENIPUNCTURE: CPT

## 2022-11-21 PROCEDURE — 6370000000 HC RX 637 (ALT 250 FOR IP): Performed by: INTERNAL MEDICINE

## 2022-11-21 RX ORDER — FUROSEMIDE 20 MG/1
20 TABLET ORAL DAILY
Qty: 30 TABLET | Refills: 2 | Status: SHIPPED | OUTPATIENT
Start: 2022-11-21

## 2022-11-21 RX ADMIN — TROSPIUM CHLORIDE 20 MG: 20 TABLET, FILM COATED ORAL at 05:50

## 2022-11-21 RX ADMIN — INSULIN GLARGINE 25 UNITS: 100 INJECTION, SOLUTION SUBCUTANEOUS at 09:01

## 2022-11-21 RX ADMIN — RIVAROXABAN 20 MG: 20 TABLET, FILM COATED ORAL at 08:59

## 2022-11-21 RX ADMIN — DULOXETINE HYDROCHLORIDE 30 MG: 30 CAPSULE, DELAYED RELEASE ORAL at 08:59

## 2022-11-21 RX ADMIN — CEFTRIAXONE 2000 MG: 2 INJECTION, POWDER, FOR SOLUTION INTRAMUSCULAR; INTRAVENOUS at 05:53

## 2022-11-21 RX ADMIN — SODIUM CHLORIDE: 9 INJECTION, SOLUTION INTRAVENOUS at 07:17

## 2022-11-21 RX ADMIN — PANTOPRAZOLE SODIUM 40 MG: 40 TABLET, DELAYED RELEASE ORAL at 05:50

## 2022-11-21 RX ADMIN — GLIPIZIDE 5 MG: 5 TABLET ORAL at 08:59

## 2022-11-21 RX ADMIN — INSULIN LISPRO 4 UNITS: 100 INJECTION, SOLUTION INTRAVENOUS; SUBCUTANEOUS at 12:59

## 2022-11-21 RX ADMIN — METOPROLOL SUCCINATE 50 MG: 50 TABLET, EXTENDED RELEASE ORAL at 08:59

## 2022-11-21 RX ADMIN — DICLOFENAC SODIUM 4 G: 10 GEL TOPICAL at 08:59

## 2022-11-21 RX ADMIN — TRAMADOL HYDROCHLORIDE 50 MG: 50 TABLET ORAL at 11:26

## 2022-11-21 RX ADMIN — TRAMADOL HYDROCHLORIDE 50 MG: 50 TABLET ORAL at 04:08

## 2022-11-21 RX ADMIN — ACETAMINOPHEN 650 MG: 325 TABLET, FILM COATED ORAL at 06:12

## 2022-11-21 ASSESSMENT — PAIN DESCRIPTION - LOCATION
LOCATION: BACK
LOCATION: BACK

## 2022-11-21 ASSESSMENT — PAIN DESCRIPTION - DESCRIPTORS
DESCRIPTORS: ACHING
DESCRIPTORS: ACHING

## 2022-11-21 ASSESSMENT — PAIN SCALES - GENERAL
PAINLEVEL_OUTOF10: 10
PAINLEVEL_OUTOF10: 3
PAINLEVEL_OUTOF10: 10

## 2022-11-21 ASSESSMENT — PAIN - FUNCTIONAL ASSESSMENT: PAIN_FUNCTIONAL_ASSESSMENT: ACTIVITIES ARE NOT PREVENTED

## 2022-11-21 NOTE — DISCHARGE SUMMARY
[facility]  Office: [address], DO, Rand Flannery, DO, Kyara Yusuf, DO, Jaswinderlaurie Henriquez Blood, DO, Dalila Peña MD, Alireza Mendes MD, Maury Smith MD, Yaquelin Garcia MD,  Josiah Villarreal MD, Breana Colin MD, Lenard Zamudio DO, Digna Patton MD,  Eliza Larson MD, Fatimah Shore MD, Handy Perez DO, Jann Neff MD, Brigitte Ryan MD, Jaelyn Buckner DO, Pedro Townsend MD, Satish Davila MD, Daisha Calzada MD, Mirela Sandra MD, Lance Hahn DO, Michelle Dalton MD, Bel Santa MD, Amadou Cain, CNP,  Yana Pugh, CNP, Bonilla Baltazar, CNP, De Dorman, CNP,  Hollis Allen, LIBORIO, Kitty Cuenca, CNP, Terrance Saucedo, CNP, Óscar Villafuerte, CNP, Prasad Perla, CNP, Estuardo Bedoya, CNP, Lavonne Marin PA-C, Jaylyn Cameron, CNS, Cheri Lopes, [facility], Daniel Meraz, CNP, Sumaya Pagan, CNP, Leroy Griggs [facility]    Discharge Summary     Patient ID: Marques Calabrese  :  1955   MRN: [id number]     ACCOUNT:  [de-identified]   Patient's PCP: ARTURO Gupta CNP  Admit Date: 11/15/2022   Discharge Date: 2022     Length of Stay: 6  Code Status:  Full Code  Admitting Physician: No admitting provider for patient encounter.   Discharge Physician: ARTURO Condon NP     Active Discharge Diagnoses:     Hospital Problem Lists:  Principal Problem:    Cellulitis of left lower extremity  Active Problems:    Paroxysmal atrial fibrillation (HCC)    Venous insufficiency of both lower extremities    Plantar fasciitis of right foot    Bacteremia due to group B Streptococcus    Type 2 diabetes mellitus with hyperglycemia, without long-term current use of insulin (HCC)    GERD (gastroesophageal reflux disease)    Essential hypertension    Lymphedema of both lower extremities    Morbid obesity with BMI of 50.0-59.9, adult ([facility]  Resolved Problems:    * No resolved hospital problems. *      Admission Condition:  fair     Discharged Condition: fair    Hospital Stay:     Hospital Course:  Eleazar Manzano is a 69 y.o. female who was admitted for the management of  Cellulitis of left lower extremity , presented to ER with Foot Pain (Dx with cellulitis on L foot on Friday. Started on doxy. Has not been taking it regularly d/t nausea. Pt now having R foot pain.)    11/15 - This very pleasant 56-year-old female was recently started on doxycycline by her podiatrist due to a left lower extremity cellulitis. The patient was unable to tolerate her doxycycline due to nausea. She states that she took it intermittently and ultimately presented to the hospital due to worsening cellulitic changes. At this point in time the patient has diffuse cellulitic changes involving the left lower extremity along with flaking of her skin. She does have chronic lymphedematous changes secondary to her morbid obesity and BMI 52. She has developed some slight skin changes along the right posterior aspect of her calf. Reviewing her laboratory data shows that her A1c has been over 10 for well over 2 years now. I had a long discussion with her regarding the fact that her glycemic control is playing a direct role in her risk of lower extremity cellulitis. Her blood sugars are obviously not controlled which increases her risk of infection immensely. I am going to titrate her long-acting insulin from 34 units daily to 25 twice daily with plans to aggressively titrate it further. We will place her on a high intensity sliding scale. I strongly encouraged her to follow-up with her outpatient physician for better glycemic control     11/16 -no change in condition. Patient reports no change in her pain to her left lower extremity and redness and induration remains persistent. Patient continues to endorse severe right ankle pain. Medial aspect of right ankle is red and incredibly painful to the touch rated 10/10. We will initiate gout work-up     11/17 -mild improvement in cellulitic areas to the left lower extremity. Clinical assessment consistent with gout/pseudogout. Will initiate treatment as such and recommend outpatient follow-up with podiatry. 11/18 - Dramatic improvement overnight. Patient reports that she is near complete resolution of her right lower extremity pain. Her cellulitic area of the left lower extremity is also dramatically improved. 11/19-11/21 -discharge held per attending due to positive blood cultures. Patient was started on IV Rocephin and a PICC line was placed. Anticipate discharge once outpatient antibiotics can be arranged. Significant therapeutic interventions: As above    Significant Diagnostic Studies:   Labs / Micro:  CBC:   Lab Results   Component Value Date/Time    WBC 15.6 11/19/2022 11:05 AM    RBC 4.03 11/19/2022 11:05 AM    HGB 11.7 11/19/2022 11:05 AM    HCT 37.6 11/19/2022 11:05 AM    MCV 93.3 11/19/2022 11:05 AM    MCH 29.0 11/19/2022 11:05 AM    MCHC 31.1 11/19/2022 11:05 AM    RDW 13.0 11/19/2022 11:05 AM     11/19/2022 11:05 AM     BMP:    Lab Results   Component Value Date/Time    GLUCOSE 150 11/21/2022 05:54 AM    GLUCOSE 260 11/04/2021 12:00 AM     11/21/2022 05:54 AM    K 4.3 11/21/2022 05:54 AM     11/21/2022 05:54 AM    CO2 24 11/21/2022 05:54 AM    ANIONGAP 8 11/21/2022 05:54 AM    BUN 32 11/21/2022 05:54 AM    CREATININE 1.23 11/21/2022 05:54 AM    BUNCRER 26 11/21/2022 05:54 AM    CALCIUM 8.7 11/21/2022 05:54 AM    LABGLOM 48 11/21/2022 05:54 AM    GFRAA >60 07/18/2022 11:45 AM    GFR      07/18/2022 11:45 AM        Radiology:  XR FOOT LEFT (MIN 3 VIEWS)    Result Date: 11/15/2022  1. Advanced left mid and hindfoot degenerative changes, with calcaneal spurring though no acute fracture seen throughout the foot. 2. Advanced right mid and hindfoot degenerative changes, with calcaneal spurring though no acute fracture.  3. Peripheral arterial disease. 4. Bilateral pes planus deformities. XR FOOT RIGHT (MIN 3 VIEWS)    Result Date: 11/15/2022  1. Advanced left mid and hindfoot degenerative changes, with calcaneal spurring though no acute fracture seen throughout the foot. 2. Advanced right mid and hindfoot degenerative changes, with calcaneal spurring though no acute fracture. 3. Peripheral arterial disease. 4. Bilateral pes planus deformities. Consultations:    Consults:     Final Specialist Recommendations/Findings:   IP CONSULT TO INTERNAL MEDICINE      The patient was seen and examined on day of discharge and this discharge summary is in conjunction with any daily progress note from day of discharge. Discharge plan:     Disposition: Home    Physician Follow Up:     [facility] [address]  [phone]  Follow up  they will provide your home care    [facility] [address] [place] [address]  [phone]    Follow up  faxed over a script for your walker. if not delivered monday please call them st set up delivery to your home       Requiring Further Evaluation/Follow Up POST HOSPITALIZATION/Incidental Findings: N/A    Diet: Please follow a diabetic diet to better control your blood sugars. Please follow a low purine diet to address your gout. Activity: As tolerated    Instructions to Patient: Ensure you complete your IV antibiotics. Make and keep a follow-up appointment with your podiatrist to be evaluated for gout through intra-articular fluid studies. Please engage in diet lifestyle modifications in an effort to control your diabetes, gout, and to assist with ambulation.     Discharge Medications:      Medication List        START taking these medications      cefTRIAXone  infusion  Commonly known as: ROCEPHIN  Infuse 2,000 mg intravenously every 24 hours for 7 days Compound per protocol  Start taking on: November 22, 2022     colchicine 0.6 MG tablet  Commonly known as: COLCRYS  Take 1 tablet by mouth daily     furosemide 20 MG tablet  Commonly known as: LASIX  Take 1 tablet by mouth daily     traMADol 50 MG tablet  Commonly known as: ULTRAM  Take 1 tablet by mouth every 4 hours as needed for Pain for up to 5 days. CHANGE how you take these medications      glyBURIDE 5 MG tablet  Commonly known as: DIABETA  TAKE 1 TABLET BY MOUTH  TWICE DAILY WITH MEALS  What changed:   how much to take  how to take this  when to take this     Touphilip Max SoloStar 300 UNIT/ML Sopn  Generic drug: Insulin Glargine (2 Unit Dial)  Inject 30 units twice daily  What changed: additional instructions            CONTINUE taking these medications      acetaminophen 500 MG tablet  Commonly known as: TYLENOL     * blood glucose monitor kit and supplies  Dispense sufficient amount for indicated testing frequency plus additional to accommodate PRN testing needs. Dispense all needed supplies to include: monitor, strips, lancing device, lancets, control solutions, alcohol swabs. * glucose monitoring kit  1 kit by Does not apply route daily     * blood glucose test strips     * blood glucose test strips  Test daily times a day & as needed for symptoms of irregular blood glucose. Dispense sufficient amount for indicated testing frequency plus additional to accommodate PRN testing needs. Dulaglutide 0.75 MG/0.5ML Sopn  Inject 1.5 mg into the skin once a week     DULoxetine 30 MG extended release capsule  Commonly known as: CYMBALTA  Take 1 capsule by mouth daily     Fesoterodine Fumarate ER 8 MG Tb24     gabapentin 300 MG capsule  Commonly known as: NEURONTIN  Take 1 capsule by mouth 3 times daily for 90 days.      Insulin Pen Needle 32G X 4 MM Misc  1 each by Does not apply route daily     Lancets Misc  1 each by Does not apply route daily     losartan 50 MG tablet  Commonly known as: COZAAR  Take 1 tablet by mouth daily     magnesium oxide 400 MG tablet  Commonly known as: MAG-OX     metoprolol succinate 50 MG extended release tablet  Commonly known as: TOPROL XL  Take 1 tablet by mouth daily     omeprazole 40 MG delayed release capsule  Commonly known as: PRILOSEC  TAKE 1 CAPSULE BY MOUTH  DAILY     rivaroxaban 20 MG Tabs tablet  Commonly known as: Xarelto  Take 1 tablet by mouth daily (with breakfast)           * This list has 4 medication(s) that are the same as other medications prescribed for you. Read the directions carefully, and ask your doctor or other care provider to review them with you. STOP taking these medications      doxycycline monohydrate 100 MG capsule  Commonly known as: MONODOX     hydroCHLOROthiazide 25 MG tablet  Commonly known as: HYDRODIURIL            ASK your doctor about these medications      diclofenac sodium 1 % Gel  Commonly known as: VOLTAREN  Apply 4 g topically in the morning and 4 g at noon and 4 g in the evening and 4 g before bedtime. Where to Get Your Medications        These medications [facility] [address],  [facility] [address]      Phone: [phone]   colchicine 0.6 MG tablet  traMADol 50 MG tablet       These medications were sent to [address] [place] [address], [address] [phone] -  [phone]  [place] [address], [address] [place] [id number]      Phone: [phone]   furosemide 20 MG tablet  Christiano Roberto SoloStar 300 UNIT/ML Sopn       You can get these medications from any pharmacy    Bring a paper prescription for each of these medications  cefTRIAXone  infusion         No discharge procedures on file. Time Spent on discharge is  37 mins in patient examination, evaluation, counseling as well as medication reconciliation, prescriptions for required medications, discharge plan and follow up.     Electronically signed by   ARTURO Li NP  11/21/2022  11:12 AM      Thank you Dr. Jose Rajan, ARTURO - ISAURO for the opportunity to be involved in this patient's care.

## 2022-11-21 NOTE — PROGRESS NOTES
Pt states her bariatric bed has a hole in it and that it is uncomfortable. Writer asked if the day shift nurse mentioned ordering a new one. . Pt states \"no, I'm going home tomorrow, it's fine\" Writer offered an air mattress which pt refused.  Writer also again offered to investigate ordering a new bed but pt again stated \"Its not a big deal, it's fine for tonight\"

## 2022-11-21 NOTE — CARE COORDINATION
Spoke to Khalif Mondragon with [facility].  Running benefits for IV Rocephin. Plan is for start of care 11-22 with [facility].

## 2022-11-21 NOTE — PROGRESS NOTES
[facility]  Office: [address], DO, Dulce Skye, DO, Tim Goldberggm, DO, Dakota Vossclaude Blood, DO, Narcisa Melo MD, Elizabeth Mercado MD, Thelma Arreguin MD, Sita Rivas MD,  Cherelle Benz MD, Isadora Glover MD, Leena Dorsey, DO, Gladys Turk MD,  Kecia Flores MD, Andrew Blue MD, Talib Monge, DO, Melani Davison MD, Sandra Lorenz MD, Johan Montalvo, DO, Macario Kearney MD, Heather Huitron MD, Magaly Carrillo MD, Ammy Marie MD, Rudolph Mercer DO, Allyson Mcmillan MD, True Umanzor MD, Renata Miguel, CNP,  Charla Garcia, CNP, Nerissa Pulido, CNP, Homar Reyna, CNP,  Edgar Leon, DNP, Deondre Hood, CNP, Nica Castro, CNP, Celine Chaudhry, CNP, Davian Villasenor, CNP, Antony Libman, CNP, LARISSA MartinezC, Doris Tompkins, CNS, Anna Marie Tinsley, DNP, Sumi Greene, CNP, Catalina Diane, CNP, Tj Irizarry, CNP         [facility]    Progress Note    11/21/2022    11:05 AM    Name:   Collette Landaverde  MRN:     [id number]     Acct:      [de-identified]   Room:   2021/2021-01   Day:  6  Admit Date:  11/15/2022  1:00 AM    PCP:   ARTURO Dumont CNP  Code Status:  Full Code    Subjective:     C/C:   Chief Complaint   Patient presents with    Foot Pain     Dx with cellulitis on L foot on Friday. Started on doxy. Has not been taking it regularly d/t nausea. Pt now having R foot pain. Interval History Status:  improvement    Patient continues to improve. She has responded well to both treatment for cellulitis as well as gout. Patient is highly motivated for discharge. Patient is stable for discharge from medicine standpoint provided outpatient IV antibiotics are arranged. Patient is again encouraged to follow-up with podiatry to discuss intra-articular studies versus treatments. Also recommend following up with primary care for close glycemic control.     Brief History:     11/15 - This very pleasant 49-year-old female was recently started on doxycycline by her podiatrist due to a left lower extremity cellulitis. The patient was unable to tolerate her doxycycline due to nausea. She states that she took it intermittently and ultimately presented to the hospital due to worsening cellulitic changes. At this point in time the patient has diffuse cellulitic changes involving the left lower extremity along with flaking of her skin. She does have chronic lymphedematous changes secondary to her morbid obesity and BMI 52. She has developed some slight skin changes along the right posterior aspect of her calf. Reviewing her laboratory data shows that her A1c has been over 10 for well over 2 years now. I had a long discussion with her regarding the fact that her glycemic control is playing a direct role in her risk of lower extremity cellulitis. Her blood sugars are obviously not controlled which increases her risk of infection immensely. I am going to titrate her long-acting insulin from 34 units daily to 25 twice daily with plans to aggressively titrate it further. We will place her on a high intensity sliding scale. I strongly encouraged her to follow-up with her outpatient physician for better glycemic control    11/16 -no change in condition. Patient reports no change in her pain to her left lower extremity and redness and induration remains persistent. Patient continues to endorse severe right ankle pain. Medial aspect of right ankle is red and incredibly painful to the touch rated 10/10. We will initiate gout work-up    11/17 -mild improvement in cellulitic areas to the left lower extremity. Clinical assessment consistent with gout/pseudogout. Will initiate treatment as such and recommend outpatient follow-up with podiatry. 11/18 - Dramatic improvement overnight. Patient reports that she is near complete resolution of her right lower extremity pain.   Her cellulitic area of the left lower extremity is also dramatically improved. 11/19-11/21 -discharge held per attending due to positive blood cultures. Patient was started on IV Rocephin and a PICC line was placed. Anticipate discharge once outpatient antibiotics can be arranged. Review of Systems:     Constitutional:  negative for chills, fevers, sweats  Respiratory:  negative for cough, dyspnea on exertion, shortness of breath, wheezing  Cardiovascular:  negative for chest pain, chest pressure/discomfort, lower extremity edema, palpitations  Gastrointestinal:  negative for abdominal pain, constipation, diarrhea, nausea, vomiting  Neurological:  negative for dizziness, headache    Medications: Allergies:     Allergies   Allergen Reactions    Erythromycin Hives    Lisinopril Itching       Current Meds:   Scheduled Meds:    trospium  20 mg Oral BID AC    cefTRIAXone (ROCEPHIN) IV  2,000 mg IntraVENous Q24H    insulin lispro  0-16 Units SubCUTAneous 4x Daily AC & HS    colchicine  0.6 mg Oral Daily    insulin glargine  40 Units SubCUTAneous Nightly    insulin glargine  25 Units SubCUTAneous Daily    rivaroxaban  20 mg Oral Daily with breakfast    pantoprazole  40 mg Oral QAM AC    metoprolol succinate  50 mg Oral Daily    DULoxetine  30 mg Oral Daily    sodium chloride flush  5-40 mL IntraVENous 2 times per day    glipiZIDE  5 mg Oral BID WC    diclofenac sodium  4 g Topical 4x Daily    [Held by provider] losartan  50 mg Oral Daily     Continuous Infusions:    sodium chloride 75 mL/hr at 11/21/22 0717    sodium chloride      dextrose       PRN Meds: traMADol, sodium chloride flush, sodium chloride, potassium chloride **OR** potassium alternative oral replacement **OR** potassium chloride, magnesium sulfate, ondansetron **OR** ondansetron, acetaminophen **OR** acetaminophen, glucose, dextrose bolus **OR** dextrose bolus, glucagon (rDNA), dextrose    Data:     Past Medical History:   has a past medical history of DM (diabetes mellitus) [facility]), DVT (deep venous thrombosis) ([address] GERD (gastroesophageal reflux disease), Hypertension, MVP (mitral valve prolapse), and KATHI (obstructive sleep apnea). Social History:   reports that she quit smoking about 39 years ago. Her smoking use included cigarettes. She has a 5.00 pack-year smoking history. She has never used smokeless tobacco. She reports that she does not drink alcohol and does not use drugs. Family History:   Family History   Problem Relation Age of Onset    Heart Disease Mother        Vitals:  BP (!) 150/52   Pulse 95   Temp 98.1 °F (36.7 °C) (Oral)   Resp 16   Ht 6' (1.829 m)   Wt (!) 319 lb (144.7 kg)   SpO2 96%   BMI 43.26 kg/m²   Temp (24hrs), Av.1 °F (36.7 °C), Min:97.9 °F (36.6 °C), Max:98.3 °F (36.8 °C)    Recent Labs     22  1114 22  1615 22  19422  0625   POCGLU 203* 225* 212* 137*       I/O (24Hr):     Intake/Output Summary (Last 24 hours) at 2022 1105  Last data filed at 2022 0614  Gross per 24 hour   Intake 933.32 ml   Output --   Net 933.32 ml       Labs:  Hematology:  Recent Labs     22  1105   WBC 15.6*   RBC 4.03   HGB 11.7*   HCT 37.6   MCV 93.3   MCH 29.0   MCHC 31.1   RDW 13.0      MPV 10.0     Chemistry:  Recent Labs     22  1105 22  0548 22  0554   * 134* 134*   K 4.2 3.8 4.3   CL 98 101 102   CO2 *  24   GLUCOSE 330* 241* 150*   BUN 41* 44* 32*   CREATININE 1.62* 1.50* 1.23*   ANIONGAP 14 11 8*   LABGLOM 34* 37* 48*   CALCIUM 8.5* 8.4* 8.7     Recent Labs     226 22  0623 22  1114 22  1615 22  1942 22  0625   POCGLU 363* 202* 203* 225* 212* 137*     ABG:No results found for: POCPH, PHART, PH, POCPCO2, QOP7OTQ, PCO2, POCPO2, PO2ART, PO2, POCHCO3, MDR9JLB, HCO3, NBEA, PBEA, BEART, BE, THGBART, THB, YAL3IYS, MMAR9YJL, Y0CNLGIF, O2SAT, FIO2  Lab Results   Component Value Date/Time    SPECIAL 20ML RT [facility] 11/15/2022 03:06 AM     Lab Results Component Value Date/Time    CULTURE NO GROWTH 5 DAYS 11/15/2022 03:06 AM       Radiology:  XR FOOT LEFT (MIN 3 VIEWS)    Result Date: 11/15/2022  1. Advanced left mid and hindfoot degenerative changes, with calcaneal spurring though no acute fracture seen throughout the foot. 2. Advanced right mid and hindfoot degenerative changes, with calcaneal spurring though no acute fracture. 3. Peripheral arterial disease. 4. Bilateral pes planus deformities. XR FOOT RIGHT (MIN 3 VIEWS)    Result Date: 11/15/2022  1. Advanced left mid and hindfoot degenerative changes, with calcaneal spurring though no acute fracture seen throughout the foot. 2. Advanced right mid and hindfoot degenerative changes, with calcaneal spurring though no acute fracture. 3. Peripheral arterial disease. 4. Bilateral pes planus deformities.        Physical Examination:        General appearance:  alert, cooperative and no distress  Mental Status:  oriented to person, place and time and normal affect  Lungs:  clear to auscultation bilaterally, normal effort  Heart:  regular rate and rhythm, no murmur  Abdomen:  soft, nontender, nondistended, normal bowel sounds, no masses, hepatomegaly, splenomegaly  Extremities:  no edema, redness, tenderness in the calves  Skin:  no gross lesions, rashes, induration    Assessment:        Hospital Problems             Last Modified POA    * (Principal) Cellulitis of left lower extremity 11/16/2022 Yes    Paroxysmal atrial fibrillation ([address] 11/19/2022 Yes    Venous insufficiency of both lower extremities 11/16/2022 Yes    Plantar fasciitis of right foot 11/16/2022 Yes    Bacteremia due to group B Streptococcus 11/19/2022 Yes    Type 2 diabetes mellitus with hyperglycemia, without long-term current use of insulin ([address] 11/16/2022 Yes    GERD (gastroesophageal reflux disease) (Chronic) 11/19/2022 Yes    Essential hypertension 11/16/2022 Yes    Lymphedema of both lower extremities (Chronic) 11/19/2022 Yes    Morbid obesity with BMI of 50.0-59.9, adult ([facility] 11/19/2022 Yes       Plan:        Cellulitis left lower extremity with blood culture positive x 1 for Group B Strep  PICC line placed and plan for IV Rocephin as an outpatient per attending  Right ankle pain, clinical assessment consistent with gout/pseudogout  Recommend outpatient follow-up with orthopedics/podiatry to discuss intra-articular treatment options and fluid studies  Continue colchicine  Discontinue Indocin and steroids      ARTURO Goetz NP  11/21/2022  11:05 AM

## 2022-11-21 NOTE — CARE COORDINATION
Spoke to Alysha with [facility] and estimated weekly cost for IV Rocephin is $138.64 until OOP met then covered at 100%. Kecia with [facility] informed of D/C today and start of care will be 11-22 in afternoon after ATB and supplies are delivered. Spoke to Juventino with [facility] and walker ordered. Will be delivered to patients home and pt informed to call [facility] when she arrives home.

## 2022-11-21 NOTE — CARE COORDINATION
Spoke to [place] pharmacist with [facility] and called in verbal order from Dr. Guy for NS 10ml flushes  before and after IV Rocephin.

## 2022-11-21 NOTE — PLAN OF CARE
Problem: Discharge Planning  Goal: Discharge to home or other facility with appropriate resources  11/21/2022 1215 by Baltazar Watson RN  Outcome: Progressing  11/20/2022 2347 by Raj Acuña RN  Outcome: Progressing     Problem: Pain  Goal: Verbalizes/displays adequate comfort level or baseline comfort level  11/21/2022 1215 by Baltazar Watson RN  Outcome: Progressing  11/20/2022 2347 by Raj Acuña RN  Outcome: Progressing     Problem: Neurosensory - Adult  Goal: Achieves stable or improved neurological status  11/21/2022 1215 by Baltazar Watson RN  Outcome: Progressing  11/20/2022 2347 by Rja Acuña RN  Outcome: Progressing     Problem: Skin/Tissue Integrity - Adult  Goal: Skin integrity remains intact  11/21/2022 1215 by Baltazar Watson RN  Outcome: Progressing  11/20/2022 2347 by Raj Acuña RN  Outcome: Progressing  Goal: Incisions, wounds, or drain sites healing without S/S of infection  11/21/2022 1215 by Baltazar Watson RN  Outcome: Progressing  11/20/2022 2347 by Raj Acuña RN  Outcome: Progressing  Goal: Oral mucous membranes remain intact  11/21/2022 1215 by Baltazar Watson RN  Outcome: Progressing  11/20/2022 2347 by Raj Acuña RN  Outcome: Progressing     Problem: Musculoskeletal - Adult  Goal: Return mobility to safest level of function  11/21/2022 1215 by Baltazar Watson RN  Outcome: Progressing  11/20/2022 2347 by Raj Acuña RN  Outcome: Progressing  Goal: Return ADL status to a safe level of function  11/21/2022 1215 by Baltazar Watson RN  Outcome: Progressing  11/20/2022 2347 by Raj Acuña RN  Outcome: Progressing     Problem: Gastrointestinal - Adult  Goal: Minimal or absence of nausea and vomiting  11/21/2022 1215 by Baltazar Watson RN  Outcome: Progressing  11/20/2022 2347 by Raj Acuña RN  Outcome: Progressing  Goal: Maintains or returns to baseline bowel function  11/21/2022 1215 by Baltazar Watson RN  Outcome: Progressing  11/20/2022 2347 by Raj Acuña RN  Outcome: Progressing  Goal: Maintains adequate nutritional intake  11/21/2022 1215 by Kyle Bingham RN  Outcome: Progressing  11/20/2022 2347 by Rain Saha RN  Outcome: Progressing     Problem: Genitourinary - Adult  Goal: Absence of urinary retention  11/21/2022 1215 by Kyle Bingham RN  Outcome: Progressing  11/20/2022 2347 by Rain Saha RN  Outcome: Progressing     Problem: Infection - Adult  Goal: Absence of infection at discharge  11/21/2022 1215 by Kyle Bingham RN  Outcome: Progressing  11/20/2022 2347 by Rain Saha RN  Outcome: Progressing  Goal: Absence of infection during hospitalization  11/21/2022 1215 by Kyle Bingham RN  Outcome: Progressing  11/20/2022 2347 by Rain Saha RN  Outcome: Progressing     Problem: Metabolic/Fluid and Electrolytes - Adult  Goal: Electrolytes maintained within normal limits  11/21/2022 1215 by Kyle Bingham RN  Outcome: Progressing  11/20/2022 2347 by Rain Saha RN  Outcome: Progressing  Goal: Hemodynamic stability and optimal renal function maintained  11/21/2022 1215 by Kyle Bingham RN  Outcome: Progressing  11/20/2022 2347 by Rain Saha RN  Outcome: Progressing  Goal: Glucose maintained within prescribed range  11/21/2022 1215 by Kyle Bingham RN  Outcome: Progressing  11/20/2022 2347 by Rain Saha RN  Outcome: Progressing     Problem: Skin/Tissue Integrity  Goal: Absence of new skin breakdown  Description: 1. Monitor for areas of redness and/or skin breakdown  2. Assess vascular access sites hourly  3. Every 4-6 hours minimum:  Change oxygen saturation probe site  4. Every 4-6 hours:  If on nasal continuous positive airway pressure, respiratory therapy assess nares and determine need for appliance change or resting period.   11/21/2022 1215 by Kyle Bingham RN  Outcome: Progressing  11/20/2022 2347 by Rain Saha RN  Outcome: Progressing     Problem: Safety - Adult  Goal: Free from fall injury  11/21/2022 1215 by Kyle Bingham RN  Outcome: Progressing  11/20/2022 2347 by Kadeem Lorenz RN  Outcome: Progressing     Problem: ABCDS Injury Assessment  Goal: Absence of physical injury  11/21/2022 1215 by Chelsie Sawyer RN  Outcome: Progressing  11/20/2022 2347 by Kadeem Lorenz RN  Outcome: Progressing     Problem: Chronic Conditions and Co-morbidities  Goal: Patient's chronic conditions and co-morbidity symptoms are monitored and maintained or improved  11/21/2022 1215 by Chelsie Sawyer RN  Outcome: Progressing  11/20/2022 2347 by Kadeem Lorenz RN  Outcome: Progressing

## 2022-11-21 NOTE — PROGRESS NOTES
Physical Therapy  DATE: 2022    NAME: Dottie Jones  MRN: [id number]   : 1955    Patient not seen this date for Physical Therapy due to:      [] Cancel by RN or physician due to:    [] Hemodialysis    [] Critical Lab Value Level     [] Blood transfusion in progress    [] Acute or unstable cardiovascular status   _MAP < 55 or more than >115  _HR < 40 or > 130    [] Acute or unstable pulmonary status   -FiO2 > 60%   _RR < 5 or >40    _O2 sats < 85%    [] Strict Bedrest    [] Off Unit for surgery or procedure    [] Off Unit for testing       [] Pending imaging to R/O fracture    [x] Refusal by Patient Pt. Refused treatment stating \"I don't need you. .. Goodbye\". PT will cont to follow. [] Other      [] PT being discontinued at this time. Patient independent. No further needs. [] PT being discontinued at this time as the patient has been transferred to hospice care. No further needs.       DIGNA SUAREZ, PTA

## 2022-11-21 NOTE — PROGRESS NOTES
DATE: 2022    NAME: Danny Smith  MRN: [id number]   : 1955    Patient not seen this date for Occupational Therapy due to:      [] Cancel by RN or physician due to:    [] Hemodialysis    [] Critical Lab Value Level     [] Blood transfusion in progress    [] Acute or unstable cardiovascular status   _MAP < 55 or more than >115  _HR < 40 or > 130    [] Acute or unstable pulmonary status   -FiO2 > 60%   _RR < 5 or >40    _O2 sats < 85%    [] Strict Bedrest    [] Off Unit for surgery or procedure    [] Off Unit for testing       [] Pending imaging to R/O fracture    [x] Refusal by Patient : Pt. Refused treatment stating \"I don't need you. .. Goodbye\". OT will cont to follow. [] Other      [] OT being discontinued at this time. Patient independent. No further needs. [] OT being discontinued at this time as the patient has been transferred to hospice care. No further needs.       Joe Miner GE

## 2022-11-23 DIAGNOSIS — E11.65 TYPE 2 DIABETES MELLITUS WITH HYPERGLYCEMIA, WITH LONG-TERM CURRENT USE OF INSULIN (HCC): ICD-10-CM

## 2022-11-23 DIAGNOSIS — Z79.4 TYPE 2 DIABETES MELLITUS WITH HYPERGLYCEMIA, WITH LONG-TERM CURRENT USE OF INSULIN (HCC): ICD-10-CM

## 2022-11-23 NOTE — TELEPHONE ENCOUNTER
Kathe Contreras is calling to request a refill on the following medication(s):    Medication Request:  Requested Prescriptions     Pending Prescriptions Disp Refills    Insulin Glargine, 2 Unit Dial, (TOUJEO MAX SOLOSTAR) 300 UNIT/ML SOPN 3 Adjustable Dose Pre-filled Pen Syringe 1     Sig: Inject 30 units twice daily       Last Visit Date (If Applicable):  57/41/6882    Next Visit Date:    Visit date not found

## 2022-11-25 RX ORDER — INSULIN GLARGINE 300 U/ML
INJECTION, SOLUTION SUBCUTANEOUS
Qty: 3 ADJUSTABLE DOSE PRE-FILLED PEN SYRINGE | Refills: 1 | OUTPATIENT
Start: 2022-11-25

## 2022-12-16 ENCOUNTER — OFFICE VISIT (OUTPATIENT)
Dept: FAMILY MEDICINE CLINIC | Age: 67
End: 2022-12-16

## 2022-12-16 VITALS
SYSTOLIC BLOOD PRESSURE: 165 MMHG | HEART RATE: 83 BPM | RESPIRATION RATE: 16 BRPM | OXYGEN SATURATION: 94 % | WEIGHT: 293 LBS | BODY MASS INDEX: 53.98 KG/M2 | DIASTOLIC BLOOD PRESSURE: 84 MMHG

## 2022-12-16 DIAGNOSIS — N18.32 STAGE 3B CHRONIC KIDNEY DISEASE (HCC): ICD-10-CM

## 2022-12-16 DIAGNOSIS — Z79.4 TYPE 2 DIABETES MELLITUS WITH HYPERGLYCEMIA, WITH LONG-TERM CURRENT USE OF INSULIN (HCC): ICD-10-CM

## 2022-12-16 DIAGNOSIS — J96.11 CHRONIC HYPOXEMIC RESPIRATORY FAILURE (HCC): ICD-10-CM

## 2022-12-16 DIAGNOSIS — I10 ESSENTIAL HYPERTENSION: ICD-10-CM

## 2022-12-16 DIAGNOSIS — K21.9 GASTROESOPHAGEAL REFLUX DISEASE WITHOUT ESOPHAGITIS: ICD-10-CM

## 2022-12-16 DIAGNOSIS — L97.813 SKIN ULCER OF PRETIBIAL REGION OF RIGHT LOWER EXTREMITY, WITH NECROSIS OF MUSCLE (HCC): ICD-10-CM

## 2022-12-16 DIAGNOSIS — M10.9 GOUT OF RIGHT ANKLE, UNSPECIFIED CAUSE, UNSPECIFIED CHRONICITY: ICD-10-CM

## 2022-12-16 DIAGNOSIS — N20.0 STAGHORN RENAL CALCULUS: ICD-10-CM

## 2022-12-16 DIAGNOSIS — E11.42 DIABETIC POLYNEUROPATHY ASSOCIATED WITH TYPE 2 DIABETES MELLITUS (HCC): ICD-10-CM

## 2022-12-16 DIAGNOSIS — I82.5Y9 CHRONIC DEEP VEIN THROMBOSIS (DVT) OF PROXIMAL VEIN OF LOWER EXTREMITY, UNSPECIFIED LATERALITY (HCC): ICD-10-CM

## 2022-12-16 DIAGNOSIS — I87.2 VENOUS INSUFFICIENCY OF BOTH LOWER EXTREMITIES: ICD-10-CM

## 2022-12-16 DIAGNOSIS — E11.65 TYPE 2 DIABETES MELLITUS WITH HYPERGLYCEMIA, WITH LONG-TERM CURRENT USE OF INSULIN (HCC): ICD-10-CM

## 2022-12-16 DIAGNOSIS — Z76.89 ENCOUNTER TO ESTABLISH CARE: Primary | ICD-10-CM

## 2022-12-16 DIAGNOSIS — I27.20 PULMONARY HYPERTENSION (HCC): ICD-10-CM

## 2022-12-16 LAB — HBA1C MFR BLD: 7.6 %

## 2022-12-16 RX ORDER — COLCHICINE 0.6 MG/1
0.6 TABLET ORAL DAILY
Qty: 90 TABLET | Refills: 1 | Status: CANCELLED | OUTPATIENT
Start: 2022-12-16

## 2022-12-16 RX ORDER — INSULIN GLARGINE 300 U/ML
INJECTION, SOLUTION SUBCUTANEOUS
Qty: 1 ADJUSTABLE DOSE PRE-FILLED PEN SYRINGE | Refills: 1 | Status: SHIPPED | OUTPATIENT
Start: 2022-12-16

## 2022-12-16 RX ORDER — SEMAGLUTIDE 1.34 MG/ML
0.25 INJECTION, SOLUTION SUBCUTANEOUS WEEKLY
Qty: 2 ADJUSTABLE DOSE PRE-FILLED PEN SYRINGE | Refills: 1 | Status: SHIPPED | OUTPATIENT
Start: 2022-12-16

## 2022-12-16 RX ORDER — GLYBURIDE 5 MG/1
5 TABLET ORAL 2 TIMES DAILY WITH MEALS
Qty: 90 TABLET | Refills: 1 | Status: CANCELLED | OUTPATIENT
Start: 2022-12-16

## 2022-12-16 RX ORDER — FUROSEMIDE 20 MG/1
20 TABLET ORAL DAILY
Qty: 90 TABLET | Refills: 3 | Status: SHIPPED | OUTPATIENT
Start: 2022-12-16

## 2022-12-16 RX ORDER — DULOXETIN HYDROCHLORIDE 30 MG/1
30 CAPSULE, DELAYED RELEASE ORAL DAILY
Qty: 90 CAPSULE | Refills: 1 | Status: SHIPPED | OUTPATIENT
Start: 2022-12-16

## 2022-12-16 RX ORDER — GABAPENTIN 300 MG/1
300 CAPSULE ORAL 3 TIMES DAILY
Qty: 180 CAPSULE | Refills: 1 | Status: SHIPPED | OUTPATIENT
Start: 2022-12-16 | End: 2023-03-16

## 2022-12-16 RX ORDER — OMEPRAZOLE 40 MG/1
40 CAPSULE, DELAYED RELEASE ORAL DAILY
Qty: 90 CAPSULE | Refills: 1 | Status: SHIPPED | OUTPATIENT
Start: 2022-12-16

## 2022-12-16 RX ORDER — LOSARTAN POTASSIUM 50 MG/1
50 TABLET ORAL DAILY
Qty: 90 TABLET | Refills: 3 | Status: SHIPPED | OUTPATIENT
Start: 2022-12-16

## 2022-12-16 RX ORDER — METOPROLOL SUCCINATE 50 MG/1
50 TABLET, EXTENDED RELEASE ORAL DAILY
Qty: 90 TABLET | Refills: 3 | Status: SHIPPED | OUTPATIENT
Start: 2022-12-16

## 2022-12-16 NOTE — PROGRESS NOTES
[address] PRIMARY CARE  [address] [place] [address] [address] [place] [address]  Dept: [phone]  Dept Fax: [phone]    Doreen Schwab is a 69 y.o. female who is a New patient, who presents today for her medical conditions/complaints as noted below:  Chief Complaint   Patient presents with    New Patient    Diabetes    Hypertension         HPI:     She is a 49-year-old female here today to establish care as a new patient. She has multiple chronic comorbidities including poorly controlled diabetes, hypertension, CKD, chronic hypoxemia, on nocturnal oxygen, renal calculi, diabetic neuropathy, recurrent cellulitis, bilateral lymphedema, chronic DVT on Xarelto. Her diabetes has been poorly controlled over the past few years and her A1c was running around 10. She had a recent hospital admission for recurrent cellulitis and her insulin was adjusted after which her sugars have improved. She was taken off of hydrochlorothiazide due to concern for gout in her right ankle and was started on colchicine which she says helped improved her pain a lot. She follows with podiatry and plans to set up a visit with them soon. She also has chronic hypoxemia for which she uses oxygen at night, she has not had any sleep study and does not want to follow with pulmonary. She is currently on Xarelto for her history of paroxysmal A. fib and DVT, does not want to follow with cardiology. She has a history of staghorn renal calculi her right kidney and says that it has been there for several years now. She does not want to follow with urology. She has chronic lymphedema bilaterally and says that she uses lymphedema pump at night. She does not want to follow-up with lymphedema clinic, says that she gets recurrent infections and would like to just get antibiotics whenever that happens. She declined mammogram and vaccinations.          Hemoglobin A1C (%)   Date Value   12/16/2022 7.6   07/18/2022 10.4 (H) 2022 10.3 (H)             ( goal A1Cis < 7)   Microalb/Crt. Ratio (mcg/mg creat)   Date Value   2022 350 (H)     LDL Cholesterol (mg/dL)   Date Value   2022 82   2022 75     LDL Calculated (mg/dL)   Date Value   2021 82   10/16/2021 85   2019 73       (goal LDL is <100)   AST (U/L)   Date Value   11/15/2022 12     ALT (U/L)   Date Value   11/15/2022 8     BUN (mg/dL)   Date Value   2022 32 (H)     BP Readings from Last 3 Encounters:   22 (!) 165/84   22 (!) 149/59   10/18/22 139/80          (goal 120/80)    Past Medical History:   Diagnosis Date    DM (diabetes mellitus) ([facility] 2012    DVT (deep venous thrombosis) ([facility] 06/22/2012    x1 provoked s/p long travel    GERD (gastroesophageal reflux disease) 2012    Hypertension     MVP (mitral valve prolapse) 2012    KATHI (obstructive sleep apnea)       Past Surgical History:   Procedure Laterality Date    HYSTERECTOMY (CERVIX STATUS UNKNOWN)      TONSILLECTOMY         Family History   Problem Relation Age of Onset    Heart Disease Mother        Social History     Tobacco Use    Smoking status: Former     Packs/day: 0.25     Years: 20.00     Pack years: 5.00     Types: Cigarettes     Quit date: 1983     Years since quittin.5    Smokeless tobacco: Never    Tobacco comments:     Quit 30 years ago   Substance Use Topics    Alcohol use: No      Current Outpatient Medications   Medication Sig Dispense Refill    DULoxetine (CYMBALTA) 30 MG extended release capsule Take 1 capsule by mouth daily 90 capsule 1    furosemide (LASIX) 20 MG tablet Take 1 tablet by mouth daily 90 tablet 3    gabapentin (NEURONTIN) 300 MG capsule Take 1 capsule by mouth 3 times daily for 90 days.  180 capsule 1    diclofenac sodium (VOLTAREN) 1 % GEL Apply 4 g topically 4 times daily as needed for Pain Uses on feet 230 g 1    Insulin Glargine, 2 Unit Dial, (TOUJEO MAX SOLOSTAR) 300 UNIT/ML SOPN Inject 30 units twice daily 1 Adjustable Dose Pre-filled Pen Syringe 1    losartan (COZAAR) 50 MG tablet Take 1 tablet by mouth daily 90 tablet 3    metoprolol succinate (TOPROL XL) 50 MG extended release tablet Take 1 tablet by mouth daily 90 tablet 3    omeprazole (PRILOSEC) 40 MG delayed release capsule Take 1 capsule by mouth daily TAKE 1 CAPSULE BY MOUTH  DAILY 90 capsule 1    rivaroxaban (XARELTO) 20 MG TABS tablet Take 1 tablet by mouth daily (with breakfast) 90 tablet 1    Semaglutide,0.25 or 0.5MG/DOS, (OZEMPIC, 0.25 OR 0.5 MG/DOSE,) 2 MG/1.5ML SOPN Inject 0.25 mg into the skin once a week 2 Adjustable Dose Pre-filled Pen Syringe 1    colchicine (COLCRYS) 0.6 MG tablet Take 1 tablet by mouth daily 30 tablet 1    blood glucose monitor strips Test daily times a day & as needed for symptoms of irregular blood glucose. Dispense sufficient amount for indicated testing frequency plus additional to accommodate PRN testing needs. 200 strip 11    Fesoterodine Fumarate ER 8 MG TB24 Take 8 mg by mouth daily      magnesium oxide (MAG-OX) 400 MG tablet Take 1 tablet by mouth daily 30 tablet 0    blood glucose monitor kit and supplies Dispense sufficient amount for indicated testing frequency plus additional to accommodate PRN testing needs. Dispense all needed supplies to include: monitor, strips, lancing device, lancets, control solutions, alcohol swabs. 1 kit 0    acetaminophen (TYLENOL) 500 MG tablet Take 500 mg by mouth every 6 hours as needed for Pain. Pt only takes 1-2 times per week      glucose monitoring (FREESTYLE FREEDOM) kit 1 kit by Does not apply route daily 1 kit 0    Lancets MISC 1 each by Does not apply route daily 100 each 11    Insulin Pen Needle 32G X 4 MM MISC 1 each by Does not apply route daily 100 each 3    Glucose Blood (BLOOD GLUCOSE TEST STRIPS) STRP by Does not apply route 3 times daily. Freestyle [place] Test Strips       No current facility-administered medications for this visit.      Allergies   Allergen Reactions Erythromycin Hives    Lisinopril Itching       Health Maintenance   Topic Date Due    COVID-19 Vaccine (1) Never done    Pneumococcal 65+ years Vaccine (1 - PCV) Never done    Hepatitis C screen  Never done    DTaP/Tdap/Td vaccine (1 - Tdap) Never done    Shingles vaccine (1 of 2) Never done    DEXA (modify frequency per FRAX score)  Never done    Diabetic retinal exam  05/05/2008    Breast cancer screen  02/22/2018    Diabetic foot exam  02/19/2021    Annual Wellness Visit (AWV)  Never done    Flu vaccine (1) 10/18/2023 (Originally 8/1/2022)    Diabetic microalbuminuria test  07/18/2023    Lipids  07/18/2023    Depression Screen  10/18/2023    A1C test (Diabetic or Prediabetic)  12/16/2023    Colorectal Cancer Screen  02/22/2026    Hepatitis A vaccine  Aged Out    Hib vaccine  Aged Out    Meningococcal (ACWY) vaccine  Aged Out       Subjective:     Review of Systems   Constitutional:  Negative for appetite change, fatigue and fever. HENT:  Negative for congestion, ear pain, hearing loss and sore throat. Eyes:  Negative for discharge and visual disturbance. Respiratory:  Negative for cough, chest tightness, shortness of breath and wheezing. Cardiovascular:  Positive for leg swelling. Negative for chest pain and palpitations. Gastrointestinal:  Negative for abdominal pain, constipation, diarrhea, nausea and vomiting. Genitourinary:  Negative for flank pain, frequency, hematuria and urgency. Musculoskeletal:  Negative for arthralgias, gait problem, joint swelling and myalgias. Skin:  Positive for wound. Neurological:  Negative for dizziness, weakness, numbness and headaches. Psychiatric/Behavioral: Negative. Negative for dysphoric mood. The patient is not nervous/anxious. Objective:     Physical Exam  Vitals reviewed. Constitutional:       Appearance: Normal appearance. She is obese. HENT:      Head: Normocephalic and atraumatic.       Nose: Nose normal.      Mouth/Throat:      Mouth: Mucous membranes are moist.      Pharynx: Oropharynx is clear. Eyes:      Extraocular Movements: Extraocular movements intact. Conjunctiva/sclera: Conjunctivae normal.      Pupils: Pupils are equal, round, and reactive to light. Cardiovascular:      Rate and Rhythm: Normal rate and regular rhythm. Heart sounds: Normal heart sounds. No murmur heard. No gallop. Pulmonary:      Effort: Pulmonary effort is normal. No respiratory distress. Breath sounds: Normal breath sounds. No stridor. No wheezing. Abdominal:      General: Bowel sounds are normal. There is no distension. Palpations: Abdomen is soft. Tenderness: There is no abdominal tenderness. There is no guarding or rebound. Musculoskeletal:         General: No swelling or tenderness. Normal range of motion. Cervical back: Normal range of motion and neck supple. Right lower leg: Edema present. Left lower leg: Edema present. Skin:     General: Skin is warm and dry. Coloration: Skin is not jaundiced. Findings: Rash present. Neurological:      General: No focal deficit present. Mental Status: She is alert and oriented to person, place, and time. Psychiatric:         Mood and Affect: Mood normal.         Behavior: Behavior normal.         Thought Content: Thought content normal.         Judgment: Judgment normal.     BP (!) 165/84   Pulse 83   Resp 16   Wt (!) 398 lb (180.5 kg)   SpO2 94%   BMI 53.98 kg/m²     Assessment/Plan:   1. Encounter to establish care  2.  Type 2 diabetes mellitus with hyperglycemia, with long-term current use of insulin ([facility])  -     POCT glycosylated hemoglobin (Hb A1C)  -     Insulin Glargine, 2 Unit Dial, (TOUJEO MAX SOLOSTAR) 300 UNIT/ML SOPN; Inject 30 units twice daily, Disp-1 Adjustable Dose Pre-filled Pen Syringe, R-1Normal  -     Semaglutide,0.25 or 0.5MG/DOS, (OZEMPIC, 0.25 OR 0.5 MG/DOSE,) 2 MG/1.5ML SOPN; Inject 0.25 mg into the skin once a week, Disp-2 Adjustable Dose Pre-filled Pen Syringe, R-1Normal  3. Essential hypertension  -     losartan (COZAAR) 50 MG tablet; Take 1 tablet by mouth daily, Disp-90 tablet, R-3Normal  -     metoprolol succinate (TOPROL XL) 50 MG extended release tablet; Take 1 tablet by mouth daily, Disp-90 tablet, R-3Requesting 1 year supplyNormal  4. Chronic hypoxemic respiratory failure (HCC)  5. Pulmonary hypertension (HCC)  6. Stage 3b chronic kidney disease ([facility]  7. Chronic deep vein thrombosis (DVT) of proximal vein of lower extremity, unspecified laterality (HCC)  -     rivaroxaban (XARELTO) 20 MG TABS tablet; Take 1 tablet by mouth daily (with breakfast), Disp-90 tablet, R-1Normal  8. Diabetic polyneuropathy associated with type 2 diabetes mellitus (HCC)  -     DULoxetine (CYMBALTA) 30 MG extended release capsule; Take 1 capsule by mouth daily, Disp-90 capsule, R-1Requesting 1 year supplyNormal  -     gabapentin (NEURONTIN) 300 MG capsule; Take 1 capsule by mouth 3 times daily for 90 days. , Disp-180 capsule, R-1Normal  -     diclofenac sodium (VOLTAREN) 1 % GEL; Apply 4 g topically 4 times daily as needed for Pain Uses on feet, Topical, 4 TIMES DAILY PRN Starting Fri 12/16/2022, Disp-230 g, R-1, Normal  9. Skin ulcer of pretibial region of right lower extremity, with necrosis of muscle ([facility]  10. Staghorn renal calculus  11. Venous insufficiency of both lower extremities  -     furosemide (LASIX) 20 MG tablet; Take 1 tablet by mouth daily, Disp-90 tablet, R-3Normal  12. Gout of right ankle, unspecified cause, unspecified chronicity  13. Gastroesophageal reflux disease without esophagitis  -     omeprazole (PRILOSEC) 40 MG delayed release capsule; Take 1 capsule by mouth daily TAKE 1 CAPSULE BY MOUTH  DAILY, Disp-90 capsule, R-1Requesting 1 year supplyNormal      Type 2 diabetes-slowly improving, POCT A1c today 7.6, on Toujeo 30 units twice daily, discontinued glyburide due to poor renal function.   Switched from [address] [facility] to [facility] 0.25 mg/week for better weight loss. Hypertension-poorly controlled, on losartan 50 mg daily and metoprolol succinate 50 mg daily. Recently taken off of hydrochlorothiazide due to concern for gout. Chronic hypoxemia on nocturnal oxygen-has not had sleep study done, does not want to follow with pulmonary or sleep medicine    Stage III CKD-recent creatinine level elevated, declines to follow-up with nephrology at this time. Staghorn renal calculi-does not want to follow with urology for stone removal, says that it has not been bothering her    Chronic DVT-history of paroxysmal A. fib and DVT, on Xarelto 20 mg daily. Declines to follow-up with cardiology at this time    Diabetic neuropathy-on gabapentin 300 mg 3 times a day and Cymbalta 30 mg daily    Chronic lymphedema-with skin ulceration and recurrent cellulitis, does not want to follow-up with lymphedema clinic or wound care at this time, using lymphedema pump at home. Takes Lasix 20 mg daily. Gout of right ankle-recent flareup improved with colchicine use, plans to follow-up with podiatry    Acid reflux-well-controlled, on omeprazole 40 mg daily, advised to take as needed        Return in about 3 months (around 3/16/2023) for Follow up DM/HTN. Orders Placed This Encounter   Procedures    POCT glycosylated hemoglobin (Hb A1C)     Orders Placed This Encounter   Medications    DULoxetine (CYMBALTA) 30 MG extended release capsule     Sig: Take 1 capsule by mouth daily     Dispense:  90 capsule     Refill:  1     Requesting 1 year supply    furosemide (LASIX) 20 MG tablet     Sig: Take 1 tablet by mouth daily     Dispense:  90 tablet     Refill:  3    gabapentin (NEURONTIN) 300 MG capsule     Sig: Take 1 capsule by mouth 3 times daily for 90 days.      Dispense:  180 capsule     Refill:  1     Requesting 1 year supply    diclofenac sodium (VOLTAREN) 1 % GEL     Sig: Apply 4 g topically 4 times daily as needed for Pain Uses on feet     Dispense:  230 g Refill:  1    Insulin Glargine, 2 Unit Dial, (TOUJEO MAX SOLOSTAR) 300 UNIT/ML SOPN     Sig: Inject 30 units twice daily     Dispense:  1 Adjustable Dose Pre-filled Pen Syringe     Refill:  1    losartan (COZAAR) 50 MG tablet     Sig: Take 1 tablet by mouth daily     Dispense:  90 tablet     Refill:  3    metoprolol succinate (TOPROL XL) 50 MG extended release tablet     Sig: Take 1 tablet by mouth daily     Dispense:  90 tablet     Refill:  3     Requesting 1 year supply    omeprazole (PRILOSEC) 40 MG delayed release capsule     Sig: Take 1 capsule by mouth daily TAKE 1 CAPSULE BY MOUTH  DAILY     Dispense:  90 capsule     Refill:  1     Requesting 1 year supply    rivaroxaban (XARELTO) 20 MG TABS tablet     Sig: Take 1 tablet by mouth daily (with breakfast)     Dispense:  90 tablet     Refill:  1    Semaglutide,0.25 or 0.5MG/DOS, (OZEMPIC, 0.25 OR 0.5 MG/DOSE,) 2 MG/1.5ML SOPN     Sig: Inject 0.25 mg into the skin once a week     Dispense:  2 Adjustable Dose Pre-filled Pen Syringe     Refill:  1       Patient given educational materials - see patient instructions. Discussed use, benefit, and side effects of prescribed medications. All patientquestions answered. Pt voiced understanding. Reviewed health maintenance. Instructedto continue current medications, diet and exercise. Patient agreed with treatmentplan. Follow up as directed.      Electronically signed by Austin Alexander MD on 12/18/2022 at 8:07 PM

## 2022-12-18 PROBLEM — N18.32 STAGE 3B CHRONIC KIDNEY DISEASE (HCC): Status: ACTIVE | Noted: 2022-12-18

## 2022-12-18 ASSESSMENT — ENCOUNTER SYMPTOMS
CHEST TIGHTNESS: 0
SHORTNESS OF BREATH: 0
ABDOMINAL PAIN: 0
VOMITING: 0
WHEEZING: 0
EYE DISCHARGE: 0
NAUSEA: 0
DIARRHEA: 0
CONSTIPATION: 0
SORE THROAT: 0
COUGH: 0

## 2022-12-20 DIAGNOSIS — I82.5Y9 CHRONIC DEEP VEIN THROMBOSIS (DVT) OF PROXIMAL VEIN OF LOWER EXTREMITY, UNSPECIFIED LATERALITY (HCC): ICD-10-CM

## 2022-12-20 DIAGNOSIS — K21.9 GASTROESOPHAGEAL REFLUX DISEASE WITHOUT ESOPHAGITIS: ICD-10-CM

## 2022-12-20 DIAGNOSIS — E11.65 TYPE 2 DIABETES MELLITUS WITH HYPERGLYCEMIA, WITH LONG-TERM CURRENT USE OF INSULIN (HCC): ICD-10-CM

## 2022-12-20 DIAGNOSIS — Z79.4 TYPE 2 DIABETES MELLITUS WITH HYPERGLYCEMIA, WITH LONG-TERM CURRENT USE OF INSULIN (HCC): ICD-10-CM

## 2022-12-20 DIAGNOSIS — I87.2 VENOUS INSUFFICIENCY OF BOTH LOWER EXTREMITIES: ICD-10-CM

## 2022-12-20 DIAGNOSIS — I10 ESSENTIAL HYPERTENSION: ICD-10-CM

## 2022-12-20 DIAGNOSIS — E11.42 DIABETIC POLYNEUROPATHY ASSOCIATED WITH TYPE 2 DIABETES MELLITUS (HCC): ICD-10-CM

## 2022-12-20 RX ORDER — INSULIN GLARGINE 300 U/ML
INJECTION, SOLUTION SUBCUTANEOUS
Qty: 1 ADJUSTABLE DOSE PRE-FILLED PEN SYRINGE | Refills: 1 | Status: SHIPPED | OUTPATIENT
Start: 2022-12-20

## 2022-12-20 RX ORDER — LOSARTAN POTASSIUM 50 MG/1
50 TABLET ORAL DAILY
Qty: 90 TABLET | Refills: 3 | Status: SHIPPED | OUTPATIENT
Start: 2022-12-20

## 2022-12-20 RX ORDER — COLCHICINE 0.6 MG/1
0.6 TABLET ORAL DAILY
Qty: 90 TABLET | Refills: 1 | Status: SHIPPED | OUTPATIENT
Start: 2022-12-20

## 2022-12-20 RX ORDER — METOPROLOL SUCCINATE 50 MG/1
50 TABLET, EXTENDED RELEASE ORAL DAILY
Qty: 90 TABLET | Refills: 3 | Status: SHIPPED | OUTPATIENT
Start: 2022-12-20

## 2022-12-20 RX ORDER — FUROSEMIDE 20 MG/1
20 TABLET ORAL DAILY
Qty: 90 TABLET | Refills: 3 | Status: SHIPPED | OUTPATIENT
Start: 2022-12-20

## 2022-12-20 RX ORDER — OMEPRAZOLE 40 MG/1
40 CAPSULE, DELAYED RELEASE ORAL DAILY
Qty: 90 CAPSULE | Refills: 1 | Status: SHIPPED | OUTPATIENT
Start: 2022-12-20

## 2022-12-20 RX ORDER — GABAPENTIN 300 MG/1
300 CAPSULE ORAL 3 TIMES DAILY
Qty: 180 CAPSULE | Refills: 1 | Status: SHIPPED | OUTPATIENT
Start: 2022-12-20 | End: 2023-03-20

## 2022-12-20 RX ORDER — SEMAGLUTIDE 1.34 MG/ML
0.25 INJECTION, SOLUTION SUBCUTANEOUS WEEKLY
Qty: 2 ADJUSTABLE DOSE PRE-FILLED PEN SYRINGE | Refills: 1 | Status: SHIPPED | OUTPATIENT
Start: 2022-12-20

## 2022-12-20 RX ORDER — DULOXETIN HYDROCHLORIDE 30 MG/1
30 CAPSULE, DELAYED RELEASE ORAL DAILY
Qty: 90 CAPSULE | Refills: 1 | Status: SHIPPED | OUTPATIENT
Start: 2022-12-20

## 2022-12-20 NOTE — TELEPHONE ENCOUNTER
Please send to mail order    Vaughn Funk is calling to request a refill on the following medication(s):    Medication Request:  Requested Prescriptions     Pending Prescriptions Disp Refills    DULoxetine (CYMBALTA) 30 MG extended release capsule 90 capsule 1     Sig: Take 1 capsule by mouth daily    furosemide (LASIX) 20 MG tablet 90 tablet 3     Sig: Take 1 tablet by mouth daily    gabapentin (NEURONTIN) 300 MG capsule 180 capsule 1     Sig: Take 1 capsule by mouth 3 times daily for 90 days.     diclofenac sodium (VOLTAREN) 1 %  g 1     Sig: Apply 4 g topically 4 times daily as needed for Pain Uses on feet    Insulin Glargine, 2 Unit Dial, (TOUJEO MAX SOLOSTAR) 300 UNIT/ML SOPN 1 Adjustable Dose Pre-filled Pen Syringe 1     Sig: Inject 30 units twice daily    losartan (COZAAR) 50 MG tablet 90 tablet 3     Sig: Take 1 tablet by mouth daily    metoprolol succinate (TOPROL XL) 50 MG extended release tablet 90 tablet 3     Sig: Take 1 tablet by mouth daily    omeprazole (PRILOSEC) 40 MG delayed release capsule 90 capsule 1     Sig: Take 1 capsule by mouth daily TAKE 1 CAPSULE BY MOUTH  DAILY    rivaroxaban (XARELTO) 20 MG TABS tablet 90 tablet 1     Sig: Take 1 tablet by mouth daily (with breakfast)    Semaglutide,0.25 or 0.5MG/DOS, (OZEMPIC, 0.25 OR 0.5 MG/DOSE,) 2 MG/1.5ML SOPN 2 Adjustable Dose Pre-filled Pen Syringe 1     Sig: Inject 0.25 mg into the skin once a week    colchicine (COLCRYS) 0.6 MG tablet 90 tablet 1     Sig: Take 1 tablet by mouth daily       Last Visit Date (If Applicable):  08/47/6542    Next Visit Date:    Visit date not found
Patient informed

## 2022-12-28 DIAGNOSIS — L08.9 SKIN INFECTION: ICD-10-CM

## 2022-12-29 RX ORDER — DOXYCYCLINE HYCLATE 100 MG
100 TABLET ORAL 2 TIMES DAILY
Qty: 20 TABLET | Refills: 0 | Status: SHIPPED | OUTPATIENT
Start: 2022-12-29 | End: 2023-01-08

## 2023-01-04 ENCOUNTER — TELEPHONE (OUTPATIENT)
Dept: FAMILY MEDICINE CLINIC | Age: 68
End: 2023-01-04

## 2023-01-04 NOTE — TELEPHONE ENCOUNTER
[employer] [facility] needs a letter stating she no longer needs the oxygen so they can come  the tanks please fax to [phone], is this ok to write and send

## 2023-01-05 RX ORDER — FESOTERODINE FUMARATE 8 MG/1
8 TABLET, EXTENDED RELEASE ORAL DAILY
Qty: 90 TABLET | Refills: 1 | Status: SHIPPED | OUTPATIENT
Start: 2023-01-05

## 2023-01-24 DIAGNOSIS — L08.9 SKIN INFECTION: ICD-10-CM

## 2023-01-25 ENCOUNTER — TELEPHONE (OUTPATIENT)
Dept: FAMILY MEDICINE CLINIC | Age: 68
End: 2023-01-25

## 2023-01-25 DIAGNOSIS — I89.0 CHRONIC ACQUIRED LYMPHEDEMA: Primary | ICD-10-CM

## 2023-01-25 RX ORDER — DOXYCYCLINE HYCLATE 100 MG
100 TABLET ORAL 2 TIMES DAILY
Qty: 20 TABLET | Refills: 0 | Status: SHIPPED | OUTPATIENT
Start: 2023-01-25 | End: 2023-02-04

## 2023-01-25 NOTE — TELEPHONE ENCOUNTER
Can we place a home health referral for her leg she will not have transportation due to her  having shoulder surgery

## 2023-01-26 ENCOUNTER — TELEPHONE (OUTPATIENT)
Dept: FAMILY MEDICINE CLINIC | Age: 68
End: 2023-01-26

## 2023-01-26 NOTE — TELEPHONE ENCOUNTER
They are looking to get clarification on the wound care like how often and what if needed please advise

## 2023-01-26 NOTE — TELEPHONE ENCOUNTER
She doesn't have transportation right now so that is not going to work can home care assess the wound you think

## 2023-01-31 ENCOUNTER — TELEPHONE (OUTPATIENT)
Dept: FAMILY MEDICINE CLINIC | Age: 68
End: 2023-01-31

## 2023-01-31 DIAGNOSIS — I89.0 CHRONIC ACQUIRED LYMPHEDEMA: Primary | ICD-10-CM

## 2023-01-31 NOTE — TELEPHONE ENCOUNTER
Patient will need an order  for nurse evaluation and treatment of the wound to be included in the order since the nurses are not wound certified but can treat the area with permission . Please include on the order   until patient can go to wound care .     Fax order to [phone]

## 2023-02-07 NOTE — TELEPHONE ENCOUNTER
Vishal Mckeon is calling to request a refill on the following medication(s):    Medication Request:  Requested Prescriptions     Pending Prescriptions Disp Refills    glyBURIDE (DIABETA) 5 MG tablet 180 tablet 1     Sig: TAKE 1 TABLET BY MOUTH  TWICE DAILY WITH MEALS       Last Visit Date (If Applicable):  14/23/7875    Next Visit Date:    Visit date not found

## 2023-02-10 DIAGNOSIS — I87.2 VENOUS INSUFFICIENCY OF BOTH LOWER EXTREMITIES: ICD-10-CM

## 2023-02-10 RX ORDER — FUROSEMIDE 20 MG/1
20 TABLET ORAL DAILY
Qty: 90 TABLET | Refills: 3 | Status: SHIPPED | OUTPATIENT
Start: 2023-02-10

## 2023-02-10 RX ORDER — GLYBURIDE 5 MG/1
TABLET ORAL
Qty: 180 TABLET | Refills: 3 | OUTPATIENT
Start: 2023-02-10

## 2023-02-10 NOTE — TELEPHONE ENCOUNTER
Gonzalez Mariscal is calling to request a refill on the following medication(s):    Medication Request:  Requested Prescriptions     Pending Prescriptions Disp Refills    furosemide (LASIX) 20 MG tablet 90 tablet 3     Sig: Take 1 tablet by mouth daily       Last Visit Date (If Applicable):  83/92/9761    Next Visit Date:    Visit date not found

## 2023-02-15 ENCOUNTER — TELEPHONE (OUTPATIENT)
Dept: FAMILY MEDICINE CLINIC | Age: 68
End: 2023-02-15

## 2023-02-15 DIAGNOSIS — S81.802D WOUND OF LEFT LOWER EXTREMITY, SUBSEQUENT ENCOUNTER: Primary | ICD-10-CM

## 2023-02-15 NOTE — TELEPHONE ENCOUNTER
She has had the home care coming out 3 times a week and the one healed but the other one is just not getting any better, do you think we should have her swab the wound she has been taking doxy as well please advise

## 2023-02-20 DIAGNOSIS — S81.802D WOUND OF LEFT LOWER EXTREMITY, SUBSEQUENT ENCOUNTER: ICD-10-CM

## 2023-02-20 DIAGNOSIS — L08.9 SKIN INFECTION: Primary | ICD-10-CM

## 2023-02-20 RX ORDER — CLINDAMYCIN HYDROCHLORIDE 300 MG/1
300 CAPSULE ORAL 2 TIMES DAILY
Qty: 14 CAPSULE | Refills: 0 | Status: SHIPPED | OUTPATIENT
Start: 2023-02-20 | End: 2023-02-27

## 2023-02-24 RX ORDER — SEMAGLUTIDE 1.34 MG/ML
INJECTION, SOLUTION SUBCUTANEOUS
Qty: 1 ADJUSTABLE DOSE PRE-FILLED PEN SYRINGE | Refills: 0 | COMMUNITY
Start: 2023-02-24

## 2023-03-09 ENCOUNTER — TELEPHONE (OUTPATIENT)
Dept: FAMILY MEDICINE CLINIC | Age: 68
End: 2023-03-09

## 2023-03-09 NOTE — TELEPHONE ENCOUNTER
She is calling the leg ulcer looks better but not healing she is wondering if she needs a stronger antibiodic she finished the clindamycin a while ago and it still not healing all the way, her sugars have been running between  124-145 with the ozempic she still doesn't have ride still at this time please advise

## 2023-03-10 DIAGNOSIS — B37.9 YEAST INFECTION: Primary | ICD-10-CM

## 2023-03-10 RX ORDER — FLUCONAZOLE 100 MG/1
100 TABLET ORAL DAILY
Qty: 6 TABLET | Refills: 0 | Status: CANCELLED | OUTPATIENT
Start: 2023-03-10 | End: 2023-03-17

## 2023-03-10 RX ORDER — FLUCONAZOLE 150 MG/1
150 TABLET ORAL
Qty: 2 TABLET | Refills: 0 | Status: SHIPPED | OUTPATIENT
Start: 2023-03-10 | End: 2023-03-16

## 2023-03-10 NOTE — TELEPHONE ENCOUNTER
Her home health nurse comes back out next Thursday she will have her look sat it but Tin Elizabeth did say it is getting smaller she is changing is twice a day can you send her in some diflucan please

## 2023-03-14 DIAGNOSIS — E11.65 TYPE 2 DIABETES MELLITUS WITH HYPERGLYCEMIA, WITHOUT LONG-TERM CURRENT USE OF INSULIN (HCC): ICD-10-CM

## 2023-03-14 RX ORDER — SEMAGLUTIDE 1.34 MG/ML
INJECTION, SOLUTION SUBCUTANEOUS
Qty: 1 ADJUSTABLE DOSE PRE-FILLED PEN SYRINGE | Refills: 0 | COMMUNITY
Start: 2023-03-14

## 2023-03-14 NOTE — TELEPHONE ENCOUNTER
Catalina Coates is calling to request a refill on the following medication(s):    Medication Request:  Requested Prescriptions     Pending Prescriptions Disp Refills    Insulin Pen Needle 32G X 4 MM MISC 100 each 11     Si each by Does not apply route 2 times daily       Last Visit Date (If Applicable):      Next Visit Date:    Visit date not found

## 2023-03-16 ENCOUNTER — TELEPHONE (OUTPATIENT)
Dept: FAMILY MEDICINE CLINIC | Age: 68
End: 2023-03-16

## 2023-03-16 NOTE — TELEPHONE ENCOUNTER
Nurse Page with [facility] called and states that patient has a wound on her left posterior leg and they have been using calcium alginate silver. It isn't working and would like to know if there is something else she can use?

## 2023-03-17 NOTE — TELEPHONE ENCOUNTER
Made patient aware she is seeing her nurse next Thursday and they are going to try meta honey and she will call me when the nurse is there

## 2023-03-20 ENCOUNTER — TELEPHONE (OUTPATIENT)
Dept: FAMILY MEDICINE CLINIC | Age: 68
End: 2023-03-20

## 2023-03-20 DIAGNOSIS — S81.802D WOUND OF LEFT LOWER EXTREMITY, SUBSEQUENT ENCOUNTER: Primary | ICD-10-CM

## 2023-03-20 NOTE — TELEPHONE ENCOUNTER
I spoke with Valentín Acharya the [facility] nurse and she thinks that it needs to be seen by wound care so I did a referral and Rob Prado is going to get an appointment scheduled

## 2023-03-23 ENCOUNTER — TELEPHONE (OUTPATIENT)
Dept: FAMILY MEDICINE CLINIC | Age: 68
End: 2023-03-23

## 2023-04-18 DIAGNOSIS — Z79.4 TYPE 2 DIABETES MELLITUS WITH HYPERGLYCEMIA, WITH LONG-TERM CURRENT USE OF INSULIN (HCC): ICD-10-CM

## 2023-04-18 DIAGNOSIS — E11.65 TYPE 2 DIABETES MELLITUS WITH HYPERGLYCEMIA, WITH LONG-TERM CURRENT USE OF INSULIN (HCC): ICD-10-CM

## 2023-04-19 RX ORDER — INSULIN GLARGINE 300 U/ML
INJECTION, SOLUTION SUBCUTANEOUS
Qty: 6 ML | Refills: 0 | Status: SHIPPED | OUTPATIENT
Start: 2023-04-19

## 2023-05-05 DIAGNOSIS — S81.802D WOUND OF LEFT LOWER EXTREMITY, SUBSEQUENT ENCOUNTER: Primary | ICD-10-CM

## 2023-05-15 RX ORDER — FESOTERODINE FUMARATE 8 MG/1
8 TABLET, EXTENDED RELEASE ORAL DAILY
Qty: 90 TABLET | Refills: 1 | Status: SHIPPED | OUTPATIENT
Start: 2023-05-15 | End: 2023-05-17 | Stop reason: SDUPTHER

## 2023-05-15 NOTE — DISCHARGE INSTRUCTIONS
[address] [facility],[address] -Phone: [phone] Fax: [phone]   Visit  Discharge Instructions / Physician Orders    DATE: 5/17/2023     Home Care:      SUPPLIES ORDERED THRU:      Wound Location:      Cleanse with: Liquid antibacterial soap and water, rinse well      Dressing Orders:      Frequency:      Additional Orders: Increase protein to diet (meat, cheese, eggs, fish, peanut butter, nuts and beans)  ELEVATE LEGS AS MUCH AS POSSIBLE    Your next appointment with [address] [place] [address] [facility]     (Please note your next appointment above and if you are unable to keep, kindly give a 24 hour notice. Thank you.)  If more than 15 min late we cannot guarantee you will be seen due to clinician schedule  Per Policy, Excessive cancellation will call for dismissal from program.     If you experience any of the following, please call the [address] [place] [address] during business hours:  [phone]  Your Phone call may be forwarded to [employer] during business hours that [facility] is closed. * Increase in Pain  * Temperature over 101  * Increase in drainage from your wound  * Drainage with a foul odor  * Bleeding  * Increase in swelling  * Need for compression bandage changes due to slippage, breakthrough drainage. If you need medical attention outside of the business hours of the [address] [place] [address] please contact your PCP or go to the nearest emergency room. The information contained in the After Visit Summary has been reviewed with me, the patient and/or responsible adult, by my health care provider(s). I had the opportunity to ask questions regarding this information.  I have elected to receive;      []After Visit Summary  [x]Comprehensive Discharge Instruction      Patient signature______________________________________Date:________

## 2023-05-15 NOTE — TELEPHONE ENCOUNTER
Osiris Maria is calling to request a refill on the following medication(s):    Medication Request:  Requested Prescriptions     Pending Prescriptions Disp Refills    Fesoterodine Fumarate ER 8 MG TB24 90 tablet 1     Sig: Take 8 mg by mouth daily       Last Visit Date (If Applicable):  90/83/2312    Next Visit Date:    5/22/2023

## 2023-05-17 ENCOUNTER — HOSPITAL ENCOUNTER (OUTPATIENT)
Dept: WOUND CARE | Age: 68
Discharge: HOME | End: 2023-05-17

## 2023-05-17 RX ORDER — FESOTERODINE FUMARATE 8 MG/1
8 TABLET, EXTENDED RELEASE ORAL DAILY
Qty: 90 TABLET | Refills: 1 | Status: SHIPPED | OUTPATIENT
Start: 2023-05-17

## 2023-05-17 NOTE — TELEPHONE ENCOUNTER
Should have went to the mail order     Terese Singh is calling to request a refill on the following medication(s):    Medication Request:  Requested Prescriptions     Pending Prescriptions Disp Refills    Fesoterodine Fumarate ER 8 MG TB24 90 tablet 1     Sig: Take 8 mg by mouth daily       Last Visit Date (If Applicable):  42/60/1484    Next Visit Date:    5/22/2023

## 2023-05-22 ENCOUNTER — OFFICE VISIT (OUTPATIENT)
Dept: FAMILY MEDICINE CLINIC | Age: 68
End: 2023-05-22
Payer: COMMERCIAL

## 2023-05-22 VITALS
OXYGEN SATURATION: 94 % | SYSTOLIC BLOOD PRESSURE: 126 MMHG | RESPIRATION RATE: 16 BRPM | HEART RATE: 71 BPM | DIASTOLIC BLOOD PRESSURE: 80 MMHG | WEIGHT: 293 LBS | BODY MASS INDEX: 55.88 KG/M2

## 2023-05-22 DIAGNOSIS — N18.32 STAGE 3B CHRONIC KIDNEY DISEASE (HCC): ICD-10-CM

## 2023-05-22 DIAGNOSIS — I10 ESSENTIAL HYPERTENSION: ICD-10-CM

## 2023-05-22 DIAGNOSIS — Z11.59 NEED FOR HEPATITIS C SCREENING TEST: ICD-10-CM

## 2023-05-22 DIAGNOSIS — E11.65 TYPE 2 DIABETES MELLITUS WITH HYPERGLYCEMIA, WITH LONG-TERM CURRENT USE OF INSULIN (HCC): Primary | ICD-10-CM

## 2023-05-22 DIAGNOSIS — Z79.4 TYPE 2 DIABETES MELLITUS WITH HYPERGLYCEMIA, WITH LONG-TERM CURRENT USE OF INSULIN (HCC): Primary | ICD-10-CM

## 2023-05-22 LAB — HBA1C MFR BLD: 6.9 %

## 2023-05-22 PROCEDURE — 2022F DILAT RTA XM EVC RTNOPTHY: CPT | Performed by: STUDENT IN AN ORGANIZED HEALTH CARE EDUCATION/TRAINING PROGRAM

## 2023-05-22 PROCEDURE — 1124F ACP DISCUSS-NO DSCNMKR DOCD: CPT | Performed by: STUDENT IN AN ORGANIZED HEALTH CARE EDUCATION/TRAINING PROGRAM

## 2023-05-22 PROCEDURE — 1090F PRES/ABSN URINE INCON ASSESS: CPT | Performed by: STUDENT IN AN ORGANIZED HEALTH CARE EDUCATION/TRAINING PROGRAM

## 2023-05-22 PROCEDURE — 83036 HEMOGLOBIN GLYCOSYLATED A1C: CPT | Performed by: STUDENT IN AN ORGANIZED HEALTH CARE EDUCATION/TRAINING PROGRAM

## 2023-05-22 PROCEDURE — G8400 PT W/DXA NO RESULTS DOC: HCPCS | Performed by: STUDENT IN AN ORGANIZED HEALTH CARE EDUCATION/TRAINING PROGRAM

## 2023-05-22 PROCEDURE — 3044F HG A1C LEVEL LT 7.0%: CPT | Performed by: STUDENT IN AN ORGANIZED HEALTH CARE EDUCATION/TRAINING PROGRAM

## 2023-05-22 PROCEDURE — G8427 DOCREV CUR MEDS BY ELIG CLIN: HCPCS | Performed by: STUDENT IN AN ORGANIZED HEALTH CARE EDUCATION/TRAINING PROGRAM

## 2023-05-22 PROCEDURE — 3078F DIAST BP <80 MM HG: CPT | Performed by: STUDENT IN AN ORGANIZED HEALTH CARE EDUCATION/TRAINING PROGRAM

## 2023-05-22 PROCEDURE — G8417 CALC BMI ABV UP PARAM F/U: HCPCS | Performed by: STUDENT IN AN ORGANIZED HEALTH CARE EDUCATION/TRAINING PROGRAM

## 2023-05-22 PROCEDURE — 3074F SYST BP LT 130 MM HG: CPT | Performed by: STUDENT IN AN ORGANIZED HEALTH CARE EDUCATION/TRAINING PROGRAM

## 2023-05-22 PROCEDURE — 1036F TOBACCO NON-USER: CPT | Performed by: STUDENT IN AN ORGANIZED HEALTH CARE EDUCATION/TRAINING PROGRAM

## 2023-05-22 PROCEDURE — 3017F COLORECTAL CA SCREEN DOC REV: CPT | Performed by: STUDENT IN AN ORGANIZED HEALTH CARE EDUCATION/TRAINING PROGRAM

## 2023-05-22 PROCEDURE — 99214 OFFICE O/P EST MOD 30 MIN: CPT | Performed by: STUDENT IN AN ORGANIZED HEALTH CARE EDUCATION/TRAINING PROGRAM

## 2023-05-22 RX ORDER — DULAGLUTIDE 3 MG/.5ML
3 INJECTION, SOLUTION SUBCUTANEOUS WEEKLY
Qty: 2 ADJUSTABLE DOSE PRE-FILLED PEN SYRINGE | Refills: 1 | Status: SHIPPED | OUTPATIENT
Start: 2023-05-22

## 2023-05-22 RX ORDER — HYDROCHLOROTHIAZIDE 12.5 MG/1
12.5 CAPSULE, GELATIN COATED ORAL EVERY MORNING
Qty: 90 CAPSULE | Refills: 1 | Status: SHIPPED | OUTPATIENT
Start: 2023-05-22

## 2023-05-22 RX ORDER — INSULIN GLARGINE 300 U/ML
INJECTION, SOLUTION SUBCUTANEOUS
Qty: 2 ADJUSTABLE DOSE PRE-FILLED PEN SYRINGE | Refills: 0 | COMMUNITY
Start: 2023-05-22

## 2023-05-22 SDOH — ECONOMIC STABILITY: INCOME INSECURITY: HOW HARD IS IT FOR YOU TO PAY FOR THE VERY BASICS LIKE FOOD, HOUSING, MEDICAL CARE, AND HEATING?: NOT HARD AT ALL

## 2023-05-22 SDOH — ECONOMIC STABILITY: FOOD INSECURITY: WITHIN THE PAST 12 MONTHS, THE FOOD YOU BOUGHT JUST DIDN'T LAST AND YOU DIDN'T HAVE MONEY TO GET MORE.: NEVER TRUE

## 2023-05-22 SDOH — ECONOMIC STABILITY: FOOD INSECURITY: WITHIN THE PAST 12 MONTHS, YOU WORRIED THAT YOUR FOOD WOULD RUN OUT BEFORE YOU GOT MONEY TO BUY MORE.: NEVER TRUE

## 2023-05-22 SDOH — ECONOMIC STABILITY: HOUSING INSECURITY
IN THE LAST 12 MONTHS, WAS THERE A TIME WHEN YOU DID NOT HAVE A STEADY PLACE TO SLEEP OR SLEPT IN A SHELTER (INCLUDING NOW)?: NO

## 2023-05-22 ASSESSMENT — PATIENT HEALTH QUESTIONNAIRE - PHQ9
SUM OF ALL RESPONSES TO PHQ QUESTIONS 1-9: 0
SUM OF ALL RESPONSES TO PHQ QUESTIONS 1-9: 0
2. FEELING DOWN, DEPRESSED OR HOPELESS: 0
SUM OF ALL RESPONSES TO PHQ QUESTIONS 1-9: 0
SUM OF ALL RESPONSES TO PHQ9 QUESTIONS 1 & 2: 0
1. LITTLE INTEREST OR PLEASURE IN DOING THINGS: 0
SUM OF ALL RESPONSES TO PHQ QUESTIONS 1-9: 0

## 2023-05-22 NOTE — PROGRESS NOTES
POCT A1c today 6.9, increased Trulicity to 3 mg/week, continue Toujeo 30 units twice daily    Hypertension-controlled, continue losartan 50 mg daily and resumed on hydrochlorothiazide 12.5 mg daily. Discontinued Lasix. Stage III CKD-stable, referral placed for nephrology      Return in about 3 months (around 8/22/2023) for Follow up DM/HTN.     Orders Placed This Encounter   Procedures    CBC with Auto Differential     Standing Status:   Future     Standing Expiration Date:   11/22/2023    Comprehensive Metabolic Panel, Fasting     Standing Status:   Future     Standing Expiration Date:   11/22/2023    Lipid, Fasting     Standing Status:   Future     Standing Expiration Date:   11/22/2023    TSH with Reflex     Standing Status:   Future     Standing Expiration Date:   11/22/2023    Vitamin D 25 Hydroxy     Standing Status:   Future     Standing Expiration Date:   11/22/2023    Microalbumin, Ur     Standing Status:   Future     Standing Expiration Date:   8/22/2023    Hepatitis C Antibody     Standing Status:   Future     Standing Expiration Date:   11/22/2023    [facility] ([facility]) - Grover Khalil MD, Nephrology, [facility]     Referral Priority:   Routine     Referral Type:   Eval and Treat     Referral Reason:   Specialty Services Required     Referred to Provider:   Elsa Jaffe MD     Requested Specialty:   Nephrology     Number of Visits Requested:   1    POCT glycosylated hemoglobin (Hb A1C)     Orders Placed This Encounter   Medications    Insulin Glargine, 2 Unit Dial, (TOUJEO MAX SOLOSTAR) 300 UNIT/ML SOPN     Sig: LOT: [id number] 5/31/24     Dispense:  2 Adjustable Dose Pre-filled Pen Syringe     Refill:  0    hydroCHLOROthiazide (MICROZIDE) 12.5 MG capsule     Sig: Take 1 capsule by mouth every morning     Dispense:  90 capsule     Refill:  1    Dulaglutide (TRULICITY) 3 TU/8.6CQ SOPN     Sig: Inject 3 mg into the skin once a week     Dispense:  2 Adjustable Dose Pre-filled Pen Syringe     Refill:  1    Insulin

## 2023-05-23 RX ORDER — INSULIN GLARGINE 300 U/ML
INJECTION, SOLUTION SUBCUTANEOUS
Qty: 1 ADJUSTABLE DOSE PRE-FILLED PEN SYRINGE | Refills: 0 | Status: SHIPPED | OUTPATIENT
Start: 2023-05-23

## 2023-05-23 ASSESSMENT — ENCOUNTER SYMPTOMS
VOMITING: 0
NAUSEA: 0
CHEST TIGHTNESS: 0
ABDOMINAL PAIN: 0
COUGH: 0
SHORTNESS OF BREATH: 0
SORE THROAT: 0
DIARRHEA: 0
EYE DISCHARGE: 0
WHEEZING: 0
CONSTIPATION: 0

## 2023-05-30 ENCOUNTER — HOSPITAL ENCOUNTER (OUTPATIENT)
Dept: WOUND CARE | Age: 68
Discharge: HOME | End: 2023-05-30
Payer: COMMERCIAL

## 2023-05-30 VITALS
DIASTOLIC BLOOD PRESSURE: 94 MMHG | TEMPERATURE: 98.7 F | HEART RATE: 81 BPM | RESPIRATION RATE: 18 BRPM | SYSTOLIC BLOOD PRESSURE: 176 MMHG | WEIGHT: 293 LBS | BODY MASS INDEX: 55.88 KG/M2

## 2023-05-30 DIAGNOSIS — E11.42 DIABETIC POLYNEUROPATHY ASSOCIATED WITH TYPE 2 DIABETES MELLITUS (HCC): ICD-10-CM

## 2023-05-30 DIAGNOSIS — B35.1 ONYCHOMYCOSIS: ICD-10-CM

## 2023-05-30 DIAGNOSIS — L97.813 SKIN ULCER OF PRETIBIAL REGION OF RIGHT LOWER EXTREMITY, WITH NECROSIS OF MUSCLE (HCC): ICD-10-CM

## 2023-05-30 DIAGNOSIS — I89.0 LYMPHEDEMA OF BOTH LOWER EXTREMITIES: Primary | Chronic | ICD-10-CM

## 2023-05-30 DIAGNOSIS — L02.91 ABSCESS: ICD-10-CM

## 2023-05-30 PROCEDURE — 99214 OFFICE O/P EST MOD 30 MIN: CPT

## 2023-05-30 PROCEDURE — 11042 DBRDMT SUBQ TIS 1ST 20SQCM/<: CPT | Performed by: SURGERY

## 2023-05-30 PROCEDURE — 99203 OFFICE O/P NEW LOW 30 MIN: CPT | Performed by: SURGERY

## 2023-05-30 PROCEDURE — 11042 DBRDMT SUBQ TIS 1ST 20SQCM/<: CPT

## 2023-05-30 RX ORDER — LIDOCAINE HYDROCHLORIDE 20 MG/ML
JELLY TOPICAL ONCE
OUTPATIENT
Start: 2023-05-30 | End: 2023-05-30

## 2023-05-30 RX ORDER — LIDOCAINE HYDROCHLORIDE 40 MG/ML
SOLUTION TOPICAL ONCE
OUTPATIENT
Start: 2023-05-30 | End: 2023-05-30

## 2023-05-30 RX ORDER — LIDOCAINE HYDROCHLORIDE 20 MG/ML
JELLY TOPICAL ONCE
Status: COMPLETED | OUTPATIENT
Start: 2023-05-30 | End: 2023-05-30

## 2023-05-30 RX ORDER — LIDOCAINE HYDROCHLORIDE 40 MG/ML
SOLUTION TOPICAL ONCE
Status: DISCONTINUED | OUTPATIENT
Start: 2023-05-30 | End: 2023-05-31 | Stop reason: HOSPADM

## 2023-05-30 RX ORDER — GABAPENTIN 300 MG/1
300 CAPSULE ORAL DAILY
COMMUNITY

## 2023-05-30 RX ADMIN — LIDOCAINE HYDROCHLORIDE 6 ML: 20 JELLY TOPICAL at 13:54

## 2023-05-30 NOTE — PROGRESS NOTES
Multilayer Compression Wrap   (Not Unna) Below the Knee    NAME:  Librado Greco  YOB: 1955  MEDICAL RECORD NUMBER:  [id number]  DATE:  5/30/2023       [x] Removed old Multilayer wrap if indicated and wash leg with mild soap/water. [x] Applied moisturizing agent to dry skin as needed. [x] Applied primary and secondary dressing as ordered   [x] Applied multilayered dressing below the knee to Left lower leg(s). [x] Instructed patient/caregiver not to remove dressing and to keep it clean and dry. [x] Instructed patient/caregiver on complications to report to provider, such as pain, numbness in toes, heavy drainage, and slippage of dressing. [x] Instructed patient on purpose of compression dressing and on activity and exercise recommendations.     Electronically signed by Laina Smalls RN on 5/30/2023 at 2:13 PM

## 2023-05-30 NOTE — DISCHARGE INSTRUCTIONS
[address] [facility],[address] -Phone: [phone] Fax: [phone]   Visit  Discharge Instructions / Physician Orders    DATE: 5/30/2023     Home Care: [facility]Existing Patient)-NEW ORDERS     SUPPLIES ORDERED THRU: N/A     Wound Location: Left Lower Leg     Cleanse with: Keep Dry and Intact     Dressing Orders: Silvercel, Kerramax, 4-Layer Wrap     Frequency: Keep Dry and Intact     Additional Orders: Increase protein to diet (meat, cheese, eggs, fish, peanut butter, nuts and beans)  ELEVATE LEGS AS MUCH AS POSSIBLE    Your next appointment with [address] [place] [address] is in 1 week with Dr. Cristal Bailey     (Please note your next appointment above and if you are unable to keep, kindly give a 24 hour notice. Thank you.)  If more than 15 min late we cannot guarantee you will be seen due to clinician schedule  Per Policy, Excessive cancellation will call for dismissal from program.     If you experience any of the following, please call the [address] [place] [address] during business hours:  [phone]  Your Phone call may be forwarded to [employer] during business hours that [facility] [employer] is closed. * Increase in Pain  * Temperature over 101  * Increase in drainage from your wound  * Drainage with a foul odor  * Bleeding  * Increase in swelling  * Need for compression bandage changes due to slippage, breakthrough drainage. If you need medical attention outside of the business hours of the [address] [place] [address] please contact your PCP or go to the nearest emergency room. The information contained in the After Visit Summary has been reviewed with me, the patient and/or responsible adult, by my health care provider(s). I had the opportunity to ask questions regarding this information.  I have elected to receive;      []After Visit Summary  [x]Comprehensive Discharge Instruction      Patient signature______________________________________Date:________  Electronically signed by Titi Rust MD on

## 2023-05-30 NOTE — PROGRESS NOTES
[address]   Progress Note and Procedure Note      Ramon Aaron  MEDICAL RECORD NUMBER:  [id number]  AGE: 77 y.o. GENDER: female  : 1955  EPISODE DATE:  2023    Subjective:     Chief Complaint   Patient presents with    Wound Check     Left leg         HISTORY of PRESENT ILLNESS HPI     Ramon Aaron is a 77 y.o. female who presents today for wound/ulcer evaluation. She has a history of bilateral lower extremity lymphedema and has weeping ulcers on the left leg. She has lymphedema pumps at home but has not gone to lymphedema therapy for several years. Although she uses pumps regularly, she does not wear any maintenance compression garments. She will likely need a compression wrap once this wound is healed.       History of Wound Context: Patient with bilateral lower extremity lymphedema  Wound/Ulcer Pain Timing/Severity: none  Quality of pain: N/A  Severity:  0 / 10   Modifying Factors: None  Associated Signs/Symptoms: edema    Ulcer Identification:  Ulcer Type: lymphedema  Contributing Factors: edema and obesity    Wound:         PAST MEDICAL HISTORY        Diagnosis Date    DM (diabetes mellitus) ([facility] 2012    DVT (deep venous thrombosis) ([facility] 06/22/2012    x1 provoked s/p long travel    GERD (gastroesophageal reflux disease) 2012    Hypertension     MVP (mitral valve prolapse) 2012    KATHI (obstructive sleep apnea)        PAST SURGICAL HISTORY    Past Surgical History:   Procedure Laterality Date    HYSTERECTOMY (CERVIX STATUS UNKNOWN)      TONSILLECTOMY         FAMILY HISTORY    Family History   Problem Relation Age of Onset    Heart Disease Mother        SOCIAL HISTORY    Social History     Tobacco Use    Smoking status: Former     Packs/day: 0.25     Years: 20.00     Pack years: 5.00     Types: Cigarettes     Quit date: 1983     Years since quittin.9    Smokeless tobacco: Never    Tobacco comments:     Quit 30 years ago

## 2023-06-06 ENCOUNTER — HOSPITAL ENCOUNTER (OUTPATIENT)
Dept: WOUND CARE | Age: 68
Discharge: HOME | End: 2023-06-06
Payer: COMMERCIAL

## 2023-06-06 VITALS
SYSTOLIC BLOOD PRESSURE: 183 MMHG | RESPIRATION RATE: 18 BRPM | DIASTOLIC BLOOD PRESSURE: 78 MMHG | WEIGHT: 293 LBS | TEMPERATURE: 98.3 F | HEIGHT: 72 IN | HEART RATE: 85 BPM | BODY MASS INDEX: 39.68 KG/M2

## 2023-06-06 DIAGNOSIS — E11.42 DIABETIC POLYNEUROPATHY ASSOCIATED WITH TYPE 2 DIABETES MELLITUS (HCC): ICD-10-CM

## 2023-06-06 DIAGNOSIS — I89.0 LYMPHEDEMA OF BOTH LOWER EXTREMITIES: ICD-10-CM

## 2023-06-06 DIAGNOSIS — L97.813 SKIN ULCER OF PRETIBIAL REGION OF RIGHT LOWER EXTREMITY, WITH NECROSIS OF MUSCLE (HCC): Primary | ICD-10-CM

## 2023-06-06 PROCEDURE — 87070 CULTURE OTHR SPECIMN AEROBIC: CPT

## 2023-06-06 PROCEDURE — 87077 CULTURE AEROBIC IDENTIFY: CPT

## 2023-06-06 PROCEDURE — 86403 PARTICLE AGGLUT ANTBDY SCRN: CPT

## 2023-06-06 PROCEDURE — 87076 CULTURE ANAEROBE IDENT EACH: CPT

## 2023-06-06 PROCEDURE — 87186 SC STD MICRODIL/AGAR DIL: CPT

## 2023-06-06 PROCEDURE — 11042 DBRDMT SUBQ TIS 1ST 20SQCM/<: CPT

## 2023-06-06 PROCEDURE — 87185 SC STD ENZYME DETCJ PER NZM: CPT

## 2023-06-06 PROCEDURE — 87075 CULTR BACTERIA EXCEPT BLOOD: CPT

## 2023-06-06 PROCEDURE — 87205 SMEAR GRAM STAIN: CPT

## 2023-06-06 RX ORDER — LIDOCAINE HYDROCHLORIDE 20 MG/ML
JELLY TOPICAL ONCE
Status: COMPLETED | OUTPATIENT
Start: 2023-06-06 | End: 2023-06-06

## 2023-06-06 RX ORDER — LIDOCAINE HYDROCHLORIDE 20 MG/ML
JELLY TOPICAL ONCE
OUTPATIENT
Start: 2023-06-06 | End: 2023-06-06

## 2023-06-06 RX ORDER — LIDOCAINE HYDROCHLORIDE 40 MG/ML
SOLUTION TOPICAL ONCE
OUTPATIENT
Start: 2023-06-06 | End: 2023-06-06

## 2023-06-06 RX ADMIN — LIDOCAINE HYDROCHLORIDE 6 ML: 20 JELLY TOPICAL at 14:39

## 2023-06-06 NOTE — PROGRESS NOTES
Multilayer Compression Wrap   (Not Unna) Below the Knee    NAME:  Hernan Chavez  YOB: 1955  MEDICAL RECORD NUMBER:  [id number]  DATE:  6/6/2023    Multilayer compression wrap: Removed old Multilayer wrap if indicated and wash leg with mild soap/water. Applied moisturizing agent to dry skin as needed. Applied primary and secondary dressing as ordered. Applied multilayered dressing below the knee to left lower leg. Instructed patient/caregiver not to remove dressing and to keep it clean and dry. Instructed patient/caregiver on complications to report to provider, such as pain, numbness in toes, heavy drainage, and slippage of dressing. Instructed patient on purpose of compression dressing and on activity and exercise recommendations.       Electronically signed by Cindy Seaman RN on 6/6/2023 at 3:38 PM

## 2023-06-06 NOTE — PROGRESS NOTES
[address]   Progress Note and Procedure Note      Jayjay Gilmore  MEDICAL RECORD NUMBER:  [id number]  AGE: 77 y.o. GENDER: female  : 1955  EPISODE DATE:  2023    Subjective:     Chief Complaint   Patient presents with    Wound Check     LLE         HISTORY of PRESENT ILLNESS HPI     Jayjay Gilmore is a 77 y.o. female who presents today for wound/ulcer evaluation. She has a history of bilateral lower extremity lymphedema and has weeping ulcers on the left leg. She has lymphedema pumps at home but has not gone to lymphedema therapy for several years. Although she uses pumps regularly, she does not wear any maintenance compression garments. She will likely need a compression wrap once this wound is healed. Culture was taken.     History of Wound Context: Patient with bilateral lower extremity lymphedema  Wound/Ulcer Pain Timing/Severity: none  Quality of pain: N/A  Severity:  0 / 10   Modifying Factors: None  Associated Signs/Symptoms: edema    Ulcer Identification:  Ulcer Type: lymphedema  Contributing Factors: edema and obesity    Wound:         PAST MEDICAL HISTORY        Diagnosis Date    DM (diabetes mellitus) ([facility] 2012    DVT (deep venous thrombosis) ([facility] 06/22/2012    x1 provoked s/p long travel    GERD (gastroesophageal reflux disease) 2012    Hypertension     MVP (mitral valve prolapse) 2012    KATHI (obstructive sleep apnea)        PAST SURGICAL HISTORY    Past Surgical History:   Procedure Laterality Date    HYSTERECTOMY (CERVIX STATUS UNKNOWN)      TONSILLECTOMY         FAMILY HISTORY    Family History   Problem Relation Age of Onset    Heart Disease Mother        SOCIAL HISTORY    Social History     Tobacco Use    Smoking status: Former     Packs/day: 0.25     Years: 20.00     Pack years: 5.00     Types: Cigarettes     Quit date: 1983     Years since quittin.9     Passive exposure: Past    Smokeless tobacco: Never

## 2023-06-10 LAB
MICROORGANISM SPEC CULT: ABNORMAL
MICROORGANISM/AGENT SPEC: ABNORMAL
MICROORGANISM/AGENT SPEC: ABNORMAL
SPECIMEN DESCRIPTION: ABNORMAL

## 2023-06-12 NOTE — TELEPHONE ENCOUNTER
Carolynn Jose is calling to request a refill on the following medication(s):    Medication Request:  Requested Prescriptions     Pending Prescriptions Disp Refills    mirabegron (MYRBETRIQ) 25 MG TB24 30 tablet 3     Sig: Take 1 tablet by mouth in the morning.        Last Visit Date (If Applicable):  Visit date not found    Next Visit Date:    10/25/2022 Additional Notes: -patient to use topical 5FU to the aa for 1-2 weeks until red, scab, scale forms then d/c and start vaseline. Detail Level: Zone Render Risk Assessment In Note?: yes

## 2023-06-20 ENCOUNTER — HOSPITAL ENCOUNTER (OUTPATIENT)
Dept: WOUND CARE | Age: 68
Discharge: HOME | End: 2023-06-20
Payer: COMMERCIAL

## 2023-06-20 VITALS
WEIGHT: 293 LBS | TEMPERATURE: 97.9 F | DIASTOLIC BLOOD PRESSURE: 81 MMHG | SYSTOLIC BLOOD PRESSURE: 182 MMHG | HEIGHT: 72 IN | BODY MASS INDEX: 39.68 KG/M2 | HEART RATE: 83 BPM | RESPIRATION RATE: 20 BRPM

## 2023-06-20 DIAGNOSIS — E11.42 DIABETIC POLYNEUROPATHY ASSOCIATED WITH TYPE 2 DIABETES MELLITUS (HCC): ICD-10-CM

## 2023-06-20 DIAGNOSIS — L97.813 SKIN ULCER OF PRETIBIAL REGION OF RIGHT LOWER EXTREMITY, WITH NECROSIS OF MUSCLE (HCC): Primary | ICD-10-CM

## 2023-06-20 DIAGNOSIS — I89.0 LYMPHEDEMA OF BOTH LOWER EXTREMITIES: ICD-10-CM

## 2023-06-20 PROCEDURE — 11042 DBRDMT SUBQ TIS 1ST 20SQCM/<: CPT | Performed by: SURGERY

## 2023-06-20 PROCEDURE — 11042 DBRDMT SUBQ TIS 1ST 20SQCM/<: CPT

## 2023-06-20 RX ORDER — LIDOCAINE HYDROCHLORIDE 20 MG/ML
JELLY TOPICAL ONCE
OUTPATIENT
Start: 2023-06-20 | End: 2023-06-20

## 2023-06-20 RX ORDER — LIDOCAINE HYDROCHLORIDE 20 MG/ML
JELLY TOPICAL ONCE
Status: COMPLETED | OUTPATIENT
Start: 2023-06-20 | End: 2023-06-20

## 2023-06-20 RX ORDER — DOXYCYCLINE HYCLATE 100 MG
100 TABLET ORAL 2 TIMES DAILY
Qty: 14 TABLET | Refills: 0 | Status: SHIPPED | OUTPATIENT
Start: 2023-06-20 | End: 2023-06-27

## 2023-06-20 RX ORDER — LIDOCAINE HYDROCHLORIDE 40 MG/ML
SOLUTION TOPICAL ONCE
OUTPATIENT
Start: 2023-06-20 | End: 2023-06-20

## 2023-06-20 RX ADMIN — LIDOCAINE HYDROCHLORIDE 3 ML: 20 JELLY TOPICAL at 14:19

## 2023-06-20 ASSESSMENT — PAIN SCALES - GENERAL: PAINLEVEL_OUTOF10: 0

## 2023-06-20 NOTE — PROGRESS NOTES
Multilayer Compression Wrap   (Not Unna) Below the Knee    NAME:  Maritza Mcgovern  YOB: 1955  MEDICAL RECORD NUMBER:  [id number]  DATE:  6/20/2023    Multilayer compression wrap: Removed old Multilayer wrap if indicated and wash leg with mild soap/water. Applied moisturizing agent to dry skin as needed. Applied primary and secondary dressing as ordered. Applied multilayered dressing below the knee to left lower leg. Instructed patient/caregiver not to remove dressing and to keep it clean and dry. Instructed patient/caregiver on complications to report to provider, such as pain, numbness in toes, heavy drainage, and slippage of dressing. Instructed patient on purpose of compression dressing and on activity and exercise recommendations.       Electronically signed by Bashir King RN on 6/20/2023 at 4:00 PM

## 2023-06-20 NOTE — PROGRESS NOTES
[address]   Progress Note and Procedure Note      Shashi Pablo  MEDICAL RECORD NUMBER:  [id number]  AGE: 77 y.o. GENDER: female  : 1955  EPISODE DATE:  2023    Subjective:     Chief Complaint   Patient presents with    Wound Check     Left leg         HISTORY of PRESENT ILLNESS HPI     Shashi Pablo is a 77 y.o. female who presents today for wound/ulcer evaluation. She has a history of bilateral lower extremity lymphedema and has weeping ulcers on the left leg. She has lymphedema pumps at home but has not gone to lymphedema therapy for several years. Although she uses pumps regularly, she does not wear any maintenance compression garments. She will likely need a compression wrap once this wound is healed. Culture was taken last week and shows multiple bacteria, likely colonized. Will start clindamycin.     Edema appears poorly controlled    History of Wound Context: Patient with bilateral lower extremity lymphedema  Wound/Ulcer Pain Timing/Severity: none  Quality of pain: N/A  Severity:  0 / 10   Modifying Factors: None  Associated Signs/Symptoms: edema    Ulcer Identification:  Ulcer Type: lymphedema  Contributing Factors: edema and obesity    Wound:         PAST MEDICAL HISTORY        Diagnosis Date    DM (diabetes mellitus) ([facility] 2012    DVT (deep venous thrombosis) ([facility] 06/22/2012    x1 provoked s/p long travel    GERD (gastroesophageal reflux disease) 2012    Hypertension     MVP (mitral valve prolapse) 2012    KATHI (obstructive sleep apnea)        PAST SURGICAL HISTORY    Past Surgical History:   Procedure Laterality Date    HYSTERECTOMY (CERVIX STATUS UNKNOWN)      TONSILLECTOMY         FAMILY HISTORY    Family History   Problem Relation Age of Onset    Heart Disease Mother        SOCIAL HISTORY    Social History     Tobacco Use    Smoking status: Former     Packs/day: 0.25     Years: 20.00     Pack years: 5.00     Types:

## 2023-06-21 DIAGNOSIS — E11.65 TYPE 2 DIABETES MELLITUS WITH HYPERGLYCEMIA, WITH LONG-TERM CURRENT USE OF INSULIN (HCC): ICD-10-CM

## 2023-06-21 DIAGNOSIS — Z79.4 TYPE 2 DIABETES MELLITUS WITH HYPERGLYCEMIA, WITH LONG-TERM CURRENT USE OF INSULIN (HCC): ICD-10-CM

## 2023-06-21 RX ORDER — INSULIN GLARGINE 300 U/ML
28 INJECTION, SOLUTION SUBCUTANEOUS 2 TIMES DAILY
Qty: 3 ADJUSTABLE DOSE PRE-FILLED PEN SYRINGE | Refills: 1 | Status: SHIPPED | OUTPATIENT
Start: 2023-06-21

## 2023-06-21 NOTE — TELEPHONE ENCOUNTER
Kinga Lilly is calling to request a refill on the following medication(s):    Medication Request:  Requested Prescriptions     Pending Prescriptions Disp Refills    Insulin Glargine, 2 Unit Dial, (TOUJEO MAX SOLOSTAR) 300 UNIT/ML SOPN 3 Adjustable Dose Pre-filled Pen Syringe 1     Sig: Inject 28 Units into the skin in the morning and at bedtime       Last Visit Date (If Applicable):  8/50/3401    Next Visit Date:    8/24/2023

## 2023-06-27 ENCOUNTER — TELEPHONE (OUTPATIENT)
Dept: WOUND CARE | Age: 68
End: 2023-06-27

## 2023-06-27 ENCOUNTER — HOSPITAL ENCOUNTER (OUTPATIENT)
Dept: WOUND CARE | Age: 68
Discharge: HOME | End: 2023-06-27

## 2023-07-11 ENCOUNTER — HOSPITAL ENCOUNTER (OUTPATIENT)
Dept: WOUND CARE | Age: 68
Discharge: HOME | End: 2023-07-11

## 2023-07-18 ENCOUNTER — HOSPITAL ENCOUNTER (OUTPATIENT)
Dept: WOUND CARE | Age: 68
Discharge: HOME | End: 2023-07-18
Payer: COMMERCIAL

## 2023-07-18 VITALS
DIASTOLIC BLOOD PRESSURE: 98 MMHG | HEART RATE: 86 BPM | TEMPERATURE: 98.2 F | SYSTOLIC BLOOD PRESSURE: 201 MMHG | RESPIRATION RATE: 16 BRPM

## 2023-07-18 DIAGNOSIS — E11.42 DIABETIC POLYNEUROPATHY ASSOCIATED WITH TYPE 2 DIABETES MELLITUS (HCC): ICD-10-CM

## 2023-07-18 DIAGNOSIS — I89.0 LYMPHEDEMA OF BOTH LOWER EXTREMITIES: ICD-10-CM

## 2023-07-18 DIAGNOSIS — L97.813 SKIN ULCER OF PRETIBIAL REGION OF RIGHT LOWER EXTREMITY, WITH NECROSIS OF MUSCLE (HCC): Primary | ICD-10-CM

## 2023-07-18 PROCEDURE — 11042 DBRDMT SUBQ TIS 1ST 20SQCM/<: CPT

## 2023-07-18 RX ORDER — LIDOCAINE HYDROCHLORIDE 40 MG/ML
SOLUTION TOPICAL ONCE
OUTPATIENT
Start: 2023-07-18 | End: 2023-07-18

## 2023-07-18 RX ORDER — LIDOCAINE HYDROCHLORIDE 20 MG/ML
JELLY TOPICAL ONCE
OUTPATIENT
Start: 2023-07-18 | End: 2023-07-18

## 2023-07-18 RX ORDER — LIDOCAINE HYDROCHLORIDE 20 MG/ML
JELLY TOPICAL ONCE
Status: DISCONTINUED | OUTPATIENT
Start: 2023-07-18 | End: 2023-07-19 | Stop reason: HOSPADM

## 2023-07-18 NOTE — PROGRESS NOTES
Multilayer Compression Wrap   (Not Unna) Below the Knee    NAME:  Mehreen Harrison  YOB: 1955  MEDICAL RECORD NUMBER:  [id number]  DATE:  7/18/2023    Multilayer compression wrap: Removed old Multilayer wrap if indicated and wash leg with mild soap/water. Applied moisturizing agent to dry skin as needed. Applied primary and secondary dressing as ordered. Applied multilayered dressing below the knee to left lower leg. Instructed patient/caregiver not to remove dressing and to keep it clean and dry. Instructed patient/caregiver on complications to report to provider, such as pain, numbness in toes, heavy drainage, and slippage of dressing. Instructed patient on purpose of compression dressing and on activity and exercise recommendations.       Electronically signed by Cesar Jordan RN on 7/18/2023 at 4:02 PM

## 2023-07-18 NOTE — PROGRESS NOTES
[address] [place] [address]   Progress Note and Procedure Note      Aylin Officer  MEDICAL RECORD NUMBER:  [id number]  AGE: 77 y.o. GENDER: female  : 1955  EPISODE DATE:  2023    Subjective:     Chief Complaint   Patient presents with    Wound Check     BLE         HISTORY of PRESENT ILLNESS HPI     Aylin Albertor is a 77 y.o. female who presents today for wound/ulcer evaluation. She has a history of bilateral lower extremity lymphedema and has weeping ulcers on the left leg. She has lymphedema pumps at home but has not gone to lymphedema therapy for several years. Although she uses pumps regularly, she does not wear any maintenance compression garments. S      Patient reports that her compression wrap keeps slipping down. She will need to have home health change this during the week. The wound was debrided and appears somewhat dusky. She admits that the wound lays directly on the edge of her recliner leg rest and she needs to offload this by placing pillows to take pressure off this area. Certainly it appears there is a pressure component to this wound.     History of Wound Context: Patient with bilateral lower extremity lymphedema  Wound/Ulcer Pain Timing/Severity: none  Quality of pain: N/A  Severity:  0 / 10   Modifying Factors: None  Associated Signs/Symptoms: edema    Ulcer Identification:  Ulcer Type: lymphedema  Contributing Factors: edema and obesity    Wound:         PAST MEDICAL HISTORY        Diagnosis Date    DM (diabetes mellitus) ([facility]) 2012    DVT (deep venous thrombosis) ([address]) 06/22/2012    x1 provoked s/p long travel    GERD (gastroesophageal reflux disease) 2012    Hypertension     MVP (mitral valve prolapse) 2012    KATHI (obstructive sleep apnea)        PAST SURGICAL HISTORY    Past Surgical History:   Procedure Laterality Date    HYSTERECTOMY (CERVIX STATUS UNKNOWN)      TONSILLECTOMY         FAMILY HISTORY    Family History   Problem

## 2023-07-18 NOTE — DISCHARGE INSTRUCTIONS
Summary  [x]Comprehensive Discharge Instruction        Patient signature______________________________________Date:________  Electronically signed by Ary Lockhart MD on 7/18/2023 at 3:00 PM  Electronically signed by Shukri Rodriguez RN on 7/18/2023 at 3:53 PM

## 2023-07-24 DIAGNOSIS — I82.5Y9 CHRONIC DEEP VEIN THROMBOSIS (DVT) OF PROXIMAL VEIN OF LOWER EXTREMITY, UNSPECIFIED LATERALITY (HCC): ICD-10-CM

## 2023-07-24 RX ORDER — RIVAROXABAN 20 MG/1
TABLET, FILM COATED ORAL
Qty: 90 TABLET | Refills: 1 | Status: SHIPPED | OUTPATIENT
Start: 2023-07-24

## 2023-07-24 NOTE — TELEPHONE ENCOUNTER
Chantal Sen is calling to request a refill on the following medication(s):    Medication Request:  Requested Prescriptions     Pending Prescriptions Disp Refills    XARELTO 20 MG TABS tablet [Pharmacy Med Name: Jaelyn Laguna TAB 20MG] 90 tablet 1     Sig: TAKE 1 TABLET DAILY WITH   BREAKFAST       Last Visit Date (If Applicable):  9/30/9703    Next Visit Date:    8/24/2023

## 2023-07-25 ENCOUNTER — HOSPITAL ENCOUNTER (OUTPATIENT)
Dept: WOUND CARE | Age: 68
Discharge: HOME | End: 2023-07-25

## 2023-07-25 NOTE — DISCHARGE INSTRUCTIONS
[address] -Phone: [phone] Fax: [phone]    Visit  Discharge Instructions / Physician Orders     DATE: 7/25/2023     [facility] Care: [facility]-EXISTING PATIENT-NEW ORDERS     SUPPLIES ORDERED THRU: N/A     Wound Location: Left Lower Leg     Cleanse with: Antibacterial soap, rinse thoroughly, pat dry. Dressing Orders: Silver Alginate, Kerramax or comparable, 4-Layer Wrap (Today in wound care center 3 Layer Wrap)     Frequency: CHANGE 2 TIMES A WEEK FOR 1 WEEK     Additional Orders: Increase protein to diet (meat, cheese, eggs, fish, peanut butter, nuts and beans)  ELEVATE LEGS AS MUCH AS POSSIBLE  Culture Taken 6/6/23 6/20/23-Antibiotic sent to pharmacy     Your next appointment with [facility] is in 1 week with Dr. Clarisa Leigh     (Please note your next appointment above and if you are unable to keep, kindly give a 24 hour notice. Thank you.)  If more than 15 min late we cannot guarantee you will be seen due to clinician schedule  Per Policy, Excessive cancellation will call for dismissal from program.     If you experience any of the following, please call the [facility] during business hours:  [phone]  Your Phone call may be forwarded to David Leon during business hours that [facility] is closed. * Increase in Pain  * Temperature over 101  * Increase in drainage from your wound  * Drainage with a foul odor  * Bleeding  * Increase in swelling  * Need for compression bandage changes due to slippage, breakthrough drainage. If you need medical attention outside of the business hours of the [facility] please contact your PCP or go to the nearest emergency room. The information contained in the After Visit Summary has been reviewed with me, the patient and/or responsible adult, by my health care provider(s). I had the opportunity to ask questions regarding this information.  I have elected to receive;      []After Visit

## 2023-07-26 ENCOUNTER — HOSPITAL ENCOUNTER (OUTPATIENT)
Dept: WOUND CARE | Age: 68
Discharge: HOME | End: 2023-07-26
Payer: COMMERCIAL

## 2023-07-26 VITALS
HEART RATE: 77 BPM | DIASTOLIC BLOOD PRESSURE: 86 MMHG | HEIGHT: 72 IN | BODY MASS INDEX: 39.68 KG/M2 | SYSTOLIC BLOOD PRESSURE: 151 MMHG | WEIGHT: 293 LBS | TEMPERATURE: 98.9 F

## 2023-07-26 DIAGNOSIS — I89.0 LYMPHEDEMA OF BOTH LOWER EXTREMITIES: ICD-10-CM

## 2023-07-26 DIAGNOSIS — E11.42 DIABETIC POLYNEUROPATHY ASSOCIATED WITH TYPE 2 DIABETES MELLITUS (HCC): ICD-10-CM

## 2023-07-26 DIAGNOSIS — L97.813 SKIN ULCER OF PRETIBIAL REGION OF RIGHT LOWER EXTREMITY, WITH NECROSIS OF MUSCLE (HCC): Primary | ICD-10-CM

## 2023-07-26 PROCEDURE — 11042 DBRDMT SUBQ TIS 1ST 20SQCM/<: CPT

## 2023-07-26 RX ORDER — LIDOCAINE HYDROCHLORIDE 40 MG/ML
SOLUTION TOPICAL ONCE
OUTPATIENT
Start: 2023-07-26 | End: 2023-07-26

## 2023-07-26 RX ORDER — LIDOCAINE HYDROCHLORIDE 20 MG/ML
JELLY TOPICAL ONCE
Status: COMPLETED | OUTPATIENT
Start: 2023-07-26 | End: 2023-07-26

## 2023-07-26 RX ORDER — LIDOCAINE HYDROCHLORIDE 20 MG/ML
JELLY TOPICAL ONCE
OUTPATIENT
Start: 2023-07-26 | End: 2023-07-26

## 2023-07-26 RX ADMIN — LIDOCAINE HYDROCHLORIDE 6 ML: 20 JELLY TOPICAL at 15:40

## 2023-07-26 NOTE — PROGRESS NOTES
Multilayer Compression Wrap   (Not Unna) Below the Knee    NAME:  Kyle Arellano  YOB: 1955  MEDICAL RECORD NUMBER:  [id number]  DATE:  7/26/2023       [x] Removed old Multilayer wrap if indicated and wash leg with mild soap/water. [x] Applied moisturizing agent to dry skin as needed. [x] Applied primary and secondary dressing as ordered   [x] Applied multilayered dressing below the knee to Left lower leg(s). [x] Instructed patient/caregiver not to remove dressing and to keep it clean and dry. [x] Instructed patient/caregiver on complications to report to provider, such as pain, numbness in toes, heavy drainage, and slippage of dressing. [x] Instructed patient on purpose of compression dressing and on activity and exercise recommendations.     Electronically signed by Agustín Todd RN on 7/26/2023 at 4:09 PM

## 2023-07-26 NOTE — DISCHARGE INSTRUCTIONS
[address] -Phone: [phone] Fax: [phone]    Visit  Discharge Instructions / Physician Orders     DATE: 7/26/2023     [facility]     SUPPLIES ORDERED THRU: N/A     Wound Location: Left Lower Leg     Cleanse with: Antibacterial soap, rinse thoroughly, pat dry. Dressing Orders: Silver Alginate, 4-Layer Compression Wrap     Frequency: CHANGE 2 TIMES A WEEK     Additional Orders: Increase protein to diet (meat, cheese, eggs, fish, peanut butter, nuts and beans)  ELEVATE LEGS AS MUCH AS POSSIBLE  Culture Taken 6/6/23 6/20/23-Antibiotic sent to pharmacy     Your next appointment with [facility] [place] is in 1 week with Dr. Duc Patel     (Please note your next appointment above and if you are unable to keep, kindly give a 24 hour notice. Thank you.)  If more than 15 min late we cannot guarantee you will be seen due to clinician schedule  Per Policy, Excessive cancellation will call for dismissal from program.     If you experience any of the following, please call the [address] [place] [address] during business hours:  [phone]  Your Phone call may be forwarded to David Leon during business hours that [facility] is closed. * Increase in Pain  * Temperature over 101  * Increase in drainage from your wound  * Drainage with a foul odor  * Bleeding  * Increase in swelling  * Need for compression bandage changes due to slippage, breakthrough drainage. If you need medical attention outside of the business hours of the [address] [place] [address] please contact your PCP or go to the nearest emergency room. The information contained in the After Visit Summary has been reviewed with me, the patient and/or responsible adult, by my health care provider(s). I had the opportunity to ask questions regarding this information.  I have elected to receive;      []After Visit Summary  [x]Comprehensive Discharge Instruction        Patient

## 2023-07-26 NOTE — PROGRESS NOTES
[address] [place] [address]   Progress Note and Procedure Note      Lou Junior  MEDICAL RECORD NUMBER:  [id number]  AGE: 77 y.o. GENDER: female  : 1955  EPISODE DATE:  2023    Subjective:     Chief Complaint   Patient presents with    Wound Check     Ble           HISTORY of PRESENT ILLNESS HPI     Lou Junior is a 77 y.o. female who presents today for wound/ulcer evaluation. History of Wound Context: She has a history of bilateral lower extremity lymphedema and has weeping ulcers on the left leg. She has lymphedema pumps at home but has not gone to lymphedema therapy for several years. Although she uses pumps regularly, she does not wear any maintenance compression garments. Interval history: home health changing compression wraps midweek. Has dusky appearance to wound indicating chronic pressure. She is having difficulty offloading this at home. We discussed this at length.     Wound/Ulcer Pain Timing/Severity: none  Quality of pain: N/A  Severity:  0 / 10   Modifying Factors: None  Associated Signs/Symptoms: edema     Ulcer Identification:  Ulcer Type: lymphedema  Contributing Factors: edema and obesity             PAST MEDICAL HISTORY        Diagnosis Date    DM (diabetes mellitus) ([address]) 2012    DVT (deep venous thrombosis) ([address]) 06/22/2012    x1 provoked s/p long travel    GERD (gastroesophageal reflux disease) 2012    Hypertension     MVP (mitral valve prolapse) 2012    KATHI (obstructive sleep apnea)     Skin ulcer of pretibial region of right lower extremity, with necrosis of muscle ([address]) 2021       PAST SURGICAL HISTORY    Past Surgical History:   Procedure Laterality Date    HYSTERECTOMY (CERVIX STATUS UNKNOWN)      TONSILLECTOMY         FAMILY HISTORY    Family History   Problem Relation Age of Onset    Heart Disease Mother        SOCIAL HISTORY    Social History     Tobacco Use    Smoking status: Former     Packs/day: 0.25     Years:

## 2023-07-28 NOTE — DISCHARGE INSTRUCTIONS
[address] [place] -Phone: [phone] Fax: [phone]    Visit  Discharge Instructions / Physician Orders     DATE: 8/1/2023     [facility]     SUPPLIES ORDERED THRU: N/A     Wound Location: Left Lower Leg     Cleanse with: Antibacterial soap, rinse thoroughly, pat dry. Dressing Orders: Silver Alginate, 4-Layer Compression Wrap     Frequency: [place] 2 TIMES A WEEK     Additional Orders: Increase protein to diet (meat, cheese, eggs, fish, peanut butter, nuts and beans)  ELEVATE LEGS AS MUCH AS POSSIBLE  Culture Taken 6/6/23 6/20/23-Antibiotic sent to pharmacy     Your next appointment with [address] [place] [address] is in 1 week with Dr. Radha Weems     (Please note your next appointment above and if you are unable to keep, kindly give a 24 hour notice. Thank you.)  If more than 15 min late we cannot guarantee you will be seen due to clinician schedule  Per Policy, Excessive cancellation will call for dismissal from program.     If you experience any of the following, please call the [address] [place] [address] during business hours:  [phone]  Your Phone call may be forwarded to David Leon during business hours that [facility] is closed. * Increase in Pain  * Temperature over 101  * Increase in drainage from your wound  * Drainage with a foul odor  * Bleeding  * Increase in swelling  * Need for compression bandage changes due to slippage, breakthrough drainage. If you need medical attention outside of the business hours of the [address] [place] [address] please contact your PCP or go to the nearest emergency room. The information contained in the After Visit Summary has been reviewed with me, the patient and/or responsible adult, by my health care provider(s). I had the opportunity to ask questions regarding this information.  I have elected to receive;      []After Visit Summary  [x]Comprehensive Discharge Instruction        Patient

## 2023-07-31 DIAGNOSIS — K21.9 GASTROESOPHAGEAL REFLUX DISEASE WITHOUT ESOPHAGITIS: ICD-10-CM

## 2023-07-31 DIAGNOSIS — E11.65 TYPE 2 DIABETES MELLITUS WITH HYPERGLYCEMIA, WITH LONG-TERM CURRENT USE OF INSULIN (HCC): ICD-10-CM

## 2023-07-31 DIAGNOSIS — Z79.4 TYPE 2 DIABETES MELLITUS WITH HYPERGLYCEMIA, WITH LONG-TERM CURRENT USE OF INSULIN (HCC): ICD-10-CM

## 2023-07-31 RX ORDER — OMEPRAZOLE 40 MG/1
40 CAPSULE, DELAYED RELEASE ORAL DAILY
Qty: 90 CAPSULE | Refills: 1 | Status: SHIPPED | OUTPATIENT
Start: 2023-07-31

## 2023-07-31 RX ORDER — DULAGLUTIDE 3 MG/.5ML
3 INJECTION, SOLUTION SUBCUTANEOUS WEEKLY
Qty: 2 ADJUSTABLE DOSE PRE-FILLED PEN SYRINGE | Refills: 3 | Status: SHIPPED | OUTPATIENT
Start: 2023-07-31

## 2023-07-31 NOTE — TELEPHONE ENCOUNTER
Nella Cook is calling to request a refill on the following medication(s):    Medication Request:  Requested Prescriptions     Pending Prescriptions Disp Refills    omeprazole (PRILOSEC) 40 MG delayed release capsule 90 capsule 1     Sig: Take 1 capsule by mouth daily TAKE 1 CAPSULE BY MOUTH  DAILY    Dulaglutide (TRULICITY) 3 KX/4.6OG SOPN 2 Adjustable Dose Pre-filled Pen Syringe 3     Sig: Inject 3 mg into the skin once a week       Last Visit Date (If Applicable):  1/89/1352    Next Visit Date:    8/24/2023

## 2023-08-01 ENCOUNTER — HOSPITAL ENCOUNTER (OUTPATIENT)
Dept: WOUND CARE | Age: 68
Discharge: HOME | End: 2023-08-01
Payer: COMMERCIAL

## 2023-08-01 VITALS
DIASTOLIC BLOOD PRESSURE: 93 MMHG | SYSTOLIC BLOOD PRESSURE: 195 MMHG | HEART RATE: 76 BPM | TEMPERATURE: 98 F | RESPIRATION RATE: 16 BRPM

## 2023-08-01 DIAGNOSIS — L97.813 SKIN ULCER OF PRETIBIAL REGION OF RIGHT LOWER EXTREMITY, WITH NECROSIS OF MUSCLE (HCC): Primary | ICD-10-CM

## 2023-08-01 DIAGNOSIS — I89.0 LYMPHEDEMA OF BOTH LOWER EXTREMITIES: ICD-10-CM

## 2023-08-01 DIAGNOSIS — E11.42 DIABETIC POLYNEUROPATHY ASSOCIATED WITH TYPE 2 DIABETES MELLITUS (HCC): ICD-10-CM

## 2023-08-01 PROCEDURE — 11042 DBRDMT SUBQ TIS 1ST 20SQCM/<: CPT

## 2023-08-01 RX ORDER — LIDOCAINE HYDROCHLORIDE 40 MG/ML
SOLUTION TOPICAL ONCE
OUTPATIENT
Start: 2023-08-01 | End: 2023-08-01

## 2023-08-01 RX ORDER — LIDOCAINE HYDROCHLORIDE 20 MG/ML
JELLY TOPICAL ONCE
Status: COMPLETED | OUTPATIENT
Start: 2023-08-01 | End: 2023-08-01

## 2023-08-01 RX ORDER — LIDOCAINE HYDROCHLORIDE 20 MG/ML
JELLY TOPICAL ONCE
OUTPATIENT
Start: 2023-08-01 | End: 2023-08-01

## 2023-08-01 RX ADMIN — LIDOCAINE HYDROCHLORIDE 6 ML: 20 JELLY TOPICAL at 10:28

## 2023-08-01 NOTE — PROGRESS NOTES
[address] [place] [address]   Progress Note and Procedure Note      Lou Junior  MEDICAL RECORD NUMBER:  [id number]  AGE: 77 y.o. GENDER: female  : 1955  EPISODE DATE:  2023    Subjective:     Chief Complaint   Patient presents with    Wound Check     Left Lower Leg         HISTORY of PRESENT ILLNESS HPI     Lou Junior is a 77 y.o. female who presents today for wound/ulcer evaluation. She has a history of bilateral lower extremity lymphedema and has weeping ulcers on the left leg. She has lymphedema pumps at home but has not gone to lymphedema therapy for several years. Although she uses pumps regularly, she does not wear any maintenance compression garments. She will need juxta lites but her insurance has not been covering the cost.      Compression wrap was removed by patient because her air conditioning at home failed and it was too hot. We will replace her wrap this week. The wound was debrided and essentially unchanged.     History of Wound Context: Patient with bilateral lower extremity lymphedema  Wound/Ulcer Pain Timing/Severity: none  Quality of pain: N/A  Severity:  0 / 10   Modifying Factors: None  Associated Signs/Symptoms: edema    Ulcer Identification:  Ulcer Type: lymphedema  Contributing Factors: edema and obesity    Wound:         PAST MEDICAL HISTORY        Diagnosis Date    DM (diabetes mellitus) ([address]) 2012    DVT (deep venous thrombosis) ([address]) 06/22/2012    x1 provoked s/p long travel    GERD (gastroesophageal reflux disease) 2012    Hypertension     MVP (mitral valve prolapse) 2012    KATHI (obstructive sleep apnea)     Skin ulcer of pretibial region of right lower extremity, with necrosis of muscle ([address]) 2021       PAST SURGICAL HISTORY    Past Surgical History:   Procedure Laterality Date    HYSTERECTOMY (CERVIX STATUS UNKNOWN)      TONSILLECTOMY         FAMILY HISTORY    Family History   Problem Relation Age of Onset    Heart

## 2023-08-01 NOTE — PROGRESS NOTES
Multilayer Compression Wrap   (Not Unna) Below the Knee    NAME:  Leslee Nicole  YOB: 1955  MEDICAL RECORD NUMBER:  [id number]  DATE:  8/1/2023       [x] Removed old Multilayer wrap if indicated and wash leg with mild soap/water. [x] Applied moisturizing agent to dry skin as needed. [x] Applied primary and secondary dressing as ordered   [x] Applied multilayered dressing below the knee to Left lower leg(s). [x] Instructed patient/caregiver not to remove dressing and to keep it clean and dry. [x] Instructed patient/caregiver on complications to report to provider, such as pain, numbness in toes, heavy drainage, and slippage of dressing. [x] Instructed patient on purpose of compression dressing and on activity and exercise recommendations.     Electronically signed by Giselle Burciaga RN on 8/1/2023 at 11:15 AM

## 2023-08-03 NOTE — DISCHARGE INSTRUCTIONS
[address] -Phone: [phone] Fax: [phone]    Visit  Discharge Instructions / Physician Orders     DATE: 8/8/2023     [facility]     SUPPLIES ORDERED THRU: Home Health d/t Medicare guidelines     Wound Location: Left Lower Leg     Cleanse with: Antibacterial soap, rinse thoroughly, pat dry. Dressing Orders: Silver Alginate, 4-Layer Compression Wrap     Frequency: [place] 2 TIMES A WEEK     Additional Orders: Increase protein to diet (meat, cheese, eggs, fish, peanut butter, nuts and beans)  ELEVATE LEGS AS MUCH AS POSSIBLE  Culture Taken 6/6/23 6/20/23-Antibiotic sent to pharmacy     Your next appointment with [facility] is in 1 week with Dr. Walter Cruz     (Please note your next appointment above and if you are unable to keep, kindly give a 24 hour notice. Thank you.)  If more than 15 min late we cannot guarantee you will be seen due to clinician schedule  Per Policy, Excessive cancellation will call for dismissal from program.     If you experience any of the following, please call the [facility] during business hours:  [phone]  Your Phone call may be forwarded to David Ganolas during business hours that [employer] is closed. * Increase in Pain  * Temperature over 101  * Increase in drainage from your wound  * Drainage with a foul odor  * Bleeding  * Increase in swelling  * Need for compression bandage changes due to slippage, breakthrough drainage. If you need medical attention outside of the business hours of the [facility] please contact your PCP or go to the nearest emergency room. The information contained in the After Visit Summary has been reviewed with me, the patient and/or responsible adult, by my health care provider(s). I had the opportunity to ask questions regarding this information.  I have elected to receive;      []After Visit Summary  [x]Comprehensive Discharge Instruction        Patient

## 2023-08-08 ENCOUNTER — HOSPITAL ENCOUNTER (OUTPATIENT)
Dept: WOUND CARE | Age: 68
Discharge: HOME | End: 2023-08-08
Payer: COMMERCIAL

## 2023-08-08 VITALS
HEART RATE: 70 BPM | RESPIRATION RATE: 16 BRPM | TEMPERATURE: 97 F | DIASTOLIC BLOOD PRESSURE: 80 MMHG | SYSTOLIC BLOOD PRESSURE: 194 MMHG

## 2023-08-08 DIAGNOSIS — I89.0 LYMPHEDEMA OF BOTH LOWER EXTREMITIES: ICD-10-CM

## 2023-08-08 DIAGNOSIS — L97.813 SKIN ULCER OF PRETIBIAL REGION OF RIGHT LOWER EXTREMITY, WITH NECROSIS OF MUSCLE (HCC): Primary | ICD-10-CM

## 2023-08-08 DIAGNOSIS — E11.42 DIABETIC POLYNEUROPATHY ASSOCIATED WITH TYPE 2 DIABETES MELLITUS (HCC): ICD-10-CM

## 2023-08-08 PROCEDURE — 11042 DBRDMT SUBQ TIS 1ST 20SQCM/<: CPT

## 2023-08-08 PROCEDURE — 11042 DBRDMT SUBQ TIS 1ST 20SQCM/<: CPT | Performed by: SURGERY

## 2023-08-08 RX ORDER — LIDOCAINE HYDROCHLORIDE 40 MG/ML
SOLUTION TOPICAL ONCE
OUTPATIENT
Start: 2023-08-08 | End: 2023-08-08

## 2023-08-08 RX ORDER — LIDOCAINE HYDROCHLORIDE 20 MG/ML
JELLY TOPICAL ONCE
OUTPATIENT
Start: 2023-08-08 | End: 2023-08-08

## 2023-08-08 RX ORDER — LIDOCAINE HYDROCHLORIDE 20 MG/ML
JELLY TOPICAL ONCE
Status: COMPLETED | OUTPATIENT
Start: 2023-08-08 | End: 2023-08-08

## 2023-08-08 RX ADMIN — LIDOCAINE HYDROCHLORIDE 6 ML: 20 JELLY TOPICAL at 10:38

## 2023-08-22 ENCOUNTER — HOSPITAL ENCOUNTER (OUTPATIENT)
Dept: WOUND CARE | Age: 68
Discharge: HOME | End: 2023-08-22

## 2023-08-23 DIAGNOSIS — Z79.4 TYPE 2 DIABETES MELLITUS WITH HYPERGLYCEMIA, WITH LONG-TERM CURRENT USE OF INSULIN (HCC): ICD-10-CM

## 2023-08-23 DIAGNOSIS — E11.65 TYPE 2 DIABETES MELLITUS WITH HYPERGLYCEMIA, WITH LONG-TERM CURRENT USE OF INSULIN (HCC): ICD-10-CM

## 2023-08-23 RX ORDER — INSULIN GLARGINE 300 U/ML
INJECTION, SOLUTION SUBCUTANEOUS
Qty: 1 ADJUSTABLE DOSE PRE-FILLED PEN SYRINGE | Refills: 0 | Status: SHIPPED | COMMUNITY
Start: 2023-08-23

## 2023-08-28 NOTE — DISCHARGE INSTRUCTIONS
[address] -Phone: [phone] Fax: [phone]    Visit  Discharge Instructions / Physician Orders     DATE: 8/29/2023     Home Care: [facility]     SUPPLIES ORDERED THRU: Home Health d/t Medicare guidelines     Wound Location: Left Lower Leg     Cleanse with: Antibacterial soap, rinse thoroughly, pat dry. Dressing Orders: Silver Alginate, 4-Layer Compression Wrap     Frequency: [place] 2 TIMES A WEEK     Additional Orders: Increase protein to diet (meat, cheese, eggs, fish, peanut butter, nuts and beans)  ELEVATE LEGS AS MUCH AS POSSIBLE  Culture Taken 6/6/23 6/20/23-Antibiotic sent to pharmacy     Your next appointment with [address] [place] [address] is in 1 week with Dr. Arnie Youssef     (Please note your next appointment above and if you are unable to keep, kindly give a 24 hour notice. Thank you.)  If more than 15 min late we cannot guarantee you will be seen due to clinician schedule  Per Policy, Excessive cancellation will call for dismissal from program.     If you experience any of the following, please call the [address] [place] [address] during business hours:  [phone]  Your Phone call may be forwarded to [address] [place] [address] during business hours that [facility] is closed. * Increase in Pain  * Temperature over 101  * Increase in drainage from your wound  * Drainage with a foul odor  * Bleeding  * Increase in swelling  * Need for compression bandage changes due to slippage, breakthrough drainage. If you need medical attention outside of the business hours of the [address] [place] [address] please contact your PCP or go to the nearest emergency room. The information contained in the After Visit Summary has been reviewed with me, the patient and/or responsible adult, by my health care provider(s). I had the opportunity to ask questions regarding this information.  I have elected to receive;      []After Visit Summary  [x]Comprehensive Discharge Instruction        Patient

## 2023-08-29 ENCOUNTER — HOSPITAL ENCOUNTER (OUTPATIENT)
Dept: WOUND CARE | Age: 68
Discharge: HOME | End: 2023-08-29

## 2023-08-30 NOTE — DISCHARGE INSTRUCTIONS
[address] -Phone: [phone] Fax: [phone]    Visit  Discharge Instructions / Physician Orders     DATE: 9/5/2023     Home Care: [facility]     SUPPLIES ORDERED THRU: Home Health d/t Medicare guidelines     Wound Location: Left Lower Leg     Cleanse with: Antibacterial soap, rinse thoroughly, pat dry. Dressing Orders: Silver Alginate, [place] Layer, 4-Layer Compression Wrap     Frequency: [place] 2 TIMES A WEEK     Additional Orders: Increase protein to diet (meat, cheese, eggs, fish, peanut butter, nuts and beans)  ELEVATE LEGS AS MUCH AS POSSIBLE  Culture Taken 6/6/23 6/20/23-Antibiotic sent to pharmacy     Your next appointment with [facility] is in 1 week      (Please note your next appointment above and if you are unable to keep, kindly give a 24 hour notice. Thank you.)  If more than 15 min late we cannot guarantee you will be seen due to clinician schedule  Per Policy, Excessive cancellation will call for dismissal from program.     If you experience any of the following, please call the [facility] during business hours:  [phone]  Your Phone call may be forwarded to David Leon during business hours that [employer] is closed. * Increase in Pain  * Temperature over 101  * Increase in drainage from your wound  * Drainage with a foul odor  * Bleeding  * Increase in swelling  * Need for compression bandage changes due to slippage, breakthrough drainage. If you need medical attention outside of the business hours of the [facility] please contact your PCP or go to the nearest emergency room. The information contained in the After Visit Summary has been reviewed with me, the patient and/or responsible adult, by my health care provider(s). I had the opportunity to ask questions regarding this information.  I have elected to receive;      []After Visit Summary  [x]Comprehensive Discharge Instruction        Patient

## 2023-09-05 ENCOUNTER — HOSPITAL ENCOUNTER (OUTPATIENT)
Dept: WOUND CARE | Age: 68
Discharge: HOME | End: 2023-09-05
Payer: COMMERCIAL

## 2023-09-05 VITALS
TEMPERATURE: 98.7 F | HEART RATE: 80 BPM | DIASTOLIC BLOOD PRESSURE: 80 MMHG | RESPIRATION RATE: 18 BRPM | SYSTOLIC BLOOD PRESSURE: 173 MMHG

## 2023-09-05 DIAGNOSIS — E11.42 DIABETIC POLYNEUROPATHY ASSOCIATED WITH TYPE 2 DIABETES MELLITUS (HCC): ICD-10-CM

## 2023-09-05 DIAGNOSIS — L97.813 SKIN ULCER OF PRETIBIAL REGION OF RIGHT LOWER EXTREMITY, WITH NECROSIS OF MUSCLE (HCC): Primary | ICD-10-CM

## 2023-09-05 DIAGNOSIS — I89.0 LYMPHEDEMA OF BOTH LOWER EXTREMITIES: ICD-10-CM

## 2023-09-05 PROCEDURE — 11042 DBRDMT SUBQ TIS 1ST 20SQCM/<: CPT

## 2023-09-05 RX ORDER — LIDOCAINE HYDROCHLORIDE 20 MG/ML
JELLY TOPICAL ONCE
Status: COMPLETED | OUTPATIENT
Start: 2023-09-05 | End: 2023-09-05

## 2023-09-05 RX ORDER — LIDOCAINE HYDROCHLORIDE 40 MG/ML
SOLUTION TOPICAL ONCE
OUTPATIENT
Start: 2023-09-05 | End: 2023-09-05

## 2023-09-05 RX ORDER — LIDOCAINE HYDROCHLORIDE 20 MG/ML
JELLY TOPICAL ONCE
OUTPATIENT
Start: 2023-09-05 | End: 2023-09-05

## 2023-09-05 RX ADMIN — LIDOCAINE HYDROCHLORIDE 6 ML: 20 JELLY TOPICAL at 11:03

## 2023-09-05 NOTE — PROGRESS NOTES
Compression [address] for Compression Stockings:     [employer] [id number] [facility] p: [phone]: [phone]     Ordering Center:     [facility]  [address]  [place] [address] [place] [address],[address]  [phone]  WOUND CARE Dept: Patrick NUMBER [phone]    Patient Information:      Alethea Ruiz  [address]   [phone]   : 1955  AGE: 77 y.o. GENDER: female   TODAYS DATE:  2023    Insurance:      PRIMARY INSURANCE:  Plan: MEDICARE PART A  Coverage: MEDICARE  Effective Date: 2015  Group Number: [unfilled]  Subscriber Number: [id number] - (Commercial)    Payer/Plan Subscr  Sex Relation Sub. Ins. ID Effective Group Num   1. [address] [facility] 1966 Male Spouse [id number] 1/1/15 [id number]                                   [address]   2. MEDICARE - Romero Ruiz 1955 Female Self [id number]                                    [id number]       Patient Information:      Problem List Items Addressed This Visit          Endocrine    Diabetic polyneuropathy [facility])    Relevant Orders    Initiate Outpatient Wound Care Protocol       Other    Lymphedema of both lower extremities (Chronic)    Relevant Orders    Initiate Outpatient Wound Care Protocol    Skin ulcer of pretibial region of right lower extremity, with necrosis of muscle ([address]) - Primary    Relevant Orders    Initiate Outpatient Wound Care Protocol       Wound 23 Leg Left; Lower; Posterior #1 (Active)   Wound Image   23 1326   Wound Etiology Venous 23 1101   Dressing Status Old drainage noted;New drainage noted 23 1101   Wound Cleansed Cleansed with saline 23 1101   Wound Length (cm) 2.5 cm 23 1101   Wound Width (cm) 3.3 cm 23 1101   Wound Depth (cm) 0.3 cm 23 1101   Wound Surface Area (cm^2) 8.25 cm^2 23 1101   Change in Wound Size %
Multilayer Compression Wrap   (Not Unna) Below the Knee    NAME:  Sree Starks  YOB: 1955  MEDICAL RECORD NUMBER:  [id number]  DATE:  9/5/2023    Multilayer compression wrap: Removed old Multilayer wrap if indicated and wash leg with mild soap/water. Applied moisturizing agent to dry skin as needed. Applied primary and secondary dressing as ordered. Applied multilayered dressing below the knee to left lower leg. Instructed patient/caregiver not to remove dressing and to keep it clean and dry. Instructed patient/caregiver on complications to report to provider, such as pain, numbness in toes, heavy drainage, and slippage of dressing. Instructed patient on purpose of compression dressing and on activity and exercise recommendations.       Electronically signed by Marsha Johnson RN on 9/5/2023 at 11:31 AM
pupils equal, round, and reactive to light, extraocular eye movements intact, conjunctivae normal  ENT: tympanic membrane, external ear and ear canal normal bilaterally, nose without deformity, nasal mucosa and turbinates normal without polyps  Neck: supple and non-tender without mass, no thyromegaly or thyroid nodules, no cervical lymphadenopathy  Pulmonary/Chest: clear to auscultation bilaterally- no wheezes, rales or rhonchi, normal air movement, no respiratory distress  Cardiovascular: normal rate, regular rhythm, normal S1 and S2, no murmurs, rubs, clicks, or gallops, distal pulses intact, no carotid bruits  Abdomen: soft, non-tender, non-distended, normal bowel sounds, no masses or organomegaly  Extremities: Bilateral 3+ pitting edema. Left leg with partial and full thickness wounds. + drainage, no cellulitis  Musculoskeletal: normal range of motion, no joint swelling, deformity or tenderness  Neurologic: reflexes normal and symmetric, no cranial nerve deficit, gait, coordination and speech normal      Assessment:     Problem List Items Addressed This Visit       Lymphedema of both lower extremities (Chronic)    Relevant Orders    Initiate Outpatient Wound Care Protocol    Skin ulcer of pretibial region of right lower extremity, with necrosis of muscle ([address]) - Primary    Relevant Orders    Initiate Outpatient Wound Care Protocol    Diabetic polyneuropathy ([address])    Relevant Orders    Initiate Outpatient Wound Care Protocol        Procedure Note  Indications:  Based on my examination of this patient's wound(s)/ulcer(s) today, debridement is required to promote healing and evaluate the wound base.     Performed by: Anselmo Riedel, MD    Consent obtained:  Yes    Time out taken:  Yes    Pain Control: Anesthetic  Anesthetic: 2% Lidocaine Gel Topical       Debridement:Excisional Debridement    Using curette the wound(s)/ulcer(s) was/were sharply debrided down through and including the removal of subcutaneous

## 2023-09-08 NOTE — DISCHARGE INSTRUCTIONS
[address] -Phone: [phone] Fax: [phone]    Visit  Discharge Instructions / Physician Orders     DATE: 9/12/2023     Home Care: [facility]     SUPPLIES ORDERED THRU: Home Health d/t Medicare guidelines     Wound Location: Left Lower Leg     Cleanse with: Antibacterial soap, rinse thoroughly, pat dry. Dressing Orders: Silver Alginate, Calamine Layer, 4-Layer Compression Wrap     Frequency: [place] 2 TIMES A WEEK     Additional Orders: Increase protein to diet (meat, cheese, eggs, fish, peanut butter, nuts and beans)  ELEVATE LEGS AS MUCH AS POSSIBLE  Culture Taken 6/6/23 6/20/23-Antibiotic sent to pharmacy     [facility]      (Please note your next appointment above and if you are unable to keep, kindly give a 24 hour notice. Thank you.)  If more than 15 min late we cannot guarantee you will be seen due to clinician schedule  Per Policy, Excessive cancellation will call for dismissal from program.     If you experience any of the following, please call the [facility] [place] during business hours:  [phone]  Your Phone call may be forwarded to David Leon during business hours that [facility] is closed. * Increase in Pain  * Temperature over 101  * Increase in drainage from your wound  * Drainage with a foul odor  * Bleeding  * Increase in swelling  * Need for compression bandage changes due to slippage, breakthrough drainage. If you need medical attention outside of the business hours of the [facility] [place] please contact your PCP or go to the nearest emergency room. The information contained in the After Visit Summary has been reviewed with me, the patient and/or responsible adult, by my health care provider(s). I had the opportunity to ask questions regarding this information.  I have elected to receive;      []After Visit Summary  [x]Comprehensive Discharge Instruction        Patient

## 2023-09-11 ENCOUNTER — TELEPHONE (OUTPATIENT)
Dept: FAMILY MEDICINE CLINIC | Age: 68
End: 2023-09-11

## 2023-09-11 NOTE — TELEPHONE ENCOUNTER
Patient called in and has lost her health insurance she is currently tring to apply for medicare/medicaid so she cant go to wound care until this is resolved she would like to know if you can prescribe her doxy script just in case something happens while she is waiting

## 2023-09-12 ENCOUNTER — HOSPITAL ENCOUNTER (OUTPATIENT)
Dept: WOUND CARE | Age: 68
Discharge: HOME | End: 2023-09-12

## 2023-09-12 RX ORDER — FESOTERODINE FUMARATE 8 MG/1
8 TABLET, EXTENDED RELEASE ORAL DAILY
Qty: 30 TABLET | Refills: 1 | Status: SHIPPED | OUTPATIENT
Start: 2023-09-12

## 2023-09-12 RX ORDER — FESOTERODINE FUMARATE 8 MG/1
8 TABLET, EXTENDED RELEASE ORAL DAILY
Qty: 90 TABLET | Refills: 1 | Status: SHIPPED | OUTPATIENT
Start: 2023-09-12 | End: 2023-09-12 | Stop reason: SDUPTHER

## 2023-09-12 NOTE — TELEPHONE ENCOUNTER
Pete Abraham is calling to request a refill on the following medication(s):    Medication Request:  Requested Prescriptions     Pending Prescriptions Disp Refills    Fesoterodine Fumarate ER 8 MG TB24 30 tablet 1     Sig: Take 8 mg by mouth daily       Last Visit Date (If Applicable):  9/47/6521    Next Visit Date:    Visit date not found

## 2023-10-03 ENCOUNTER — TELEPHONE (OUTPATIENT)
Dept: FAMILY MEDICINE CLINIC | Age: 68
End: 2023-10-03

## 2023-10-03 DIAGNOSIS — E11.65 TYPE 2 DIABETES MELLITUS WITH HYPERGLYCEMIA, WITHOUT LONG-TERM CURRENT USE OF INSULIN (HCC): ICD-10-CM

## 2023-10-03 NOTE — TELEPHONE ENCOUNTER
Isabell Moise is calling to request a refill on the following medication(s):    Medication Request:  Requested Prescriptions     Pending Prescriptions Disp Refills    Insulin Pen Needle 32G X 4 MM MISC 100 each 11     Si each by Does not apply route 2 times daily       Last Visit Date (If Applicable):      Next Visit Date:    Visit date not found

## 2023-10-03 NOTE — TELEPHONE ENCOUNTER
She is waiting to hear back form medicare she has no insurance right now and is out of trulicity and its so expensive with good rx it is still 800$ is there something else she can use remember she cant do ozempic

## 2023-10-06 ENCOUNTER — TELEPHONE (OUTPATIENT)
Dept: FAMILY MEDICINE CLINIC | Age: 68
End: 2023-10-06

## 2023-10-06 DIAGNOSIS — L08.9 SKIN INFECTION: ICD-10-CM

## 2023-10-06 RX ORDER — DOXYCYCLINE HYCLATE 100 MG
100 TABLET ORAL 2 TIMES DAILY
Qty: 20 TABLET | Refills: 0 | Status: SHIPPED | OUTPATIENT
Start: 2023-10-06 | End: 2023-10-16

## 2023-10-06 RX ORDER — FLUCONAZOLE 150 MG/1
150 TABLET ORAL
Qty: 2 TABLET | Refills: 0 | Status: SHIPPED | OUTPATIENT
Start: 2023-10-06 | End: 2023-10-12

## 2023-10-17 ENCOUNTER — TELEPHONE (OUTPATIENT)
Dept: FAMILY MEDICINE CLINIC | Age: 68
End: 2023-10-17

## 2023-10-25 ENCOUNTER — TELEPHONE (OUTPATIENT)
Dept: FAMILY MEDICINE CLINIC | Age: 68
End: 2023-10-25

## 2023-10-27 DIAGNOSIS — E11.42 DIABETIC POLYNEUROPATHY ASSOCIATED WITH TYPE 2 DIABETES MELLITUS (HCC): ICD-10-CM

## 2023-10-27 DIAGNOSIS — I82.5Y9 CHRONIC DEEP VEIN THROMBOSIS (DVT) OF PROXIMAL VEIN OF LOWER EXTREMITY, UNSPECIFIED LATERALITY (HCC): ICD-10-CM

## 2023-10-27 DIAGNOSIS — I10 ESSENTIAL HYPERTENSION: ICD-10-CM

## 2023-10-27 RX ORDER — GABAPENTIN 300 MG/1
300 CAPSULE ORAL 3 TIMES DAILY
Qty: 180 CAPSULE | Refills: 1 | Status: SHIPPED | OUTPATIENT
Start: 2023-10-27 | End: 2024-02-24

## 2023-10-27 RX ORDER — LOSARTAN POTASSIUM AND HYDROCHLOROTHIAZIDE 12.5; 5 MG/1; MG/1
1 TABLET ORAL DAILY
Qty: 90 TABLET | Refills: 1 | Status: SHIPPED | OUTPATIENT
Start: 2023-10-27

## 2023-10-27 RX ORDER — METOPROLOL SUCCINATE 50 MG/1
50 TABLET, EXTENDED RELEASE ORAL DAILY
Qty: 90 TABLET | Refills: 1 | Status: SHIPPED | OUTPATIENT
Start: 2023-10-27

## 2023-10-27 RX ORDER — DULOXETIN HYDROCHLORIDE 30 MG/1
30 CAPSULE, DELAYED RELEASE ORAL DAILY
Qty: 90 CAPSULE | Refills: 1 | Status: SHIPPED | OUTPATIENT
Start: 2023-10-27

## 2023-10-27 NOTE — TELEPHONE ENCOUNTER
Vernell Roque is calling to request a refill on the following medication(s):    Medication Request:  Requested Prescriptions     Pending Prescriptions Disp Refills    gabapentin (NEURONTIN) 300 MG capsule 180 capsule 1     Sig: Take 1 capsule by mouth 3 times daily for 120 days.     DULoxetine (CYMBALTA) 30 MG extended release capsule 90 capsule 1     Sig: Take 1 capsule by mouth daily    metoprolol succinate (TOPROL XL) 50 MG extended release tablet 90 tablet 1     Sig: Take 1 tablet by mouth daily    losartan-hydroCHLOROthiazide (HYZAAR) 50-12.5 MG per tablet 90 tablet 1     Sig: Take 1 tablet by mouth daily    rivaroxaban (XARELTO) 20 MG TABS tablet 30 tablet 0     Sig: Take 1 tablet by mouth daily (with breakfast)       Last Visit Date (If Applicable):  9/76/1973    Next Visit Date:    11/7/2023

## 2023-11-03 DIAGNOSIS — Z79.4 TYPE 2 DIABETES MELLITUS WITH HYPERGLYCEMIA, WITH LONG-TERM CURRENT USE OF INSULIN (HCC): ICD-10-CM

## 2023-11-03 DIAGNOSIS — E11.65 TYPE 2 DIABETES MELLITUS WITH HYPERGLYCEMIA, WITH LONG-TERM CURRENT USE OF INSULIN (HCC): ICD-10-CM

## 2023-11-03 NOTE — TELEPHONE ENCOUNTER
Laina Christian is calling to request a refill on the following medication(s):    Medication Request:  Requested Prescriptions     Pending Prescriptions Disp Refills    Insulin Glargine, 2 Unit Dial, (TOUJEO MAX SOLOSTAR) 300 UNIT/ML SOPN 1 Adjustable Dose Pre-filled Pen Syringe 3     Sig: Inject 30 Units into the skin in the morning and at bedtime    Dulaglutide (TRULICITY) 3 MM/5.2LF SOPN 2 Adjustable Dose Pre-filled Pen Syringe 3     Sig: Inject 3 mg into the skin once a week       Last Visit Date (If Applicable):  1/60/2791    Next Visit Date:    11/7/2023

## 2023-11-06 RX ORDER — DULAGLUTIDE 3 MG/.5ML
3 INJECTION, SOLUTION SUBCUTANEOUS WEEKLY
Qty: 2 ADJUSTABLE DOSE PRE-FILLED PEN SYRINGE | Refills: 3 | Status: SHIPPED | OUTPATIENT
Start: 2023-11-06

## 2023-11-06 RX ORDER — INSULIN GLARGINE 300 U/ML
30 INJECTION, SOLUTION SUBCUTANEOUS 2 TIMES DAILY
Qty: 1 ADJUSTABLE DOSE PRE-FILLED PEN SYRINGE | Refills: 3 | Status: SHIPPED | OUTPATIENT
Start: 2023-11-06

## 2023-11-08 RX ORDER — FESOTERODINE FUMARATE 8 MG/1
8 TABLET, EXTENDED RELEASE ORAL DAILY
Qty: 30 TABLET | Refills: 1 | Status: SHIPPED | OUTPATIENT
Start: 2023-11-08

## 2023-11-15 ENCOUNTER — OFFICE VISIT (OUTPATIENT)
Dept: FAMILY MEDICINE CLINIC | Age: 68
End: 2023-11-15

## 2023-11-15 VITALS
DIASTOLIC BLOOD PRESSURE: 84 MMHG | SYSTOLIC BLOOD PRESSURE: 138 MMHG | HEART RATE: 81 BPM | RESPIRATION RATE: 16 BRPM | OXYGEN SATURATION: 96 %

## 2023-11-15 DIAGNOSIS — Z79.4 TYPE 2 DIABETES MELLITUS WITH HYPERGLYCEMIA, WITH LONG-TERM CURRENT USE OF INSULIN (HCC): Primary | ICD-10-CM

## 2023-11-15 DIAGNOSIS — E11.65 TYPE 2 DIABETES MELLITUS WITH HYPERGLYCEMIA, WITH LONG-TERM CURRENT USE OF INSULIN (HCC): Primary | ICD-10-CM

## 2023-11-15 DIAGNOSIS — I48.0 PAROXYSMAL ATRIAL FIBRILLATION (HCC): ICD-10-CM

## 2023-11-15 DIAGNOSIS — I10 ESSENTIAL HYPERTENSION: ICD-10-CM

## 2023-11-15 DIAGNOSIS — N18.32 STAGE 3B CHRONIC KIDNEY DISEASE (HCC): ICD-10-CM

## 2023-11-15 DIAGNOSIS — I27.20 PULMONARY HYPERTENSION (HCC): ICD-10-CM

## 2023-11-15 DIAGNOSIS — I82.5Y9 CHRONIC DEEP VEIN THROMBOSIS (DVT) OF PROXIMAL VEIN OF LOWER EXTREMITY, UNSPECIFIED LATERALITY (HCC): ICD-10-CM

## 2023-11-15 LAB — HBA1C MFR BLD: 7.9 %

## 2023-11-15 NOTE — PROGRESS NOTES
range of motion and neck supple. Right lower leg: Edema present. Left lower leg: Edema present. Skin:     General: Skin is warm and dry. Coloration: Skin is not jaundiced. Findings: No rash. Neurological:      General: No focal deficit present. Mental Status: She is alert and oriented to person, place, and time. Psychiatric:         Mood and Affect: Mood normal.         Behavior: Behavior normal.         Thought Content: Thought content normal.         Judgment: Judgment normal.       /84   Pulse 81   Resp 16   SpO2 96%     Assessment/Plan:   1. Type 2 diabetes mellitus with hyperglycemia, with long-term current use of insulin (HCC)  -     POCT glycosylated hemoglobin (Hb A1C)  -     Microalbumin, Ur; Future  2. Essential hypertension  -     CBC with Auto Differential; Future  -     Comprehensive Metabolic Panel, Fasting; Future  -     Lipid, Fasting; Future  -     TSH with Reflex; Future  3. Pulmonary hypertension (HCC)  4. Paroxysmal atrial fibrillation (HCC)  5. Stage 3b chronic kidney disease ([facility])  -     Vitamin D 25 Hydroxy; Future  6. Chronic deep vein thrombosis (DVT) of proximal vein of lower extremity, unspecified laterality ([address])  7. Body mass index (BMI) 50.0-59.9, adult ([facility])      Type 2 diabetes-POCT A1c today 7.9, on Trulicity 3 mg/week, Toujeo 30 units twice daily    Tension-controlled, on losartan 50-hydrochlorothiazide 12.5 mg daily and metoprolol succinate 50 mg daily    Paroxysmal A-fib-on Xarelto 20 mg daily              Return in about 3 months (around 2/15/2024) for Follow up DM/HTN.     Orders Placed This Encounter   Procedures    CBC with Auto Differential     Standing Status:   Future     Standing Expiration Date:   5/15/2024    Comprehensive Metabolic Panel, Fasting     Standing Status:   Future     Standing Expiration Date:   5/15/2024    Lipid, Fasting     Standing Status:   Future     Standing Expiration Date:   5/15/2024    Microalbumin, Ur

## 2023-11-16 ASSESSMENT — ENCOUNTER SYMPTOMS
ABDOMINAL PAIN: 0
CHEST TIGHTNESS: 0
SORE THROAT: 0
NAUSEA: 0
SHORTNESS OF BREATH: 0
EYE DISCHARGE: 0
WHEEZING: 0
CONSTIPATION: 0
VOMITING: 0
COUGH: 0
DIARRHEA: 0

## 2023-11-20 DIAGNOSIS — I10 ESSENTIAL HYPERTENSION: ICD-10-CM

## 2023-11-20 NOTE — TELEPHONE ENCOUNTER
Juan Pablo Easton is calling to request a refill on the following medication(s):    Medication Request:  Requested Prescriptions     Pending Prescriptions Disp Refills    hydroCHLOROthiazide (MICROZIDE) 12.5 MG capsule 90 capsule 1     Sig: Take 1 capsule by mouth every morning       Last Visit Date (If Applicable):  80/97/3429    Next Visit Date:    2/1/2024

## 2023-11-21 RX ORDER — HYDROCHLOROTHIAZIDE 12.5 MG/1
12.5 CAPSULE, GELATIN COATED ORAL EVERY MORNING
Qty: 90 CAPSULE | Refills: 1 | OUTPATIENT
Start: 2023-11-21

## 2023-11-27 DIAGNOSIS — I82.5Y9 CHRONIC DEEP VEIN THROMBOSIS (DVT) OF PROXIMAL VEIN OF LOWER EXTREMITY, UNSPECIFIED LATERALITY (HCC): ICD-10-CM

## 2023-11-27 DIAGNOSIS — K21.9 GASTROESOPHAGEAL REFLUX DISEASE WITHOUT ESOPHAGITIS: ICD-10-CM

## 2023-11-28 ENCOUNTER — HOSPITAL ENCOUNTER (OUTPATIENT)
Dept: WOUND CARE | Age: 68
Discharge: HOME | End: 2023-11-28

## 2023-11-28 RX ORDER — RIVAROXABAN 20 MG/1
20 TABLET, FILM COATED ORAL DAILY
Qty: 90 TABLET | Refills: 1 | Status: SHIPPED | OUTPATIENT
Start: 2023-11-28

## 2023-11-28 RX ORDER — OMEPRAZOLE 40 MG/1
40 CAPSULE, DELAYED RELEASE ORAL DAILY
Qty: 90 CAPSULE | Refills: 1 | Status: SHIPPED | OUTPATIENT
Start: 2023-11-28

## 2023-11-28 NOTE — TELEPHONE ENCOUNTER
Eugenio Dorman is calling to request a refill on the following medication(s):    Medication Request:  Requested Prescriptions     Pending Prescriptions Disp Refills    XARELTO 20 MG TABS tablet [Pharmacy Med Name: Xarelto Oral Tablet 20 MG] 30 tablet 0     Sig: Take 1 tablet by mouth daily with breakfast       Last Visit Date (If Applicable):  25/49/2644    Next Visit Date:    2/1/2024

## 2023-12-08 NOTE — DISCHARGE INSTRUCTIONS
[address] -Phone: [phone] Fax: [phone]    Visit  Discharge Instructions / Physician Orders     DATE: 12/12/2023     Home Care: [facility]     SUPPLIES ORDERED THRU: Home Health d/t Medicare guidelines     Wound Location: Left Lower Leg     Cleanse with: Antibacterial soap, rinse thoroughly, pat dry. Dressing Orders: Silver Alginate, Calamine Layer, 4-Layer Compression Wrap     Frequency: [place] 2 TIMES A WEEK     Additional Orders: Increase protein to diet (meat, cheese, eggs, fish, peanut butter, nuts and beans)  ELEVATE LEGS AS MUCH AS POSSIBLE  Culture Taken 6/6/23 6/20/23-Antibiotic sent to pharmacy     Your next appointment with Dave [facility] [place] is in 1 week      (Please note your next appointment above and if you are unable to keep, kindly give a 24 hour notice. Thank you.)  If more than 15 min late we cannot guarantee you will be seen due to clinician schedule  Per Policy, Excessive cancellation will call for dismissal from program.     If you experience any of the following, please call the [facility] during business hours:  [phone]  Your Phone call may be forwarded to David Leon during business hours that [facility] is closed. * Increase in Pain  * Temperature over 101  * Increase in drainage from your wound  * Drainage with a foul odor  * Bleeding  * Increase in swelling  * Need for compression bandage changes due to slippage, breakthrough drainage. If you need medical attention outside of the business hours of the [facility] [place] please contact your PCP or go to the nearest emergency room. The information contained in the After Visit Summary has been reviewed with me, the patient and/or responsible adult, by my health care provider(s). I had the opportunity to ask questions regarding this information.  I have elected to receive;      []After Visit Summary  [x]Comprehensive Discharge Instruction        Patient

## 2023-12-12 ENCOUNTER — HOSPITAL ENCOUNTER (OUTPATIENT)
Dept: WOUND CARE | Age: 68
Discharge: HOME | End: 2023-12-12

## 2024-01-08 RX ORDER — FESOTERODINE FUMARATE 8 MG/1
1 TABLET, EXTENDED RELEASE ORAL DAILY
Qty: 30 TABLET | Refills: 5 | Status: SHIPPED | OUTPATIENT
Start: 2024-01-08 | End: 2024-01-12 | Stop reason: SDUPTHER

## 2024-01-08 NOTE — TELEPHONE ENCOUNTER
Cherie Molina is calling to request a refill on the following medication(s):    Medication Request:  Requested Prescriptions     Pending Prescriptions Disp Refills    Fesoterodine Fumarate ER 8 MG TB24 [Pharmacy Med Name: Fesoterodine Fumarate ER Oral Tablet Extended Release 24 Hour 8 MG] 30 tablet 0     Sig: TAKE 1 TABLET BY MOUTH EVERY DAY       Last Visit Date (If Applicable):  11/15/2023    Next Visit Date:    2/1/2024

## 2024-01-12 DIAGNOSIS — E11.65 TYPE 2 DIABETES MELLITUS WITH HYPERGLYCEMIA, WITHOUT LONG-TERM CURRENT USE OF INSULIN (HCC): Primary | ICD-10-CM

## 2024-01-12 RX ORDER — FESOTERODINE FUMARATE 8 MG/1
1 TABLET, EXTENDED RELEASE ORAL DAILY
Qty: 30 TABLET | Refills: 5 | Status: SHIPPED | OUTPATIENT
Start: 2024-01-12

## 2024-01-12 NOTE — TELEPHONE ENCOUNTER
[facility] CALLED TO AD[facility]ISE THAT THEY RECEIVED A REFERRAL FOR WOUND CARE BUT THEY TRY TO REACH OUT TO DR REYES OFFICE

## 2024-01-12 NOTE — TELEPHONE ENCOUNTER
Cherie oMlina is calling to request a refill on the following medication(s):    Medication Request:  Requested Prescriptions     Pending Prescriptions Disp Refills    Fesoterodine Fumarate ER 8 MG TB24 30 tablet 5     Sig: Take 1 tablet by mouth daily       Last Visit Date (If Applicable):  11/15/2023    Next Visit Date:    2/1/2024      Can you send this in right away she is out of the medication, and the reason she needs the referral is because her  is going to have surgery and he will not be able to drive her to wound care or anywhere

## 2024-01-15 DIAGNOSIS — R35.0 URINARY FREQUENCY: ICD-10-CM

## 2024-01-15 NOTE — TELEPHONE ENCOUNTER
Cherie Molina is calling to request a refill on the following medication(s):    Medication Request:  Requested Prescriptions     Pending Prescriptions Disp Refills    oxyBUTYnin (DITROPAN XL) 10 MG extended release tablet 30 tablet 3     Sig: Take 1 tablet by mouth daily       Last Visit Date (If Applicable):  11/15/2023    Next Visit Date:    2/1/2024

## 2024-01-16 ENCOUNTER — TELEPHONE (OUTPATIENT)
Dept: FAMILY MEDICINE CLINIC | Age: 69
End: 2024-01-16

## 2024-01-16 RX ORDER — OXYBUTYNIN CHLORIDE 10 MG/1
10 TABLET, EXTENDED RELEASE ORAL DAILY
Qty: 90 TABLET | Refills: 1 | Status: SHIPPED | OUTPATIENT
Start: 2024-01-16

## 2024-01-16 NOTE — TELEPHONE ENCOUNTER
Berna Crandall called and stated patient needs an updated note stating the underlying diagnosis of her wound. She stated patient has uncontrolled diabetes and that should be used as the diagnosis if true.

## 2024-01-17 DIAGNOSIS — E11.65 TYPE 2 DIABETES MELLITUS WITH HYPERGLYCEMIA, WITHOUT LONG-TERM CURRENT USE OF INSULIN (HCC): Primary | ICD-10-CM

## 2024-01-18 ENCOUNTER — TELEPHONE (OUTPATIENT)
Dept: FAMILY MEDICINE CLINIC | Age: 69
End: 2024-01-18

## 2024-01-18 NOTE — TELEPHONE ENCOUNTER
[facility] is asking for an addendum to be added to her chart  faxed over to them , they are looking for and underlying cause for the wound. Is it from her diabetes ?    Fax [phone]

## 2024-01-19 ENCOUNTER — TELEPHONE (OUTPATIENT)
Dept: FAMILY MEDICINE CLINIC | Age: 69
End: 2024-01-19

## 2024-01-19 NOTE — TELEPHONE ENCOUNTER
Kary with [facility] called in regards to the Wound care referral. Kary stated that the nurse would need to know the type of dressing to use. If provider isn't sure could you please give the office a call at [phone] to assess with the nurse.

## 2024-01-22 ENCOUNTER — TELEPHONE (OUTPATIENT)
Dept: FAMILY MEDICINE CLINIC | Age: 69
End: 2024-01-22

## 2024-01-22 DIAGNOSIS — L97.813 SKIN ULCER OF PRETIBIAL REGION OF RIGHT LOWER EXTREMITY, WITH NECROSIS OF MUSCLE (HCC): Primary | ICD-10-CM

## 2024-01-22 NOTE — TELEPHONE ENCOUNTER
Patient no longer see Wound care. Kary from [facility] stated that in order for the nurse to still go out and assist the patient, they would need an order. The order for the dressing can say. Nurse to assess call provider to collaborate.

## 2024-02-05 NOTE — DISCHARGE INSTRUCTIONS
[facility] -Phone: [phone] Fax: [phone]    Visit  Discharge Instructions / Physician Orders     DATE: 2/6/2024     Home Care: [facility]     SUPPLIES ORDERED THRU: Home Health d/t Medicare guidelines     Wound Location: Left Lower Leg     Cleanse with: Antibacterial soap, rinse thoroughly, pat dry.     Dressing Orders: Silver Alginate, Calamine Layer, 4-Layer Compression Wrap     Frequency: HOME HEALTH CHANGE 2 TIMES A WEEK     Additional Orders: Increase protein to diet (meat, cheese, eggs, fish, peanut butter, nuts and beans)  ELEVATE LEGS AS MUCH AS POSSIBLE  Culture Taken 6/6/23 6/20/23-Antibiotic sent to pharmacy     Your next appointment with Wound Care Center is in 1 week      (Please note your next appointment above and if you are unable to keep, kindly give a 24 hour notice. Thank you.)  If more than 15 min late we cannot guarantee you will be seen due to clinician schedule  Per Policy, Excessive cancellation will call for dismissal from program.     If you experience any of the following, please call the Wound Care Center during business hours:  [phone]  Your Phone call may be forwarded to [facility] during business hours that [place] is closed.     * Increase in Pain  * Temperature over 101  * Increase in drainage from your wound  * Drainage with a foul odor  * Bleeding  * Increase in swelling  * Need for compression bandage changes due to slippage, breakthrough drainage.     If you need medical attention outside of the business hours of the Wound Care Centers please contact your PCP or go to the nearest emergency room.     The information contained in the After Visit Summary has been reviewed with me, the patient and/or responsible adult, by my health care provider(s). I had the opportunity to ask questions regarding this information. I have elected to receive;      []After Visit Summary  [x]Comprehensive Discharge Instruction        Patient

## 2024-02-06 ENCOUNTER — HOSPITAL ENCOUNTER (OUTPATIENT)
Dept: WOUND CARE | Age: 69
Discharge: HOME | End: 2024-02-06

## 2024-02-08 ENCOUNTER — TELEPHONE (OUTPATIENT)
Dept: FAMILY MEDICINE CLINIC | Age: 69
End: 2024-02-08

## 2024-02-08 DIAGNOSIS — L03.119 CELLULITIS OF LOWER EXTREMITY, UNSPECIFIED LATERALITY: Primary | ICD-10-CM

## 2024-02-08 RX ORDER — CEPHALEXIN 500 MG/1
500 CAPSULE ORAL 3 TIMES DAILY
Qty: 21 CAPSULE | Refills: 0 | Status: SHIPPED | OUTPATIENT
Start: 2024-02-08 | End: 2024-02-15

## 2024-02-08 NOTE — TELEPHONE ENCOUNTER
Patients is having cellulitis in her leg this is a reoccurring issue for her she was taking doxy but that didn't work the last time so I am thinking she is getting immune to it because it has always worked in the past is there something else she can try

## 2024-02-08 NOTE — TELEPHONE ENCOUNTER
Made patient aware via vm  that is after she starts taking it and the redness or symptoms havent nikky away in 24hrs she needs to go to the er

## 2024-02-12 ENCOUNTER — HOSPITAL ENCOUNTER (INPATIENT)
Age: 69
LOS: 8 days | Discharge: NURSING, REHAB OR LONG-TERM CARE | DRG: 872 | End: 2024-02-21
Attending: EMERGENCY MEDICINE | Admitting: FAMILY MEDICINE
Payer: MEDICARE

## 2024-02-12 ENCOUNTER — APPOINTMENT (OUTPATIENT)
Dept: GENERAL RADIOLOGY | Age: 69
DRG: 872 | End: 2024-02-12
Payer: MEDICARE

## 2024-02-12 DIAGNOSIS — L03.116 CELLULITIS OF LEFT LOWER LEG: Primary | ICD-10-CM

## 2024-02-12 LAB
ALBUMIN SERPL-MCNC: 3 G/DL (ref 3.5–5.2)
ALP SERPL-CCNC: 109 U/L (ref 35–104)
ALT SERPL-CCNC: 8 U/L (ref 5–33)
ANION GAP SERPL CALCULATED.3IONS-SCNC: 13 MMOL/L (ref 9–17)
AST SERPL-CCNC: 15 U/L
BACTERIA URNS QL MICRO: ABNORMAL
BASOPHILS # BLD: 0.14 K/UL (ref 0–0.2)
BASOPHILS NFR BLD: 1 % (ref 0–2)
BILIRUB SERPL-MCNC: 0.4 MG/DL (ref 0.3–1.2)
BILIRUB UR QL STRIP: NEGATIVE
BUN SERPL-MCNC: 33 MG/DL (ref 8–23)
BUN/CREAT SERPL: 28 (ref 9–20)
CALCIUM SERPL-MCNC: 8.5 MG/DL (ref 8.6–10.4)
CHLORIDE SERPL-SCNC: 97 MMOL/L (ref 98–107)
CLARITY UR: CLEAR
CO2 SERPL-SCNC: 23 MMOL/L (ref 20–31)
COLOR UR: YELLOW
CREAT SERPL-MCNC: 1.2 MG/DL (ref 0.5–0.9)
EOSINOPHIL # BLD: 0.28 K/UL (ref 0–0.44)
EOSINOPHILS RELATIVE PERCENT: 2 % (ref 1–4)
EPI CELLS #/AREA URNS HPF: ABNORMAL /HPF (ref 0–5)
ERYTHROCYTE [DISTWIDTH] IN BLOOD BY AUTOMATED COUNT: 14.6 % (ref 11.8–14.4)
GFR SERPL CREATININE-BSD FRML MDRD: 49 ML/MIN/1.73M2
GLUCOSE SERPL-MCNC: 251 MG/DL (ref 70–99)
GLUCOSE UR STRIP-MCNC: NEGATIVE MG/DL
HCT VFR BLD AUTO: 32.7 % (ref 36.3–47.1)
HGB BLD-MCNC: 9.9 G/DL (ref 11.9–15.1)
HGB UR QL STRIP.AUTO: ABNORMAL
IMM GRANULOCYTES # BLD AUTO: 1.14 K/UL (ref 0–0.3)
IMM GRANULOCYTES NFR BLD: 8 %
KETONES UR STRIP-MCNC: NEGATIVE MG/DL
LACTATE BLDV-SCNC: 1.8 MMOL/L (ref 0.5–1.9)
LEUKOCYTE ESTERASE UR QL STRIP: ABNORMAL
LYMPHOCYTES NFR BLD: 0.85 K/UL (ref 1.1–3.7)
LYMPHOCYTES RELATIVE PERCENT: 6 % (ref 24–43)
MCH RBC QN AUTO: 27.9 PG (ref 25.2–33.5)
MCHC RBC AUTO-ENTMCNC: 30.3 G/DL (ref 28.4–34.8)
MCV RBC AUTO: 92.1 FL (ref 82.6–102.9)
MONOCYTES NFR BLD: 0.99 K/UL (ref 0.1–1.2)
MONOCYTES NFR BLD: 7 % (ref 3–12)
NEUTROPHILS NFR BLD: 76 % (ref 36–65)
NEUTS SEG NFR BLD: 10.8 K/UL (ref 1.5–8.1)
NITRITE UR QL STRIP: NEGATIVE
NRBC BLD-RTO: 0 PER 100 WBC
PH UR STRIP: 6 [PH] (ref 5–8)
PLATELET # BLD AUTO: 385 K/UL (ref 138–453)
PMV BLD AUTO: 9.3 FL (ref 8.1–13.5)
POTASSIUM SERPL-SCNC: 4.1 MMOL/L (ref 3.7–5.3)
PROT SERPL-MCNC: 7.7 G/DL (ref 6.4–8.3)
PROT UR STRIP-MCNC: ABNORMAL MG/DL
RBC # BLD AUTO: 3.55 M/UL (ref 3.95–5.11)
RBC #/AREA URNS HPF: ABNORMAL /HPF (ref 0–2)
SODIUM SERPL-SCNC: 133 MMOL/L (ref 135–144)
SP GR UR STRIP: 1.02 (ref 1–1.03)
UROBILINOGEN UR STRIP-ACNC: NORMAL EU/DL (ref 0–1)
WBC #/AREA URNS HPF: ABNORMAL /HPF (ref 0–5)
WBC OTHER # BLD: 14.2 K/UL (ref 3.5–11.3)

## 2024-02-12 PROCEDURE — 87086 URINE CULTURE/COLONY COUNT: CPT

## 2024-02-12 PROCEDURE — 99285 EMERGENCY DEPT VISIT HI MDM: CPT

## 2024-02-12 PROCEDURE — 87077 CULTURE AEROBIC IDENTIFY: CPT

## 2024-02-12 PROCEDURE — 80053 COMPREHEN METABOLIC PANEL: CPT

## 2024-02-12 PROCEDURE — 2580000003 HC RX 258: Performed by: EMERGENCY MEDICINE

## 2024-02-12 PROCEDURE — 93005 ELECTROCARDIOGRAM TRACING: CPT | Performed by: EMERGENCY MEDICINE

## 2024-02-12 PROCEDURE — 81001 URINALYSIS AUTO W/SCOPE: CPT

## 2024-02-12 PROCEDURE — 85652 RBC SED RATE AUTOMATED: CPT

## 2024-02-12 PROCEDURE — 86140 C-REACTIVE PROTEIN: CPT

## 2024-02-12 PROCEDURE — 87186 SC STD MICRODIL/AGAR DIL: CPT

## 2024-02-12 PROCEDURE — 83605 ASSAY OF LACTIC ACID: CPT

## 2024-02-12 PROCEDURE — 96374 THER/PROPH/DIAG INJ IV PUSH: CPT

## 2024-02-12 PROCEDURE — 87636 SARSCOV2 & INF A&B AMP PRB: CPT

## 2024-02-12 PROCEDURE — 71045 X-RAY EXAM CHEST 1 VIEW: CPT

## 2024-02-12 PROCEDURE — 6360000002 HC RX W HCPCS: Performed by: EMERGENCY MEDICINE

## 2024-02-12 PROCEDURE — 87040 BLOOD CULTURE FOR BACTERIA: CPT

## 2024-02-12 PROCEDURE — 85025 COMPLETE CBC W/AUTO DIFF WBC: CPT

## 2024-02-12 RX ORDER — SODIUM CHLORIDE 0.9 % (FLUSH) 0.9 %
5-40 SYRINGE (ML) INJECTION EVERY 12 HOURS SCHEDULED
Status: DISCONTINUED | OUTPATIENT
Start: 2024-02-12 | End: 2024-02-13

## 2024-02-12 RX ORDER — SODIUM CHLORIDE, SODIUM LACTATE, POTASSIUM CHLORIDE, AND CALCIUM CHLORIDE .6; .31; .03; .02 G/100ML; G/100ML; G/100ML; G/100ML
30 INJECTION, SOLUTION INTRAVENOUS ONCE
Status: COMPLETED | OUTPATIENT
Start: 2024-02-12 | End: 2024-02-12

## 2024-02-12 RX ORDER — SODIUM CHLORIDE 0.9 % (FLUSH) 0.9 %
5-40 SYRINGE (ML) INJECTION PRN
Status: DISCONTINUED | OUTPATIENT
Start: 2024-02-12 | End: 2024-02-13

## 2024-02-12 RX ORDER — SODIUM CHLORIDE 9 MG/ML
INJECTION, SOLUTION INTRAVENOUS PRN
Status: DISCONTINUED | OUTPATIENT
Start: 2024-02-12 | End: 2024-02-13

## 2024-02-12 RX ADMIN — SODIUM CHLORIDE, POTASSIUM CHLORIDE, SODIUM LACTATE AND CALCIUM CHLORIDE 2193 ML: 600; 310; 30; 20 INJECTION, SOLUTION INTRAVENOUS at 23:21

## 2024-02-12 RX ADMIN — VANCOMYCIN HYDROCHLORIDE 2500 MG: 5 INJECTION, POWDER, LYOPHILIZED, FOR SOLUTION INTRAVENOUS at 23:24

## 2024-02-12 ASSESSMENT — PAIN - FUNCTIONAL ASSESSMENT: PAIN_FUNCTIONAL_ASSESSMENT: NONE - DENIES PAIN

## 2024-02-12 ASSESSMENT — PAIN SCALES - GENERAL: PAINLEVEL_OUTOF10: 0

## 2024-02-12 NOTE — DISCHARGE INSTRUCTIONS
[facility] -Phone: [phone] Fax: [phone]    Visit  Discharge Instructions / Physician Orders     DATE: 2/13/2024     Home Care: [facility]     SUPPLIES ORDERED THRU: Home Health d/t Medicare guidelines     Wound Location: Left Lower Leg     Cleanse with: Antibacterial soap, rinse thoroughly, pat dry.     Dressing Orders: Silver Alginate, Calamine Layer, 4-Layer Compression Wrap     Frequency: HOME HEALTH CHANGE 2 TIMES A WEEK     Additional Orders: Increase protein to diet (meat, cheese, eggs, fish, peanut butter, nuts and beans)  ELEVATE LEGS AS MUCH AS POSSIBLE  Culture Taken 6/6/23 6/20/23-Antibiotic sent to pharmacy     Your next appointment with Wound Care Center is in 1 week      (Please note your next appointment above and if you are unable to keep, kindly give a 24 hour notice. Thank you.)  If more than 15 min late we cannot guarantee you will be seen due to clinician schedule  Per Policy, Excessive cancellation will call for dismissal from program.     If you experience any of the following, please call the Wound Care Center during business hours:  [phone]  Your Phone call may be forwarded to [facility] during business hours that [place] is closed.     * Increase in Pain  * Temperature over 101  * Increase in drainage from your wound  * Drainage with a foul odor  * Bleeding  * Increase in swelling  * Need for compression bandage changes due to slippage, breakthrough drainage.     If you need medical attention outside of the business hours of the Wound Care Centers please contact your PCP or go to the nearest emergency room.     The information contained in the After Visit Summary has been reviewed with me, the patient and/or responsible adult, by my health care provider(s). I had the opportunity to ask questions regarding this information. I have elected to receive;      []After Visit Summary  [x]Comprehensive Discharge Instruction        Patient

## 2024-02-13 ENCOUNTER — HOSPITAL ENCOUNTER (OUTPATIENT)
Dept: WOUND CARE | Age: 69
Discharge: HOME | End: 2024-02-13

## 2024-02-13 ENCOUNTER — APPOINTMENT (OUTPATIENT)
Dept: GENERAL RADIOLOGY | Age: 69
DRG: 872 | End: 2024-02-13
Payer: MEDICARE

## 2024-02-13 PROBLEM — L03.115 CELLULITIS OF RIGHT LOWER EXTREMITY: Status: ACTIVE | Noted: 2024-02-13

## 2024-02-13 PROBLEM — R74.8 ELEVATED ALKALINE PHOSPHATASE LEVEL: Status: ACTIVE | Noted: 2024-02-13

## 2024-02-13 PROBLEM — L03.115 CELLULITIS OF RIGHT LOWER EXTREMITY: Status: RESOLVED | Noted: 2024-02-13 | Resolved: 2024-02-13

## 2024-02-13 PROBLEM — E53.8 FOLIC ACID DEFICIENCY: Status: ACTIVE | Noted: 2024-02-13

## 2024-02-13 PROBLEM — D68.69 SECONDARY HYPERCOAGULABLE STATE (HCC): Status: ACTIVE | Noted: 2024-02-13

## 2024-02-13 PROBLEM — L97.529 PLANTAR ULCER OF LEFT FOOT (HCC): Status: ACTIVE | Noted: 2024-02-13

## 2024-02-13 LAB
ALBUMIN SERPL-MCNC: 2.4 G/DL (ref 3.5–5.2)
ALP SERPL-CCNC: 79 U/L (ref 35–104)
ALT SERPL-CCNC: 6 U/L (ref 5–33)
ANION GAP SERPL CALCULATED.3IONS-SCNC: 9 MMOL/L (ref 9–17)
AST SERPL-CCNC: 11 U/L
BASOPHILS # BLD: 0 K/UL (ref 0–0.2)
BASOPHILS NFR BLD: 0 % (ref 0–2)
BILIRUB SERPL-MCNC: 0.6 MG/DL (ref 0.3–1.2)
BUN SERPL-MCNC: 28 MG/DL (ref 8–23)
BUN/CREAT SERPL: 25 (ref 9–20)
CALCIUM SERPL-MCNC: 7.9 MG/DL (ref 8.6–10.4)
CHLORIDE SERPL-SCNC: 100 MMOL/L (ref 98–107)
CO2 SERPL-SCNC: 22 MMOL/L (ref 20–31)
CREAT SERPL-MCNC: 1.1 MG/DL (ref 0.5–0.9)
CRP SERPL HS-MCNC: 195.1 MG/L (ref 0–5)
EKG ATRIAL RATE: 100 BPM
EKG Q-T INTERVAL: 386 MS
EKG QRS DURATION: 106 MS
EKG QTC CALCULATION (BAZETT): 472 MS
EKG R AXIS: -46 DEGREES
EKG T AXIS: 42 DEGREES
EKG VENTRICULAR RATE: 90 BPM
EOSINOPHIL # BLD: 0.15 K/UL (ref 0–0.44)
EOSINOPHILS RELATIVE PERCENT: 1 % (ref 1–4)
ERYTHROCYTE [DISTWIDTH] IN BLOOD BY AUTOMATED COUNT: 14.9 % (ref 11.8–14.4)
ERYTHROCYTE [SEDIMENTATION RATE] IN BLOOD BY PHOTOMETRIC METHOD: 59 MM/HR (ref 0–30)
FERRITIN SERPL-MCNC: 247 NG/ML (ref 13–150)
FLUAV RNA RESP QL NAA+PROBE: NOT DETECTED
FLUBV RNA RESP QL NAA+PROBE: NOT DETECTED
FOLATE SERPL-MCNC: 3.1 NG/ML (ref 4.8–24.2)
GFR SERPL CREATININE-BSD FRML MDRD: 54 ML/MIN/1.73M2
GLUCOSE BLD-MCNC: 184 MG/DL (ref 65–105)
GLUCOSE BLD-MCNC: 214 MG/DL (ref 65–105)
GLUCOSE BLD-MCNC: 234 MG/DL (ref 65–105)
GLUCOSE BLD-MCNC: 237 MG/DL (ref 65–105)
GLUCOSE BLD-MCNC: 265 MG/DL (ref 65–105)
GLUCOSE SERPL-MCNC: 289 MG/DL (ref 70–99)
HCT VFR BLD AUTO: 33.5 % (ref 36.3–47.1)
HGB BLD-MCNC: 10 G/DL (ref 11.9–15.1)
IMM GRANULOCYTES # BLD AUTO: 0.6 K/UL (ref 0–0.3)
IMM GRANULOCYTES NFR BLD: 4 %
IMM RETICS NFR: 23.8 % (ref 2.7–18.3)
IRON SATN MFR SERPL: 15 % (ref 20–55)
IRON SERPL-MCNC: 19 UG/DL (ref 37–145)
LYMPHOCYTES NFR BLD: 0.6 K/UL (ref 1.1–3.7)
LYMPHOCYTES RELATIVE PERCENT: 4 % (ref 24–43)
MCH RBC QN AUTO: 28.2 PG (ref 25.2–33.5)
MCHC RBC AUTO-ENTMCNC: 29.9 G/DL (ref 28.4–34.8)
MCV RBC AUTO: 94.6 FL (ref 82.6–102.9)
MONOCYTES NFR BLD: 0.45 K/UL (ref 0.1–1.2)
MONOCYTES NFR BLD: 3 % (ref 3–12)
NEUTROPHILS NFR BLD: 88 % (ref 36–65)
NEUTS SEG NFR BLD: 13.3 K/UL (ref 1.5–8.1)
PLATELET # BLD AUTO: 340 K/UL (ref 138–453)
PMV BLD AUTO: 9 FL (ref 8.1–13.5)
POTASSIUM SERPL-SCNC: 4.2 MMOL/L (ref 3.7–5.3)
PROT SERPL-MCNC: 6.3 G/DL (ref 6.4–8.3)
RBC # BLD AUTO: 3.54 M/UL (ref 3.95–5.11)
RETIC HEMOGLOBIN: 21.1 PG (ref 28.2–35.7)
RETICS # AUTO: 0.06 M/UL (ref 0.03–0.08)
RETICS/RBC NFR AUTO: 1.7 % (ref 0.5–1.9)
SARS-COV-2 RNA RESP QL NAA+PROBE: NOT DETECTED
SODIUM SERPL-SCNC: 131 MMOL/L (ref 135–144)
SOURCE: NORMAL
SPECIMEN DESCRIPTION: NORMAL
TIBC SERPL-MCNC: 131 UG/DL (ref 250–450)
UNSATURATED IRON BINDING CAPACITY: 112 UG/DL (ref 112–347)
VIT B12 SERPL-MCNC: 418 PG/ML (ref 232–1245)
WBC OTHER # BLD: 15.1 K/UL (ref 3.5–11.3)

## 2024-02-13 PROCEDURE — 97167 OT EVAL HIGH COMPLEX 60 MIN: CPT

## 2024-02-13 PROCEDURE — 6360000002 HC RX W HCPCS: Performed by: NURSE PRACTITIONER

## 2024-02-13 PROCEDURE — 99223 1ST HOSP IP/OBS HIGH 75: CPT | Performed by: INTERNAL MEDICINE

## 2024-02-13 PROCEDURE — 0HDLXZZ EXTRACTION OF LEFT LOWER LEG SKIN, EXTERNAL APPROACH: ICD-10-PCS

## 2024-02-13 PROCEDURE — 6370000000 HC RX 637 (ALT 250 FOR IP): Performed by: FAMILY MEDICINE

## 2024-02-13 PROCEDURE — 6370000000 HC RX 637 (ALT 250 FOR IP): Performed by: NURSE PRACTITIONER

## 2024-02-13 PROCEDURE — 97535 SELF CARE MNGMENT TRAINING: CPT

## 2024-02-13 PROCEDURE — 73630 X-RAY EXAM OF FOOT: CPT

## 2024-02-13 PROCEDURE — 82607 VITAMIN B-12: CPT

## 2024-02-13 PROCEDURE — 87205 SMEAR GRAM STAIN: CPT

## 2024-02-13 PROCEDURE — 2500000003 HC RX 250 WO HCPCS: Performed by: FAMILY MEDICINE

## 2024-02-13 PROCEDURE — 83540 ASSAY OF IRON: CPT

## 2024-02-13 PROCEDURE — 82728 ASSAY OF FERRITIN: CPT

## 2024-02-13 PROCEDURE — 97530 THERAPEUTIC ACTIVITIES: CPT

## 2024-02-13 PROCEDURE — 2580000003 HC RX 258: Performed by: FAMILY MEDICINE

## 2024-02-13 PROCEDURE — APPSS45 APP SPLIT SHARED TIME 31-45 MINUTES: Performed by: NURSE PRACTITIONER

## 2024-02-13 PROCEDURE — 82947 ASSAY GLUCOSE BLOOD QUANT: CPT

## 2024-02-13 PROCEDURE — 36415 COLL VENOUS BLD VENIPUNCTURE: CPT

## 2024-02-13 PROCEDURE — 85025 COMPLETE CBC W/AUTO DIFF WBC: CPT

## 2024-02-13 PROCEDURE — 0HDNXZZ EXTRACTION OF LEFT FOOT SKIN, EXTERNAL APPROACH: ICD-10-PCS

## 2024-02-13 PROCEDURE — 82746 ASSAY OF FOLIC ACID SERUM: CPT

## 2024-02-13 PROCEDURE — 2580000003 HC RX 258: Performed by: NURSE PRACTITIONER

## 2024-02-13 PROCEDURE — 83550 IRON BINDING TEST: CPT

## 2024-02-13 PROCEDURE — 80053 COMPREHEN METABOLIC PANEL: CPT

## 2024-02-13 PROCEDURE — 87070 CULTURE OTHR SPECIMN AEROBIC: CPT

## 2024-02-13 PROCEDURE — 97163 PT EVAL HIGH COMPLEX 45 MIN: CPT

## 2024-02-13 PROCEDURE — 2580000003 HC RX 258: Performed by: INTERNAL MEDICINE

## 2024-02-13 PROCEDURE — 85045 AUTOMATED RETICULOCYTE COUNT: CPT

## 2024-02-13 PROCEDURE — 99223 1ST HOSP IP/OBS HIGH 75: CPT | Performed by: FAMILY MEDICINE

## 2024-02-13 PROCEDURE — 1200000000 HC SEMI PRIVATE

## 2024-02-13 PROCEDURE — 6360000002 HC RX W HCPCS: Performed by: INTERNAL MEDICINE

## 2024-02-13 PROCEDURE — 6360000002 HC RX W HCPCS: Performed by: FAMILY MEDICINE

## 2024-02-13 RX ORDER — GABAPENTIN 300 MG/1
300 CAPSULE ORAL 3 TIMES DAILY
Status: DISCONTINUED | OUTPATIENT
Start: 2024-02-13 | End: 2024-02-21 | Stop reason: HOSPADM

## 2024-02-13 RX ORDER — FUROSEMIDE 10 MG/ML
20 INJECTION INTRAMUSCULAR; INTRAVENOUS ONCE
Status: COMPLETED | OUTPATIENT
Start: 2024-02-13 | End: 2024-02-13

## 2024-02-13 RX ORDER — INSULIN LISPRO 100 [IU]/ML
0-16 INJECTION, SOLUTION INTRAVENOUS; SUBCUTANEOUS
Status: DISCONTINUED | OUTPATIENT
Start: 2024-02-13 | End: 2024-02-21 | Stop reason: HOSPADM

## 2024-02-13 RX ORDER — INSULIN LISPRO 100 [IU]/ML
0-4 INJECTION, SOLUTION INTRAVENOUS; SUBCUTANEOUS NIGHTLY
Status: DISCONTINUED | OUTPATIENT
Start: 2024-02-13 | End: 2024-02-13

## 2024-02-13 RX ORDER — INSULIN LISPRO 100 [IU]/ML
0-8 INJECTION, SOLUTION INTRAVENOUS; SUBCUTANEOUS
Status: DISCONTINUED | OUTPATIENT
Start: 2024-02-13 | End: 2024-02-13

## 2024-02-13 RX ORDER — LABETALOL HYDROCHLORIDE 5 MG/ML
10 INJECTION, SOLUTION INTRAVENOUS ONCE
Status: COMPLETED | OUTPATIENT
Start: 2024-02-13 | End: 2024-02-13

## 2024-02-13 RX ORDER — LOSARTAN POTASSIUM 50 MG/1
50 TABLET ORAL DAILY
Status: DISCONTINUED | OUTPATIENT
Start: 2024-02-13 | End: 2024-02-14

## 2024-02-13 RX ORDER — INSULIN LISPRO 100 [IU]/ML
0-4 INJECTION, SOLUTION INTRAVENOUS; SUBCUTANEOUS NIGHTLY
Status: DISCONTINUED | OUTPATIENT
Start: 2024-02-13 | End: 2024-02-21 | Stop reason: HOSPADM

## 2024-02-13 RX ORDER — MAGNESIUM SULFATE 1 G/100ML
1000 INJECTION INTRAVENOUS PRN
Status: DISCONTINUED | OUTPATIENT
Start: 2024-02-13 | End: 2024-02-21 | Stop reason: HOSPADM

## 2024-02-13 RX ORDER — TROSPIUM CHLORIDE 20 MG/1
20 TABLET, FILM COATED ORAL
Status: DISCONTINUED | OUTPATIENT
Start: 2024-02-13 | End: 2024-02-17

## 2024-02-13 RX ORDER — ONDANSETRON 4 MG/1
4 TABLET, ORALLY DISINTEGRATING ORAL EVERY 8 HOURS PRN
Status: DISCONTINUED | OUTPATIENT
Start: 2024-02-13 | End: 2024-02-21 | Stop reason: HOSPADM

## 2024-02-13 RX ORDER — SODIUM CHLORIDE 0.9 % (FLUSH) 0.9 %
5-40 SYRINGE (ML) INJECTION EVERY 12 HOURS SCHEDULED
Status: DISCONTINUED | OUTPATIENT
Start: 2024-02-13 | End: 2024-02-21 | Stop reason: HOSPADM

## 2024-02-13 RX ORDER — ONDANSETRON 2 MG/ML
4 INJECTION INTRAMUSCULAR; INTRAVENOUS EVERY 6 HOURS PRN
Status: DISCONTINUED | OUTPATIENT
Start: 2024-02-13 | End: 2024-02-21 | Stop reason: HOSPADM

## 2024-02-13 RX ORDER — ACETAMINOPHEN 650 MG/1
650 SUPPOSITORY RECTAL EVERY 6 HOURS PRN
Status: DISCONTINUED | OUTPATIENT
Start: 2024-02-13 | End: 2024-02-21 | Stop reason: HOSPADM

## 2024-02-13 RX ORDER — ACETAMINOPHEN 325 MG/1
650 TABLET ORAL EVERY 6 HOURS PRN
Status: DISCONTINUED | OUTPATIENT
Start: 2024-02-13 | End: 2024-02-21 | Stop reason: HOSPADM

## 2024-02-13 RX ORDER — SODIUM CHLORIDE 0.9 % (FLUSH) 0.9 %
10 SYRINGE (ML) INJECTION PRN
Status: DISCONTINUED | OUTPATIENT
Start: 2024-02-13 | End: 2024-02-21 | Stop reason: HOSPADM

## 2024-02-13 RX ORDER — HYDROCHLOROTHIAZIDE 12.5 MG/1
12.5 TABLET ORAL DAILY
Status: DISCONTINUED | OUTPATIENT
Start: 2024-02-13 | End: 2024-02-14

## 2024-02-13 RX ORDER — METOPROLOL SUCCINATE 50 MG/1
50 TABLET, EXTENDED RELEASE ORAL DAILY
Status: DISCONTINUED | OUTPATIENT
Start: 2024-02-13 | End: 2024-02-15

## 2024-02-13 RX ORDER — OXYBUTYNIN CHLORIDE 10 MG/1
10 TABLET, EXTENDED RELEASE ORAL DAILY
Status: DISCONTINUED | OUTPATIENT
Start: 2024-02-13 | End: 2024-02-17

## 2024-02-13 RX ORDER — LOSARTAN POTASSIUM AND HYDROCHLOROTHIAZIDE 12.5; 5 MG/1; MG/1
1 TABLET ORAL DAILY
Status: DISCONTINUED | OUTPATIENT
Start: 2024-02-13 | End: 2024-02-13 | Stop reason: CLARIF

## 2024-02-13 RX ORDER — DEXTROSE MONOHYDRATE 100 MG/ML
INJECTION, SOLUTION INTRAVENOUS CONTINUOUS PRN
Status: DISCONTINUED | OUTPATIENT
Start: 2024-02-13 | End: 2024-02-21 | Stop reason: HOSPADM

## 2024-02-13 RX ORDER — POLYETHYLENE GLYCOL 3350 17 G/17G
17 POWDER, FOR SOLUTION ORAL DAILY PRN
Status: DISCONTINUED | OUTPATIENT
Start: 2024-02-13 | End: 2024-02-21 | Stop reason: HOSPADM

## 2024-02-13 RX ORDER — DULOXETIN HYDROCHLORIDE 30 MG/1
30 CAPSULE, DELAYED RELEASE ORAL DAILY
Status: DISCONTINUED | OUTPATIENT
Start: 2024-02-13 | End: 2024-02-21 | Stop reason: HOSPADM

## 2024-02-13 RX ORDER — SODIUM CHLORIDE 9 MG/ML
INJECTION, SOLUTION INTRAVENOUS PRN
Status: DISCONTINUED | OUTPATIENT
Start: 2024-02-13 | End: 2024-02-21 | Stop reason: HOSPADM

## 2024-02-13 RX ORDER — POTASSIUM CHLORIDE 7.45 MG/ML
10 INJECTION INTRAVENOUS PRN
Status: DISCONTINUED | OUTPATIENT
Start: 2024-02-13 | End: 2024-02-21 | Stop reason: HOSPADM

## 2024-02-13 RX ORDER — PANTOPRAZOLE SODIUM 40 MG/1
40 TABLET, DELAYED RELEASE ORAL
Status: DISCONTINUED | OUTPATIENT
Start: 2024-02-13 | End: 2024-02-15

## 2024-02-13 RX ORDER — INSULIN GLARGINE 100 [IU]/ML
24 INJECTION, SOLUTION SUBCUTANEOUS 2 TIMES DAILY
Status: DISCONTINUED | OUTPATIENT
Start: 2024-02-13 | End: 2024-02-21 | Stop reason: HOSPADM

## 2024-02-13 RX ORDER — FOLIC ACID 1 MG/1
1 TABLET ORAL DAILY
Status: DISCONTINUED | OUTPATIENT
Start: 2024-02-13 | End: 2024-02-21 | Stop reason: HOSPADM

## 2024-02-13 RX ORDER — POTASSIUM CHLORIDE 20 MEQ/1
40 TABLET, EXTENDED RELEASE ORAL PRN
Status: DISCONTINUED | OUTPATIENT
Start: 2024-02-13 | End: 2024-02-21 | Stop reason: HOSPADM

## 2024-02-13 RX ADMIN — HYALURONIDASE (HUMAN RECOMBINANT) 15 UNITS: 150 INJECTION, SOLUTION SUBCUTANEOUS at 14:57

## 2024-02-13 RX ADMIN — METOPROLOL SUCCINATE 50 MG: 50 TABLET, EXTENDED RELEASE ORAL at 08:03

## 2024-02-13 RX ADMIN — VANCOMYCIN HYDROCHLORIDE 750 MG: 750 INJECTION, POWDER, LYOPHILIZED, FOR SOLUTION INTRAVENOUS at 23:26

## 2024-02-13 RX ADMIN — INSULIN GLARGINE 24 UNITS: 100 INJECTION, SOLUTION SUBCUTANEOUS at 21:28

## 2024-02-13 RX ADMIN — VANCOMYCIN HYDROCHLORIDE 750 MG: 750 INJECTION, POWDER, LYOPHILIZED, FOR SOLUTION INTRAVENOUS at 12:06

## 2024-02-13 RX ADMIN — PANTOPRAZOLE SODIUM 40 MG: 40 TABLET, DELAYED RELEASE ORAL at 06:55

## 2024-02-13 RX ADMIN — CEFEPIME 2000 MG: 2 INJECTION, POWDER, FOR SOLUTION INTRAVENOUS at 21:57

## 2024-02-13 RX ADMIN — FOLIC ACID 1 MG: 1 TABLET ORAL at 13:13

## 2024-02-13 RX ADMIN — HYDROCHLOROTHIAZIDE 12.5 MG: 12.5 TABLET ORAL at 08:04

## 2024-02-13 RX ADMIN — LOSARTAN POTASSIUM 50 MG: 50 TABLET, FILM COATED ORAL at 08:03

## 2024-02-13 RX ADMIN — SODIUM CHLORIDE, PRESERVATIVE FREE 10 ML: 5 INJECTION INTRAVENOUS at 08:19

## 2024-02-13 RX ADMIN — GABAPENTIN 300 MG: 300 CAPSULE ORAL at 13:32

## 2024-02-13 RX ADMIN — LABETALOL HYDROCHLORIDE 10 MG: 5 INJECTION, SOLUTION INTRAVENOUS at 04:24

## 2024-02-13 RX ADMIN — INSULIN LISPRO 8 UNITS: 100 INJECTION, SOLUTION INTRAVENOUS; SUBCUTANEOUS at 13:13

## 2024-02-13 RX ADMIN — INSULIN LISPRO 2 UNITS: 100 INJECTION, SOLUTION INTRAVENOUS; SUBCUTANEOUS at 08:03

## 2024-02-13 RX ADMIN — INSULIN GLARGINE 24 UNITS: 100 INJECTION, SOLUTION SUBCUTANEOUS at 09:58

## 2024-02-13 RX ADMIN — GABAPENTIN 300 MG: 300 CAPSULE ORAL at 09:58

## 2024-02-13 RX ADMIN — INSULIN LISPRO 4 UNITS: 100 INJECTION, SOLUTION INTRAVENOUS; SUBCUTANEOUS at 17:41

## 2024-02-13 RX ADMIN — ANTI-FUNGAL POWDER MICONAZOLE NITRATE TALC FREE: 1.42 POWDER TOPICAL at 22:00

## 2024-02-13 RX ADMIN — DULOXETINE HYDROCHLORIDE 30 MG: 30 CAPSULE, DELAYED RELEASE ORAL at 08:04

## 2024-02-13 RX ADMIN — CEFEPIME 2000 MG: 2 INJECTION, POWDER, FOR SOLUTION INTRAVENOUS at 13:34

## 2024-02-13 RX ADMIN — WATER 1000 MG: 1 INJECTION INTRAMUSCULAR; INTRAVENOUS; SUBCUTANEOUS at 04:24

## 2024-02-13 RX ADMIN — FUROSEMIDE 20 MG: 10 INJECTION, SOLUTION INTRAMUSCULAR; INTRAVENOUS at 17:41

## 2024-02-13 RX ADMIN — GABAPENTIN 300 MG: 300 CAPSULE ORAL at 21:28

## 2024-02-13 NOTE — DISCHARGE INSTRUCTIONS
Podiatry:  -Continue lymphedema pump therapy for 1 hour twice daily  -Continue to follow-up at [facility] wound care or previous provider

## 2024-02-13 NOTE — ED NOTES
Report given to ANTONIO Mccartney from Becky Rosado RN.   Report method by phone   The following was reviewed with receiving RN:   Current vital signs:  BP (!) 179/61   Pulse (!) 102   Temp 98.1 °F (36.7 °C) (Oral)   Resp 18   Ht 1.829 m (6')   Wt (!) 186.9 kg (412 lb)   SpO2 97%   BMI 55.88 kg/m²                MEWS Score: 1     Any medication or safety alerts were reviewed. Any pending diagnostics and notifications were also reviewed, as well as any safety concerns or issues, abnormal labs, abnormal imaging, and abnormal assessment findings. Questions were answered.       
currently taking oral antibiotics but did not take her dose today.  Pt states she can use the walker with help but has been unable to put weight on right leg with out pain.  Pt lives at home with .  A/Ox4, Full Code.          Electronically signed by Becky Navarro RN on 2/12/2024 at 11:37 PM

## 2024-02-13 NOTE — ACP (ADVANCE CARE PLANNING)
Advance Care Planning     Advance Care Planning Activator (Inpatient)  Conversation Note      Date of ACP Conversation: 2/12/2024    Conversation Conducted with: Patient with Decision Making Capacity    ACP Activator: Bouchra Acosta RN    *When Decision Maker makes decisions on behalf of the incapacitated patient: Decision Maker is asked to consider and make decisions based on patient values, known preferences, or best interests.     Health Care Decision Maker:     Current Designated Health Care Decision Maker:   Primary Decision Maker: Michael Molina - [facility] - [phone]    Care Preferences    Ventilation:  \"If you were in your present state of health and suddenly became very ill and were unable to breathe on your own, what would your preference be about the use of a ventilator (breathing machine) if it were available to you?\"      Would the patient desire the use of ventilator (breathing machine)?: yes    \"If your health worsens and it becomes clear that your chance of recovery is unlikely, what would your preference be about the use of a ventilator (breathing machine) if it were available to you?\"     Would the patient desire the use of ventilator (breathing machine)?: Yes      Resuscitation  \"CPR works best to restart the heart when there is a sudden event, like a heart attack, in someone who is otherwise healthy. Unfortunately, CPR does not typically restart the heart for people who have serious health conditions or who are very sick.\"    \"In the event your heart stopped as a result of an underlying serious health condition, would you want attempts to be made to restart your heart (answer \"yes\" for attempt to resuscitate) or would you prefer a natural death (answer \"no\" for do not attempt to resuscitate)?\" yes      Bouchra Acosta RN

## 2024-02-13 NOTE — ED PROVIDER NOTES
EMERGENCY DEPARTMENT ENCOUNTER    Pt Name: Cherie Molina  MRN: [id number]  Birthdate 1955  Date of evaluation: 2/13/24  CHIEF COMPLAINT       Chief Complaint   Patient presents with    Leg Pain     Dx with cellulitis 2-3 weeks ago to left lower leg.  Currently on antibiotics but did not take dose today.      Wound Infection     New wound infection to sole of left foot,      HISTORY OF PRESENT ILLNESS   HPI  68-year-old female with a history of DVT on Xarelto, hypertension, severe lymphedema with frequent lower extremity cellulitis presents to the ED for redness, pain, swelling, sores to her left lower leg for the past 2 to 3 weeks.  Patient states that about 2 or 3 weeks ago she started having some redness to her left lower leg, has a chronic wound on her posterior calf.  Patient states that she was recently started on Keflex for this which she has been taking.  Patient states that the redness and pain has gotten worse, yesterday her son noted a new wound to the bottom of her left foot, there is also increased drainage from her chronic left calf wound.  Patient states that she feels weak, has chills, mild cough.  Patient states that this is typically how she feels when she is very sick from cellulitis.  She denies any trauma, chest pain, shortness of breath, abdominal pain, diarrhea, dysuria or any other acute complaints.    REVIEW OF SYSTEMS     Review of Systems   All other systems reviewed and are negative.    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

## 2024-02-13 NOTE — CARE COORDINATION
Case Management Assessment  Initial Evaluation    Date/Time of Evaluation: 2/13/2024 4:58 PM  Assessment Completed by: Bouchra Acosta RN    If patient is discharged prior to next notation, then this note serves as note for discharge by case management.    Patient Name: Cherie Molina                   YOB: 1955  Diagnosis: Cellulitis of right lower extremity [L03.115]  Cellulitis of left lower leg [L03.116]                   Date / Time: 2/12/2024  9:37 PM    Patient Admission Status: Inpatient   Readmission Risk (Low < 19, Mod (19-27), High > 27): Readmission Risk Score: 17.5    Current PCP: Azul Guthrie MD  PCP verified by CM? Yes    Chart Reviewed: Yes      History Provided by: Patient  Patient Orientation: Alert and Oriented    Patient Cognition: Alert    Hospitalization in the last 30 days (Readmission):  No    If yes, Readmission Assessment in CM Navigator will be completed.    Advance Directives:      Code Status: Full Code   Patient's Primary Decision Maker is: Legal Next of Kin    Primary Decision Maker: Michael Molina - Spouse - [phone]    Discharge Planning:    Patient lives with: (P) Spouse/Significant Other, Children Type of Home: (P) House  Primary Care Giver: Self  Patient Support Systems include: Spouse/Significant Other, Children   Current Financial resources: (P) Medicare  Current community resources: (P) None  Current services prior to admission: (P) Home Care, Durable Medical Equipment            Current DME: (P) Walker, Wheelchair            Type of Home Care services:  (P) None    ADLS  Prior functional level: (P) Assistance with the following:, Housework, Shopping, Bathing, Dressing, Toileting, Cooking, Mobility  Current functional level: (P) Assistance with the following:, Bathing, Dressing, Toileting, Cooking, Housework, Shopping, Mobility    PT AM-PAC: 6 /24  OT AM-PAC: 12 /24    Family can provide assistance at DC: (P) No  Would you like Case Management to

## 2024-02-13 NOTE — H&P
and/or coordination of care.    Consultations:   IP CONSULT TO INTERNAL MEDICINE  IP CONSULT TO PODIATRY  IP CONSULT TO DIETITIAN  PHARMACY TO DOSE VANCOMYCIN  IP CONSULT TO INFECTIOUS DISEASES    Patient is admitted as inpatient status because of co-morbidities listed above, severity of signs and symptoms as outlined, requirement for current medical therapies and most importantly because of direct risk to patient if care not provided in a hospital setting.  Expected length of stay > 48 hours.    Ebony Alan, ARTURO - CNP  2/13/2024  1:43 AM    Copy sent to Azul Levin MD

## 2024-02-14 ENCOUNTER — TELEPHONE (OUTPATIENT)
Dept: FAMILY MEDICINE CLINIC | Age: 69
End: 2024-02-14

## 2024-02-14 LAB
ANION GAP SERPL CALCULATED.3IONS-SCNC: 13 MMOL/L (ref 9–17)
BASOPHILS # BLD: 0 K/UL (ref 0–0.2)
BASOPHILS NFR BLD: 0 % (ref 0–2)
BUN SERPL-MCNC: 34 MG/DL (ref 8–23)
BUN/CREAT SERPL: 23 (ref 9–20)
CALCIUM SERPL-MCNC: 8.3 MG/DL (ref 8.6–10.4)
CHLORIDE SERPL-SCNC: 100 MMOL/L (ref 98–107)
CO2 SERPL-SCNC: 20 MMOL/L (ref 20–31)
CREAT SERPL-MCNC: 1.5 MG/DL (ref 0.5–0.9)
CRP SERPL HS-MCNC: 245.8 MG/L (ref 0–5)
EOSINOPHIL # BLD: 0.46 K/UL (ref 0–0.44)
EOSINOPHILS RELATIVE PERCENT: 3 % (ref 1–4)
ERYTHROCYTE [DISTWIDTH] IN BLOOD BY AUTOMATED COUNT: 14.9 % (ref 11.8–14.4)
GFR SERPL CREATININE-BSD FRML MDRD: 37 ML/MIN/1.73M2
GLUCOSE BLD-MCNC: 192 MG/DL (ref 65–105)
GLUCOSE BLD-MCNC: 215 MG/DL (ref 65–105)
GLUCOSE BLD-MCNC: 220 MG/DL (ref 65–105)
GLUCOSE BLD-MCNC: 229 MG/DL (ref 65–105)
GLUCOSE SERPL-MCNC: 205 MG/DL (ref 70–99)
HCT VFR BLD AUTO: 31.9 % (ref 36.3–47.1)
HGB BLD-MCNC: 9.6 G/DL (ref 11.9–15.1)
IMM GRANULOCYTES # BLD AUTO: 0.46 K/UL (ref 0–0.3)
IMM GRANULOCYTES NFR BLD: 3 %
LYMPHOCYTES NFR BLD: 0.61 K/UL (ref 1.1–3.7)
LYMPHOCYTES RELATIVE PERCENT: 4 % (ref 24–43)
MCH RBC QN AUTO: 28.3 PG (ref 25.2–33.5)
MCHC RBC AUTO-ENTMCNC: 30.1 G/DL (ref 28.4–34.8)
MCV RBC AUTO: 94.1 FL (ref 82.6–102.9)
MONOCYTES NFR BLD: 0.46 K/UL (ref 0.1–1.2)
MONOCYTES NFR BLD: 3 % (ref 3–12)
NEUTROPHILS NFR BLD: 87 % (ref 36–65)
NEUTS SEG NFR BLD: 13.21 K/UL (ref 1.5–8.1)
NRBC BLD-RTO: 0 PER 100 WBC
PLATELET # BLD AUTO: 312 K/UL (ref 138–453)
PMV BLD AUTO: 9.5 FL (ref 8.1–13.5)
POTASSIUM SERPL-SCNC: 5 MMOL/L (ref 3.7–5.3)
RBC # BLD AUTO: 3.39 M/UL (ref 3.95–5.11)
SODIUM SERPL-SCNC: 133 MMOL/L (ref 135–144)
VANCOMYCIN SERPL-MCNC: 39.7 UG/ML
WBC OTHER # BLD: 15.2 K/UL (ref 3.5–11.3)

## 2024-02-14 PROCEDURE — 36415 COLL VENOUS BLD VENIPUNCTURE: CPT

## 2024-02-14 PROCEDURE — 2580000003 HC RX 258: Performed by: INTERNAL MEDICINE

## 2024-02-14 PROCEDURE — 99232 SBSQ HOSP IP/OBS MODERATE 35: CPT | Performed by: NURSE PRACTITIONER

## 2024-02-14 PROCEDURE — 6370000000 HC RX 637 (ALT 250 FOR IP): Performed by: FAMILY MEDICINE

## 2024-02-14 PROCEDURE — 2700000000 HC OXYGEN THERAPY PER DAY

## 2024-02-14 PROCEDURE — 6360000002 HC RX W HCPCS: Performed by: NURSE PRACTITIONER

## 2024-02-14 PROCEDURE — 1200000000 HC SEMI PRIVATE

## 2024-02-14 PROCEDURE — 80202 ASSAY OF VANCOMYCIN: CPT

## 2024-02-14 PROCEDURE — 94760 N-INVAS EAR/PLS OXIMETRY 1: CPT

## 2024-02-14 PROCEDURE — 99213 OFFICE O/P EST LOW 20 MIN: CPT

## 2024-02-14 PROCEDURE — 2580000003 HC RX 258: Performed by: NURSE PRACTITIONER

## 2024-02-14 PROCEDURE — 80048 BASIC METABOLIC PNL TOTAL CA: CPT

## 2024-02-14 PROCEDURE — 86140 C-REACTIVE PROTEIN: CPT

## 2024-02-14 PROCEDURE — 99232 SBSQ HOSP IP/OBS MODERATE 35: CPT | Performed by: FAMILY MEDICINE

## 2024-02-14 PROCEDURE — 6370000000 HC RX 637 (ALT 250 FOR IP): Performed by: NURSE PRACTITIONER

## 2024-02-14 PROCEDURE — 6360000002 HC RX W HCPCS: Performed by: INTERNAL MEDICINE

## 2024-02-14 PROCEDURE — 82947 ASSAY GLUCOSE BLOOD QUANT: CPT

## 2024-02-14 PROCEDURE — 85025 COMPLETE CBC W/AUTO DIFF WBC: CPT

## 2024-02-14 RX ORDER — LABETALOL HYDROCHLORIDE 5 MG/ML
20 INJECTION, SOLUTION INTRAVENOUS ONCE
Status: COMPLETED | OUTPATIENT
Start: 2024-02-14 | End: 2024-02-14

## 2024-02-14 RX ORDER — MAGNESIUM HYDROXIDE/ALUMINUM HYDROXICE/SIMETHICONE 120; 1200; 1200 MG/30ML; MG/30ML; MG/30ML
30 SUSPENSION ORAL EVERY 6 HOURS PRN
Status: DISCONTINUED | OUTPATIENT
Start: 2024-02-14 | End: 2024-02-15

## 2024-02-14 RX ORDER — CALCIUM CARBONATE 500 MG/1
500 TABLET, CHEWABLE ORAL 3 TIMES DAILY PRN
Status: DISCONTINUED | OUTPATIENT
Start: 2024-02-14 | End: 2024-02-14

## 2024-02-14 RX ORDER — CALCIUM CARBONATE 500 MG/1
1000 TABLET, CHEWABLE ORAL 3 TIMES DAILY PRN
Status: DISCONTINUED | OUTPATIENT
Start: 2024-02-14 | End: 2024-02-15

## 2024-02-14 RX ORDER — CLONIDINE HYDROCHLORIDE 0.1 MG/1
0.1 TABLET ORAL ONCE
Status: COMPLETED | OUTPATIENT
Start: 2024-02-14 | End: 2024-02-14

## 2024-02-14 RX ORDER — LOSARTAN POTASSIUM 100 MG/1
100 TABLET ORAL DAILY
Status: DISCONTINUED | OUTPATIENT
Start: 2024-02-15 | End: 2024-02-21 | Stop reason: HOSPADM

## 2024-02-14 RX ORDER — HYDROCHLOROTHIAZIDE 25 MG/1
50 TABLET ORAL DAILY
Status: DISCONTINUED | OUTPATIENT
Start: 2024-02-15 | End: 2024-02-21 | Stop reason: HOSPADM

## 2024-02-14 RX ADMIN — METOPROLOL SUCCINATE 50 MG: 50 TABLET, EXTENDED RELEASE ORAL at 08:43

## 2024-02-14 RX ADMIN — ANTI-FUNGAL POWDER MICONAZOLE NITRATE TALC FREE: 1.42 POWDER TOPICAL at 08:43

## 2024-02-14 RX ADMIN — GABAPENTIN 300 MG: 300 CAPSULE ORAL at 15:03

## 2024-02-14 RX ADMIN — GABAPENTIN 300 MG: 300 CAPSULE ORAL at 21:41

## 2024-02-14 RX ADMIN — INSULIN LISPRO 4 UNITS: 100 INJECTION, SOLUTION INTRAVENOUS; SUBCUTANEOUS at 16:32

## 2024-02-14 RX ADMIN — HYDROCHLOROTHIAZIDE 12.5 MG: 12.5 TABLET ORAL at 08:42

## 2024-02-14 RX ADMIN — FOLIC ACID 1 MG: 1 TABLET ORAL at 08:42

## 2024-02-14 RX ADMIN — DULOXETINE HYDROCHLORIDE 30 MG: 30 CAPSULE, DELAYED RELEASE ORAL at 08:42

## 2024-02-14 RX ADMIN — LOSARTAN POTASSIUM 50 MG: 50 TABLET, FILM COATED ORAL at 08:42

## 2024-02-14 RX ADMIN — GABAPENTIN 300 MG: 300 CAPSULE ORAL at 08:42

## 2024-02-14 RX ADMIN — Medication 500 MG: at 20:06

## 2024-02-14 RX ADMIN — SODIUM CHLORIDE, PRESERVATIVE FREE 10 ML: 5 INJECTION INTRAVENOUS at 08:46

## 2024-02-14 RX ADMIN — CEFEPIME 2000 MG: 2 INJECTION, POWDER, FOR SOLUTION INTRAVENOUS at 22:55

## 2024-02-14 RX ADMIN — ANTI-FUNGAL POWDER MICONAZOLE NITRATE TALC FREE: 1.42 POWDER TOPICAL at 21:41

## 2024-02-14 RX ADMIN — CLONIDINE HYDROCHLORIDE 0.1 MG: 0.1 TABLET ORAL at 16:50

## 2024-02-14 RX ADMIN — LABETALOL HYDROCHLORIDE 20 MG: 5 INJECTION, SOLUTION INTRAVENOUS at 21:41

## 2024-02-14 RX ADMIN — INSULIN GLARGINE 24 UNITS: 100 INJECTION, SOLUTION SUBCUTANEOUS at 21:41

## 2024-02-14 RX ADMIN — RIVAROXABAN 20 MG: 20 TABLET, FILM COATED ORAL at 13:10

## 2024-02-14 RX ADMIN — INSULIN LISPRO 4 UNITS: 100 INJECTION, SOLUTION INTRAVENOUS; SUBCUTANEOUS at 08:42

## 2024-02-14 RX ADMIN — CEFEPIME 2000 MG: 2 INJECTION, POWDER, FOR SOLUTION INTRAVENOUS at 05:50

## 2024-02-14 RX ADMIN — VANCOMYCIN HYDROCHLORIDE 750 MG: 750 INJECTION, POWDER, LYOPHILIZED, FOR SOLUTION INTRAVENOUS at 22:55

## 2024-02-14 RX ADMIN — INSULIN LISPRO 4 UNITS: 100 INJECTION, SOLUTION INTRAVENOUS; SUBCUTANEOUS at 13:11

## 2024-02-14 RX ADMIN — PANTOPRAZOLE SODIUM 40 MG: 40 TABLET, DELAYED RELEASE ORAL at 05:49

## 2024-02-14 RX ADMIN — CEFEPIME 2000 MG: 2 INJECTION, POWDER, FOR SOLUTION INTRAVENOUS at 15:04

## 2024-02-14 RX ADMIN — INSULIN GLARGINE 24 UNITS: 100 INJECTION, SOLUTION SUBCUTANEOUS at 08:43

## 2024-02-14 NOTE — DISCHARGE INSTR - COC
iodine 02/13/24 1530   Dressing/Treatment Foam;Ace wrap 02/14/24 0840   Kaylin-wound Assessment Other (Comment) 02/14/24 0840   Number of days: 1       Wound 02/13/24 Coccyx (Active)   Dressing Status Clean;Dry;Intact 02/14/24 0840   Wound Cleansed Soap and water 02/13/24 1530   Dressing/Treatment Foam 02/14/24 0840   Wound Assessment Other (Comment) 02/14/24 0840   Kaylin-wound Assessment Other (Comment) 02/14/24 0840   Number of days: 1     Left Posterior Lower Leg -  cleanse with saline, pat dry. Cover wound with Maxorb Ag and apply foam dressing to cover. Apply roll gauze and apply wrap to both lower legs change every other day. Start with 4 inch wrap just above toes to ankle, then use 6 in wrap up to just below knees. Attempt to overlap layers 50/50. Rewrap daily. Ace wrap may come off at HS, back on in AM.     Left heel - betadine paint with silicone border foam, change every other day    Follow up in wound care clinic at [facility] for next scheduled appointment     Elimination:  Continence:   Bowel: Yes  Bladder: No  Urinary Catheter: None   Colostomy/Ileostomy/Ileal Conduit: No       Date of Last BM: 2-20-24    Intake/Output Summary (Last 24 hours) at 2/14/2024 1704  Last data filed at 2/14/2024 1525  Gross per 24 hour   Intake 479.39 ml   Output 1100 ml   Net -620.61 ml     I/O last 3 completed shifts:  In: 959.4 [P.O.:480; IV Piggyback:479.4]  Out: 700 [Urine:700]    Safety Concerns:     At Risk for Falls    Impairments/Disabilities:      None    Nutrition Therapy:  Current Nutrition Therapy:   - Oral Diet:  Carb Control 4 carbs/meal (1800kcals/day)    Routes of Feeding: Oral  Liquids: No Restrictions  Daily Fluid Restriction: no  Last Modified Barium Swallow with Video (Video Swallowing Test): not done    Treatments at the Time of Hospital Discharge:   Respiratory Treatments:   Oxygen Therapy:  is on oxygen at 2 L/min per nasal cannula.  Ventilator:    - No ventilator support    Rehab Therapies: Physical

## 2024-02-14 NOTE — CARE COORDINATION
Social Work-[facility] will not have bed for patient. Provided [employer] list of providers. Pt hayde review list with . [facility]

## 2024-02-14 NOTE — TELEPHONE ENCOUNTER
Aliya with [facility] called to see if provider will follow patient for home care once discharge from hospital. Provider will follow .

## 2024-02-14 NOTE — CARE COORDINATION
Social Work-Met with patient and  regarding dc plans. Discussed short term SNF for rehab;;. They are agreeable.                    Post Acute Facility/Agency List     Provided patient with the following list, the list includes the overall star ratings obtained from CMS per the Medicare Web site (www.Medicare.gov):     [] Long Term Acute Care Facilities  [] Acute Inpatient Rehabilitation Facilities  [x] Skilled Nursing Facilities  [] Home Care    Provided verbal instructions on how to utilize the QR Code to obtain additional detailed star ratings from www.Medicare.gov     offered to print and provide the detailed list:    []Accepted   [x]Declined    The following printed detailed lists were provided:    They would like a referral to [facility]. Sent referral. [facility]

## 2024-02-15 ENCOUNTER — APPOINTMENT (OUTPATIENT)
Dept: CT IMAGING | Age: 69
DRG: 872 | End: 2024-02-15
Payer: MEDICARE

## 2024-02-15 PROBLEM — K81.0 ACUTE CHOLECYSTITIS: Status: ACTIVE | Noted: 2024-02-15

## 2024-02-15 LAB
ALBUMIN SERPL-MCNC: 2.6 G/DL (ref 3.5–5.2)
ALP SERPL-CCNC: 301 U/L (ref 35–104)
ALT SERPL-CCNC: 30 U/L (ref 5–33)
AST SERPL-CCNC: 100 U/L
BASOPHILS # BLD: 0 K/UL (ref 0–0.2)
BASOPHILS NFR BLD: 0 % (ref 0–2)
BILIRUB DIRECT SERPL-MCNC: 0.9 MG/DL
BILIRUB INDIRECT SERPL-MCNC: 0.1 MG/DL (ref 0–1)
BILIRUB SERPL-MCNC: 1 MG/DL (ref 0.3–1.2)
CREAT SERPL-MCNC: 1.2 MG/DL (ref 0.5–0.9)
EOSINOPHIL # BLD: 0.34 K/UL (ref 0–0.44)
EOSINOPHILS RELATIVE PERCENT: 2 % (ref 1–4)
ERYTHROCYTE [DISTWIDTH] IN BLOOD BY AUTOMATED COUNT: 14.9 % (ref 11.8–14.4)
GFR SERPL CREATININE-BSD FRML MDRD: 49 ML/MIN/1.73M2
GLUCOSE BLD-MCNC: 168 MG/DL (ref 65–105)
GLUCOSE BLD-MCNC: 216 MG/DL (ref 65–105)
GLUCOSE BLD-MCNC: 224 MG/DL (ref 65–105)
GLUCOSE BLD-MCNC: 231 MG/DL (ref 65–105)
HCT VFR BLD AUTO: 33.4 % (ref 36.3–47.1)
HGB BLD-MCNC: 10.1 G/DL (ref 11.9–15.1)
IMM GRANULOCYTES # BLD AUTO: 0.51 K/UL (ref 0–0.3)
IMM GRANULOCYTES NFR BLD: 3 %
INR PPP: 2.5
LYMPHOCYTES NFR BLD: 0.51 K/UL (ref 1.1–3.7)
LYMPHOCYTES RELATIVE PERCENT: 3 % (ref 24–43)
MCH RBC QN AUTO: 28.1 PG (ref 25.2–33.5)
MCHC RBC AUTO-ENTMCNC: 30.2 G/DL (ref 28.4–34.8)
MCV RBC AUTO: 93 FL (ref 82.6–102.9)
MICROORGANISM SPEC CULT: ABNORMAL
MONOCYTES NFR BLD: 0.68 K/UL (ref 0.1–1.2)
MONOCYTES NFR BLD: 4 % (ref 3–12)
NEUTROPHILS NFR BLD: 88 % (ref 36–65)
NEUTS SEG NFR BLD: 14.86 K/UL (ref 1.5–8.1)
NRBC BLD-RTO: 0 PER 100 WBC
PLATELET # BLD AUTO: 294 K/UL (ref 138–453)
PMV BLD AUTO: 9.3 FL (ref 8.1–13.5)
PROT SERPL-MCNC: 7.4 G/DL (ref 6.4–8.3)
PROTHROMBIN TIME: 26.3 SEC (ref 11.5–14.2)
RBC # BLD AUTO: 3.59 M/UL (ref 3.95–5.11)
SPECIMEN DESCRIPTION: ABNORMAL
TROPONIN I SERPL HS-MCNC: 18 NG/L (ref 0–14)
WBC OTHER # BLD: 16.9 K/UL (ref 3.5–11.3)

## 2024-02-15 PROCEDURE — 82565 ASSAY OF CREATININE: CPT

## 2024-02-15 PROCEDURE — 99233 SBSQ HOSP IP/OBS HIGH 50: CPT | Performed by: INTERNAL MEDICINE

## 2024-02-15 PROCEDURE — 74176 CT ABD & PELVIS W/O CONTRAST: CPT

## 2024-02-15 PROCEDURE — 97530 THERAPEUTIC ACTIVITIES: CPT

## 2024-02-15 PROCEDURE — 82947 ASSAY GLUCOSE BLOOD QUANT: CPT

## 2024-02-15 PROCEDURE — 97110 THERAPEUTIC EXERCISES: CPT

## 2024-02-15 PROCEDURE — 1200000000 HC SEMI PRIVATE

## 2024-02-15 PROCEDURE — 2500000003 HC RX 250 WO HCPCS: Performed by: FAMILY MEDICINE

## 2024-02-15 PROCEDURE — 6360000002 HC RX W HCPCS: Performed by: NURSE PRACTITIONER

## 2024-02-15 PROCEDURE — 85610 PROTHROMBIN TIME: CPT

## 2024-02-15 PROCEDURE — 6360000002 HC RX W HCPCS: Performed by: INTERNAL MEDICINE

## 2024-02-15 PROCEDURE — 99233 SBSQ HOSP IP/OBS HIGH 50: CPT | Performed by: FAMILY MEDICINE

## 2024-02-15 PROCEDURE — 6370000000 HC RX 637 (ALT 250 FOR IP): Performed by: FAMILY MEDICINE

## 2024-02-15 PROCEDURE — 6370000000 HC RX 637 (ALT 250 FOR IP): Performed by: NURSE PRACTITIONER

## 2024-02-15 PROCEDURE — 2580000003 HC RX 258: Performed by: INTERNAL MEDICINE

## 2024-02-15 PROCEDURE — 2580000003 HC RX 258: Performed by: NURSE PRACTITIONER

## 2024-02-15 PROCEDURE — 80076 HEPATIC FUNCTION PANEL: CPT

## 2024-02-15 PROCEDURE — 84484 ASSAY OF TROPONIN QUANT: CPT

## 2024-02-15 PROCEDURE — 6360000002 HC RX W HCPCS: Performed by: FAMILY MEDICINE

## 2024-02-15 PROCEDURE — 36415 COLL VENOUS BLD VENIPUNCTURE: CPT

## 2024-02-15 PROCEDURE — 5A09357 ASSISTANCE WITH RESPIRATORY VENTILATION, LESS THAN 24 CONSECUTIVE HOURS, CONTINUOUS POSITIVE AIRWAY PRESSURE: ICD-10-PCS | Performed by: FAMILY MEDICINE

## 2024-02-15 PROCEDURE — 85025 COMPLETE CBC W/AUTO DIFF WBC: CPT

## 2024-02-15 PROCEDURE — 93005 ELECTROCARDIOGRAM TRACING: CPT | Performed by: FAMILY MEDICINE

## 2024-02-15 RX ORDER — FUROSEMIDE 10 MG/ML
20 INJECTION INTRAMUSCULAR; INTRAVENOUS ONCE
Status: COMPLETED | OUTPATIENT
Start: 2024-02-15 | End: 2024-02-15

## 2024-02-15 RX ORDER — METRONIDAZOLE 500 MG/100ML
500 INJECTION, SOLUTION INTRAVENOUS EVERY 8 HOURS
Status: DISCONTINUED | OUTPATIENT
Start: 2024-02-15 | End: 2024-02-18 | Stop reason: CLARIF

## 2024-02-15 RX ORDER — METOPROLOL SUCCINATE 50 MG/1
100 TABLET, EXTENDED RELEASE ORAL DAILY
Status: DISCONTINUED | OUTPATIENT
Start: 2024-02-16 | End: 2024-02-21

## 2024-02-15 RX ORDER — MAGNESIUM HYDROXIDE/ALUMINUM HYDROXICE/SIMETHICONE 120; 1200; 1200 MG/30ML; MG/30ML; MG/30ML
30 SUSPENSION ORAL 4 TIMES DAILY
Status: DISCONTINUED | OUTPATIENT
Start: 2024-02-15 | End: 2024-02-21 | Stop reason: HOSPADM

## 2024-02-15 RX ORDER — FUROSEMIDE 10 MG/ML
20 INJECTION INTRAMUSCULAR; INTRAVENOUS ONCE
Status: CANCELLED | OUTPATIENT
Start: 2024-02-15

## 2024-02-15 RX ADMIN — FOLIC ACID 1 MG: 1 TABLET ORAL at 10:37

## 2024-02-15 RX ADMIN — INSULIN LISPRO 4 UNITS: 100 INJECTION, SOLUTION INTRAVENOUS; SUBCUTANEOUS at 10:39

## 2024-02-15 RX ADMIN — FUROSEMIDE 20 MG: 10 INJECTION, SOLUTION INTRAMUSCULAR; INTRAVENOUS at 16:37

## 2024-02-15 RX ADMIN — DULOXETINE HYDROCHLORIDE 30 MG: 30 CAPSULE, DELAYED RELEASE ORAL at 10:37

## 2024-02-15 RX ADMIN — ANTI-FUNGAL POWDER MICONAZOLE NITRATE TALC FREE: 1.42 POWDER TOPICAL at 10:36

## 2024-02-15 RX ADMIN — METOPROLOL SUCCINATE 50 MG: 50 TABLET, EXTENDED RELEASE ORAL at 10:37

## 2024-02-15 RX ADMIN — HYDROCHLOROTHIAZIDE 50 MG: 25 TABLET ORAL at 10:38

## 2024-02-15 RX ADMIN — INSULIN GLARGINE 24 UNITS: 100 INJECTION, SOLUTION SUBCUTANEOUS at 10:39

## 2024-02-15 RX ADMIN — GABAPENTIN 300 MG: 300 CAPSULE ORAL at 10:37

## 2024-02-15 RX ADMIN — CEFEPIME 2000 MG: 2 INJECTION, POWDER, FOR SOLUTION INTRAVENOUS at 16:46

## 2024-02-15 RX ADMIN — PANTOPRAZOLE SODIUM 40 MG: 40 TABLET, DELAYED RELEASE ORAL at 05:57

## 2024-02-15 RX ADMIN — SODIUM CHLORIDE, PRESERVATIVE FREE 10 ML: 5 INJECTION INTRAVENOUS at 20:10

## 2024-02-15 RX ADMIN — GABAPENTIN 300 MG: 300 CAPSULE ORAL at 20:10

## 2024-02-15 RX ADMIN — GABAPENTIN 300 MG: 300 CAPSULE ORAL at 16:50

## 2024-02-15 RX ADMIN — CEFEPIME 2000 MG: 2 INJECTION, POWDER, FOR SOLUTION INTRAVENOUS at 05:57

## 2024-02-15 RX ADMIN — ANTI-FUNGAL POWDER MICONAZOLE NITRATE TALC FREE: 1.42 POWDER TOPICAL at 20:15

## 2024-02-15 RX ADMIN — SODIUM CHLORIDE, PRESERVATIVE FREE 10 ML: 5 INJECTION INTRAVENOUS at 10:38

## 2024-02-15 RX ADMIN — INSULIN LISPRO 4 UNITS: 100 INJECTION, SOLUTION INTRAVENOUS; SUBCUTANEOUS at 13:39

## 2024-02-15 RX ADMIN — INSULIN GLARGINE 24 UNITS: 100 INJECTION, SOLUTION SUBCUTANEOUS at 20:10

## 2024-02-15 RX ADMIN — METRONIDAZOLE 500 MG: 500 INJECTION, SOLUTION INTRAVENOUS at 18:40

## 2024-02-15 RX ADMIN — INSULIN LISPRO 4 UNITS: 100 INJECTION, SOLUTION INTRAVENOUS; SUBCUTANEOUS at 16:50

## 2024-02-15 RX ADMIN — VANCOMYCIN HYDROCHLORIDE 750 MG: 750 INJECTION, POWDER, LYOPHILIZED, FOR SOLUTION INTRAVENOUS at 22:40

## 2024-02-15 RX ADMIN — RIVAROXABAN 20 MG: 20 TABLET, FILM COATED ORAL at 10:37

## 2024-02-15 RX ADMIN — LOSARTAN POTASSIUM 100 MG: 100 TABLET, FILM COATED ORAL at 10:37

## 2024-02-15 NOTE — CARE COORDINATION
CM spoke with patient at bedside to discus transitional planning. Additional SNF choices requested. Patient states that she hasn't been feeling well and hasn't looked at the list. CM requested 3 additional SNF choices by 12p today.    1130- Patient provided CM with additional SNF choices of [facility], [facility] and [facility]. Referrals sent to all.    1345- CM spoke with Joellen from [facility] and they are unable to accept patient. Messages requesting call back left for Christina with [facility] and Radha with [facility].    1630- [facility] and [facility] unable to accommodate patient. CM spoke with Christina from [facility] and she state that they are able to accept patient. CM will initiate [facility] precert.

## 2024-02-16 ENCOUNTER — APPOINTMENT (OUTPATIENT)
Dept: ULTRASOUND IMAGING | Age: 69
DRG: 872 | End: 2024-02-16
Payer: MEDICARE

## 2024-02-16 ENCOUNTER — APPOINTMENT (OUTPATIENT)
Dept: NUCLEAR MEDICINE | Age: 69
DRG: 872 | End: 2024-02-16
Payer: MEDICARE

## 2024-02-16 LAB
EKG ATRIAL RATE: 52 BPM
EKG Q-T INTERVAL: 418 MS
EKG QRS DURATION: 108 MS
EKG QTC CALCULATION (BAZETT): 431 MS
EKG R AXIS: -60 DEGREES
EKG T AXIS: 52 DEGREES
EKG VENTRICULAR RATE: 64 BPM
GLUCOSE BLD-MCNC: 125 MG/DL (ref 65–105)
GLUCOSE BLD-MCNC: 128 MG/DL (ref 65–105)
GLUCOSE BLD-MCNC: 150 MG/DL (ref 65–105)
GLUCOSE BLD-MCNC: 166 MG/DL (ref 65–105)
MICROORGANISM SPEC CULT: ABNORMAL
MICROORGANISM/AGENT SPEC: ABNORMAL
SPECIMEN DESCRIPTION: ABNORMAL
VANCOMYCIN SERPL-MCNC: 31.9 UG/ML

## 2024-02-16 PROCEDURE — 2580000003 HC RX 258: Performed by: INTERNAL MEDICINE

## 2024-02-16 PROCEDURE — 1200000000 HC SEMI PRIVATE

## 2024-02-16 PROCEDURE — A9537 TC99M MEBROFENIN: HCPCS | Performed by: SURGERY

## 2024-02-16 PROCEDURE — 6370000000 HC RX 637 (ALT 250 FOR IP): Performed by: FAMILY MEDICINE

## 2024-02-16 PROCEDURE — CF241ZZ TOMOGRAPHIC (TOMO) NUCLEAR MEDICINE IMAGING OF GALLBLADDER USING TECHNETIUM 99M (TC-99M): ICD-10-PCS | Performed by: RADIOLOGY

## 2024-02-16 PROCEDURE — 6360000002 HC RX W HCPCS: Performed by: NURSE PRACTITIONER

## 2024-02-16 PROCEDURE — A4216 STERILE WATER/SALINE, 10 ML: HCPCS | Performed by: NURSE PRACTITIONER

## 2024-02-16 PROCEDURE — C9113 INJ PANTOPRAZOLE SODIUM, VIA: HCPCS | Performed by: FAMILY MEDICINE

## 2024-02-16 PROCEDURE — 99232 SBSQ HOSP IP/OBS MODERATE 35: CPT | Performed by: INTERNAL MEDICINE

## 2024-02-16 PROCEDURE — 6360000002 HC RX W HCPCS: Performed by: FAMILY MEDICINE

## 2024-02-16 PROCEDURE — 78226 HEPATOBILIARY SYSTEM IMAGING: CPT

## 2024-02-16 PROCEDURE — 2580000003 HC RX 258: Performed by: FAMILY MEDICINE

## 2024-02-16 PROCEDURE — 2709999900 HC NON-CHARGEABLE SUPPLY

## 2024-02-16 PROCEDURE — 36415 COLL VENOUS BLD VENIPUNCTURE: CPT

## 2024-02-16 PROCEDURE — A4216 STERILE WATER/SALINE, 10 ML: HCPCS | Performed by: FAMILY MEDICINE

## 2024-02-16 PROCEDURE — 97530 THERAPEUTIC ACTIVITIES: CPT

## 2024-02-16 PROCEDURE — 6370000000 HC RX 637 (ALT 250 FOR IP): Performed by: NURSE PRACTITIONER

## 2024-02-16 PROCEDURE — 93010 ELECTROCARDIOGRAM REPORT: CPT | Performed by: INTERNAL MEDICINE

## 2024-02-16 PROCEDURE — 97112 NEUROMUSCULAR REEDUCATION: CPT

## 2024-02-16 PROCEDURE — 2580000003 HC RX 258: Performed by: NURSE PRACTITIONER

## 2024-02-16 PROCEDURE — 82947 ASSAY GLUCOSE BLOOD QUANT: CPT

## 2024-02-16 PROCEDURE — 80202 ASSAY OF VANCOMYCIN: CPT

## 2024-02-16 PROCEDURE — 3430000000 HC RX DIAGNOSTIC RADIOPHARMACEUTICAL: Performed by: SURGERY

## 2024-02-16 PROCEDURE — 97535 SELF CARE MNGMENT TRAINING: CPT

## 2024-02-16 PROCEDURE — 76705 ECHO EXAM OF ABDOMEN: CPT

## 2024-02-16 PROCEDURE — 99232 SBSQ HOSP IP/OBS MODERATE 35: CPT | Performed by: STUDENT IN AN ORGANIZED HEALTH CARE EDUCATION/TRAINING PROGRAM

## 2024-02-16 PROCEDURE — 6360000002 HC RX W HCPCS: Performed by: INTERNAL MEDICINE

## 2024-02-16 PROCEDURE — 97110 THERAPEUTIC EXERCISES: CPT

## 2024-02-16 RX ADMIN — ANTI-FUNGAL POWDER MICONAZOLE NITRATE TALC FREE: 1.42 POWDER TOPICAL at 21:30

## 2024-02-16 RX ADMIN — GABAPENTIN 300 MG: 300 CAPSULE ORAL at 16:01

## 2024-02-16 RX ADMIN — CEFEPIME 2000 MG: 2 INJECTION, POWDER, FOR SOLUTION INTRAVENOUS at 08:30

## 2024-02-16 RX ADMIN — SODIUM CHLORIDE, PRESERVATIVE FREE 10 ML: 5 INJECTION INTRAVENOUS at 21:26

## 2024-02-16 RX ADMIN — VANCOMYCIN HYDROCHLORIDE 750 MG: 750 INJECTION, POWDER, LYOPHILIZED, FOR SOLUTION INTRAVENOUS at 22:56

## 2024-02-16 RX ADMIN — Medication 8.6 MILLICURIE: at 13:00

## 2024-02-16 RX ADMIN — PANTOPRAZOLE SODIUM 40 MG: 40 INJECTION, POWDER, FOR SOLUTION INTRAVENOUS at 08:32

## 2024-02-16 RX ADMIN — SODIUM CHLORIDE, PRESERVATIVE FREE 10 ML: 5 INJECTION INTRAVENOUS at 08:32

## 2024-02-16 RX ADMIN — INSULIN GLARGINE 24 UNITS: 100 INJECTION, SOLUTION SUBCUTANEOUS at 21:26

## 2024-02-16 RX ADMIN — CEFEPIME 2000 MG: 2 INJECTION, POWDER, FOR SOLUTION INTRAVENOUS at 21:32

## 2024-02-16 RX ADMIN — RIVAROXABAN 20 MG: 20 TABLET, FILM COATED ORAL at 16:00

## 2024-02-16 RX ADMIN — CEFEPIME 2000 MG: 2 INJECTION, POWDER, FOR SOLUTION INTRAVENOUS at 00:12

## 2024-02-16 RX ADMIN — METRONIDAZOLE 500 MG: 500 INJECTION, SOLUTION INTRAVENOUS at 17:58

## 2024-02-16 RX ADMIN — CEFEPIME 2000 MG: 2 INJECTION, POWDER, FOR SOLUTION INTRAVENOUS at 16:04

## 2024-02-16 RX ADMIN — HYDROCHLOROTHIAZIDE 50 MG: 25 TABLET ORAL at 16:01

## 2024-02-16 RX ADMIN — METOPROLOL SUCCINATE 100 MG: 50 TABLET, EXTENDED RELEASE ORAL at 16:01

## 2024-02-16 RX ADMIN — LOSARTAN POTASSIUM 100 MG: 100 TABLET, FILM COATED ORAL at 16:01

## 2024-02-16 RX ADMIN — METRONIDAZOLE 500 MG: 500 INJECTION, SOLUTION INTRAVENOUS at 10:40

## 2024-02-16 RX ADMIN — ANTI-FUNGAL POWDER MICONAZOLE NITRATE TALC FREE: 1.42 POWDER TOPICAL at 08:32

## 2024-02-16 RX ADMIN — ACETAMINOPHEN 650 MG: 325 TABLET ORAL at 05:29

## 2024-02-16 RX ADMIN — INSULIN GLARGINE 24 UNITS: 100 INJECTION, SOLUTION SUBCUTANEOUS at 08:33

## 2024-02-16 RX ADMIN — DULOXETINE HYDROCHLORIDE 30 MG: 30 CAPSULE, DELAYED RELEASE ORAL at 16:00

## 2024-02-16 RX ADMIN — GABAPENTIN 300 MG: 300 CAPSULE ORAL at 21:26

## 2024-02-16 RX ADMIN — METRONIDAZOLE 500 MG: 500 INJECTION, SOLUTION INTRAVENOUS at 01:57

## 2024-02-16 ASSESSMENT — PAIN DESCRIPTION - LOCATION: LOCATION: HEAD

## 2024-02-16 ASSESSMENT — PAIN SCALES - GENERAL: PAINLEVEL_OUTOF10: 3

## 2024-02-16 NOTE — CARE COORDINATION
Social Work-Insurance denied. P2P is being offered. Doctor must call [phone] by 11 AM 2/19. The reference number is [id number]. Left Dr Betty Padilla

## 2024-02-16 NOTE — CONSULTS
[facility]   Pharmacy Pharmacokinetic Monitoring Service - Vancomycin     Cehrie Molina is a 68 y.o. female starting on vancomycin therapy for cellulitis of left lower extremity. Pharmacy consulted by Ebony Alan CNP for monitoring and adjustment.    Target Concentration: Goal AUC/RONEL 400-600 mg*hr/L    Additional Antimicrobials: zosyn x 1, rocephin    Pertinent Laboratory Values:   Wt Readings from Last 1 Encounters:   02/12/24 (!) 186.9 kg (412 lb)     Temp Readings from Last 1 Encounters:   02/13/24 99 °F (37.2 °C) (Oral)     Estimated Creatinine Clearance: 82 mL/min (A) (based on SCr of 1.2 mg/dL (H)).  Recent Labs     02/12/24  2233   CREATININE 1.2*   BUN 33*   WBC 14.2*     Procalcitonin: --    Pertinent Cultures:  Culture Date Source Results   2/12/24 Blood x 2, urine No growth < 24hr   MRSA Nasal Swab: N/A. Non-respiratory infection.    Plan:  Dosing recommendations based on Bayesian software  Start vancomycin 2500mg x 1, then 750mg q12hr  Anticipated AUC of 489 and trough concentration of 15.4 at steady state  Renal labs as indicated   Vancomycin concentration ordered for 2/14/24 @ 0600   Pharmacy will continue to monitor patient and adjust therapy as indicated    Thank you for the consult,  Tae Ingram RPH  2/13/2024 3:11 AM   
education level: Not on file   Occupational History    Not on file   Tobacco Use    Smoking status: Former     Current packs/day: 0.00     Average packs/day: 0.3 packs/day for 20.0 years (5.0 ttl pk-yrs)     Types: Cigarettes     Start date: 1963     Quit date: 1983     Years since quittin.6     Passive exposure: Past    Smokeless tobacco: Never    Tobacco comments:     Quit 30 years ago   Vaping Use    Vaping Use: Never used   Substance and Sexual Activity    Alcohol use: No    Drug use: No    Sexual activity: Not Currently   Other Topics Concern    Not on file   Social History Narrative    Not on file     Social Determinants of Health     Financial Resource Strain: Low Risk  (2023)    Overall Financial Resource Strain (CARDIA)     Difficulty of Paying Living Expenses: Not hard at all   Food Insecurity: No Food Insecurity (2024)    Hunger Vital Sign     Worried About Running Out of Food in the Last Year: Never true     Ran Out of Food in the Last Year: Never true   Transportation Needs: No Transportation Needs (2024)    PRAPARE - Transportation     Lack of Transportation (Medical): No     Lack of Transportation (Non-Medical): No   Physical Activity: Not on file   Stress: Not on file   Social Connections: Not on file   Intimate Partner Violence: Not on file   Housing Stability: Low Risk  (2024)    Housing Stability Vital Sign     Unable to Pay for Housing in the Last Year: No     Number of Places Lived in the Last Year: 1     Unstable Housing in the Last Year: No     Family History:     Family History   Problem Relation Age of Onset    Heart Disease Mother       Allergies:   Erythromycin and Lisinopril     Review of Systems:   General:  No fevers but has some chills and has had some generalized malaise/weakness  Eyes: No double vision or blurry vision.  ENT: No sore throat or runny nose.  Cardiovascular: No chest pain or palpitations.  Lung: No shortness of breath or 
rate and rhythm. Warm, well perfused.  Respiratory:  Equal chest rise bilaterally. Increased work of breathing  Abdomen:  Soft, obese, tender to palpation mid-epigastrium, no surgical scars present, non-peritoneal  Extremities:  Warm, dry, and well perfused.  Bilateral lymphedema with ace wrap in place  Skin:  No rashes or lesions.    LABS:  Recent Labs     02/13/24  0910 02/14/24  1116 02/15/24  1100   WBC 15.1* 15.2* 16.9*   HGB 10.0* 9.6* 10.1*    312 294     Recent Labs     02/12/24 2233 02/13/24  0910 02/14/24  0641 02/15/24  0824   * 131* 133*  --    K 4.1 4.2 5.0  --    CL 97* 100 100  --    CO2 23 22 20  --    BUN 33* 28* 34*  --    CREATININE 1.2* 1.1* 1.5* 1.2*   GLUCOSE 251* 289* 205*  --      Recent Labs     02/12/24 2233 02/13/24  0910 02/15/24  1100   AST 15 11 100*   ALT 8 6 30   ALKPHOS 109* 79 301*   BILITOT 0.4 0.6 1.0   BILIDIR  --   --  0.9*     Recent Labs     02/12/24 2233 02/13/24  0910 02/15/24  1100   PROT 7.7 6.3* 7.4       IMAGING:  CT ABDOMEN PELVIS WO CONTRAST Additional Contrast? None    Result Date: 2/15/2024  1. Distended gallbladder with associated wall thickening and surrounding inflammatory change, concerning for acute cholecystitis. 2. Cirrhosis with hepatosplenomegaly. 3. Multiple large nonobstructive right renal calculi. 4. Trace right pleural effusion.       ASSESSMENT   Patient Active Problem List   Diagnosis    Type 2 diabetes mellitus with hyperglycemia, with long-term current use of insulin (HCC)    GERD (gastroesophageal reflux disease)    MVP (mitral valve prolapse)    DVT (deep venous thrombosis) ([facility])    Essential hypertension    Radiculopathy of lumbosacral region    Staghorn renal calculus    Foraminal stenosis of lumbar region    Acute cystitis    SIRS (systemic inflammatory response syndrome) ([facility])    Lymphedema of both lower extremities    Cellulitis of left lower leg    Morbid obesity with BMI of 50.0-59.9, adult (HCC)    Chronic hypoxemic 
substitutions which may escape proof reading.  In such instances, actual meaning can be extrapolated by contextual diversion.

## 2024-02-17 LAB
ALBUMIN SERPL-MCNC: 2.1 G/DL (ref 3.5–5.2)
ALP SERPL-CCNC: 278 U/L (ref 35–104)
ALT SERPL-CCNC: 34 U/L (ref 5–33)
AST SERPL-CCNC: 72 U/L
BILIRUB DIRECT SERPL-MCNC: 2.1 MG/DL
BILIRUB INDIRECT SERPL-MCNC: 0.1 MG/DL (ref 0–1)
BILIRUB SERPL-MCNC: 2.2 MG/DL (ref 0.3–1.2)
BUN SERPL-MCNC: 34 MG/DL (ref 8–23)
CREAT SERPL-MCNC: 1.2 MG/DL (ref 0.5–0.9)
GFR SERPL CREATININE-BSD FRML MDRD: 49 ML/MIN/1.73M2
GLUCOSE BLD-MCNC: 130 MG/DL (ref 65–105)
GLUCOSE BLD-MCNC: 132 MG/DL (ref 65–105)
GLUCOSE BLD-MCNC: 133 MG/DL (ref 65–105)
GLUCOSE BLD-MCNC: 141 MG/DL (ref 65–105)
PROT SERPL-MCNC: 6.6 G/DL (ref 6.4–8.3)

## 2024-02-17 PROCEDURE — 2580000003 HC RX 258: Performed by: FAMILY MEDICINE

## 2024-02-17 PROCEDURE — 80076 HEPATIC FUNCTION PANEL: CPT

## 2024-02-17 PROCEDURE — 2580000003 HC RX 258: Performed by: NURSE PRACTITIONER

## 2024-02-17 PROCEDURE — 6360000002 HC RX W HCPCS: Performed by: INTERNAL MEDICINE

## 2024-02-17 PROCEDURE — 6370000000 HC RX 637 (ALT 250 FOR IP): Performed by: FAMILY MEDICINE

## 2024-02-17 PROCEDURE — 6360000002 HC RX W HCPCS: Performed by: FAMILY MEDICINE

## 2024-02-17 PROCEDURE — C9113 INJ PANTOPRAZOLE SODIUM, VIA: HCPCS | Performed by: FAMILY MEDICINE

## 2024-02-17 PROCEDURE — 82565 ASSAY OF CREATININE: CPT

## 2024-02-17 PROCEDURE — 99232 SBSQ HOSP IP/OBS MODERATE 35: CPT | Performed by: STUDENT IN AN ORGANIZED HEALTH CARE EDUCATION/TRAINING PROGRAM

## 2024-02-17 PROCEDURE — 6370000000 HC RX 637 (ALT 250 FOR IP): Performed by: NURSE PRACTITIONER

## 2024-02-17 PROCEDURE — A4216 STERILE WATER/SALINE, 10 ML: HCPCS | Performed by: FAMILY MEDICINE

## 2024-02-17 PROCEDURE — 36415 COLL VENOUS BLD VENIPUNCTURE: CPT

## 2024-02-17 PROCEDURE — 84520 ASSAY OF UREA NITROGEN: CPT

## 2024-02-17 PROCEDURE — 82947 ASSAY GLUCOSE BLOOD QUANT: CPT

## 2024-02-17 PROCEDURE — 2580000003 HC RX 258: Performed by: INTERNAL MEDICINE

## 2024-02-17 PROCEDURE — 1200000000 HC SEMI PRIVATE

## 2024-02-17 RX ORDER — PANTOPRAZOLE SODIUM 40 MG/1
40 TABLET, DELAYED RELEASE ORAL
Status: DISCONTINUED | OUTPATIENT
Start: 2024-02-18 | End: 2024-02-21 | Stop reason: HOSPADM

## 2024-02-17 RX ADMIN — RIVAROXABAN 20 MG: 20 TABLET, FILM COATED ORAL at 10:02

## 2024-02-17 RX ADMIN — DULOXETINE HYDROCHLORIDE 30 MG: 30 CAPSULE, DELAYED RELEASE ORAL at 10:02

## 2024-02-17 RX ADMIN — ANTI-FUNGAL POWDER MICONAZOLE NITRATE TALC FREE: 1.42 POWDER TOPICAL at 19:37

## 2024-02-17 RX ADMIN — GABAPENTIN 300 MG: 300 CAPSULE ORAL at 14:11

## 2024-02-17 RX ADMIN — METRONIDAZOLE 500 MG: 500 INJECTION, SOLUTION INTRAVENOUS at 18:50

## 2024-02-17 RX ADMIN — SODIUM CHLORIDE, PRESERVATIVE FREE 10 ML: 5 INJECTION INTRAVENOUS at 10:03

## 2024-02-17 RX ADMIN — METRONIDAZOLE 500 MG: 500 INJECTION, SOLUTION INTRAVENOUS at 11:03

## 2024-02-17 RX ADMIN — LOSARTAN POTASSIUM 100 MG: 100 TABLET, FILM COATED ORAL at 10:02

## 2024-02-17 RX ADMIN — GABAPENTIN 300 MG: 300 CAPSULE ORAL at 10:02

## 2024-02-17 RX ADMIN — CEFEPIME 2000 MG: 2 INJECTION, POWDER, FOR SOLUTION INTRAVENOUS at 22:20

## 2024-02-17 RX ADMIN — ANTI-FUNGAL POWDER MICONAZOLE NITRATE TALC FREE: 1.42 POWDER TOPICAL at 11:04

## 2024-02-17 RX ADMIN — PANTOPRAZOLE SODIUM 40 MG: 40 INJECTION, POWDER, FOR SOLUTION INTRAVENOUS at 09:58

## 2024-02-17 RX ADMIN — HYDROCHLOROTHIAZIDE 50 MG: 25 TABLET ORAL at 10:02

## 2024-02-17 RX ADMIN — SODIUM CHLORIDE, PRESERVATIVE FREE 10 ML: 5 INJECTION INTRAVENOUS at 19:38

## 2024-02-17 RX ADMIN — METRONIDAZOLE 500 MG: 500 INJECTION, SOLUTION INTRAVENOUS at 03:38

## 2024-02-17 RX ADMIN — GABAPENTIN 300 MG: 300 CAPSULE ORAL at 19:38

## 2024-02-17 RX ADMIN — CEFEPIME 2000 MG: 2 INJECTION, POWDER, FOR SOLUTION INTRAVENOUS at 05:22

## 2024-02-17 RX ADMIN — INSULIN GLARGINE 24 UNITS: 100 INJECTION, SOLUTION SUBCUTANEOUS at 19:38

## 2024-02-17 RX ADMIN — VANCOMYCIN HYDROCHLORIDE 750 MG: 750 INJECTION, POWDER, LYOPHILIZED, FOR SOLUTION INTRAVENOUS at 23:21

## 2024-02-17 RX ADMIN — METOPROLOL SUCCINATE 100 MG: 50 TABLET, EXTENDED RELEASE ORAL at 10:02

## 2024-02-17 RX ADMIN — FOLIC ACID 1 MG: 1 TABLET ORAL at 09:59

## 2024-02-17 RX ADMIN — CEFEPIME 2000 MG: 2 INJECTION, POWDER, FOR SOLUTION INTRAVENOUS at 14:15

## 2024-02-17 RX ADMIN — INSULIN GLARGINE 24 UNITS: 100 INJECTION, SOLUTION SUBCUTANEOUS at 09:58

## 2024-02-18 LAB
ALBUMIN SERPL-MCNC: 2.2 G/DL (ref 3.5–5.2)
ALP SERPL-CCNC: 292 U/L (ref 35–104)
ALT SERPL-CCNC: 33 U/L (ref 5–33)
ANION GAP SERPL CALCULATED.3IONS-SCNC: 11 MMOL/L (ref 9–17)
AST SERPL-CCNC: 61 U/L
BASOPHILS # BLD: 0.05 K/UL (ref 0–0.2)
BASOPHILS NFR BLD: 0 % (ref 0–2)
BILIRUB SERPL-MCNC: 2 MG/DL (ref 0.3–1.2)
BUN SERPL-MCNC: 37 MG/DL (ref 8–23)
BUN/CREAT SERPL: 26 (ref 9–20)
CALCIUM SERPL-MCNC: 8.5 MG/DL (ref 8.6–10.4)
CHLORIDE SERPL-SCNC: 102 MMOL/L (ref 98–107)
CO2 SERPL-SCNC: 22 MMOL/L (ref 20–31)
CREAT SERPL-MCNC: 1.4 MG/DL (ref 0.5–0.9)
EOSINOPHIL # BLD: 0.31 K/UL (ref 0–0.44)
EOSINOPHILS RELATIVE PERCENT: 2 % (ref 1–4)
ERYTHROCYTE [DISTWIDTH] IN BLOOD BY AUTOMATED COUNT: 15 % (ref 11.8–14.4)
GFR SERPL CREATININE-BSD FRML MDRD: 40 ML/MIN/1.73M2
GLUCOSE BLD-MCNC: 111 MG/DL (ref 65–105)
GLUCOSE BLD-MCNC: 135 MG/DL (ref 65–105)
GLUCOSE BLD-MCNC: 137 MG/DL (ref 65–105)
GLUCOSE BLD-MCNC: 143 MG/DL (ref 65–105)
GLUCOSE SERPL-MCNC: 111 MG/DL (ref 70–99)
HCT VFR BLD AUTO: 31.6 % (ref 36.3–47.1)
HGB BLD-MCNC: 9.3 G/DL (ref 11.9–15.1)
IMM GRANULOCYTES # BLD AUTO: 0.37 K/UL (ref 0–0.3)
IMM GRANULOCYTES NFR BLD: 2 %
LYMPHOCYTES NFR BLD: 0.77 K/UL (ref 1.1–3.7)
LYMPHOCYTES RELATIVE PERCENT: 5 % (ref 24–43)
MCH RBC QN AUTO: 27.7 PG (ref 25.2–33.5)
MCHC RBC AUTO-ENTMCNC: 29.4 G/DL (ref 28.4–34.8)
MCV RBC AUTO: 94 FL (ref 82.6–102.9)
MICROORGANISM SPEC CULT: NORMAL
MICROORGANISM SPEC CULT: NORMAL
MONOCYTES NFR BLD: 0.73 K/UL (ref 0.1–1.2)
MONOCYTES NFR BLD: 5 % (ref 3–12)
NEUTROPHILS NFR BLD: 86 % (ref 36–65)
NEUTS SEG NFR BLD: 13.6 K/UL (ref 1.5–8.1)
NRBC BLD-RTO: 0 PER 100 WBC
PLATELET # BLD AUTO: 333 K/UL (ref 138–453)
PMV BLD AUTO: 9.7 FL (ref 8.1–13.5)
POTASSIUM SERPL-SCNC: 4.8 MMOL/L (ref 3.7–5.3)
PROT SERPL-MCNC: 6.9 G/DL (ref 6.4–8.3)
RBC # BLD AUTO: 3.36 M/UL (ref 3.95–5.11)
RBC # BLD: ABNORMAL 10*6/UL
SERVICE CMNT-IMP: NORMAL
SERVICE CMNT-IMP: NORMAL
SODIUM SERPL-SCNC: 135 MMOL/L (ref 135–144)
SPECIMEN DESCRIPTION: NORMAL
SPECIMEN DESCRIPTION: NORMAL
TROPONIN I SERPL HS-MCNC: 19 NG/L (ref 0–14)
WBC OTHER # BLD: 15.8 K/UL (ref 3.5–11.3)

## 2024-02-18 PROCEDURE — 36415 COLL VENOUS BLD VENIPUNCTURE: CPT

## 2024-02-18 PROCEDURE — 84484 ASSAY OF TROPONIN QUANT: CPT

## 2024-02-18 PROCEDURE — 2580000003 HC RX 258: Performed by: NURSE PRACTITIONER

## 2024-02-18 PROCEDURE — 6370000000 HC RX 637 (ALT 250 FOR IP): Performed by: NURSE PRACTITIONER

## 2024-02-18 PROCEDURE — 82947 ASSAY GLUCOSE BLOOD QUANT: CPT

## 2024-02-18 PROCEDURE — 1200000000 HC SEMI PRIVATE

## 2024-02-18 PROCEDURE — 2580000003 HC RX 258: Performed by: INTERNAL MEDICINE

## 2024-02-18 PROCEDURE — 6370000000 HC RX 637 (ALT 250 FOR IP): Performed by: STUDENT IN AN ORGANIZED HEALTH CARE EDUCATION/TRAINING PROGRAM

## 2024-02-18 PROCEDURE — 80053 COMPREHEN METABOLIC PANEL: CPT

## 2024-02-18 PROCEDURE — 6360000002 HC RX W HCPCS: Performed by: INTERNAL MEDICINE

## 2024-02-18 PROCEDURE — 99232 SBSQ HOSP IP/OBS MODERATE 35: CPT | Performed by: STUDENT IN AN ORGANIZED HEALTH CARE EDUCATION/TRAINING PROGRAM

## 2024-02-18 PROCEDURE — 85025 COMPLETE CBC W/AUTO DIFF WBC: CPT

## 2024-02-18 PROCEDURE — 6370000000 HC RX 637 (ALT 250 FOR IP): Performed by: FAMILY MEDICINE

## 2024-02-18 RX ORDER — METRONIDAZOLE 500 MG/1
500 TABLET ORAL 3 TIMES DAILY
Status: DISCONTINUED | OUTPATIENT
Start: 2024-02-18 | End: 2024-02-18 | Stop reason: ALTCHOICE

## 2024-02-18 RX ORDER — OXYCODONE HYDROCHLORIDE 5 MG/1
5 TABLET ORAL EVERY 6 HOURS PRN
Status: DISCONTINUED | OUTPATIENT
Start: 2024-02-18 | End: 2024-02-21 | Stop reason: HOSPADM

## 2024-02-18 RX ADMIN — CEFEPIME 2000 MG: 2 INJECTION, POWDER, FOR SOLUTION INTRAVENOUS at 05:47

## 2024-02-18 RX ADMIN — LOSARTAN POTASSIUM 100 MG: 100 TABLET, FILM COATED ORAL at 09:16

## 2024-02-18 RX ADMIN — METOPROLOL SUCCINATE 100 MG: 50 TABLET, EXTENDED RELEASE ORAL at 09:16

## 2024-02-18 RX ADMIN — OXYCODONE HYDROCHLORIDE 5 MG: 5 TABLET ORAL at 07:12

## 2024-02-18 RX ADMIN — METRONIDAZOLE 500 MG: 500 INJECTION, SOLUTION INTRAVENOUS at 03:32

## 2024-02-18 RX ADMIN — INSULIN GLARGINE 24 UNITS: 100 INJECTION, SOLUTION SUBCUTANEOUS at 09:10

## 2024-02-18 RX ADMIN — HYDROCHLOROTHIAZIDE 50 MG: 25 TABLET ORAL at 09:06

## 2024-02-18 RX ADMIN — CEFEPIME 2000 MG: 2 INJECTION, POWDER, FOR SOLUTION INTRAVENOUS at 21:10

## 2024-02-18 RX ADMIN — GABAPENTIN 300 MG: 300 CAPSULE ORAL at 14:48

## 2024-02-18 RX ADMIN — FOLIC ACID 1 MG: 1 TABLET ORAL at 09:05

## 2024-02-18 RX ADMIN — GABAPENTIN 300 MG: 300 CAPSULE ORAL at 21:07

## 2024-02-18 RX ADMIN — INSULIN GLARGINE 24 UNITS: 100 INJECTION, SOLUTION SUBCUTANEOUS at 21:07

## 2024-02-18 RX ADMIN — PANTOPRAZOLE SODIUM 40 MG: 40 TABLET, DELAYED RELEASE ORAL at 05:45

## 2024-02-18 RX ADMIN — RIVAROXABAN 20 MG: 20 TABLET, FILM COATED ORAL at 09:05

## 2024-02-18 RX ADMIN — VANCOMYCIN HYDROCHLORIDE 750 MG: 750 INJECTION, POWDER, LYOPHILIZED, FOR SOLUTION INTRAVENOUS at 23:21

## 2024-02-18 RX ADMIN — DULOXETINE HYDROCHLORIDE 30 MG: 30 CAPSULE, DELAYED RELEASE ORAL at 09:05

## 2024-02-18 RX ADMIN — GABAPENTIN 300 MG: 300 CAPSULE ORAL at 09:06

## 2024-02-18 RX ADMIN — ANTI-FUNGAL POWDER MICONAZOLE NITRATE TALC FREE: 1.42 POWDER TOPICAL at 21:08

## 2024-02-18 RX ADMIN — CEFEPIME 2000 MG: 2 INJECTION, POWDER, FOR SOLUTION INTRAVENOUS at 14:56

## 2024-02-18 RX ADMIN — ANTI-FUNGAL POWDER MICONAZOLE NITRATE TALC FREE: 1.42 POWDER TOPICAL at 09:12

## 2024-02-18 RX ADMIN — SODIUM CHLORIDE, PRESERVATIVE FREE 10 ML: 5 INJECTION INTRAVENOUS at 21:08

## 2024-02-18 ASSESSMENT — PAIN DESCRIPTION - FREQUENCY
FREQUENCY: CONTINUOUS

## 2024-02-18 ASSESSMENT — PAIN DESCRIPTION - LOCATION
LOCATION: HIP

## 2024-02-18 ASSESSMENT — PAIN DESCRIPTION - PAIN TYPE
TYPE: CHRONIC PAIN

## 2024-02-18 ASSESSMENT — PAIN DESCRIPTION - ORIENTATION
ORIENTATION: LEFT

## 2024-02-18 ASSESSMENT — PAIN SCALES - GENERAL
PAINLEVEL_OUTOF10: 10

## 2024-02-18 ASSESSMENT — PAIN DESCRIPTION - DESCRIPTORS
DESCRIPTORS: ACHING

## 2024-02-18 ASSESSMENT — PAIN DESCRIPTION - ONSET
ONSET: ON-GOING

## 2024-02-19 LAB
BUN SERPL-MCNC: 42 MG/DL (ref 8–23)
CREAT SERPL-MCNC: 1.5 MG/DL (ref 0.5–0.9)
GFR SERPL CREATININE-BSD FRML MDRD: 37 ML/MIN/1.73M2
GLUCOSE BLD-MCNC: 132 MG/DL (ref 65–105)
GLUCOSE BLD-MCNC: 133 MG/DL (ref 65–105)
GLUCOSE BLD-MCNC: 148 MG/DL (ref 65–105)
GLUCOSE BLD-MCNC: 156 MG/DL (ref 65–105)

## 2024-02-19 PROCEDURE — 2580000003 HC RX 258: Performed by: NURSE PRACTITIONER

## 2024-02-19 PROCEDURE — 97535 SELF CARE MNGMENT TRAINING: CPT

## 2024-02-19 PROCEDURE — 36415 COLL VENOUS BLD VENIPUNCTURE: CPT

## 2024-02-19 PROCEDURE — 82565 ASSAY OF CREATININE: CPT

## 2024-02-19 PROCEDURE — 1200000000 HC SEMI PRIVATE

## 2024-02-19 PROCEDURE — 6370000000 HC RX 637 (ALT 250 FOR IP): Performed by: NURSE PRACTITIONER

## 2024-02-19 PROCEDURE — 6360000002 HC RX W HCPCS: Performed by: INTERNAL MEDICINE

## 2024-02-19 PROCEDURE — 6370000000 HC RX 637 (ALT 250 FOR IP): Performed by: FAMILY MEDICINE

## 2024-02-19 PROCEDURE — 6370000000 HC RX 637 (ALT 250 FOR IP): Performed by: STUDENT IN AN ORGANIZED HEALTH CARE EDUCATION/TRAINING PROGRAM

## 2024-02-19 PROCEDURE — 84520 ASSAY OF UREA NITROGEN: CPT

## 2024-02-19 PROCEDURE — 99232 SBSQ HOSP IP/OBS MODERATE 35: CPT | Performed by: STUDENT IN AN ORGANIZED HEALTH CARE EDUCATION/TRAINING PROGRAM

## 2024-02-19 PROCEDURE — 99232 SBSQ HOSP IP/OBS MODERATE 35: CPT | Performed by: INTERNAL MEDICINE

## 2024-02-19 PROCEDURE — 97116 GAIT TRAINING THERAPY: CPT

## 2024-02-19 PROCEDURE — 2580000003 HC RX 258: Performed by: INTERNAL MEDICINE

## 2024-02-19 PROCEDURE — 6370000000 HC RX 637 (ALT 250 FOR IP): Performed by: INTERNAL MEDICINE

## 2024-02-19 PROCEDURE — 2700000000 HC OXYGEN THERAPY PER DAY

## 2024-02-19 PROCEDURE — 97110 THERAPEUTIC EXERCISES: CPT

## 2024-02-19 PROCEDURE — 82947 ASSAY GLUCOSE BLOOD QUANT: CPT

## 2024-02-19 RX ORDER — LEVOFLOXACIN 500 MG/1
500 TABLET, FILM COATED ORAL NIGHTLY
Status: DISCONTINUED | OUTPATIENT
Start: 2024-02-19 | End: 2024-02-19

## 2024-02-19 RX ORDER — DOXYCYCLINE 100 MG/1
100 CAPSULE ORAL EVERY 12 HOURS SCHEDULED
Status: DISCONTINUED | OUTPATIENT
Start: 2024-02-19 | End: 2024-02-21 | Stop reason: HOSPADM

## 2024-02-19 RX ORDER — LEVOFLOXACIN 500 MG/1
500 TABLET, FILM COATED ORAL NIGHTLY
Status: DISCONTINUED | OUTPATIENT
Start: 2024-02-19 | End: 2024-02-21 | Stop reason: HOSPADM

## 2024-02-19 RX ADMIN — GABAPENTIN 300 MG: 300 CAPSULE ORAL at 22:11

## 2024-02-19 RX ADMIN — DOXYCYCLINE 100 MG: 100 CAPSULE ORAL at 22:11

## 2024-02-19 RX ADMIN — SODIUM CHLORIDE, PRESERVATIVE FREE 10 ML: 5 INJECTION INTRAVENOUS at 22:11

## 2024-02-19 RX ADMIN — LEVOFLOXACIN 500 MG: 500 TABLET, FILM COATED ORAL at 22:11

## 2024-02-19 RX ADMIN — CEFEPIME 2000 MG: 2 INJECTION, POWDER, FOR SOLUTION INTRAVENOUS at 05:12

## 2024-02-19 RX ADMIN — LOSARTAN POTASSIUM 100 MG: 100 TABLET, FILM COATED ORAL at 11:21

## 2024-02-19 RX ADMIN — PANTOPRAZOLE SODIUM 40 MG: 40 TABLET, DELAYED RELEASE ORAL at 05:10

## 2024-02-19 RX ADMIN — GABAPENTIN 300 MG: 300 CAPSULE ORAL at 14:51

## 2024-02-19 RX ADMIN — HYDROCHLOROTHIAZIDE 50 MG: 25 TABLET ORAL at 11:21

## 2024-02-19 RX ADMIN — RIVAROXABAN 20 MG: 20 TABLET, FILM COATED ORAL at 11:22

## 2024-02-19 RX ADMIN — DULOXETINE HYDROCHLORIDE 30 MG: 30 CAPSULE, DELAYED RELEASE ORAL at 11:21

## 2024-02-19 RX ADMIN — ANTI-FUNGAL POWDER MICONAZOLE NITRATE TALC FREE: 1.42 POWDER TOPICAL at 22:12

## 2024-02-19 RX ADMIN — ANTI-FUNGAL POWDER MICONAZOLE NITRATE TALC FREE: 1.42 POWDER TOPICAL at 11:22

## 2024-02-19 RX ADMIN — CEFEPIME 2000 MG: 2 INJECTION, POWDER, FOR SOLUTION INTRAVENOUS at 14:51

## 2024-02-19 RX ADMIN — INSULIN GLARGINE 24 UNITS: 100 INJECTION, SOLUTION SUBCUTANEOUS at 22:10

## 2024-02-19 RX ADMIN — GABAPENTIN 300 MG: 300 CAPSULE ORAL at 11:21

## 2024-02-19 RX ADMIN — INSULIN GLARGINE 24 UNITS: 100 INJECTION, SOLUTION SUBCUTANEOUS at 11:32

## 2024-02-19 RX ADMIN — FOLIC ACID 1 MG: 1 TABLET ORAL at 11:22

## 2024-02-19 ASSESSMENT — PAIN SCALES - GENERAL: PAINLEVEL_OUTOF10: 0

## 2024-02-19 NOTE — CARE COORDINATION
Social Work-Patient will need an expedited appeal. Will need to know if IV atb will be continued at SNF. Sent perfect serve to [facility]

## 2024-02-20 LAB
GLUCOSE BLD-MCNC: 129 MG/DL (ref 65–105)
GLUCOSE BLD-MCNC: 135 MG/DL (ref 65–105)
GLUCOSE BLD-MCNC: 163 MG/DL (ref 65–105)
GLUCOSE BLD-MCNC: 164 MG/DL (ref 65–105)

## 2024-02-20 PROCEDURE — 6360000002 HC RX W HCPCS: Performed by: FAMILY MEDICINE

## 2024-02-20 PROCEDURE — 2580000003 HC RX 258: Performed by: NURSE PRACTITIONER

## 2024-02-20 PROCEDURE — 6370000000 HC RX 637 (ALT 250 FOR IP): Performed by: FAMILY MEDICINE

## 2024-02-20 PROCEDURE — 97110 THERAPEUTIC EXERCISES: CPT

## 2024-02-20 PROCEDURE — 82947 ASSAY GLUCOSE BLOOD QUANT: CPT

## 2024-02-20 PROCEDURE — 1200000000 HC SEMI PRIVATE

## 2024-02-20 PROCEDURE — 6370000000 HC RX 637 (ALT 250 FOR IP): Performed by: NURSE PRACTITIONER

## 2024-02-20 PROCEDURE — 6370000000 HC RX 637 (ALT 250 FOR IP): Performed by: STUDENT IN AN ORGANIZED HEALTH CARE EDUCATION/TRAINING PROGRAM

## 2024-02-20 PROCEDURE — 99232 SBSQ HOSP IP/OBS MODERATE 35: CPT | Performed by: INTERNAL MEDICINE

## 2024-02-20 PROCEDURE — 6370000000 HC RX 637 (ALT 250 FOR IP): Performed by: INTERNAL MEDICINE

## 2024-02-20 PROCEDURE — 97530 THERAPEUTIC ACTIVITIES: CPT

## 2024-02-20 PROCEDURE — 99232 SBSQ HOSP IP/OBS MODERATE 35: CPT | Performed by: FAMILY MEDICINE

## 2024-02-20 PROCEDURE — 97535 SELF CARE MNGMENT TRAINING: CPT

## 2024-02-20 RX ORDER — HYDRALAZINE HYDROCHLORIDE 20 MG/ML
10 INJECTION INTRAMUSCULAR; INTRAVENOUS EVERY 6 HOURS PRN
Status: DISCONTINUED | OUTPATIENT
Start: 2024-02-20 | End: 2024-02-21 | Stop reason: HOSPADM

## 2024-02-20 RX ADMIN — LEVOFLOXACIN 500 MG: 500 TABLET, FILM COATED ORAL at 21:51

## 2024-02-20 RX ADMIN — GABAPENTIN 300 MG: 300 CAPSULE ORAL at 08:29

## 2024-02-20 RX ADMIN — PANTOPRAZOLE SODIUM 40 MG: 40 TABLET, DELAYED RELEASE ORAL at 08:30

## 2024-02-20 RX ADMIN — GABAPENTIN 300 MG: 300 CAPSULE ORAL at 14:30

## 2024-02-20 RX ADMIN — HYDROCHLOROTHIAZIDE 50 MG: 25 TABLET ORAL at 08:31

## 2024-02-20 RX ADMIN — FOLIC ACID 1 MG: 1 TABLET ORAL at 08:30

## 2024-02-20 RX ADMIN — DOXYCYCLINE 100 MG: 100 CAPSULE ORAL at 21:51

## 2024-02-20 RX ADMIN — SODIUM CHLORIDE, PRESERVATIVE FREE 10 ML: 5 INJECTION INTRAVENOUS at 21:52

## 2024-02-20 RX ADMIN — INSULIN GLARGINE 24 UNITS: 100 INJECTION, SOLUTION SUBCUTANEOUS at 08:27

## 2024-02-20 RX ADMIN — RIVAROXABAN 20 MG: 20 TABLET, FILM COATED ORAL at 08:30

## 2024-02-20 RX ADMIN — ANTI-FUNGAL POWDER MICONAZOLE NITRATE TALC FREE: 1.42 POWDER TOPICAL at 08:34

## 2024-02-20 RX ADMIN — INSULIN GLARGINE 24 UNITS: 100 INJECTION, SOLUTION SUBCUTANEOUS at 21:51

## 2024-02-20 RX ADMIN — DOXYCYCLINE 100 MG: 100 CAPSULE ORAL at 08:29

## 2024-02-20 RX ADMIN — SODIUM CHLORIDE, PRESERVATIVE FREE 10 ML: 5 INJECTION INTRAVENOUS at 08:35

## 2024-02-20 RX ADMIN — DULOXETINE HYDROCHLORIDE 30 MG: 30 CAPSULE, DELAYED RELEASE ORAL at 08:30

## 2024-02-20 RX ADMIN — HYDRALAZINE HYDROCHLORIDE 10 MG: 20 INJECTION, SOLUTION INTRAMUSCULAR; INTRAVENOUS at 21:52

## 2024-02-20 RX ADMIN — GABAPENTIN 300 MG: 300 CAPSULE ORAL at 21:51

## 2024-02-20 RX ADMIN — ANTI-FUNGAL POWDER MICONAZOLE NITRATE TALC FREE: 1.42 POWDER TOPICAL at 21:52

## 2024-02-20 RX ADMIN — METOPROLOL SUCCINATE 100 MG: 50 TABLET, EXTENDED RELEASE ORAL at 08:32

## 2024-02-20 RX ADMIN — LOSARTAN POTASSIUM 100 MG: 100 TABLET, FILM COATED ORAL at 08:31

## 2024-02-20 ASSESSMENT — ENCOUNTER SYMPTOMS
WHEEZING: 0
RHINORRHEA: 0
SHORTNESS OF BREATH: 0
COLOR CHANGE: 1
NAUSEA: 0
CONSTIPATION: 0
ABDOMINAL PAIN: 0
CHEST TIGHTNESS: 0
VOMITING: 0
COUGH: 0
DIARRHEA: 0
BLOOD IN STOOL: 0

## 2024-02-20 NOTE — ADT AUTH CERT
Utilization Reviews       2/18/24   by Radha Wall RN  Last Updated by Radha Wall RN on 2/20/2024 0956     Review Status Created By   In Primary Radha Wall RN       Review Type   --      Criteria Review   DATE: 2/18/24       MS w/ tele     RELEVANT BASELINES: RA     PERTINENT UPDATES:  Periodic use of O2  @ 2L/NC   Continues on IV cefepime, vancomycin and Flagyl.            VITALS:97.7 (36.5)     20        78        179/61  95% RA     ABNL/PERTINENT LABS/RADIOLOGY/DIAGNOSTIC STUDIES:     02/18/24 05:48  BUN,BUNPL: 37 (H)  Creatinine: 1.4 (H)  Bun/Cre Ratio: 26 (H)  Est, Glom Filt Rate: 40 (L)  Glucose, Random: 111 (H)  CALCIUM, SERUM, [id number]: 8.5 (L)  Albumin: 2.2 (L)  Alk Phos: 292 (H)  AST: 61 (H)  BILIRUBIN TOTAL: 2.0 (H)  WBC: 15.8 (H)  RBC: 3.36 (L)  Hemoglobin Quant: 9.3 (L)  Hematocrit: 31.6 (L)  RDW: 15.0 (H)  Neutrophils %: 86 (H)  Lymphocyte %: 5 (L)  Neutrophils Absolute: 13.60 (H)  Lymphocytes Absolute: 0.77 (L)  Immature Granulocytes: 2 (H)  Absolute Immature Granulocyte: 0.37 (H)        02/18/24 06:26  POC Glucose: 111 (H)     02/18/24 12:03  POC Glucose: 143 (H)     02/18/24 16:08  POC Glucose: 137 (H)     02/18/24 20:31  POC Glucose: 135 (H)     02/18/24 21:43  EKG 12-LEAD:   Atrial fibrillation with slow ventricular response  Left axis deviation  Minimal voltage criteria for LVH, may be normal variant ( Gómez product )  Possible Anterior infarct , age undetermined  Abnormal ECG  When compared with ECG of 15-FEB-2024 11:18,  No significant change was found     02/18/24 22:03  Troponin, High Sensitivity: 19 (H)     PHYSICAL EXAM:  Cardiovascular:      Rate and Rhythm: Normal rate and regular rhythm.      Heart sounds: No murmur heard.     Comments: Bilateral lower extremity chronic lymphedema/venous stasis/dermatitis        MD CONSULTS/ASSESSMENT AND PLAN:     IM  Patient seen examined at bedside.  No acute events overnight.  Resting in bed.  Continues on IV cefepime,

## 2024-02-21 VITALS
BODY MASS INDEX: 39.68 KG/M2 | WEIGHT: 293 LBS | HEIGHT: 72 IN | DIASTOLIC BLOOD PRESSURE: 84 MMHG | TEMPERATURE: 97.3 F | HEART RATE: 63 BPM | SYSTOLIC BLOOD PRESSURE: 119 MMHG | RESPIRATION RATE: 16 BRPM | OXYGEN SATURATION: 91 %

## 2024-02-21 LAB
BUN SERPL-MCNC: 36 MG/DL (ref 8–23)
CREAT SERPL-MCNC: 1.2 MG/DL (ref 0.5–0.9)
GFR SERPL CREATININE-BSD FRML MDRD: 49 ML/MIN/1.73M2
GLUCOSE BLD-MCNC: 149 MG/DL (ref 65–105)
GLUCOSE BLD-MCNC: 153 MG/DL (ref 65–105)

## 2024-02-21 PROCEDURE — 6370000000 HC RX 637 (ALT 250 FOR IP): Performed by: FAMILY MEDICINE

## 2024-02-21 PROCEDURE — 6370000000 HC RX 637 (ALT 250 FOR IP): Performed by: INTERNAL MEDICINE

## 2024-02-21 PROCEDURE — 99239 HOSP IP/OBS DSCHRG MGMT >30: CPT | Performed by: FAMILY MEDICINE

## 2024-02-21 PROCEDURE — 6370000000 HC RX 637 (ALT 250 FOR IP): Performed by: NURSE PRACTITIONER

## 2024-02-21 PROCEDURE — 82947 ASSAY GLUCOSE BLOOD QUANT: CPT

## 2024-02-21 PROCEDURE — 6370000000 HC RX 637 (ALT 250 FOR IP): Performed by: STUDENT IN AN ORGANIZED HEALTH CARE EDUCATION/TRAINING PROGRAM

## 2024-02-21 PROCEDURE — 36415 COLL VENOUS BLD VENIPUNCTURE: CPT

## 2024-02-21 PROCEDURE — 2580000003 HC RX 258: Performed by: NURSE PRACTITIONER

## 2024-02-21 PROCEDURE — 82565 ASSAY OF CREATININE: CPT

## 2024-02-21 PROCEDURE — 84520 ASSAY OF UREA NITROGEN: CPT

## 2024-02-21 RX ORDER — LEVOFLOXACIN 500 MG/1
500 TABLET, FILM COATED ORAL NIGHTLY
Qty: 5 TABLET | Refills: 0 | DISCHARGE
Start: 2024-02-21 | End: 2024-02-26

## 2024-02-21 RX ORDER — FOLIC ACID 1 MG/1
1 TABLET ORAL DAILY
Qty: 30 TABLET | Refills: 3 | Status: SHIPPED | OUTPATIENT
Start: 2024-02-22

## 2024-02-21 RX ORDER — METOPROLOL SUCCINATE 50 MG/1
50 TABLET, EXTENDED RELEASE ORAL DAILY
Status: DISCONTINUED | OUTPATIENT
Start: 2024-02-22 | End: 2024-02-21 | Stop reason: HOSPADM

## 2024-02-21 RX ORDER — DOXYCYCLINE 100 MG/1
100 CAPSULE ORAL EVERY 12 HOURS SCHEDULED
Qty: 10 CAPSULE | Refills: 0 | DISCHARGE
Start: 2024-02-21 | End: 2024-02-26

## 2024-02-21 RX ADMIN — HYDROCHLOROTHIAZIDE 50 MG: 25 TABLET ORAL at 09:07

## 2024-02-21 RX ADMIN — DOXYCYCLINE 100 MG: 100 CAPSULE ORAL at 09:06

## 2024-02-21 RX ADMIN — DULOXETINE HYDROCHLORIDE 30 MG: 30 CAPSULE, DELAYED RELEASE ORAL at 09:06

## 2024-02-21 RX ADMIN — GABAPENTIN 300 MG: 300 CAPSULE ORAL at 09:06

## 2024-02-21 RX ADMIN — SODIUM CHLORIDE, PRESERVATIVE FREE 10 ML: 5 INJECTION INTRAVENOUS at 09:05

## 2024-02-21 RX ADMIN — METOPROLOL SUCCINATE 100 MG: 50 TABLET, EXTENDED RELEASE ORAL at 09:07

## 2024-02-21 RX ADMIN — GABAPENTIN 300 MG: 300 CAPSULE ORAL at 14:23

## 2024-02-21 RX ADMIN — PANTOPRAZOLE SODIUM 40 MG: 40 TABLET, DELAYED RELEASE ORAL at 06:50

## 2024-02-21 RX ADMIN — INSULIN GLARGINE 24 UNITS: 100 INJECTION, SOLUTION SUBCUTANEOUS at 09:04

## 2024-02-21 RX ADMIN — LOSARTAN POTASSIUM 100 MG: 100 TABLET, FILM COATED ORAL at 09:07

## 2024-02-21 RX ADMIN — ANTI-FUNGAL POWDER MICONAZOLE NITRATE TALC FREE: 1.42 POWDER TOPICAL at 09:05

## 2024-02-21 RX ADMIN — RIVAROXABAN 20 MG: 20 TABLET, FILM COATED ORAL at 09:06

## 2024-02-21 RX ADMIN — FOLIC ACID 1 MG: 1 TABLET ORAL at 09:06

## 2024-02-21 ASSESSMENT — ENCOUNTER SYMPTOMS
COLOR CHANGE: 1
SHORTNESS OF BREATH: 0
RHINORRHEA: 0
CHEST TIGHTNESS: 0
COUGH: 0
VOMITING: 0
ABDOMINAL PAIN: 0
BLOOD IN STOOL: 0
NAUSEA: 0
CONSTIPATION: 0
WHEEZING: 0
DIARRHEA: 0

## 2024-02-21 NOTE — CARE COORDINATION
Social Work-[facility] approved patient for admission. Sent approval letter to [facility]. Requested 3PM. [email or web address]

## 2024-02-21 NOTE — PROGRESS NOTES
Consultation Note  Foot and Ankle Surgery     Subjective     Chief Complaint:   Lymphedema, bilateral lower extremity  Cellulitis  Ulcerations, left leg    HPI:  Cherie Molina is a 68 y.o. female diabetic patient seen at [facility] for lymphedema and wounds to the left lower extremity.  Foot ulceration to the left lower extremity. They have had this wound for roughly 10 days where she noticed that it appeared after a blister that was present. They have longstanding history of lymphedema bilaterally.  She was evaluated yesterday and had bilateral Gan compressive dressings applied to bilateral lower extremities.  She is tolerating them well.  States that unnecessary for them to take them down today.  Would rather just not have them changed. She follows outpatient at [facility].  Furthermore she does utilize at home lymphedema pumps 3 times daily.  Currently waiting to see if [facility] has lymphedema capabilities to give the patient.  Serology markers consistent with a white blood cell count of 15.1, CRP is elevated to 245, ESR is pending currently.  She is afebrile otherwise vital signs are stable besides hypertension.  She feels well, denies constitutional symptoms.    PCP is Azul Guthrie MD     ROS:   Review of Systems   HENT: Negative.     Eyes: Negative.    Respiratory: Negative.     Cardiovascular:  Positive for leg swelling.   Endocrine: Negative.    Genitourinary: Negative.    Skin:  Positive for color change, pallor, rash and wound.   Allergic/Immunologic: Negative.    Neurological: Negative.        Past Medical History   has a past medical history of Cellulitis of left lower extremity, DM (diabetes mellitus) ([facility]), DVT (deep venous thrombosis) ([facility], located within [facility]), GERD (gastroesophageal reflux disease), Hypertension, Lymphedema of lower extremity, MVP (mitral valve prolapse), KATHI (obstructive sleep apnea), Secondary hypercoagulable state ([facility], located [facility]), and Skin ulcer of pretibial region of right lower 
    Consultation Note  Foot and Ankle Surgery     Subjective     Chief Complaint:   Lymphedema, bilateral lower extremity  Cellulitis (L>R)  Ulcerations, left leg    Interval history:  Patient seen and examined at bedside.  Patient relates worsening shortness of breath.  She is awake alert and oriented x 3  Vital signs, labs, and imaging reviewed  Afebrile, vital signs stable   Both legs were wrapped circumferentially with layered dressings from the toes to the groin.  This should greatly improve swelling.  Discussed with  to obtain lymphedema pumps from home to place over the compressive dressings.    HPI:  Cherie Molina is a 68 y.o. female diabetic patient seen at [facility] for lymphedema and wounds to the left lower extremity.  Foot ulceration to the left lower extremity. They have had this wound for roughly 10 days where she noticed that it appeared after a blister that was present. They have longstanding history of lymphedema bilaterally.  She was evaluated yesterday and had bilateral Gan compressive dressings applied to bilateral lower extremities.  She is tolerating them well.  States that unnecessary for them to take them down today.  Would rather just not have them changed. She follows outpatient at [facility].  Furthermore she does utilize at home lymphedema pumps 3 times daily.  Currently waiting to see if [facility] has lymphedema capabilities to give the patient.  Serology markers consistent with a white blood cell count of 15.1, CRP is elevated to 245, ESR is pending currently.  She is afebrile otherwise vital signs are stable besides hypertension.  She feels well, denies constitutional symptoms.    PCP is Azul Guthrie MD     ROS:   Review of Systems   HENT: Negative.     Eyes: Negative.    Respiratory: Negative.     Cardiovascular:  Positive for leg swelling.   Endocrine: Negative.    Genitourinary: Negative.    Skin:  Positive for color change, pallor, rash and wound. 
  [facility]  Progress Note    Cherie Molina  MRN: [id number]  Date: 2/16/2024  LOS: 3     Reason for F/U :   Left plantar foot ulceration    Impression :   Morbid obesity  Diabetes mellitus type 2 with associated neuropathy  Left lower extremity lymphedema/venous stasis with some associated dermatitis  Left lateral calf superficial ulceration-chronic  New left plantar foot ulceration-infected  Possible urinary tract infection-culture with Pseudomonas aeruginosa  Findings concerning for acute cholecystitis  Essential hypertension  Chronic kidney disease stage IIIb    Recommendations:   Continue intravenous antimicrobial therapy with cefepime, vancomycin and metronidazole   Surgery input noted and the patient is scheduled for a a HIDA scan and potential percutaneous drain placement  The patient is undergoing compression wraps in the left lower extremity  Continue local wound care per the podiatry service    Infection Control Recommendations:   Universal precautions    Discharge Planning:   Estimated Length of IV antimicrobials: To be determined  Patient will need Midline Catheter Insertion/ PICC line Insertion: No  Patient will need: Home IV , Infusion Center,  SNF,  LTAC: Undetermined  Patient willneed outpatient wound care: No    Medical Decision making / Summary of Stay:   Cherie Molina is a 68 y.o.-year-old female who was initially admitted on 2/12/2024.   Cherie has diabetes mellitus type 2 with associated neuropathy, essential hypertension, is morbidly obese, pulmonary hypertension, stage IIIb chronic kidney disease, paroxysmal atrial fibrillation, history of chronic DVT.     The patient reports that she has been feeling \"blah\" and cannot really expand over the safety much else.  She reports that she just has not been feeling well does not really report any subjective fever, AV reports some chills but no sweats.     She reports that her son was helping her to get her socks on when he 
  [facility]  Progress Note    Cherie Molina  MRN: [id number]  Date: 2/19/2024  LOS: 6     Reason for F/U :   Left plantar foot ulceration    Impression :   Morbid obesity  Diabetes mellitus type 2 with associated neuropathy  Left lower extremity lymphedema/venous stasis with some associated dermatitis  Left lateral calf superficial ulceration-chronic  New left plantar foot ulceration-infected  Possible urinary tract infection-culture with Pseudomonas aeruginosa  Findings concerning for acute cholecystitis  Essential hypertension  Chronic kidney disease stage IIIb    Recommendations:   The patient has been on intravenous antimicrobial therapy with cefepime and vancomycin  The left foot wound culture has Pseudomonas aeruginosa, Proteus species, beta-hemolytic strep and normal skin sandy isolated  The urine culture has Pseudomonas aeruginosa isolated which was fluoroquinolone susceptible  Clinically the patient is doing well and there are no further plans for intervention for the gallbladder  I will switch her to oral antimicrobial therapy with levofloxacin and doxycycline    Infection Control Recommendations:   Universal precautions    Discharge Planning:   Estimated Length of IV antimicrobials: To be determined  Patient will need Midline Catheter Insertion/ PICC line Insertion: No  Patient will need: Home IV , Infusion Center,  SNF,  LTAC: Undetermined  Patient willneed outpatient wound care: No    Medical Decision making / Summary of Stay:   Cherie Molina is a 68 y.o.-year-old female who was initially admitted on 2/12/2024.   Cherie has diabetes mellitus type 2 with associated neuropathy, essential hypertension, is morbidly obese, pulmonary hypertension, stage IIIb chronic kidney disease, paroxysmal atrial fibrillation, history of chronic DVT.     The patient reports that she has been feeling \"blah\" and cannot really expand over the safety much else.  She reports that she just has not been 
  [facility]  Progress Note    Cherie Molina  MRN: [id number]  Date: 2/14/2024  LOS: 1     Reason for F/U :   Left plantar foot ulceration    Impression :   Morbid obesity  Diabetes mellitus type 2 with associated neuropathy  Left lower extremity lymphedema/venous stasis with some associated dermatitis  Left lateral calf superficial ulceration-chronic  New left plantar foot ulceration-infected  Right upper extremity vancomycin infiltrate  Status post hyaluronidase injection  Possible urinary tract infection  Essentially pretension  Chronic kidney disease stage IIIb    Recommendations:   The patient continues on IV antimicrobial therapy with cefepime and vancomycin  Patient underwent bedside debridement per the podiatry team, they do not plan any surgical intervention  Continue local wound care  The wound culture is pending, Gram stain with gram-positive rods and gram-negative rods  Continue supportive care    Infection Control Recommendations:   Universal precautions    Discharge Planning:   Estimated Length of IV antimicrobials: To be determined  Patient will need Midline Catheter Insertion/ PICC line Insertion: No  Patient will need: Home IV , Infusion Center,  SNF,  LTAC: Undetermined  Patient willneed outpatient wound care: No    Medical Decision making / Summary of Stay:   Cherie Molina is a 68 y.o.-year-old female who was initially admitted on 2/12/2024.   Cherie has diabetes mellitus type 2 with associated neuropathy, essential hypertension, is morbidly obese, pulmonary hypertension, stage IIIb chronic kidney disease, paroxysmal atrial fibrillation, history of chronic DVT.     The patient reports that she has been feeling \"blah\" and cannot really expand over the safety much else.  She reports that she just has not been feeling well does not really report any subjective fever, AV reports some chills but no sweats.     She reports that her son was helping her to get her socks on when he 
  [facility]  Progress Note    Cherie Molina  MRN: [id number]  Date: 2/15/2024  LOS: 2     Reason for F/U :   Left plantar foot ulceration    Impression :   Morbid obesity  Diabetes mellitus type 2 with associated neuropathy  Left lower extremity lymphedema/venous stasis with some associated dermatitis  Left lateral calf superficial ulceration-chronic  New left plantar foot ulceration-infected  Possible urinary tract infection-culture with Pseudomonas aeruginosa  Findings concerning for acute cholecystitis  Essential hypertension  Chronic kidney disease stage IIIb    Recommendations:   The patient has CT abdomen findings concerning for acute cholecystitis  The patient continues on intravenous antimicrobial therapy with cefepime and vancomycin and I will add metronidazole given the abdominal finding  The podiatry input has been noted and the plan is to continue local wound care for the plantar foot ulceration  The patient is undergoing compression wraps in the left lower extremity  The Pseudomonas in the urine is currently covered with the current antibiotic therapy    Infection Control Recommendations:   Universal precautions    Discharge Planning:   Estimated Length of IV antimicrobials: To be determined  Patient will need Midline Catheter Insertion/ PICC line Insertion: No  Patient will need: Home IV , Infusion Center,  SNF,  LTAC: Undetermined  Patient willneed outpatient wound care: No    Medical Decision making / Summary of Stay:   Cherie Molina is a 68 y.o.-year-old female who was initially admitted on 2/12/2024.   Cherie has diabetes mellitus type 2 with associated neuropathy, essential hypertension, is morbidly obese, pulmonary hypertension, stage IIIb chronic kidney disease, paroxysmal atrial fibrillation, history of chronic DVT.     The patient reports that she has been feeling \"blah\" and cannot really expand over the safety much else.  She reports that she just has not been feeling 
  Physician Progress Note      PATIENT:               EARLE VELAZQUEZ  CSN #:                  [id number]  :                       1955  ADMIT DATE:       2024 9:37 PM  DISCH DATE:  RESPONDING  PROVIDER #:        CARROLL Singleton CNP          QUERY TEXT:    Patient admitted with cellulitis. Documentation reflects Sepsis in ED note(s)   dated  .  If possible, please make selection below, document in the   progress notes and discharge summary if Sepsis was:    The medical record reflects the following:  Risk Factors: Cellulitis, DM, prior skin ulcers, morbid obesity , acute   cystitis  Clinical Indicators: On admission WBC 14.2, .1 , Heart  rate >90  105 , acute cystitis, cellulitis  Treatment: admission, imaging , labs , hyperglycemia / DM treatment, wound   care ,IV fluids antibiotics - rocephin , zosyn , vancomycin    Thank You ,  Bryan LEAL, RN, CRCR  Clinical   P: [phone]  F: [phone]  Options provided:  -- Sepsis POA and confirmed after study  -- Sepsis  ruled out after study  -- Other - I will add my own diagnosis  -- Disagree - Not applicable / Not valid  -- Disagree - Clinically unable to determine / Unknown  -- Refer to Clinical Documentation Reviewer    PROVIDER RESPONSE TEXT:    Sepsis POA and confirmed after study.    Query created by: Bryan Dupree on 2024 9:14 AM      Electronically signed by:  CARROLL Singleton CNP 2024 4:18 AM          
  Physician Progress Note      PATIENT:               EARLE VELAZQUEZ  CSN #:                  [id number]  :                       1955  ADMIT DATE:       2024 9:37 PM  DISCH DATE:  RESPONDING  PROVIDER #:        CARROLL Singleton CNP          QUERY TEXT:    Pt admitted with Left lower extremity cellulitis. Pt noted to have DM 2 with   hyperglycemia . If possible, please document in progress notes and discharge   summary the relationship, if any, between cellulitis and DM.    The medical record reflects the following:  Risk Factors: Uncontrolled DM2 with hyperglycemia, Morbid obesity , ckd Severe   lymphedema  Clinical Indicators: Admission for cellulitis , H&P documentation Type 2   diabetes with hyperglycemia with long-term insulin use.  Treatment: admission, imaging , labs, blood sugar and carb diet control , IV   antibiotics for infection    Thank You,  Bryan LEAL, RN, CRCR  Clinical   P: [phone]  F: [phone]  Options provided:  -- Left lower extremity cellulitis associated with Diabetes  -- left lower extremity cellulitis unrelated to Diabetes  -- Other - I will add my own diagnosis  -- Disagree - Not applicable / Not valid  -- Disagree - Clinically unable to determine / Unknown  -- Refer to Clinical Documentation Reviewer    PROVIDER RESPONSE TEXT:    Left lower extremity cellulitis associated with Diabetes.    Query created by: Bryan Dupree on 2024 9:02 AM      Electronically signed by:  CARROLL Singleton CNP 2024 4:59 AM          
Comprehensive Nutrition Assessment    Type and Reason for Visit:  Initial, Wound, Consult    Nutrition Recommendations/Plan:   Provide diet rx as ordered   Provide Zac twice a day   Monitor labs as they become available   Monitor skin integrity/weights     Malnutrition Assessment:  Malnutrition Status:  No malnutrition (02/13/24 1301)    Context:        Findings of the 6 clinical characteristics of malnutrition:  Energy Intake:     Weight Loss:        Body Fat Loss:        Muscle Mass Loss:       Fluid Accumulation:        Strength:       Nutrition Assessment:    Leg Pain (Dx with cellulitis 2-3 weeks ago to left lower leg. was noted to be on keflex but missed her dose the day of admission. Patient seen by this writer for consult of wounds. Patient has a left leg posterior wound and a left plantar foot wound, Seen patient in room to education her on the Zac that will be arriving on her trays for breakfast and dinner to aid with wound healing, patient did state at this time she has a poor appetite which she attributes to feeling \"yucky\" right now. weight on 2/12 was 412#, 5/23 was 412# 11/17/22 was 413# weight appers stable. Monitor po intakes, weights, labs, skin integrity.    Nutrition Related Findings:    BM 2/12 active sounds Edema to BUE +2, edema to BLE +2. Wound Type: Open Wounds (left leg posterior, left plantar foot.)       Current Nutrition Intake & Therapies:    Average Meal Intake: 51-75%     ADULT DIET; Regular; 4 carb choices (60 gm/meal); Low Fat/Low Chol/High Fiber/BERT; Low Sodium (2 gm)  ADULT ORAL NUTRITION SUPPLEMENT; Breakfast, Dinner; Wound Healing Oral Supplement    Anthropometric Measures:  Height: 182.9 cm (6')  Ideal Body Weight (IBW): 160 lbs (73 kg)    Admission Body Weight: 186.9 kg (412 lb)  Current Body Weight: 186.9 kg (412 lb), 257.5 % IBW. Weight Source: Stated  Current BMI (kg/m2): 55.9        Weight Adjustment For: No Adjustment                 BMI Categories: Obese Class 3 
Comprehensive Nutrition Assessment    Type and Reason for Visit:  Reassess    Nutrition Recommendations/Plan:   ADULT DIET; Regular; 4 carb choices (60 gm/meal); Low Fat/Low Chol/High Fiber/2 gm Na  Continue Zac 2x/day  Monitor p.o intakes, weight, labs and skin integrity     Malnutrition Assessment:  Malnutrition Status:  No malnutrition (02/13/24 1301)        Nutrition Assessment:    Patient reports she does not have much of an appetite and states she is eating about 50% at meals. Patient is tolerating Regular; 4 carb choices; low fat/ low chol/ high fiber/ 2 gm Na and is drining Zac oral nutrition supplements. Bilateral lower extremities are wrapped due to venous status ulcers and lymphedema. Continue current diet and Zac. Monitor p.o intakes, weight, labs and skin integrity.    Nutrition Related Findings:    Edema: +2 non-pitting BUE, +3 BLE. Active bowel sounds. PMH: lymphedema Wound Type: Open Wounds (left leg posterior, left plantar foot.)       Current Nutrition Intake & Therapies:    Average Meal Intake: 26-50%, 51-75%  Average Supplements Intake: 51-75%  ADULT DIET; Regular; 4 carb choices (60 gm/meal); Low Fat/Low Chol/High Fiber/2 gm Na  ADULT ORAL NUTRITION SUPPLEMENT; Breakfast, Dinner; Wound Healing Oral Supplement    Anthropometric Measures:  Height: 182.9 cm (6')  Ideal Body Weight (IBW): 160 lbs (73 kg)    Admission Body Weight: 186.9 kg (412 lb)  Current Body Weight: 186.9 kg (412 lb), 257.5 % IBW. Weight Source: Stated  Current BMI (kg/m2): 55.9        Weight Adjustment For: No Adjustment                 BMI Categories: Obese Class 3 (BMI 40.0 or greater)    Estimated Daily Nutrient Needs:  Energy Requirements Based On: Kcal/kg  Weight Used for Energy Requirements: Current  Energy (kcal/day): 0039-3397 kcals per day using 8-11 g/kg of actual body weight  Weight Used for Protein Requirements: Ideal  Protein (g/day): 109-123 gm of protein (1.5-1.7 gm/kg)  Method Used for Fluid Requirements: 1 
Dr Rojo to the patient room.   
Dr Zamarripa to the patient bedside; patient complaining of upper abdominal discomfort. Orders to be placed per Dr Zamarripa.   
End Of Shift Note  [facility]  Summary of shift: Waiting for expedited appeal. See [facility] nursing order for lymph compression orders. PRN Hydralazine ordered for SBP  > 160.    Vitals:    Vitals:    02/19/24 1854 02/20/24 0747 02/20/24 1209 02/20/24 1644   BP: (!) 147/64 (!) 171/54 (!) 172/70 (!) 148/64   Pulse: 54 78 (!) 42 62   Resp: 22 17 20    Temp: 97.7 °F (36.5 °C) 97.3 °F (36.3 °C) 97.7 °F (36.5 °C) 97.9 °F (36.6 °C)   TempSrc: Oral Oral Axillary    SpO2: 96% 97% 98% 97%   Weight:       Height:            I&O:   Intake/Output Summary (Last 24 hours) at 2/20/2024 1829  Last data filed at 2/20/2024 1159  Gross per 24 hour   Intake --   Output 2300 ml   Net -2300 ml       Resp Status: RA- 2L     Ventilator Settings:     / / /     Critical Care IV infusions:   dextrose      sodium chloride          LDA:   Peripheral IV 02/19/24 Right;Anterior Wrist (Active)   Number of days: 1       External Urinary Catheter (Active)   Number of days: 6       Wound 02/13/24 Leg Left;Posterior (Active)   Number of days: 7       Wound 02/13/24 Left;Plantar (Active)   Number of days: 7       Wound 02/13/24 Coccyx (Active)   Number of days: 7          
FLAGYL 500MG IV Q 8 HRS  changed from IV to PO per [facility] approved policy.    Basic Criteria (please refer to hospital policy for details):  1. functioning GI tract  2. tolerating PO/NG routine medications  2. Antibiotics: Received at least 24 hours of IV, clinical stabilization, afebrile, normalizing WBC)    Thank you.  Sandra Mitchell [facility] 2/18/2024 10:40 AM    
General Surgery Progress Note            PATIENT NAME: Cherie Molina     TODAY'S DATE: 2/16/2024, 5:31 AM    SUBJECTIVE:    Patient seen and examined at bedside. No acute events overnight. VSS, afebrile. States she feels a bit better this morning. Denies any abdominal pain. States she had gas pain yesterday, but that has resolved. Denies any nausea or vomiting.          ROS:  Gen: No fever or chills  Eyes: No conjunctivitis   ENT: No sinus congestion or sore throat  CV: No chest pain   Pulm: No cough or shortness of breath  GI: No abdominal pain, nausea or vomiting  Renal: No dysuria or hematuria  Neuro: No difficulty with speech or acute extremity weakness        OBJECTIVE:   VITALS:  BP (!) 160/59   Pulse 93   Temp 98.8 °F (37.1 °C) (Oral)   Resp 18   Ht 1.829 m (6')   Wt (!) 186.9 kg (412 lb)   SpO2 92%   BMI 55.88 kg/m²      INTAKE/OUTPUT:      Intake/Output Summary (Last 24 hours) at 2/16/2024 0531  Last data filed at 2/15/2024 2340  Gross per 24 hour   Intake 295.85 ml   Output 2300 ml   Net -2004.15 ml       PHYSICAL EXAM  GEN: Alert, awake, oriented, NAD  HEENT: normocephalic, no scleral icterus, moist mucous membranes  CV: regular rate   LUNG: no respiratory distress, no audible wheezing, on supplemental oxygen via nasal cannula   ABDOMEN: soft, non-distended, non-tender to palpation. No rebound, guarding, or rigidity   EXTREMITIES: bilateral lower extremity lymphedema with wraps in place   NEURO: awake, alert, following commands     Data:  CBC with Differential:    Lab Results   Component Value Date/Time    WBC 16.9 02/15/2024 11:00 AM    RBC 3.59 02/15/2024 11:00 AM    HGB 10.1 02/15/2024 11:00 AM    HCT 33.4 02/15/2024 11:00 AM     02/15/2024 11:00 AM    MCV 93.0 02/15/2024 11:00 AM    MCH 28.1 02/15/2024 11:00 AM    MCHC 30.2 02/15/2024 11:00 AM    RDW 14.9 02/15/2024 11:00 AM    LYMPHOPCT 3 02/15/2024 11:00 AM    MONOPCT 4 02/15/2024 11:00 AM    EOSPCT 2.5 11/04/2021 12:00 AM    
General Surgery Progress Note            PATIENT NAME: Cherie Molina     TODAY'S DATE: 2/17/2024, 7:45 AM    SUBJECTIVE:    Patient seen and examined at bedside. No acute events overnight. VSS, afebrile. Patient denies any abdominal pain this morning. States she feels better. No recurrence of indigestion or gas pains. She is tolerating a regular diet. Denies any nausea or vomiting. Discussed US and HIDA results, she expressed understanding.          ROS:  Gen: No fever or chills  Eyes: No conjunctivitis   ENT: No sinus congestion or sore throat  CV: No chest pain   Pulm: No cough or shortness of breath  GI: No abdominal pain, nausea or vomiting  Renal: No dysuria or hematuria  Neuro: No difficulty with speech or acute extremity weakness        OBJECTIVE:   VITALS:  BP (!) 160/67   Pulse 93   Temp 98.2 °F (36.8 °C) (Oral)   Resp 16   Ht 1.829 m (6')   Wt (!) 186.9 kg (412 lb)   SpO2 94%   BMI 55.88 kg/m²      INTAKE/OUTPUT:      Intake/Output Summary (Last 24 hours) at 2/17/2024 0745  Last data filed at 2/17/2024 0609  Gross per 24 hour   Intake 1487.44 ml   Output 1025 ml   Net 462.44 ml       PHYSICAL EXAM  GEN: Alert, awake, no apparent distress   HEENT: normocephalic, no scleral icterus, moist mucous membranes  CV: regular rate   LUNG: no respiratory distress, no audible wheezing, on supplemental oxygen via nasal cannula   ABDOMEN: soft, non-distended, non-tender to palpation. No rebound, guarding, or rigidity   EXTREMITIES: bilateral lower extremity lymphedema with wraps in place   NEURO: awake, alert, following commands     Data:  CBC with Differential:    Lab Results   Component Value Date/Time    WBC 16.9 02/15/2024 11:00 AM    RBC 3.59 02/15/2024 11:00 AM    HGB 10.1 02/15/2024 11:00 AM    HCT 33.4 02/15/2024 11:00 AM     02/15/2024 11:00 AM    MCV 93.0 02/15/2024 11:00 AM    MCH 28.1 02/15/2024 11:00 AM    MCHC 30.2 02/15/2024 11:00 AM    RDW 14.9 02/15/2024 11:00 AM    LYMPHOPCT 3 
[facility]  Office: [phone]  Brian Hunt DO, Sanjay Foster DO, Bear Horn DO, Harman Guy DO, Vince Verdugo MD, Taryn Rizo MD, Demetrius Rojo MD, Lacie Zamarripa MD,  Joel Flores MD, Paco France MD, Kj Jimenes MD,  Felipa Smith DO, Ayla Yusuf MD, Abdullahi Dempsey MD, Kush Hunt DO, Erin Jimenez MD,  Edward Rogers DO, Rupali Wang MD, Jaclyn Anna MD, Alexandria Yoon MD, Gregory Amos MD,  Avery Pierre MD, Blanche Alvarenga MD, Megan Perez MD, Jese Josue MD, Chad Barcenas MD, Negrito Hughes MD, Solomon Driver DO, Yovany Nair DO, Petra Cruz MD,  Ranjit Cherry MD, Shirley Waterhouse, CNP,  Ebony Alan, CNP, Terry Vargas, CNP,  Zoraida De La Fuente, LIBORIO, Rachelle Trujillo, CNP, Helen Cross, CNP, Lainey Montes CNP, Monse Tirado CNP, Zenia Díaz, CNP, Cristina Summers, PA-C, Tayler Delarosa PA-C, Luzma Chiang, CNP, Daisy Diaz, CNP, Lenka Leiva, CNS, Sandra Harley, CNP, Roxane Magaña CNP, Tracy Schwab, CNP       [facility]      Daily Progress Note     Admit Date: 2/12/2024  Bed/Room No.  2021/2021-01  Admitting Physician : Demetrius Rojo MD  Code Status :Full Code  Hospital Day:  LOS: 8 days   Chief Complaint:     Chief Complaint   Patient presents with    Leg Pain     Dx with cellulitis 2-3 weeks ago to left lower leg.  Currently on antibiotics but did not take dose today.      Wound Infection     New wound infection to sole of left foot,      Principal Problem:    Cellulitis of left lower leg  Active Problems:    Paroxysmal atrial fibrillation (HCC)    Stage 3b chronic kidney disease (HCC)    Type 2 diabetes mellitus with hyperglycemia, with long-term current use of insulin (HCC)    DVT (deep venous thrombosis) (HCC)    Essential hypertension    Acute cystitis    Lymphedema of both lower extremities    Morbid obesity with BMI of 50.0-59.9, adult (HCC)    Chronic hypoxemic respiratory failure (HCC)    Pulmonary 
[facility]  Office: [phone]  Brian Hunt DO, Sanjay Foster DO, Bear Horn DO, Harman Guy DO, Vince Verdugo MD, Taryn Rizo MD, Demetrius Rojo MD, Lacie Zamarripa MD,  Joel Flores MD, Paco France MD, Kj Jimenes MD,  Felipa Smith DO, Ayla Yusuf MD, Abdullahi Dempsey MD, Kush Hunt DO, Erin Jimenez MD,  Edward Rogers DO, Rupali Wang MD, Jaclyn Anna MD, Alexandria Yoon MD, Gregory Amos MD,  Avery Pierre MD, Blanche Alvarenga MD, Megan Perez MD, Jese Josue MD, Chad Barcenas MD, Negrito Hughes MD, Solomon Driver DO, Yovany aNir DO, Petra Cruz MD,  Ranjit Cherry MD, Shirley Waterhouse, CNP,  Ebony Alan, CNP, Terry Vargas, CNP,  Zoraida De La Fuente, LIBORIO, Rachelle Trujillo, CNP, Helen Cross, CNP, Lainey Montes CNP, Monse Tirado, CNP, Zenia Díaz, CNP, Cristina Summers, PA-C, Tayler Delarosa, PA-C, Luzma Chiang, CNP, Daisy Diaz, CNP, Lenka Leiva, CNS, Sandra Harley, CNP, Roxane Magaña, CNP, Tracy Schwab, CNP         [facility]   IN-PATIENT SERVICE   [facility]    Progress Note    2/18/2024    1:02 PM    Name:   Cherie Molina  MRN:     [id number]     Acct:      [id number]   Room:   2021/2021-01  IP Day:  5  Admit Date:  2/12/2024  9:37 PM    PCP:   Azul Guthrie MD  Code Status:  Full Code    Subjective:     C/C:   Chief Complaint   Patient presents with    Leg Pain     Dx with cellulitis 2-3 weeks ago to left lower leg.  Currently on antibiotics but did not take dose today.      Wound Infection     New wound infection to sole of left foot,      Interval History Status: improved.     Patient seen examined at bedside.  No acute events overnight.  Resting in bed.  Continues on IV cefepime, vancomycin and Flagyl.  Patient denies having any chest pain, shortness of breath, fever or chills.  Abdominal pain has improved.  Awaiting final ID recs.  Patient would like to talk to  about possible SNF    Brief 
[facility]  Consult Note       NAME:  Cherie Molina  MEDICAL RECORD NUMBER:  [id number]  AGE: 68 y.o.   GENDER: female  : 1955  TODAY'S DATE:  2024    Subjective:      Cherie Molina is a 68 y.o. female with inpatient referral to [facility] for:  \"Chronic lymphedema with wounds\"      Wound Identification:  Wound Type: lymphedema  Contributing Factors: lymphedema    Wound History: patient reports history of lymphedema to [facility], has been to wound clinic at [facility]. Has had a number of cancellations due to inability to transport to appointments. She reports using lymphedema pumps at home for several hours per day 1 hour at a time. Patient reports that she is normally ambulatory until approx 3 weeks ago due to increased swelling restricting her ability to move. Her support system includes spouse and children.  Current Wound Care Treatment:     Patient Goal of Care:  [x] Wound Healing  [] Odor Control  [] Palliative Care  [] Pain Control   [] Other:         PAST MEDICAL HISTORY        Diagnosis Date    Cellulitis of left lower extremity 2024    DM (diabetes mellitus) ([facility]) 2012    DVT (deep venous thrombosis) ([facility]) 06/22/2012    x1 provoked s/p long travel    GERD (gastroesophageal reflux disease) 2012    Hypertension     Lymphedema of lower extremity     significant bilateral    MVP (mitral valve prolapse) 2012    KATHI (obstructive sleep apnea)     Secondary hypercoagulable state ([facility]) 2024    Skin ulcer of pretibial region of right lower extremity, with necrosis of muscle ([facility]) 2021       PAST SURGICAL HISTORY    Past Surgical History:   Procedure Laterality Date    HYSTERECTOMY (CERVIX STATUS UNKNOWN)      TONSILLECTOMY         FAMILY HISTORY    Family History   Problem Relation Age of Onset    Heart Disease Mother        SOCIAL HISTORY    Social History     Tobacco Use    Smoking status: Former     Current packs/day: 
[facility]  Office: [phone]  Brian Hunt DO, Sanjay Foster DO, Bear Horn DO, Harman Guy DO, Vince Verdugo MD, Taryn Rizo MD, Demetrius Rojo MD, Lacie Zamarripa MD,  Joel Flores MD, Paco France MD, Kj Jimenes MD,  Felipa Smith DO, Ayla Yusuf MD, Abdullahi Dempsey MD, Kush Hunt DO, Erin Jimenez MD,  Edward Rogers DO, Rupali Wang MD, Jaclyn Anna MD, Alexandria Yoon MD, Gregory Amos MD,  Avery Pierre MD, Blanche Alvarenga MD, Megan Perez MD, Jese Josue MD, Chad Barcenas MD, Negrito Hughes MD, Solomon Driver DO, Yovany Nair DO, Petra Cruz MD,  Ranjit Cherry MD, Shirley Waterhouse, CNP,  Ebony Alan, CNP, Terry Vargas, CNP,  Zoraida De La Fuente, DNP, Rachelle Trujillo, CNP, Helen Cross, CNP, Lainey Montes CNP, Monse Tirado, CNP, Zenia Díaz, CNP, Cristina Summers, PA-C, Tayler Delarosa, PA-C, Luzma Chiang, CNP, Daisy Diaz, CNP, Lenka Leiva, CNS, Sandra Harley, CNP, Roxane Magaña, CNP, Tracy Schwab, CNP         [facility]   IN-PATIENT SERVICE   [facility]    Progress Note    2/15/2024    12:24 PM    Name:   Cherie Molina  MRN:     [id number]     Acct:      [id number]   Room:   2021/2021-01  IP Day:  2  Admit Date:  2/12/2024  9:37 PM    PCP:   Azul Guthrie MD  Code Status:  Full Code    Subjective:     C/C:   Chief Complaint   Patient presents with    Leg Pain     Dx with cellulitis 2-3 weeks ago to left lower leg.  Currently on antibiotics but did not take dose today.      Wound Infection     New wound infection to sole of left foot,      Interval History Status: Worse    Patient seen and examined at bedside, has been having significant nausea and epigastric discomfort overnight and this morning, per report could not have her dinner and she did not eat her breakfast, she had bowel movement last night per her report.  She is already on Protonix, Tums were added yesterday with no relief  Her 
[facility]  Office: [phone]  Brian Hunt DO, Sanjay Foster DO, Bear Horn DO, Harman Guy DO, Vince Verdugo MD, Taryn Rizo MD, Demetrius Rojo MD, Lacie Zamarripa MD,  Joel Flores MD, Paco France MD, Kj Jimenes MD,  Felipa Smith DO, Ayla Yusuf MD, Abdullahi Dempsey MD, Kush Hunt DO, Erin Jimenez MD,  Edward Rogers DO, Rupali Wang MD, Jaclyn Anna MD, Alexandria Yoon MD, Gregory Amos MD,  Avery Pierre MD, Blanche Alvarenga MD, Megan Perez MD, Jese Josue MD, Chad Barcenas MD, Negrito Hughes MD, Solomon Driver DO, Yovany Nair DO, Petra Cruz MD,  Ranjit Cherry MD, Shirley Waterhouse, CNP,  Ebony Alan, CNP, Terry Vargas, CNP,  Zoraida De La Fuente, DNP, Rachelle Trujillo, CNP, Helen Cross, CNP, Lainey Montes CNP, Monse Tirado, CNP, Zenia Díaz, CNP, Crisitna Summers, PA-C, Tayler Delarosa, PA-C, Luzma Chiang, CNP, Daisy Diaz, CNP, Lenka Leiva, CNS, Sandra Harley, CNP, Roxane Magaña, CNP, Tracy Schwab, CNP         [facility]   IN-PATIENT SERVICE   [facility]    Progress Note    2/14/2024    11:32 AM    Name:   Cherie Molina  MRN:     [id number]     Acct:      [id number]   Room:   2017/2017-02  IP Day:  1  Admit Date:  2/12/2024  9:37 PM    PCP:   Azul Guthrie MD  Code Status:  Full Code    Subjective:     C/C:   Chief Complaint   Patient presents with    Leg Pain     Dx with cellulitis 2-3 weeks ago to left lower leg.  Currently on antibiotics but did not take dose today.      Wound Infection     New wound infection to sole of left foot,      Interval History Status: improved.     Patient seen and examined at bedside, no acute events overnight.  Feeling and looking better  Afebrile overnight and blood pressure improved  Received a dose of IV Lasix yesterday with good response  Patient denies any chest pain, shortness of breath, chills, fevers, nausea or vomiting.  Patient vitals, labs and all 
Occupational Therapy  Facility/Department: [facility] MED [facility]  Rehabilitation Occupational Therapy Daily Treatment Note    Date: 24  Patient Name: Cherie Molina       Room:   MRN: [id number]  Account: [id number]   : 1955  (68 y.o.) Gender: female   Past Medical History:  has a past medical history of Cellulitis of left lower extremity, DM (diabetes mellitus) ([facility]), DVT (deep venous thrombosis) ([facility]), GERD (gastroesophageal reflux disease), Hypertension, Lymphedema of lower extremity, MVP (mitral valve prolapse), KATHI (obstructive sleep apnea), Secondary hypercoagulable state (HCC), and Skin ulcer of pretibial region of right lower extremity, with necrosis of muscle ([facility]).  Past Surgical History:   has a past surgical history that includes Hysterectomy and Tonsillectomy.    Restrictions  Restrictions/Precautions: Up as Tolerated, General Precautions, Contact Precautions  Other position/activity restrictions: l UE IV; ext cath; severe lymphedema LE    Subjective  Subjective: \"Don't talk to me like that!\"  Restrictions/Precautions: Up as Tolerated;General Precautions;Contact Precautions  Objective     Cognition  Overall Cognitive Status: Exceptions  Arousal/Alertness: Appropriate responses to stimuli  Following Commands: Follows multistep commands consistently;Follows multistep commands with increased time  Attention Span: Appears intact  Memory: Appears intact  Safety Judgement: Decreased awareness of need for assistance;Decreased awareness of need for safety  Problem Solving: Assistance required to generate solutions;Assistance required to implement solutions  Insights: Decreased awareness of deficits  Initiation: Requires cues for some  Sequencing: Requires cues for some  Cognition Comment: Pt. is sarcastic and can be hostile with her way of joking.  Orientation  Overall Orientation Status: Within Functional Limits  Orientation Level: Oriented X4         ADL  Toileting  Assistance Level: 
Occupational Therapy  Facility/Department: [facility] MED SURG  Rehabilitation Occupational Therapy Daily Treatment Note    Date: 24  Patient Name: Cehrie Molina       Room:   MRN: [id number]  Account: [id number]   : 1955  (68 y.o.) Gender: female   Past Medical History:  has a past medical history of Cellulitis of left lower extremity, DM (diabetes mellitus) ([facility]), DVT (deep venous thrombosis) ([facility]), GERD (gastroesophageal reflux disease), Hypertension, Lymphedema of lower extremity, MVP (mitral valve prolapse), KATHI (obstructive sleep apnea), Secondary hypercoagulable state ([facility]), and Skin ulcer of pretibial region of right lower extremity, with necrosis of muscle ([facility]).  Past Surgical History:   has a past surgical history that includes Hysterectomy and Tonsillectomy.    Restrictions  Restrictions/Precautions: Up as Tolerated, General Precautions, Contact Precautions  Other position/activity restrictions: l UE IV; ext cath; severe lymphedema LE    Subjective  Subjective: \"Oh great it's you!'  Restrictions/Precautions: Up as Tolerated;General Precautions;Contact Precautions    Objective     Cognition  Overall Cognitive Status: Exceptions  Arousal/Alertness: Appropriate responses to stimuli  Following Commands: Follows multistep commands consistently;Follows multistep commands with increased time  Attention Span: Appears intact  Memory: Appears intact  Safety Judgement: Decreased awareness of need for assistance;Decreased awareness of need for safety  Problem Solving: Assistance required to generate solutions;Assistance required to implement solutions  Insights: Decreased awareness of deficits  Initiation: Requires cues for some  Sequencing: Requires cues for some  Cognition Comment: Pt. displayed increased cooperation today.  Orientation  Overall Orientation Status: Within Functional Limits  Orientation Level: Oriented X4         ADL  Lower Extremity Bathing  Assistance Level: 
Occupational Therapy  Facility/Department: [facility] MED SURG  Occupational Therapy Initial Assessment    Name: Cherie Molina  : 1955  MRN: [id number]  Date of Service: 2024      ANTONIO Lara reports patient is medically stable for therapy treatment this date.    Chart reviewed prior to treatment and patient is agreeable for therapy.  All lines intact and patient positioned comfortably at end of treatment.  All patient needs addressed prior to ending therapy session.      Discharge Recommendations:  Patient would benefit from continued therapy after discharge   Pt currently functioning below baseline.  Recommend daily inpatient skilled therapy at time of discharge to maximize long term outcomes and prevent re-admission. Please refer to AM-PAC score for current level of function.         Patient Diagnosis(es): The encounter diagnosis was Cellulitis of left lower leg.  Past Medical History:  has a past medical history of Cellulitis of left lower extremity, DM (diabetes mellitus) ([facility]), DVT (deep venous thrombosis) ([facility]), GERD (gastroesophageal reflux disease), Hypertension, Lymphedema of lower extremity, MVP (mitral valve prolapse), KATHI (obstructive sleep apnea), Secondary hypercoagulable state ([facility]), and Skin ulcer of pretibial region of right lower extremity, with necrosis of muscle ([facility]).  Past Surgical History:  has a past surgical history that includes Hysterectomy and Tonsillectomy.     PER H&P: 68 y.o. Non- / non  female who presents with Leg Pain (Dx with cellulitis 2-3 weeks ago to left lower leg.  Currently on antibiotics but did not take dose today.  ) and Wound Infection (New wound infection to sole of left foot, ) and is admitted to the hospital for the management of Cellulitis of left lower extremity.  The patient presented to the ER with concerns of cellulitis to her left lower extremity. She states she has been on Keflex per the wound clinic doctor related to worsening redness to the 
PROTONIX 40MG IV DAILY  changed from IV to PO per [facility] approved policy.    Basic Criteria (please refer to hospital policy for details):  1. functioning GI tract  2. tolerating PO/NG routine medications  2. Antibiotics: Received at least 24 hours of IV, clinical stabilization, afebrile, normalizing WBC)    Thank you.  Sandra Mitchell [facility] 2/17/2024 2:00 PM    
Patient receives Sacrament of the Sick (anointing) from [employer].    Spiritual Care will follow as needed.  (writer charting for contract [employer].)    02/13/24 0900   Encounter Summary   Encounter Overview/Reason   Encounter   Service Provided For: Patient   Referral/Consult From: Shiva   Last Encounter  02/13/24   Rituals, Rites and Sacraments   Type Sacrament of Sick;Anointing       
Patient receives a blessing from Олег mckeon.    Spiritual Care will follow as needed.  (writer charting for contract Олег mckeon.)      02/16/24 0919   Encounter Summary   Encounter Overview/Reason   Encounter   Service Provided For: Patient   Referral/Consult From: Shiva   Last Encounter  02/16/24   Rituals, Rites and Sacraments   Type Blessings       
Patient receives blessing from Олег mckeon.    Spiritual Care will follow as needed.  (writer charting for contract [employer].)      02/20/24 0807   Encounter Summary   Encounter Overview/Reason   Encounter   Service Provided For: Patient   Referral/Consult From: Shiva   Last Encounter  02/20/24   Rituals, Rites and Sacraments   Type Blessings       
Physical Therapy  DATE: 2024    NAME: Cherie Molina  MRN: [id number]   : 1955    Patient not seen this date for Physical Therapy due to:      [] Cancel by RN or physician due to:    [] Hemodialysis    [] Critical Lab Value Level     [] Blood transfusion in progress    [] Acute or unstable cardiovascular status   _MAP < 55 or more than >115  _HR < 40 or > 130    [] Acute or unstable pulmonary status   -FiO2 > 60%   _RR < 5 or >40    _O2 sats < 85%    [] Strict Bedrest    [] Off Unit for surgery or procedure    [] Off Unit for testing       [] Pending imaging to R/O fracture    [x] Refusal by Patient  RN Jane chesterayed patient for therapy. Therapist introduced self and patient reports \"Oh here we go.  I can't right now because my leg oleg and I am too weak\".    Pt declining EOB and OOB activity. Edu provided on complications resulting from immobility and decreased participation such as increased risk for blood clots, pneumonia, constipation, muscle atrophy, and decreased independence. Pt verbalized fair understanding, resulting in continued refusal of OOB and EOB activity.  Therapist attempted to redirect patient and educated patient that she was referred to therapy to improve strength. Patient continued to refuse and reports \"I need therapy, but not right now\".  PT continue to follow. Will check back as able.     [] Other      [] PT being discontinued at this time. Patient independent. No further needs.     [] PT being discontinued at this time as the patient has been transferred to hospice care. No further needs.      Elsie Mayorga, PT     
Physical Therapy  DATE: 2024    NAME: Cherie Molina  MRN: [id number]   : 1955    Patient not seen this date for Physical Therapy due to:      [] Cancel by RN or physician due to:    [] Hemodialysis    [] Critical Lab Value Level     [] Blood transfusion in progress    [] Acute or unstable cardiovascular status   _MAP < 55 or more than >115  _HR < 40 or > 130    [] Acute or unstable pulmonary status   -FiO2 > 60%   _RR < 5 or >40    _O2 sats < 85%    [] Strict Bedrest    [] Off Unit for surgery or procedure    [] Off Unit for testing       [] Pending imaging to R/O fracture    [x] Refusal by Patient : Patient visiting with family and eating lunch. Will continue to educate and follow.      [] Other      [] PT being discontinued at this time. Patient independent. No further needs.     [] PT being discontinued at this time as the patient has been transferred to hospice care. No further needs.      Jane Barger, PTA     
Physical Therapy  Facility/Department: [facility] MED SURG  Physical Therapy Initial Assessment    Name: Cherie Molina  : 1955  MRN: [id number]  Date of Service: 2024    Per H&P:68 y.o. Non- / non  female who presents with Leg Pain (Dx with cellulitis 2-3 weeks ago to left lower leg.  Currently on antibiotics but did not take dose today.  ) and Wound Infection (New wound infection to sole of left foot, ) and is admitted to the hospital for the management of Cellulitis of left lower extremity.  The patient presented to the ER with concerns of cellulitis to her left lower extremity. She states she has been on Keflex per the wound clinic doctor related to worsening redness to the left lower extremity and a chronic wound on her posterior calf.  She states over the last couple days she has had increased drainage from the chronic left chronic wound and her son noted a new wound to her left sole making it difficult for her to ambulate.  She states over the last couple days she has felt weak with fever, chills, and a mild cough.  She denies shortness of breath urinary symptoms, abdominal pain, nausea, vomiting or diarrhea.  According to her history she has chronic hypoxemia respiratory failure and was on home oxygen at night but her PCP took her off of it a couple years ago.  She has not been tested for sleep apnea.  Other history includes type 2 diabetes with hyperglycemia and long-term insulin use, CKD 3B, A-fib and a DVT with secondary hypercoagulable state on Xarelto and essential hypertension        ANTONIO Lara reports patient is medically stable for therapy treatment this date.    Chart reviewed prior to treatment and patient is agreeable for therapy.  All lines intact and patient positioned comfortably at end of treatment.  All patient needs addressed prior to ending therapy session.     Discharge Recommendations:  Patient would benefit from continued therapy after discharge   Pt currently 
Physical Therapy  Facility/Department: [facility]  Rehabilitation Physical Therapy Treatment Note    NAME: Cherie Molina  : 1955 (68 y.o.)  MRN: [id number]  CODE STATUS: Full Code    Date of Service: 24       Restrictions:  Restrictions/Precautions: Up as Tolerated, General Precautions, Contact Precautions  Position Activity Restriction  Other position/activity restrictions: l UE IV; ext cath; severe lymphedema LE     SUBJECTIVE  Subjective  Subjective: pt in bed upon arrival agreeable to PT, RN oks PT               OBJECTIVE  Cognition  Overall Cognitive Status: Exceptions  Arousal/Alertness: Appropriate responses to stimuli  Following Commands: Follows multistep commands consistently;Follows multistep commands with increased time  Attention Span: Appears intact  Memory: Appears intact  Safety Judgement: Decreased awareness of need for assistance;Decreased awareness of need for safety  Problem Solving: Assistance required to generate solutions;Assistance required to implement solutions  Insights: Decreased awareness of deficits  Initiation: Requires cues for some  Sequencing: Requires cues for some  Cognition Comment: Pt. displayed increased cooperation today.  Orientation  Overall Orientation Status: Within Functional Limits  Orientation Level: Oriented X4    Functional Mobility  Bed Mobility  Overall Assistance Level: Dependent  Additional Factors: Set-up;Verbal cues;Increased time to complete  Roll Left  Assistance Level: Maximum assistance;Dependent;Requires x 2 assistance  Skilled Clinical Factors: Patient requries vc's for hand placement and assist in promotion to side lying minimal motions throughout bed mobility. Patient performs several rolls for pericare and linen changes to promote skin integrity  Roll Right  Assistance Level: Maximum assistance;Dependent;Requires x 2 assistance  Balance  Sitting Balance: Supervision  Transfers  Device: Walker  Sit to Stand  Assistance Level: Maximum 
Physical Therapy  Facility/Department: [facility] MED SURG  Daily Treatment Note  NAME: Cherie Molina  : 1955  MRN: [id number]    Date of Service: 2024    Discharge Recommendations:  Patient would benefit from continued therapy after discharge    Pt currently functioning below baseline.  Recommend daily inpatient skilled therapy at time of discharge to maximize long term outcomes and prevent re-admission. Please refer to AM-PAC score for current level of function.     Patient Diagnosis(es): The encounter diagnosis was Cellulitis of left lower leg.    Assessment   Assessment: Patient significantly limited bed mobility tolerance and participation. Patient required Max-dep x 2 A for all bed mobility. Sat EOB x 20+ mins for activity and core strengthening. Patient would benefit from continued skilled PT services skilled PT treatment to maximize return to PLOF.  Activity Tolerance: Patient limited by endurance;Patient limited by fatigue     Plan    Physical Therapy Plan  General Plan: 2-3 times per week  Current Treatment Recommendations: Strengthening;Balance training;Functional mobility training;Home exercise program;Safety education & training;Patient/Caregiver education & training;Therapeutic activities     Restrictions  Restrictions/Precautions  Restrictions/Precautions: Up as Tolerated, General Precautions, Contact Precautions  Position Activity Restriction  Other position/activity restrictions: l UE IV; ext cath; severe lymphedema LE     Subjective    Subjective  Subjective: Patient resting in bed and agreeable for PT treatment. RN reported patient medically appropriate for PT treatment.  Orientation  Overall Orientation Status: Within Functional Limits  Orientation Level: Oriented X4  Cognition  Overall Cognitive Status: Exceptions  Arousal/Alertness: Appropriate responses to stimuli  Following Commands: Follows multistep commands consistently;Follows multistep commands with increased time  Attention Span: 
Physical Therapy  [facility]  Rehabilitation Physical Therapy Treatment Note    NAME: Cherie Molina  : 1955 (68 y.o.)  MRN: [id number]  CODE STATUS: Full Code    Date of Service: 24       Restrictions:  Restrictions/Precautions: Up as Tolerated, General Precautions, Contact Precautions, Weight Bearing  Lower Extremity Weight Bearing Restrictions  Right Lower Extremity Weight Bearing: Weight Bearing As Tolerated  Left Lower Extremity Weight Bearing: Weight Bearing As Tolerated  Position Activity Restriction  Other position/activity restrictions: l UE IV; ext cath; severe lymphedema LE;     SUBJECTIVE  Subjective  Subjective: pt in bed upon arrival agreeable to PT, RN oks PT               OBJECTIVE  Cognition  Overall Cognitive Status: Exceptions  Arousal/Alertness: Appropriate responses to stimuli  Following Commands: Follows multistep commands consistently;Follows multistep commands with increased time  Attention Span: Appears intact  Memory: Appears intact  Safety Judgement: Decreased awareness of need for assistance;Decreased awareness of need for safety  Problem Solving: Assistance required to generate solutions;Assistance required to implement solutions  Insights: Decreased awareness of deficits  Initiation: Requires cues for some  Sequencing: Requires cues for some  Cognition Comment: Pt. displayed increased cooperation today.  Orientation  Overall Orientation Status: Within Functional Limits  Orientation Level: Oriented X4    Functional Mobility  Bed Mobility  Overall Assistance Level: Moderate Assistance;Requires x 2 Assistance  Additional Factors: Set-up;Verbal cues;Increased time to complete  Roll Left  Assistance Level: Maximum assistance;Requires x 2 assistance  Skilled Clinical Factors: Max assist x2/dep to roll side to side to change brief  Roll Right  Assistance Level: Maximum assistance;Requires x 2 assistance;Dependent  Transfers  Surface: From bed;To chair with 
Pt admitted to room 2017 per cart in stable condition from ED  Oriented to room and surroundings  Bed in lowest position, wheels locked, 2/4 side rails up  Call light in reach, room free of clutter, adequate lighting provided  Denies any further questions at this time  Instructed to call out with any questions/concerns/new onset of pain and/or n/v   White board updated  Continue to monitor with hourly rounding  STAY WITH ME protocol initiated   Bed alarm on/Fall Risk signs in place/Fall risk sticker to wrist band  Non-skid socks on/at bedside  MEDICATION EDUCATION SHEET in place for patient/family to view & ask questions.       
[facility]  Office: [phone]  Brian Hunt DO, Sanjay Foster DO, Bear Horn DO, Harman Guy DO, Vince Verdugo MD, Taryn Rizo MD, Demetrius Rojo MD, Lacie Zamarripa MD,  Joel Flores MD, Paco France MD, Kj Jimenes MD,  Felipa Smith DO, Ayla Yusuf MD, Abdullahi Dempsey MD, Kush Hunt DO, Erin Jimenez MD,  Edward Rogers DO, Rupali Wang MD, Jaclyn Anna MD, Alexandria Yoon MD, Gregory Amos MD,  Avery Pierre MD, Blanche Alvarenga MD, Megan Perez MD, Jese Josue MD, Chad Barcenas MD, Negrito Hughes MD, Solomon Driver DO, Yovany Nair DO, Petra Cruz MD,  Ranjit Cherry MD, Shirley Waterhouse, CNP,  Ebony Alan, CNP, Terry Vargas, CNP,  Zoraida De La Fuente, LIBORIO, Rachelle Trujillo, CNP, Helen Cross, CNP, Lainey Montes CNP, Monse Tirado, CNP, Zenia Díaz, CNP, Cristina Summers, PA-C, Tayler Delraosa, PA-C, Luzma Chiang, CNP, Daisy Diaz, CNP, Lenka Leiva, CNS, Sandra Harley, CNP, Roxane Magaña, CNP, Tracy Schwab, CNP         [facility]   IN-PATIENT SERVICE   [facility] [place]    Progress Note    2/16/2024    2:24 PM    Name:   Cherie Molina  MRN:     [id number]     Acct:      [id number]   Room:   2021/2021-01  IP Day:  3  Admit Date:  2/12/2024  9:37 PM    PCP:   Azul Guthrie MD  Code Status:  Full Code    Subjective:     C/C:   Chief Complaint   Patient presents with    Leg Pain     Dx with cellulitis 2-3 weeks ago to left lower leg.  Currently on antibiotics but did not take dose today.      Wound Infection     New wound infection to sole of left foot,      Interval History Status: improved.     Patient seen and examined at bedside this morning. No acute events overnight. Patient resting in bed comfortably with  at bedside. Bilateral legs are wrapped. Patient denies any pain, numbness, tingling, fever or chills. Patient evaluated by podiatry. No surgical intervention planned at this time. Patient also being 
[facility]  Office: [phone]  Brian Hunt DO, Sanjay Foster DO, Bear Horn DO, Harman Guy DO, Vince Verdugo MD, Taryn Rizo MD, Demetrius Rojo MD, Lacie Zamarripa MD,  Joel Flores MD, Paco France MD, Kj Jimenes MD,  Felipa Smith DO, Ayla Yusuf MD, Abdullahi Dempsey MD, Kush Hunt DO, Erin Jimenez MD,  Edward Rogers DO, Rupail Wang MD, Jaclyn Anna MD, lAexandria Yoon MD, Gregory Amos MD,  Avery Pierre MD, Blanche Alvarenga MD, Megan Perez MD, Jese Josue MD, Chad Barcenas MD, Negrito Hughes MD, Solomon Driver DO, Yovany Nair DO, Petra Cruz MD,  Ranjit Cherry MD, Shirley Waterhouse, CNP,  Ebony Alan, CNP, Terry Vargas, CNP,  Zoraida De La Fuente, LIBORIO, Rachelle Trujillo, CNP, Helen Cross, CNP, Lainey Montes CNP, Monse Tirado, CNP, Zenia Díaz, CNP, Cristina Summers, PA-C, Tayler Delarosa, PA-C, Luzma Chiang, CNP, Daisy Diaz, CNP, Lenka Leiva, CNS, Sandra Harley, CNP, Roxane Magaña, CNP, Tracy Schwab, CNP         [facility]   IN-PATIENT SERVICE   [facility]    Progress Note    2/17/2024    2:02 PM    Name:   Cherie Molina  MRN:     [id number]     Acct:      [id number]   Room:   2021/2021-01  IP Day:  4  Admit Date:  2/12/2024  9:37 PM    PCP:   Azul Guthrie MD  Code Status:  Full Code    Subjective:     C/C:   Chief Complaint   Patient presents with    Leg Pain     Dx with cellulitis 2-3 weeks ago to left lower leg.  Currently on antibiotics but did not take dose today.      Wound Infection     New wound infection to sole of left foot,      Interval History Status: improved.     Patient seen and examined at bedside this morning.  No acute events overnight.  Patient not having any abdominal pain, chest pain, shortness of breath, lightheadedness, dizziness, fever or chills.  Ultrasound and HIDA scan results discussed with patient.    Brief History:     Patient with known past medical history of morbid 
The patient was discharged at this time; no distress. Transferred to facility per [facility] life flight. Report called to receiving nurse Jada. The patient's spouse is present at discharge and took the patient's belongings with him to the facility.   
The patient was transferred in patient specialty bed to the CT department; House supervisor accompanied the patient. The patient's  returned and was updated on CT order. The patient's  states \"she has been having shortness of breath on and off since her legs have been swelling\".   
[facility]   Pharmacy Pharmacokinetic Monitoring Service - Vancomycin    Consulting Provider: Ebony Alan CNP   Indication: cellulitis of LLE   Target Concentration: Goal AUC/RONEL 400-600 mg*hr/L  Day of Therapy: 3  Additional Antimicrobials: Cefepime     Pertinent Laboratory Values:   Wt Readings from Last 1 Encounters:   02/12/24 (!) 186.9 kg (412 lb)     Temp Readings from Last 1 Encounters:   02/13/24 97.5 °F (36.4 °C)     Estimated Creatinine Clearance: 65 mL/min (A) (based on SCr of 1.5 mg/dL (H)).  Recent Labs     02/12/24  2233 02/13/24  0910 02/14/24  0641   CREATININE 1.2* 1.1* 1.5*   BUN 33* 28* 34*   WBC 14.2* 15.1*  --        Pertinent Cultures:  Culture Date Source Results   2/12 Blood x2 NGTD   2/13 Foot  Prelim: GPR, GNR   MRSA Nasal Swab: N/A. Non-respiratory infection.    Recent vancomycin administrations                     vancomycin (VANCOCIN) 750 mg in sodium chloride 0.9 % 250 mL IVPB (mg) 750 mg New Bag 02/13/24 2326     750 mg New Bag  1206    vancomycin (VANCOCIN) 2,500 mg in sodium chloride 0.9 % 500 mL IVPB (mg) 2,500 mg New Bag 02/12/24 2324                    Assessment:  Date/Time Current Dose Concentration Timing of Concentration (h) AUC   2/14 750 mg q12h 39.7 @ 0641 941   Note: Serum concentrations collected for AUC dosing may appear elevated if collected in close proximity to the dose administered, this is not necessarily an indication of toxicity    Plan:  Current dosing regimen is supra-therapeutic  \"Decrease dose to 750 mg IV q24h to start 24 hours after last vanc dose  Repeat vancomycin concentration ordered for 2/16 @ 0600   Pharmacy will continue to monitor patient and adjust therapy as indicated    Thank you for the consult,  Johanny Ruano RPH  2/14/2024 8:00 AM    
[facility]   Pharmacy Pharmacokinetic Monitoring Service - Vancomycin    Consulting Provider: Ebony Alan CNP   Indication: infected wound on plantar area of foot in diabetic  Target Concentration: Goal AUC/RONEL 400-600 mg*hr/L  Day of Therapy: 8  Additional Antimicrobials: Cefepime    Pertinent Laboratory Values:   Wt Readings from Last 1 Encounters:   02/12/24 (!) 186.9 kg (412 lb)     Temp Readings from Last 1 Encounters:   02/19/24 97.7 °F (36.5 °C) (Oral)     Estimated Creatinine Clearance: 65 mL/min (A) (based on SCr of 1.5 mg/dL (H)).  Recent Labs     02/18/24  0548 02/19/24  0512   CREATININE 1.4* 1.5*   BUN 37* 42*   WBC 15.8*  --      Procalcitonin: -    Pertinent Cultures:  Culture Date Source Results   2/13 wound PSEUDOMONAS SPEC  PROTEUS SPEC       Recent vancomycin administrations                     vancomycin (VANCOCIN) 750 mg in sodium chloride 0.9 % 250 mL IVPB (mg) 750 mg New Bag 02/18/24 2321     750 mg New Bag 02/17/24 2321    vancomycin (VANCOCIN) 750 mg in sodium chloride 0.9 % 250 mL IVPB (mg) 750 mg New Bag 02/16/24 2256                      Plan:  Current dosing regimen is therapeutic  Continue current dose  CEFEPIME WILL NEED TO BE RENEWED TO BE CONTINUED, OR WILL AUTOMATICALLY BE STOPPED TOMORROW.   Pharmacy will continue to monitor patient and adjust therapy as indicated    Thank you for the consult,  Sandra Mitchell RPH  2/19/2024 8:12 AM    
[facility]   Pharmacy Pharmacokinetic Monitoring Service - Vancomycin    Consulting Provider: Ebony Alan CNP   Indication: cellulitis LLE  Target Concentration: Goal AUC/RONEL 400-600 mg*hr/L  Day of Therapy: 7  Additional Antimicrobials: Cefepime/Flagyl    Pertinent Laboratory Values:   Wt Readings from Last 1 Encounters:   02/12/24 (!) 186.9 kg (412 lb)     Temp Readings from Last 1 Encounters:   02/18/24 97.6 °F (36.4 °C) (Oral)     Estimated Creatinine Clearance: 70 mL/min (A) (based on SCr of 1.4 mg/dL (H)).  Recent Labs     02/15/24  1100 02/17/24  0537 02/18/24  0548   CREATININE  --  1.2* 1.4*   BUN  --  34* 37*   WBC 16.9*  --  15.8*         Pertinent Cultures:  Culture Date Source Results   2/13 wound Pseudomonas species - mod  Proteus species - light       Recent vancomycin administrations                     vancomycin (VANCOCIN) 750 mg in sodium chloride 0.9 % 250 mL IVPB (mg) 750 mg New Bag 02/17/24 2321    vancomycin (VANCOCIN) 750 mg in sodium chloride 0.9 % 250 mL IVPB (mg) 750 mg New Bag 02/16/24 2256     750 mg New Bag 02/15/24 2240                      Plan:  Current dosing regimen is therapeutic.     Pharmacy will continue to monitor patient and adjust therapy as indicated    Thank you for the consult,  Sandra Mitchell [facility]  2/18/2024 10:30 AM    
[facility]   Pharmacy Pharmacokinetic Monitoring Service - Vancomycin    Consulting Provider: Ebony Alan CNP   Indication: cellulitis LLE   Target Concentration: Goal AUC/RONEL 400-600 mg*hr/L  Day of Therapy: 6  Additional Antimicrobials: Cefepime/Flagyl    Pertinent Laboratory Values:   Wt Readings from Last 1 Encounters:   02/12/24 (!) 186.9 kg (412 lb)     Temp Readings from Last 1 Encounters:   02/17/24 98.4 °F (36.9 °C) (Oral)     Estimated Creatinine Clearance: 82 mL/min (A) (based on SCr of 1.2 mg/dL (H)).  Recent Labs     02/14/24  1116 02/15/24  0824 02/15/24  1100 02/17/24  0537   CREATININE  --  1.2*  --  1.2*   BUN  --   --   --  34*   WBC 15.2*  --  16.9*  --          Recent vancomycin administrations                     vancomycin (VANCOCIN) 750 mg in sodium chloride 0.9 % 250 mL IVPB (mg) 750 mg New Bag 02/16/24 2256     750 mg New Bag 02/15/24 2240     750 mg New Bag 02/14/24 2255                  Plan:  Current dosing regimen is therapeutic, levels were obtained 2/16.  Continue current dose of vancomycin 750mg IV q 24 hours  Pharmacy will continue to monitor patient and adjust therapy as indicated    Thank you for the consult,  Sandra Mitchell RPH  2/17/2024 9:18 AM    
[facility]   Pharmacy Pharmacokinetic Monitoring Service - Vancomycin    Consulting Provider: Ebony Alan CNP   Indication: cellulitis LLE  Target Concentration: Goal AUC/RONEL 400-600 mg*hr/L  Day of Therapy: 5  Additional Antimicrobials: Cefepime    Pertinent Laboratory Values:   Wt Readings from Last 1 Encounters:   02/12/24 (!) 186.9 kg (412 lb)     Temp Readings from Last 1 Encounters:   02/15/24 98.8 °F (37.1 °C) (Oral)     Estimated Creatinine Clearance: 82 mL/min (A) (based on SCr of 1.2 mg/dL (H)).  Recent Labs     02/13/24  0910 02/14/24  0641 02/14/24  1116 02/15/24  0824 02/15/24  1100   CREATININE 1.1* 1.5*  --  1.2*  --    BUN 28* 34*  --   --   --    WBC 15.1*  --  15.2*  --  16.9*     Procalcitonin: none    Pertinent Cultures:  Culture, Blood 1 [[id number]] Collected: 02/12/24 2233   Order Status: Completed Specimen: Blood Updated: 02/15/24 2357    Specimen Description .BLOOD 10ML R FOREARM    Special Requests         Culture NO GROWTH 3 DAYS   Culture, Blood 2 [[id number]] Collected: 02/12/24 2315   Order Status: Completed Specimen: Blood Updated: 02/15/24 0840    Specimen Description .BLOOD 10ML L HAND    Special Requests         Culture NO GROWTH 2 DAYS   Culture, Urine [[id number]] (Abnormal)  Collected: 02/12/24 2250   Order Status: Completed Specimen: Urine, clean catch Updated: 02/15/24 0748    Specimen Description .CLEAN CATCH URINE    Culture PSEUDOMONAS AERUGINOSA 10 to 50,000 CFU/ML Abnormal    Culture, Wound [[id number]] Collected: 02/13/24 1702   Order Status: Completed Specimen: No Site Given from Foot Updated: 02/14/24 1030    Specimen Description .FOOT LT    Direct Exam RARE NEUTROPHILS     MANY GRAM POSITIVE RODS     MODERATE GRAM NEGATIVE RODS    Culture CULTURE IN PROGRESS     MRSA Nasal Swab: N/A. Non-respiratory infection.    Recent vancomycin administrations                     vancomycin (VANCOCIN) 750 mg in sodium chloride 0.9 % 250 mL IVPB (mg) 750 mg New Bag 
[facility]  Office: [phone]  Brian Hunt DO, Sanjay Foster DO, Bear Horn DO, Harman Guy DO, Vince Verdugo MD, Taryn Rizo MD, Demetrius Rojo MD, Lacie Zamarripa MD,  Joel Flores MD, Paco France MD, Kj Jimenes MD,  Felipa Smith DO, Ayla Yusuf MD, Abdullahi Dempsey MD, Kush Hunt DO, Erin Jimenez MD,  Edward Rogers DO, Rupali Wang MD, Jaclyn Anna MD, Alexandria Yoon MD, Gregory Amos MD,  Avery Pierre MD, Blanche Alvarenga MD, Megan Perez MD, Jese Josue MD, Chad Barcenas MD, Negrito Hughes MD, Solomon Driver DO, Yovany Nair DO, Petra Cruz MD,  Ranjit Cherry MD, Shirley Waterhouse, CNP,  Ebony Alan, CNP, Terry Vargas, CNP,  Zoraida De La Fuente, LIBORIO, Rachelle Trujillo, CNP, Helen Cross, CNP, Lainey Montes CNP, Monse Tirado CNP, Zenia Díaz, CNP, Cristina Summers, PA-C, Tayler Delarosa PA-C, Luzma Chiang, CNP, Daisy Diaz, CNP, Lenka Leiva, CNS, Sandra Harley, CNP, Roxane Magaña CNP, Tracy Schwab, CNP       [facility]      Daily Progress Note     Admit Date: 2/12/2024  Bed/Room No.  2021/2021-01  Admitting Physician : Demetrius Rojo MD  Code Status :Full Code  Hospital Day:  LOS: 7 days   Chief Complaint:     Chief Complaint   Patient presents with    Leg Pain     Dx with cellulitis 2-3 weeks ago to left lower leg.  Currently on antibiotics but did not take dose today.      Wound Infection     New wound infection to sole of left foot,      Principal Problem:    Cellulitis of left lower leg  Active Problems:    Paroxysmal atrial fibrillation (HCC)    Stage 3b chronic kidney disease (HCC)    Type 2 diabetes mellitus with hyperglycemia, with long-term current use of insulin (HCC)    DVT (deep venous thrombosis) (HCC)    Essential hypertension    Acute cystitis    Lymphedema of both lower extremities    Morbid obesity with BMI of 50.0-59.9, adult (HCC)    Chronic hypoxemic respiratory failure (HCC)    Pulmonary 
Patient will roll to the R requiring min A x1  Short Term Goal 2: Patient will roll to the L requiring min A x1  Short Term Goal 3: Patient will demonstrate and verbalize pressure relieving techniques to maintain good skin integrity and prevent pressure sores.  Short Term Goal 4: Patient will be indep with LE and core HEP following handout    PLAN OF CARE/SAFETY  Physical Therapy Plan  General Plan: 2-3 times per week  Current Treatment Recommendations: Strengthening;Balance training;Functional mobility training;Home exercise program;Safety education & training;Patient/Caregiver education & training;Therapeutic activities  Safety Devices  Type of Devices: All fall risk precautions in place;Call light within reach;Nurse notified;Left in bed;Patient at risk for falls  Restraints  Restraints Initially in Place: No    EDUCATION  Education  Education Given To: Patient  Education Provided Comments: Education on the importance of repositioning in bed to prevent secondary complications.  Education Method: Demonstration;Verbal;Teach Back  Barriers to Learning: Cognition  Education Outcome: Verbalized understanding;Continued education needed        Therapy Time   Individual Concurrent Group Co-treatment   Time In 1342         Time Out 1425         Minutes 43                   Shaina Rosa PTA, 02/15/24 at 2:41 PM           
postural awareness in seated.  Patient with 1 sit to stand with a MAX x2 A utilizing Bariatric RW for UE support. Patient would benefit from continued skilled PT services skilled PT treatment to maximize return to PLOF.  Activity Tolerance: Patient limited by endurance;Patient limited by fatigue  Discharge Recommendations: Patient would benefit from continued therapy after discharge    Goals  Patient Goals   Patient Goals : To increase indep  Short Term Goals  Time Frame for Short Term Goals: 12 visits  Short Term Goal 1: Patient will roll to the R requiring min A x1  Short Term Goal 2: Patient will roll to the L requiring min A x1  Short Term Goal 3: Patient will demonstrate and verbalize pressure relieving techniques to maintain good skin integrity and prevent pressure sores.  Short Term Goal 4: Patient will be indep with LE and core HEP following handout    PLAN OF CARE/SAFETY  Physical Therapy Plan  General Plan: 2-3 times per week  Current Treatment Recommendations: Strengthening;Balance training;Functional mobility training;Home exercise program;Safety education & training;Patient/Caregiver education & training;Therapeutic activities  Safety Devices  Type of Devices: All fall risk precautions in place;Call light within reach;Nurse notified;Left in bed;Patient at risk for falls  Restraints  Restraints Initially in Place: No    EDUCATION  Education  Education Given To: Patient  Education Provided Comments: Education on the importance of repositioning in bed to prevent secondary complications.  Education Method: Demonstration;Verbal;Teach Back  Barriers to Learning: Cognition  Education Outcome: Verbalized understanding;Continued education needed        Therapy Time   Individual Concurrent Group Co-treatment   Time In       1255   Time Out       1334   Minutes       39     Co-treatment with OT warranted secondary to decreased safety and independence requiring 2 skilled therapy professionals to address individual 
patient is agreeable for therapy.  RN reports patient is medically stable for therapy treatment this date.    RONNI Guardado/BRAULIO    
liver, suggesting diffuse hepatocellular disease     CT ABDOMEN PELVIS WO CONTRAST Additional Contrast? None    Result Date: 2/15/2024  1. Distended gallbladder with associated wall thickening and surrounding inflammatory change, concerning for acute cholecystitis. 2. Cirrhosis with hepatosplenomegaly. 3. Multiple large nonobstructive right renal calculi. 4. Trace right pleural effusion.     XR FOOT LEFT (MIN 3 VIEWS)    Result Date: 2/13/2024  1. Pes planus and degenerative changes in the foot.  Talonavicular and calcaneocuboid degenerative changes have progressed. 2. No radiographic evidence for osteomyelitis.  MRI may be obtained if this remains a strong clinical concern. 3. Marked diffuse soft tissue swelling foot visualized lower leg unchanged.     XR CHEST PORTABLE    Result Date: 2/12/2024  Cardiomegaly and mild pulmonary vascular congestion.       Physical Examination:        Physical Exam  Vitals and nursing note reviewed.   Constitutional:       General: She is not in acute distress.     Appearance: She is obese.      Interventions: Nasal cannula in place.   HENT:      Head: Normocephalic and atraumatic.   Eyes:      Conjunctiva/sclera: Conjunctivae normal.      Pupils: Pupils are equal, round, and reactive to light.   Cardiovascular:      Rate and Rhythm: Normal rate and regular rhythm.      Heart sounds: No murmur heard.     Comments: Bilateral lower extremity chronic lymphedema/venous stasis/dermatitis  Pulmonary:      Effort: Pulmonary effort is normal. No accessory muscle usage or respiratory distress.      Breath sounds: No stridor. No decreased breath sounds, wheezing, rhonchi or rales.   Abdominal:      General: Bowel sounds are normal. There is no distension.      Palpations: Abdomen is soft. Abdomen is not rigid.      No tenderness  Musculoskeletal:         General: No tenderness.      Right lower leg: Edema present.      Left lower leg: Edema present.   Skin:     General: Skin is warm and dry. 
folic acid  1 mg Oral Daily    insulin lispro  0-16 Units SubCUTAneous TID WC    insulin lispro  0-4 Units SubCUTAneous Nightly    miconazole   Topical BID       Electronically signed by Jalil Rodríguez MD on 2/20/2024 at 11:01 AM      [facility]  Jalil Rodríguez MD  [employer]  OFFICE: [phone]    Thank you for allowing us to participate in the care of this patient. Please call with questions.    This note is created with the assistance of a speech recognition program.  While intending to generate a document that actually reflects the content of the visit, the document can still have some errors including those of syntax and sound a like substitutions which may escape proof reading.  In such instances, actual meaning can be extrapolated by contextual diversion.    
Pulmonary hypertension (HCC)    Diabetic polyneuropathy (HCC)    Secondary hypercoagulable state (HCC)    Elevated alkaline phosphatase level    Folic acid deficiency    Plantar ulcer of left foot (HCC)    Acute cholecystitis  Resolved Problems:    Cellulitis of right lower extremity        Plan     Patient examined and evaluated at bedside.   Treatment options discussed in detail with the patient.  Plain film radiographs ordered and discussed in detail with the patient.  Negative for soft tissue emphysema, acute fracture or dislocation, foreign body, or osteomyelitis.  ID Abx: Per the recommendations of ID  IV cefepime, vancomycin, and metronidazole  Both legs are wrapped circumferentially with layered dressings and lymphedema pumps from the toes to the groin.  This should greatly improve swelling.  This has helped her tremendously in the past.  Plan: No surgical invention at this time, recommend continuing local wound care and compression therapy and setting up follow up with lymphedema clinic. Podiatry will sign off at this time, please contact on call resident with any questions  Dressings:  Wound care ostomy to continue to perform Unna boot application for Profore wraps on 2/16/2024  Betadine application Mepilex border plantar foot wound.  Posterior leg wound can be dressed with Betadine and collagen.  Weightbearing as tolerated to Bilateral lower extremity.    PT OT recommendations  Discussed with MARIANELA McmillanM   Foot and Ankle Surgery  2/16/2024 at 11:07 AM

## 2024-02-21 NOTE — PLAN OF CARE
Problem: Discharge Planning  Goal: Discharge to home or other facility with appropriate resources  2/14/2024 1924 by Jane Juarez RN  Outcome: Progressing  Flowsheets (Taken 2/14/2024 0840)  Discharge to home or other facility with appropriate resources: Identify barriers to discharge with patient and caregiver     Problem: Discharge Planning  Goal: Discharge to home or other facility with appropriate resources  2/14/2024 1924 by Jane Juarez RN  Outcome: Progressing  Flowsheets (Taken 2/14/2024 0840)  Discharge to home or other facility with appropriate resources: Identify barriers to discharge with patient and caregiver     Problem: Skin/Tissue Integrity  Goal: Absence of new skin breakdown  Description: 1.  Monitor for areas of redness and/or skin breakdown  2.  Assess vascular access sites hourly  3.  Every 4-6 hours minimum:  Change oxygen saturation probe site  4.  Every 4-6 hours:  If on nasal continuous positive airway pressure, respiratory therapy assess nares and determine need for appliance change or resting period.  2/14/2024 1924 by Jane Juarez RN  Outcome: Progressing     Problem: Skin/Tissue Integrity  Goal: Absence of new skin breakdown  Description: 1.  Monitor for areas of redness and/or skin breakdown  2.  Assess vascular access sites hourly  3.  Every 4-6 hours minimum:  Change oxygen saturation probe site  4.  Every 4-6 hours:  If on nasal continuous positive airway pressure, respiratory therapy assess nares and determine need for appliance change or resting period.  2/14/2024 1924 by Jane Juarez RN  Outcome: Progressing     Problem: ABCDS Injury Assessment  Goal: Absence of physical injury  2/14/2024 1924 by Jane Juarez RN  Outcome: Progressing  Flowsheets (Taken 2/14/2024 1924)  Absence of Physical Injury: Implement safety measures based on patient assessment     
  Problem: Discharge Planning  Goal: Discharge to home or other facility with appropriate resources  2/16/2024 0005 by Cristina Clancy RN  Outcome: Progressing  2/15/2024 1613 by Adrienne Parrish RN  Outcome: Progressing  Flowsheets (Taken 2/15/2024 0819)  Discharge to home or other facility with appropriate resources:   Identify barriers to discharge with patient and caregiver   Arrange for needed discharge resources and transportation as appropriate   Identify discharge learning needs (meds, wound care, etc)   Refer to discharge planning if patient needs post-hospital services based on physician order or complex needs related to functional status, cognitive ability or social support system     Problem: Skin/Tissue Integrity  Goal: Absence of new skin breakdown  Description: 1.  Monitor for areas of redness and/or skin breakdown  2.  Assess vascular access sites hourly  3.  Every 4-6 hours minimum:  Change oxygen saturation probe site  4.  Every 4-6 hours:  If on nasal continuous positive airway pressure, respiratory therapy assess nares and determine need for appliance change or resting period.  2/16/2024 0005 by Cristina Clancy RN  Outcome: Progressing  2/15/2024 1613 by Adrienne Parrish RN  Outcome: Not Progressing     Problem: ABCDS Injury Assessment  Goal: Absence of physical injury  2/16/2024 0005 by Cristina Clancy RN  Outcome: Progressing  2/15/2024 1613 by Adrienne Parrish RN  Outcome: Progressing     Problem: Chronic Conditions and Co-morbidities  Goal: Patient's chronic conditions and co-morbidity symptoms are monitored and maintained or improved  2/16/2024 0005 by Cristina Clancy RN  Outcome: Progressing  2/15/2024 1613 by Adrienne Parrish RN  Outcome: Not Progressing  Flowsheets (Taken 2/15/2024 0819)  Care Plan - Patient's Chronic Conditions and Co-Morbidity Symptoms are Monitored and Maintained or Improved:   Monitor and assess patient's chronic conditions and comorbid symptoms for stability, deterioration, or 
  Problem: Discharge Planning  Goal: Discharge to home or other facility with appropriate resources  2/17/2024 0428 by Jaison Bryant RN  Outcome: Progressing  Flowsheets (Taken 2/16/2024 2030)  Discharge to home or other facility with appropriate resources:   Identify barriers to discharge with patient and caregiver   Arrange for needed discharge resources and transportation as appropriate   Identify discharge learning needs (meds, wound care, etc)   Refer to discharge planning if patient needs post-hospital services based on physician order or complex needs related to functional status, cognitive ability or social support system  2/16/2024 1549 by Zoya Mon, RN  Outcome: Progressing  Flowsheets (Taken 2/16/2024 0830)  Discharge to home or other facility with appropriate resources:   Identify barriers to discharge with patient and caregiver   Arrange for needed discharge resources and transportation as appropriate   Identify discharge learning needs (meds, wound care, etc)   Refer to discharge planning if patient needs post-hospital services based on physician order or complex needs related to functional status, cognitive ability or social support system     Problem: Skin/Tissue Integrity  Goal: Absence of new skin breakdown  Description: 1.  Monitor for areas of redness and/or skin breakdown  2.  Assess vascular access sites hourly  3.  Every 4-6 hours minimum:  Change oxygen saturation probe site  4.  Every 4-6 hours:  If on nasal continuous positive airway pressure, respiratory therapy assess nares and determine need for appliance change or resting period.  2/17/2024 0428 by Jaison Bryant RN  Outcome: Progressing  2/16/2024 1549 by Zoya Mon RN  Outcome: Progressing     Problem: ABCDS Injury Assessment  Goal: Absence of physical injury  2/17/2024 0428 by Jaison Bryant RN  Outcome: Progressing  2/16/2024 1549 by Zoya Mon RN  Outcome: Progressing     Problem: Chronic 
  Problem: Discharge Planning  Goal: Discharge to home or other facility with appropriate resources  2/21/2024 1506 by Adrienne Parrish RN  Outcome: Adequate for Discharge  2/21/2024 0400 by Marina Friedman RN  Outcome: Progressing     Problem: Skin/Tissue Integrity  Goal: Absence of new skin breakdown  Description: 1.  Monitor for areas of redness and/or skin breakdown  2.  Assess vascular access sites hourly  3.  Every 4-6 hours minimum:  Change oxygen saturation probe site  4.  Every 4-6 hours:  If on nasal continuous positive airway pressure, respiratory therapy assess nares and determine need for appliance change or resting period.  2/21/2024 1506 by Adrienne Parrish RN  Outcome: Adequate for Discharge  2/21/2024 0400 by Marina Friedman RN  Outcome: Progressing     Problem: ABCDS Injury Assessment  Goal: Absence of physical injury  2/21/2024 1506 by Adrienne Parrish RN  Outcome: Adequate for Discharge  2/21/2024 0400 by Marina Friedman RN  Outcome: Progressing     Problem: Chronic Conditions and Co-morbidities  Goal: Patient's chronic conditions and co-morbidity symptoms are monitored and maintained or improved  2/21/2024 1506 by Adrienne Parrish RN  Outcome: Adequate for Discharge  2/21/2024 0400 by Marina Friedman RN  Outcome: Progressing     Problem: Nutrition Deficit:  Goal: Optimize nutritional status  2/21/2024 1506 by Adrienne Parrish RN  Outcome: Adequate for Discharge  2/21/2024 0400 by Marina Friedman RN  Outcome: Progressing     Problem: Safety - Adult  Goal: Free from fall injury  2/21/2024 1506 by Adrienne Parrish RN  Outcome: Adequate for Discharge  2/21/2024 0400 by Marina Friedman RN  Outcome: Progressing     Problem: Neurosensory - Adult  Goal: Achieves stable or improved neurological status  2/21/2024 1506 by Adrienne Parrish RN  Outcome: Adequate for Discharge  2/21/2024 0400 by Marina Friedman RN  Outcome: Progressing     Problem: Respiratory - Adult  Goal: Achieves optimal ventilation and oxygenation  2/21/2024 
  Problem: Discharge Planning  Goal: Discharge to home or other facility with appropriate resources  Outcome: Progressing     Problem: Discharge Planning  Goal: Discharge to home or other facility with appropriate resources  Outcome: Progressing     Problem: Skin/Tissue Integrity  Goal: Absence of new skin breakdown  Description: 1.  Monitor for areas of redness and/or skin breakdown  2.  Assess vascular access sites hourly  3.  Every 4-6 hours minimum:  Change oxygen saturation probe site  4.  Every 4-6 hours:  If on nasal continuous positive airway pressure, respiratory therapy assess nares and determine need for appliance change or resting period.  Outcome: Progressing     Problem: ABCDS Injury Assessment  Goal: Absence of physical injury  Outcome: Progressing     Problem: ABCDS Injury Assessment  Goal: Absence of physical injury  Outcome: Progressing     Problem: Chronic Conditions and Co-morbidities  Goal: Patient's chronic conditions and co-morbidity symptoms are monitored and maintained or improved  Outcome: Progressing     Problem: Nutrition Deficit:  Goal: Optimize nutritional status  Outcome: Progressing     Problem: Nutrition Deficit:  Goal: Optimize nutritional status  Outcome: Progressing     Problem: Safety - Adult  Goal: Free from fall injury  Outcome: Progressing     Problem: Neurosensory - Adult  Goal: Achieves stable or improved neurological status  Outcome: Progressing     Problem: Respiratory - Adult  Goal: Achieves optimal ventilation and oxygenation  Outcome: Progressing     Problem: Skin/Tissue Integrity - Adult  Goal: Skin integrity remains intact  Outcome: Progressing     Problem: Gastrointestinal - Adult  Goal: Minimal or absence of nausea and vomiting  Outcome: Progressing     Problem: Genitourinary - Adult  Goal: Absence of urinary retention  Outcome: Progressing     
  Problem: Discharge Planning  Goal: Discharge to home or other facility with appropriate resources  Outcome: Progressing     Problem: Skin/Tissue Integrity  Goal: Absence of new skin breakdown  Description: 1.  Monitor for areas of redness and/or skin breakdown  2.  Assess vascular access sites hourly  3.  Every 4-6 hours minimum:  Change oxygen saturation probe site  4.  Every 4-6 hours:  If on nasal continuous positive airway pressure, respiratory therapy assess nares and determine need for appliance change or resting period.  Outcome: Progressing     Problem: ABCDS Injury Assessment  Goal: Absence of physical injury  Outcome: Progressing     Problem: Chronic Conditions and Co-morbidities  Goal: Patient's chronic conditions and co-morbidity symptoms are monitored and maintained or improved  Outcome: Progressing     Problem: Nutrition Deficit:  Goal: Optimize nutritional status  Outcome: Progressing     Problem: Safety - Adult  Goal: Free from fall injury  Outcome: Progressing     
  Problem: Discharge Planning  Goal: Discharge to home or other facility with appropriate resources  Outcome: Progressing  Flowsheets (Taken 2/16/2024 0830)  Discharge to home or other facility with appropriate resources:   Identify barriers to discharge with patient and caregiver   Arrange for needed discharge resources and transportation as appropriate   Identify discharge learning needs (meds, wound care, etc)   Refer to discharge planning if patient needs post-hospital services based on physician order or complex needs related to functional status, cognitive ability or social support system     Problem: Skin/Tissue Integrity  Goal: Absence of new skin breakdown  Description: 1.  Monitor for areas of redness and/or skin breakdown  2.  Assess vascular access sites hourly  3.  Every 4-6 hours minimum:  Change oxygen saturation probe site  4.  Every 4-6 hours:  If on nasal continuous positive airway pressure, respiratory therapy assess nares and determine need for appliance change or resting period.  Outcome: Progressing     Problem: ABCDS Injury Assessment  Goal: Absence of physical injury  Outcome: Progressing     Problem: Chronic Conditions and Co-morbidities  Goal: Patient's chronic conditions and co-morbidity symptoms are monitored and maintained or improved  Outcome: Progressing  Flowsheets (Taken 2/16/2024 0830)  Care Plan - Patient's Chronic Conditions and Co-Morbidity Symptoms are Monitored and Maintained or Improved: Monitor and assess patient's chronic conditions and comorbid symptoms for stability, deterioration, or improvement     Problem: Nutrition Deficit:  Goal: Optimize nutritional status  Outcome: Progressing     Problem: Safety - Adult  Goal: Free from fall injury  Outcome: Progressing     Problem: Neurosensory - Adult  Goal: Achieves stable or improved neurological status  Outcome: Progressing  Flowsheets (Taken 2/16/2024 0830)  Achieves stable or improved neurological status: Assess for and 
  Problem: Discharge Planning  Goal: Discharge to home or other facility with appropriate resources  Outcome: Progressing  Flowsheets (Taken 2/19/2024 2000)  Discharge to home or other facility with appropriate resources:   Identify barriers to discharge with patient and caregiver   Arrange for needed discharge resources and transportation as appropriate     Problem: Skin/Tissue Integrity  Goal: Absence of new skin breakdown  Description: 1.  Monitor for areas of redness and/or skin breakdown  2.  Assess vascular access sites hourly  3.  Every 4-6 hours minimum:  Change oxygen saturation probe site  4.  Every 4-6 hours:  If on nasal continuous positive airway pressure, respiratory therapy assess nares and determine need for appliance change or resting period.  Outcome: Progressing     Problem: ABCDS Injury Assessment  Goal: Absence of physical injury  Outcome: Progressing     Problem: Chronic Conditions and Co-morbidities  Goal: Patient's chronic conditions and co-morbidity symptoms are monitored and maintained or improved  Outcome: Progressing  Flowsheets (Taken 2/19/2024 2000)  Care Plan - Patient's Chronic Conditions and Co-Morbidity Symptoms are Monitored and Maintained or Improved:   Collaborate with multidisciplinary team to address chronic and comorbid conditions and prevent exacerbation or deterioration   Monitor and assess patient's chronic conditions and comorbid symptoms for stability, deterioration, or improvement     Problem: Nutrition Deficit:  Goal: Optimize nutritional status  Outcome: Progressing  Flowsheets (Taken 2/19/2024 1611 by Lainey Blackwell, RD, LD)  Nutrient intake appropriate for improving, restoring, or maintaining nutritional needs:   Monitor oral intake, labs, and treatment plans   Assess nutritional status and recommend course of action     Problem: Safety - Adult  Goal: Free from fall injury  Outcome: Progressing  Flowsheets (Taken 2/19/2024 2316)  Free From Fall Injury: Instruct 
  Problem: Skin/Tissue Integrity  Goal: Absence of new skin breakdown  Description: 1.  Monitor for areas of redness and/or skin breakdown  2.  Assess vascular access sites hourly  3.  Every 4-6 hours minimum:  Change oxygen saturation probe site  4.  Every 4-6 hours:  If on nasal continuous positive airway pressure, respiratory therapy assess nares and determine need for appliance change or resting period.  2/14/2024 0534 by Sherrill Estrella RN  Outcome: Progressing     Problem: ABCDS Injury Assessment  Goal: Absence of physical injury  2/14/2024 0534 by Sherrill Estrella RN  Outcome: Progressing     Problem: Nutrition Deficit:  Goal: Optimize nutritional status  2/14/2024 0534 by Sherrill Estrella RN  Outcome: Progressing     Problem: Safety - Adult  Goal: Free from fall injury  2/14/2024 0534 by Sherrill Estrella, RN  Outcome: Progressing        Problem: Gastrointestinal - Adult  Goal: Minimal or absence of nausea and vomiting  2/14/2024 0534 by Sherrill Estrella, RN  Outcome: Progressing        
  Problem: Skin/Tissue Integrity  Goal: Absence of new skin breakdown  Description: 1.  Monitor for areas of redness and/or skin breakdown  2.  Assess vascular access sites hourly  3.  Every 4-6 hours minimum:  Change oxygen saturation probe site  4.  Every 4-6 hours:  If on nasal continuous positive airway pressure, respiratory therapy assess nares and determine need for appliance change or resting period.  2/15/2024 0642 by Sherrill Estrella, RN  Outcome: Progressing     Problem: ABCDS Injury Assessment  Goal: Absence of physical injury  2/15/2024 0642 by Sherrill Estrella, RN  Outcome: Progressing     Problem: Nutrition Deficit:  Goal: Optimize nutritional status  2/15/2024 0642 by Sherrill Estrella, RN  Outcome: Progressing     Problem: Safety - Adult  Goal: Free from fall injury  2/15/2024 0642 by Sherrill Estrella, RN  Outcome: Progressing     
Patient was transferred to a speciality bed this shift. Podiatry wrapped BLE, wound care was consulted. Patient continues on IV antibiotics. Patient refuses turns, even after education on skin breakdown.  Problem: Discharge Planning  Goal: Discharge to home or other facility with appropriate resources  2/13/2024 1833 by Jane Juarez RN  Outcome: Progressing  Flowsheets (Taken 2/13/2024 0806)  Discharge to home or other facility with appropriate resources: Identify barriers to discharge with patient and caregiver     Problem: Skin/Tissue Integrity  Goal: Absence of new skin breakdown  Description: 1.  Monitor for areas of redness and/or skin breakdown  2.  Assess vascular access sites hourly  3.  Every 4-6 hours minimum:  Change oxygen saturation probe site  4.  Every 4-6 hours:  If on nasal continuous positive airway pressure, respiratory therapy assess nares and determine need for appliance change or resting period.  2/13/2024 1833 by Jane Juarez RN  Outcome: Progressing     Problem: ABCDS Injury Assessment  Goal: Absence of physical injury  2/13/2024 1833 by Jane uJarez RN  Outcome: Progressing  Flowsheets (Taken 2/13/2024 1833)  Absence of Physical Injury: Implement safety measures based on patient assessment     Problem: Chronic Conditions and Co-morbidities  Goal: Patient's chronic conditions and co-morbidity symptoms are monitored and maintained or improved  2/13/2024 1833 by Jane Juarez RN  Outcome: Progressing  Flowsheets (Taken 2/13/2024 0806)  Care Plan - Patient's Chronic Conditions and Co-Morbidity Symptoms are Monitored and Maintained or Improved: Monitor and assess patient's chronic conditions and comorbid symptoms for stability, deterioration, or improvement     
Uneventful day.  Hopeful home/rehab tomorrow   Problem: Discharge Planning  Goal: Discharge to home or other facility with appropriate resources  Outcome: Progressing     Problem: Safety - Adult  Goal: Free from fall injury  Outcome: Progressing     
  Problem: Nutrition Deficit:  Goal: Optimize nutritional status  Outcome: Progressing     Problem: Safety - Adult  Goal: Free from fall injury  2/19/2024 0327 by Jaison Bryant, RN  Outcome: Progressing  2/18/2024 1812 by Addie Grace, RN  Outcome: Progressing     Problem: Neurosensory - Adult  Goal: Achieves stable or improved neurological status  Outcome: Progressing  Flowsheets (Taken 2/18/2024 2030)  Achieves stable or improved neurological status: Assess for and report changes in neurological status     Problem: Respiratory - Adult  Goal: Achieves optimal ventilation and oxygenation  Outcome: Progressing  Flowsheets (Taken 2/18/2024 2030)  Achieves optimal ventilation and oxygenation:   Assess for changes in respiratory status   Assess for changes in mentation and behavior   Position to facilitate oxygenation and minimize respiratory effort   Oxygen supplementation based on oxygen saturation or arterial blood gases   Encourage broncho-pulmonary hygiene including cough, deep breathe, incentive spirometry   Respiratory therapy support as indicated   Assess and instruct to report shortness of breath or any respiratory difficulty     Problem: Skin/Tissue Integrity - Adult  Goal: Skin integrity remains intact  Outcome: Progressing  Flowsheets (Taken 2/18/2024 2030)  Skin Integrity Remains Intact: Monitor for areas of redness and/or skin breakdown  Goal: Incisions, wounds, or drain sites healing without S/S of infection  Outcome: Progressing  Flowsheets (Taken 2/18/2024 2030)  Incisions, Wounds, or Drain Sites Healing Without Sign and Symptoms of Infection: TWICE DAILY: Assess and document skin integrity     Problem: Musculoskeletal - Adult  Goal: Return mobility to safest level of function  Outcome: Progressing  Flowsheets (Taken 2/18/2024 2030)  Return Mobility to Safest Level of Function:   Assess patient stability and activity tolerance for standing, transferring and ambulating with or without assistive 
Sherrill Estrella, RN  Outcome: Progressing  Flowsheets (Taken 2/14/2024 2006)  Skin Integrity Remains Intact: Monitor for areas of redness and/or skin breakdown  Goal: Incisions, wounds, or drain sites healing without S/S of infection  2/15/2024 1613 by Adrienne Parrish RN  Outcome: Not Progressing  2/15/2024 0642 by Sherrill Estrella RN  Outcome: Progressing  Flowsheets (Taken 2/14/2024 2006)  Incisions, Wounds, or Drain Sites Healing Without Sign and Symptoms of Infection: TWICE DAILY: Assess and document skin integrity     Problem: Musculoskeletal - Adult  Goal: Return mobility to safest level of function  2/15/2024 1613 by Adrienne Parrish RN  Outcome: Not Progressing  2/15/2024 0642 by Sherrill Estrella RN  Outcome: Progressing     
vomiting:   Administer ordered antiemetic medications as needed   Provide nonpharmacologic comfort measures as appropriate  Goal: Maintains or returns to baseline bowel function  2/18/2024 0210 by Jaison Bryant RN  Outcome: Progressing  Flowsheets (Taken 2/17/2024 2030)  Maintains or returns to baseline bowel function:   Assess bowel function   Encourage oral fluids to ensure adequate hydration   Encourage mobilization and activity  2/18/2024 0210 by Jaison Bryant RN  Outcome: Progressing  Flowsheets (Taken 2/17/2024 2030)  Maintains or returns to baseline bowel function:   Assess bowel function   Encourage oral fluids to ensure adequate hydration   Encourage mobilization and activity     Problem: Genitourinary - Adult  Goal: Absence of urinary retention  2/18/2024 0210 by Jaison Bryant RN  Outcome: Progressing  Flowsheets (Taken 2/17/2024 2030)  Absence of urinary retention: Assess patient’s ability to void and empty bladder  2/18/2024 0210 by Jaison Bryant RN  Outcome: Progressing  Flowsheets (Taken 2/17/2024 2030)  Absence of urinary retention: Assess patient’s ability to void and empty bladder     Problem: Infection - Adult  Goal: Absence of infection at discharge  2/18/2024 0210 by Jaison Bryant RN  Outcome: Progressing  Flowsheets (Taken 2/17/2024 2030)  Absence of infection at discharge:   Assess and monitor for signs and symptoms of infection   Monitor lab/diagnostic results   Administer medications as ordered  2/18/2024 0210 by Jaison Bryant RN  Outcome: Progressing  Flowsheets (Taken 2/17/2024 2030)  Absence of infection at discharge:   Assess and monitor for signs and symptoms of infection   Monitor lab/diagnostic results   Administer medications as ordered     Problem: Metabolic/Fluid and Electrolytes - Adult  Goal: Electrolytes maintained within normal limits  2/18/2024 0210 by Jaison Bryant RN  Outcome: Progressing  Flowsheets (Taken 2/17/2024 2030)  Electrolytes maintained within

## 2024-02-21 NOTE — DISCHARGE SUMMARY
[facility]  Office: [phone]  Brian Hunt DO, Sanjay Foster DO, Bear Horn DO, Harman Guy DO, Vince Verdugo MD, Taryn Rizo MD, Demetrius Rojo MD, Lacie Zamarripa MD,  Joel Flores MD, Paco France MD, Yovany Nair DO, Kj Jimenes MD,  Felipa Smith DO, Ayla Yusuf MD, Abdullahi Dempsey MD, Kush Hunt DO, Erin Jimenez MD,  Edward Rogers DO, Rupali Wang MD, Jaclyn Anna MD, Alexandria Yoon MD, Gregory Amos MD,  Avery Pierre MD, Blanche Alvarenga MD, Megan Perez MD, Jese Josue MD, Solomon Driver DO, Petra Cruz MD,  Ranjit Cherry MD, Chad Barcenas MD, Shirley Waterhouse, ISAURO,  Ebony Alan, CNP,, Terry Vagras, CNP,  Zoraida De La Fuente, DNP, Rachelle Trujillo, CNP, Helen Cross, CNP, Lainey Montes CNP, Monse Tirado, CNP, Zenia Díaz, CNP, Daisy Diaz, CNP, Lenka Leiva, CNS, Sandra Harley, CNP, Roxane Magaña, CNP                  [facility]      Discharge Summary     Patient ID: Cherie Molina  :  1955   MRN: [id number]     ACCOUNT:  [id number]   Patient Location :   Patient's PCP: Azul Guthrie MD  Admit Date: 2024   Discharge Date: 2024     Length of Stay: 8  Code Status:  Full Code  Admitting Physician: Lacie Zamarripa MD  Discharge Physician: Demetrius Rojo MD     Active Discharge Diagnosis :     Primary Problem  Cellulitis of left lower leg      Hospital Problems  Active Hospital Problems    Diagnosis Date Noted    Stage 3b chronic kidney disease (HCC) [N18.32] 2022     Priority: Medium    Paroxysmal atrial fibrillation (HCC) [I48.0] 10/18/2022     Priority: Medium    Acute cholecystitis [K81.0] 02/15/2024    Secondary hypercoagulable state (HCC) [D68.69] 2024    Elevated alkaline phosphatase level [R74.8] 2024    Folic acid deficiency [E53.8] 2024    Plantar ulcer of left foot ([facility]) [L97.529] 2024    Diabetic polyneuropathy ([facility]) [E11.42] 2021

## 2024-02-21 NOTE — CARE COORDINATION
Social Work- Arranged [facility] to transport at 3PM to [facility]. Orders faxed. Nurse to call report to [phone]. PAS completed. Pt and  are agreeable with lily webb. Ayad

## 2024-02-22 ENCOUNTER — TELEPHONE (OUTPATIENT)
Dept: FAMILY MEDICINE CLINIC | Age: 69
End: 2024-02-22

## 2024-02-22 LAB
EKG Q-T INTERVAL: 484 MS
EKG QRS DURATION: 110 MS
EKG QTC CALCULATION (BAZETT): 446 MS
EKG R AXIS: -44 DEGREES
EKG T AXIS: 23 DEGREES
EKG VENTRICULAR RATE: 51 BPM

## 2024-02-22 NOTE — TELEPHONE ENCOUNTER
Care Transitions Initial Follow Up Call    Outreach made within 2 business days of discharge: Yes    Patient: Cherie Molina Patient : 1955   MRN: [id number]  Reason for Admission: There are no discharge diagnoses documented for the most recent discharge.  Discharge Date: 24       Spoke with: patient     Discharge department/facility: [facility] Interactive Patient Contact:    [facility] for patient to call back.    Scheduled appointment with PCP within 7-14 days    Follow Up  Future Appointments   Date Time Provider Department Center   2024  3:45 PM Azul Guthrie MD [facility]       Gail Trammell MA

## 2024-02-23 NOTE — TELEPHONE ENCOUNTER
Care Transitions Initial Follow Up Call    Outreach made within 2 business days of discharge: Yes    Patient: Cherie Molina Patient : 1955   MRN: [id number]  Reason for Admission: There are no discharge diagnoses documented for the most recent discharge.  Discharge Date: 24       Spoke with: Cherie    Discharge department/facility: [facility]    [facility] Interactive Patient Contact:  Was patient able to fill all prescriptions: Yes  Was patient instructed to bring all medications to the follow-up visit: Yes  Is patient taking all medications as directed in the discharge summary? Yes  Does patient understand their discharge instructions: Yes  Does patient have questions or concerns that need addressed prior to 7-14 day follow up office visit: no    Scheduled appointment with PCP within 7-14 days no she is in a rehab facility right now we will schedule for when she gets out.        Follow Up  Future Appointments   Date Time Provider Department Center   2024  3:45 PM Azul Guthrie MD [facility]       Daisy Crowe MA

## 2024-02-26 DIAGNOSIS — L03.119 CELLULITIS OF LOWER EXTREMITY, UNSPECIFIED LATERALITY: Primary | ICD-10-CM

## 2024-02-26 DIAGNOSIS — L97.813 SKIN ULCER OF PRETIBIAL REGION OF RIGHT LOWER EXTREMITY, WITH NECROSIS OF MUSCLE (HCC): ICD-10-CM

## 2024-02-28 ENCOUNTER — TELEPHONE (OUTPATIENT)
Dept: FAMILY MEDICINE CLINIC | Age: 69
End: 2024-02-28

## 2024-02-28 NOTE — TELEPHONE ENCOUNTER
Kary with [facility] care called and stated  that she was doing the intake for patient, but after  taking to the skilled nursing facility, [facility] will not be able to accept patient at this time do to patient leaving the nursing facility. Kary spoke with the skilled nursing facility and stated that patient will not benefit from home health due to she is a two person assist with lifting and the wound care that she needs. Kary stated that patient will need to see pcp to determine next steps

## 2024-03-04 LAB
EKG ATRIAL RATE: 52 BPM
EKG Q-T INTERVAL: 418 MS
EKG QRS DURATION: 108 MS
EKG QTC CALCULATION (BAZETT): 431 MS
EKG R AXIS: -60 DEGREES
EKG T AXIS: 52 DEGREES
EKG VENTRICULAR RATE: 64 BPM

## 2024-03-05 ENCOUNTER — TELEPHONE (OUTPATIENT)
Dept: FAMILY MEDICINE CLINIC | Age: 69
End: 2024-03-05

## 2024-03-14 ENCOUNTER — TELEPHONE (OUTPATIENT)
Dept: FAMILY MEDICINE CLINIC | Age: 69
End: 2024-03-14

## 2024-03-14 NOTE — TELEPHONE ENCOUNTER
Care Transitions Initial Follow Up Call    Outreach made within 2 business days of discharge: Yes    Patient: Cherie Molina Patient : 1955   MRN: [id number]  Reason for Admission: There are no discharge diagnoses documented for the most recent discharge.  Discharge Date: 24       Spoke with: Cherie    Discharge department/facility: [facility] Interactive Patient Contact:  Was patient able to fill all prescriptions: Yes  Was patient instructed to bring all medications to the follow-up visit: Yes  Is patient taking all medications as directed in the discharge summary? Yes  Does patient understand their discharge instructions: Yes  Does patient have questions or concerns that need addressed prior to 7-14 day follow up office visit: no    Scheduled appointment with PCP within 7-14 days, she would like to call back     Follow Up  Future Appointments   Date Time Provider Department Center   2024  3:45 PM Azul Guthrie MD [facility]       Daisy Crowe MA

## 2024-04-09 ENCOUNTER — TELEPHONE (OUTPATIENT)
Dept: FAMILY MEDICINE CLINIC | Age: 69
End: 2024-04-09

## 2024-04-09 NOTE — TELEPHONE ENCOUNTER
had called in in regard to being worried about her, she tried to call her but she didn't answer so they called the facility they stated that she has pneumonia right now and is on antibiotics he wanted me to call and check on her so I did lm for the nurse to call me back

## 2024-04-17 ENCOUNTER — TELEPHONE (OUTPATIENT)
Dept: FAMILY MEDICINE CLINIC | Age: 69
End: 2024-04-17

## 2024-04-17 NOTE — TELEPHONE ENCOUNTER
I spoke with the nurse from the nursing home, and she had a lot of congestion and her lungs sound pretty bad so that is why they are transferring her to [place] her vitals are good except she has a low grade fever they believe she needs iv antibiotics and some steroids